# Patient Record
Sex: FEMALE | Race: WHITE | NOT HISPANIC OR LATINO | Employment: UNEMPLOYED | URBAN - METROPOLITAN AREA
[De-identification: names, ages, dates, MRNs, and addresses within clinical notes are randomized per-mention and may not be internally consistent; named-entity substitution may affect disease eponyms.]

---

## 2023-06-03 PROBLEM — F19.10 POLYSUBSTANCE ABUSE: Status: ACTIVE | Noted: 2023-06-03

## 2023-06-03 PROBLEM — A41.9 SEPSIS WITHOUT ACUTE ORGAN DYSFUNCTION, DUE TO UNSPECIFIED ORGANISM: Status: ACTIVE | Noted: 2023-06-03

## 2023-06-03 PROBLEM — R11.2 NAUSEA VOMITING AND DIARRHEA: Status: ACTIVE | Noted: 2023-06-03

## 2023-06-03 PROBLEM — I07.9 ENDOCARDITIS OF TRICUSPID VALVE: Status: ACTIVE | Noted: 2023-06-03

## 2023-06-03 PROBLEM — R78.81 MRSA BACTEREMIA: Status: ACTIVE | Noted: 2023-06-03

## 2023-06-03 PROBLEM — N17.9 AKI (ACUTE KIDNEY INJURY): Status: ACTIVE | Noted: 2023-06-03

## 2023-06-03 PROBLEM — D63.8 ANEMIA, CHRONIC DISEASE: Status: ACTIVE | Noted: 2023-06-03

## 2023-06-03 PROBLEM — R19.7 NAUSEA VOMITING AND DIARRHEA: Status: ACTIVE | Noted: 2023-06-03

## 2023-06-03 PROBLEM — B95.62 MRSA BACTEREMIA: Status: ACTIVE | Noted: 2023-06-03

## 2023-06-04 ENCOUNTER — TELEPHONE (OUTPATIENT)
Dept: PSYCHIATRY | Facility: HOSPITAL | Age: 33
End: 2023-06-04

## 2023-06-07 PROBLEM — Z18.9 RETAINED FOREIGN BODY: Status: ACTIVE | Noted: 2023-06-07

## 2023-06-19 ENCOUNTER — READMISSION MANAGEMENT (OUTPATIENT)
Dept: CALL CENTER | Facility: HOSPITAL | Age: 33
End: 2023-06-19
Payer: COMMERCIAL

## 2023-06-19 NOTE — OUTREACH NOTE
Prep Survey      Flowsheet Row Responses   [facility] facility patient discharged from? [place]   Is LACE score < 7 ? No   Eligibility Not Eligible   What are the reasons patient is not eligible? Other  [continued IV heroin]   Does the patient have one of the following disease processes/diagnoses(primary or secondary)? Sepsis   Prep survey completed? Yes            Miguelina LIU - Registered Nurse

## 2023-08-08 ENCOUNTER — TRANSCRIBE ORDERS (OUTPATIENT)
Dept: ADMINISTRATIVE | Facility: HOSPITAL | Age: 33
End: 2023-08-08
Payer: COMMERCIAL

## 2023-08-08 ENCOUNTER — HOSPITAL ENCOUNTER (OUTPATIENT)
Dept: GENERAL RADIOLOGY | Facility: HOSPITAL | Age: 33
Discharge: HOME | End: 2023-08-08
Admitting: NURSE PRACTITIONER
Payer: COMMERCIAL

## 2023-08-08 DIAGNOSIS — M25.572 LEFT ANKLE PAIN, UNSPECIFIED CHRONICITY: ICD-10-CM

## 2023-08-08 DIAGNOSIS — M25.572 LEFT ANKLE PAIN, UNSPECIFIED CHRONICITY: Primary | ICD-10-CM

## 2023-08-08 PROCEDURE — 73610 X-RAY EXAM OF ANKLE: CPT

## 2023-12-02 ENCOUNTER — APPOINTMENT (OUTPATIENT)
Dept: GENERAL RADIOLOGY | Facility: HOSPITAL | Age: 33
DRG: 871 | End: 2023-12-02
Payer: COMMERCIAL

## 2023-12-02 ENCOUNTER — HOSPITAL ENCOUNTER (INPATIENT)
Facility: HOSPITAL | Age: 33
LOS: 9 days | Discharge: LEFT BEFORE DISCHARGE | DRG: 871 | End: 2023-12-11
Attending: EMERGENCY MEDICINE | Admitting: INTERNAL MEDICINE
Payer: COMMERCIAL

## 2023-12-02 DIAGNOSIS — R65.20 SEPSIS WITH ENCEPHALOPATHY WITHOUT SEPTIC SHOCK, DUE TO UNSPECIFIED ORGANISM: Primary | ICD-10-CM

## 2023-12-02 DIAGNOSIS — G93.41 SEPSIS WITH ENCEPHALOPATHY WITHOUT SEPTIC SHOCK, DUE TO UNSPECIFIED ORGANISM: Primary | ICD-10-CM

## 2023-12-02 DIAGNOSIS — A41.9 SEPSIS WITH ENCEPHALOPATHY WITHOUT SEPTIC SHOCK, DUE TO UNSPECIFIED ORGANISM: Primary | ICD-10-CM

## 2023-12-02 LAB
ALBUMIN SERPL-MCNC: 2.8 G/DL (ref 3.5–5.2)
ALBUMIN/GLOB SERPL: 0.6 G/DL
ALP SERPL-CCNC: 212 U/L (ref 39–117)
ALT SERPL W P-5'-P-CCNC: 61 U/L (ref 1–33)
ANION GAP SERPL CALCULATED.3IONS-SCNC: 23.8 MMOL/L (ref 5–15)
ARTERIAL PATENCY WRIST A: POSITIVE
AST SERPL-CCNC: 111 U/L (ref 1–32)
ATMOSPHERIC PRESS: 749.6 MMHG
B PARAPERT DNA SPEC QL NAA+PROBE: NOT DETECTED
B PERT DNA SPEC QL NAA+PROBE: NOT DETECTED
BASE EXCESS BLDA CALC-SCNC: -15.4 MMOL/L (ref 0–2)
BDY SITE: ABNORMAL
BILIRUB SERPL-MCNC: 2.8 MG/DL (ref 0–1.2)
BUN BLDA-MCNC: 19 MG/DL
BUN SERPL-MCNC: 21 MG/DL (ref 6–20)
BUN/CREAT SERPL: 7.7 (ref 7–25)
C PNEUM DNA NPH QL NAA+NON-PROBE: NOT DETECTED
CA-I BLDA-SCNC: 1.05 MMOL/L (ref 2.15–2.5)
CALCIUM SPEC-SCNC: 8.4 MG/DL (ref 8.6–10.5)
CHLORIDE BLDA-SCNC: 106 MMOL/L (ref 98–107)
CHLORIDE SERPL-SCNC: 95 MMOL/L (ref 98–107)
CO2 BLDA-SCNC: 12.8 MMOL/L (ref 23–27)
CO2 SERPL-SCNC: 15.2 MMOL/L (ref 22–29)
CREAT BLDA-MCNC: 2.83 MG/DL (ref 0.6–130)
CREAT SERPL-MCNC: 2.72 MG/DL (ref 0.57–1)
CRP SERPL-MCNC: 12.82 MG/DL (ref 0–0.5)
D-LACTATE SERPL-SCNC: 11.2 MMOL/L (ref 0.5–2)
D-LACTATE SERPL-SCNC: 11.9 MMOL/L (ref 0.5–2)
D-LACTATE SERPL-SCNC: 5.9 MMOL/L (ref 0.5–2)
D-LACTATE SERPL-SCNC: 7.6 MMOL/L (ref 0.5–2)
DEPRECATED RDW RBC AUTO: 41.3 FL (ref 37–54)
EGFRCR SERPLBLD CKD-EPI 2021: 22.7 ML/MIN/1.73
EOSINOPHIL # BLD MANUAL: 0.17 10*3/MM3 (ref 0–0.4)
EOSINOPHIL NFR BLD MANUAL: 1 % (ref 0.3–6.2)
ERYTHROCYTE [DISTWIDTH] IN BLOOD BY AUTOMATED COUNT: 15.6 % (ref 12.3–15.4)
ERYTHROCYTE [SEDIMENTATION RATE] IN BLOOD: 46 MM/HR (ref 0–20)
ETHANOL BLD-MCNC: <10 MG/DL (ref 0–10)
ETHANOL UR QL: <0.01 %
FLUAV SUBTYP SPEC NAA+PROBE: NOT DETECTED
FLUBV RNA ISLT QL NAA+PROBE: NOT DETECTED
GLOBULIN UR ELPH-MCNC: 4.6 GM/DL
GLUCOSE BLDC GLUCOMTR-MCNC: 28 MG/DL (ref 70–130)
GLUCOSE BLDC GLUCOMTR-MCNC: 31 MG/DL (ref 70–130)
GLUCOSE BLDC GLUCOMTR-MCNC: 62 MG/DL (ref 70–130)
GLUCOSE BLDC GLUCOMTR-MCNC: 64 MG/DL (ref 70–130)
GLUCOSE BLDC GLUCOMTR-MCNC: 69 MG/DL (ref 70–130)
GLUCOSE BLDC GLUCOMTR-MCNC: 69 MG/DL (ref 70–130)
GLUCOSE BLDC GLUCOMTR-MCNC: 88 MG/DL (ref 70–130)
GLUCOSE BLDC GLUCOMTR-MCNC: 94 MG/DL (ref 70–130)
GLUCOSE BLDC GLUCOMTR-MCNC: 95 MG/DL (ref 70–130)
GLUCOSE SERPL-MCNC: 64 MG/DL (ref 65–99)
HADV DNA SPEC NAA+PROBE: NOT DETECTED
HCG SERPL QL: NEGATIVE
HCO3 BLDA-SCNC: 12.5 MMOL/L (ref 22–28)
HCOV 229E RNA SPEC QL NAA+PROBE: NOT DETECTED
HCOV HKU1 RNA SPEC QL NAA+PROBE: NOT DETECTED
HCOV NL63 RNA SPEC QL NAA+PROBE: NOT DETECTED
HCOV OC43 RNA SPEC QL NAA+PROBE: NOT DETECTED
HCT VFR BLD AUTO: 32.4 % (ref 34–46.6)
HCT VFR BLDA CALC: 28 % (ref 38–51)
HEMODILUTION: NO
HGB BLD-MCNC: 9.7 G/DL (ref 12–15.9)
HGB BLDA-MCNC: 9.5 G/DL (ref 12–17)
HMPV RNA NPH QL NAA+NON-PROBE: NOT DETECTED
HPIV1 RNA ISLT QL NAA+PROBE: NOT DETECTED
HPIV2 RNA SPEC QL NAA+PROBE: NOT DETECTED
HPIV3 RNA NPH QL NAA+PROBE: NOT DETECTED
HPIV4 P GENE NPH QL NAA+PROBE: NOT DETECTED
LIPASE SERPL-CCNC: 14 U/L (ref 13–60)
LYMPHOCYTES # BLD MANUAL: 0 10*3/MM3 (ref 0.7–3.1)
LYMPHOCYTES NFR BLD MANUAL: 2 % (ref 5–12)
Lab: ABNORMAL
Lab: ABNORMAL
M PNEUMO IGG SER IA-ACNC: NOT DETECTED
MCH RBC QN AUTO: 22 PG (ref 26.6–33)
MCHC RBC AUTO-ENTMCNC: 29.9 G/DL (ref 31.5–35.7)
MCV RBC AUTO: 73.6 FL (ref 79–97)
METAMYELOCYTES NFR BLD MANUAL: 2 % (ref 0–0)
MODALITY: ABNORMAL
MONOCYTES # BLD: 0.35 10*3/MM3 (ref 0.1–0.9)
NEUTROPHILS # BLD AUTO: 16.39 10*3/MM3 (ref 1.7–7)
NEUTROPHILS NFR BLD MANUAL: 95 % (ref 42.7–76)
NOTIFIED WHO: ABNORMAL
NRBC BLD AUTO-RTO: 0 /100 WBC (ref 0–0.2)
PCO2 BLDA: 36.2 MM HG (ref 35–45)
PH BLDA: 7.14 PH UNITS (ref 7.35–7.45)
PLAT MORPH BLD: NORMAL
PLATELET # BLD AUTO: 144 10*3/MM3 (ref 140–450)
PMV BLD AUTO: 10.4 FL (ref 6–12)
PO2 BLDA: 79.8 MM HG (ref 80–100)
POTASSIUM BLDA-SCNC: 2.9 MMOL/L (ref 3.5–5.2)
POTASSIUM SERPL-SCNC: 3.6 MMOL/L (ref 3.5–5.2)
PROT SERPL-MCNC: 7.4 G/DL (ref 6–8.5)
RBC # BLD AUTO: 4.4 10*6/MM3 (ref 3.77–5.28)
RBC MORPH BLD: NORMAL
READ BACK: YES
RHINOVIRUS RNA SPEC NAA+PROBE: NOT DETECTED
RSV RNA NPH QL NAA+NON-PROBE: NOT DETECTED
SAO2 % BLDCOA: 91.5 % (ref 92–98.5)
SARS-COV-2 RNA NPH QL NAA+NON-PROBE: NOT DETECTED
SODIUM BLD-SCNC: 138 MMOL/L (ref 136–145)
SODIUM SERPL-SCNC: 134 MMOL/L (ref 136–145)
TOTAL RATE: 16 BREATHS/MINUTE
VARIANT LYMPHS NFR BLD MANUAL: 0 % (ref 19.6–45.3)
WBC MORPH BLD: NORMAL
WBC NRBC COR # BLD AUTO: 17.25 10*3/MM3 (ref 3.4–10.8)
WHOLE BLOOD HOLD COAG: NORMAL

## 2023-12-02 PROCEDURE — 83605 ASSAY OF LACTIC ACID: CPT | Performed by: INTERNAL MEDICINE

## 2023-12-02 PROCEDURE — 82948 REAGENT STRIP/BLOOD GLUCOSE: CPT

## 2023-12-02 PROCEDURE — 83605 ASSAY OF LACTIC ACID: CPT | Performed by: EMERGENCY MEDICINE

## 2023-12-02 PROCEDURE — 85025 COMPLETE CBC W/AUTO DIFF WBC: CPT | Performed by: EMERGENCY MEDICINE

## 2023-12-02 PROCEDURE — 85652 RBC SED RATE AUTOMATED: CPT | Performed by: EMERGENCY MEDICINE

## 2023-12-02 PROCEDURE — 71045 X-RAY EXAM CHEST 1 VIEW: CPT

## 2023-12-02 PROCEDURE — 86140 C-REACTIVE PROTEIN: CPT | Performed by: EMERGENCY MEDICINE

## 2023-12-02 PROCEDURE — 25810000003 SODIUM CHLORIDE 0.9 % SOLUTION: Performed by: INTERNAL MEDICINE

## 2023-12-02 PROCEDURE — 25010000002 FENTANYL CITRATE (PF) 50 MCG/ML SOLUTION: Performed by: INTERNAL MEDICINE

## 2023-12-02 PROCEDURE — 83690 ASSAY OF LIPASE: CPT | Performed by: EMERGENCY MEDICINE

## 2023-12-02 PROCEDURE — 80047 BASIC METABLC PNL IONIZED CA: CPT

## 2023-12-02 PROCEDURE — 85007 BL SMEAR W/DIFF WBC COUNT: CPT | Performed by: EMERGENCY MEDICINE

## 2023-12-02 PROCEDURE — 25010000002 HEPARIN (PORCINE) PER 1000 UNITS: Performed by: INTERNAL MEDICINE

## 2023-12-02 PROCEDURE — 25010000002 VASOPRESSIN 20 UNIT/ML SOLUTION

## 2023-12-02 PROCEDURE — 99285 EMERGENCY DEPT VISIT HI MDM: CPT

## 2023-12-02 PROCEDURE — 85018 HEMOGLOBIN: CPT

## 2023-12-02 PROCEDURE — 25010000002 MIDAZOLAM PER 1 MG

## 2023-12-02 PROCEDURE — 0202U NFCT DS 22 TRGT SARS-COV-2: CPT | Performed by: EMERGENCY MEDICINE

## 2023-12-02 PROCEDURE — 25010000002 POTASSIUM CHLORIDE 10 MEQ/100ML SOLUTION

## 2023-12-02 PROCEDURE — 36415 COLL VENOUS BLD VENIPUNCTURE: CPT | Performed by: INTERNAL MEDICINE

## 2023-12-02 PROCEDURE — 87147 CULTURE TYPE IMMUNOLOGIC: CPT | Performed by: EMERGENCY MEDICINE

## 2023-12-02 PROCEDURE — 82803 BLOOD GASES ANY COMBINATION: CPT

## 2023-12-02 PROCEDURE — 25010000002 VANCOMYCIN 10 G RECONSTITUTED SOLUTION: Performed by: EMERGENCY MEDICINE

## 2023-12-02 PROCEDURE — 82077 ASSAY SPEC XCP UR&BREATH IA: CPT | Performed by: EMERGENCY MEDICINE

## 2023-12-02 PROCEDURE — 36600 WITHDRAWAL OF ARTERIAL BLOOD: CPT

## 2023-12-02 PROCEDURE — 87186 SC STD MICRODIL/AGAR DIL: CPT | Performed by: EMERGENCY MEDICINE

## 2023-12-02 PROCEDURE — 25810000003 SODIUM CHLORIDE 0.9 % SOLUTION: Performed by: EMERGENCY MEDICINE

## 2023-12-02 PROCEDURE — 25010000002 ONDANSETRON PER 1 MG: Performed by: INTERNAL MEDICINE

## 2023-12-02 PROCEDURE — 25810000003 DEXTROSE-NACL PER 500 ML: Performed by: INTERNAL MEDICINE

## 2023-12-02 PROCEDURE — 25010000002 PIPERACILLIN SOD-TAZOBACTAM PER 1 G: Performed by: INTERNAL MEDICINE

## 2023-12-02 PROCEDURE — 02HV33Z INSERTION OF INFUSION DEVICE INTO SUPERIOR VENA CAVA, PERCUTANEOUS APPROACH: ICD-10-PCS

## 2023-12-02 PROCEDURE — 80053 COMPREHEN METABOLIC PANEL: CPT | Performed by: EMERGENCY MEDICINE

## 2023-12-02 PROCEDURE — 25010000002 PIPERACILLIN SOD-TAZOBACTAM PER 1 G: Performed by: EMERGENCY MEDICINE

## 2023-12-02 PROCEDURE — 87040 BLOOD CULTURE FOR BACTERIA: CPT | Performed by: EMERGENCY MEDICINE

## 2023-12-02 PROCEDURE — 84703 CHORIONIC GONADOTROPIN ASSAY: CPT | Performed by: EMERGENCY MEDICINE

## 2023-12-02 PROCEDURE — 83605 ASSAY OF LACTIC ACID: CPT

## 2023-12-02 PROCEDURE — 87150 DNA/RNA AMPLIFIED PROBE: CPT | Performed by: EMERGENCY MEDICINE

## 2023-12-02 RX ORDER — ACETAMINOPHEN 500 MG
1000 TABLET ORAL ONCE
Status: COMPLETED | OUTPATIENT
Start: 2023-12-02 | End: 2023-12-02

## 2023-12-02 RX ORDER — DEXTROSE AND SODIUM CHLORIDE 5; .9 G/100ML; G/100ML
75 INJECTION, SOLUTION INTRAVENOUS CONTINUOUS
Status: DISCONTINUED | OUTPATIENT
Start: 2023-12-02 | End: 2023-12-05

## 2023-12-02 RX ORDER — HEPARIN SODIUM 5000 [USP'U]/ML
5000 INJECTION, SOLUTION INTRAVENOUS; SUBCUTANEOUS EVERY 8 HOURS SCHEDULED
Status: DISCONTINUED | OUTPATIENT
Start: 2023-12-02 | End: 2023-12-11 | Stop reason: HOSPADM

## 2023-12-02 RX ORDER — AMOXICILLIN 250 MG
2 CAPSULE ORAL 2 TIMES DAILY
Status: DISCONTINUED | OUTPATIENT
Start: 2023-12-02 | End: 2023-12-06

## 2023-12-02 RX ORDER — SODIUM CHLORIDE 9 MG/ML
125 INJECTION, SOLUTION INTRAVENOUS CONTINUOUS
Status: DISCONTINUED | OUTPATIENT
Start: 2023-12-02 | End: 2023-12-05

## 2023-12-02 RX ORDER — DEXTROSE MONOHYDRATE 25 G/50ML
25 INJECTION, SOLUTION INTRAVENOUS
Status: DISCONTINUED | OUTPATIENT
Start: 2023-12-02 | End: 2023-12-11 | Stop reason: HOSPADM

## 2023-12-02 RX ORDER — DEXTROSE MONOHYDRATE 25 G/50ML
25 INJECTION, SOLUTION INTRAVENOUS ONCE
Status: COMPLETED | OUTPATIENT
Start: 2023-12-02 | End: 2023-12-02

## 2023-12-02 RX ORDER — NITROGLYCERIN 0.4 MG/1
0.4 TABLET SUBLINGUAL
Status: DISCONTINUED | OUTPATIENT
Start: 2023-12-02 | End: 2023-12-11 | Stop reason: HOSPADM

## 2023-12-02 RX ORDER — POTASSIUM CHLORIDE 750 MG/1
40 TABLET, FILM COATED, EXTENDED RELEASE ORAL EVERY 4 HOURS
Status: DISCONTINUED | OUTPATIENT
Start: 2023-12-02 | End: 2023-12-02

## 2023-12-02 RX ORDER — VANCOMYCIN HYDROCHLORIDE 1 G/200ML
1000 INJECTION, SOLUTION INTRAVENOUS EVERY 24 HOURS
Status: DISCONTINUED | OUTPATIENT
Start: 2023-12-03 | End: 2023-12-03

## 2023-12-02 RX ORDER — PHENYLEPHRINE HCL IN 0.9% NACL 0.5 MG/5ML
.5-3 SYRINGE (ML) INTRAVENOUS
Status: DISCONTINUED | OUTPATIENT
Start: 2023-12-02 | End: 2023-12-05

## 2023-12-02 RX ORDER — DEXTROSE MONOHYDRATE 25 G/50ML
INJECTION, SOLUTION INTRAVENOUS
Status: COMPLETED
Start: 2023-12-02 | End: 2023-12-02

## 2023-12-02 RX ORDER — SODIUM CHLORIDE 0.9 % (FLUSH) 0.9 %
10 SYRINGE (ML) INJECTION EVERY 12 HOURS SCHEDULED
Status: DISCONTINUED | OUTPATIENT
Start: 2023-12-02 | End: 2023-12-11 | Stop reason: HOSPADM

## 2023-12-02 RX ORDER — POLYETHYLENE GLYCOL 3350 17 G/17G
17 POWDER, FOR SOLUTION ORAL DAILY PRN
Status: DISCONTINUED | OUTPATIENT
Start: 2023-12-02 | End: 2023-12-06

## 2023-12-02 RX ORDER — SODIUM CHLORIDE 0.9 % (FLUSH) 0.9 %
10 SYRINGE (ML) INJECTION AS NEEDED
Status: DISCONTINUED | OUTPATIENT
Start: 2023-12-02 | End: 2023-12-11 | Stop reason: HOSPADM

## 2023-12-02 RX ORDER — VANCOMYCIN 2 GRAM/500 ML IN 0.9 % SODIUM CHLORIDE INTRAVENOUS
20 ONCE
Status: COMPLETED | OUTPATIENT
Start: 2023-12-02 | End: 2023-12-02

## 2023-12-02 RX ORDER — NOREPINEPHRINE BITARTRATE 0.03 MG/ML
.02-.3 INJECTION, SOLUTION INTRAVENOUS
Status: DISCONTINUED | OUTPATIENT
Start: 2023-12-02 | End: 2023-12-05

## 2023-12-02 RX ORDER — MIDAZOLAM HYDROCHLORIDE 1 MG/ML
2 INJECTION INTRAMUSCULAR; INTRAVENOUS ONCE
Status: COMPLETED | OUTPATIENT
Start: 2023-12-02 | End: 2023-12-02

## 2023-12-02 RX ORDER — FENTANYL CITRATE 50 UG/ML
100 INJECTION, SOLUTION INTRAMUSCULAR; INTRAVENOUS ONCE
Status: COMPLETED | OUTPATIENT
Start: 2023-12-02 | End: 2023-12-02

## 2023-12-02 RX ORDER — NICOTINE POLACRILEX 4 MG
15 LOZENGE BUCCAL
Status: DISCONTINUED | OUTPATIENT
Start: 2023-12-02 | End: 2023-12-11 | Stop reason: HOSPADM

## 2023-12-02 RX ORDER — SODIUM CHLORIDE 9 MG/ML
40 INJECTION, SOLUTION INTRAVENOUS AS NEEDED
Status: DISCONTINUED | OUTPATIENT
Start: 2023-12-02 | End: 2023-12-11 | Stop reason: HOSPADM

## 2023-12-02 RX ORDER — ACETAMINOPHEN 325 MG/1
650 TABLET ORAL EVERY 6 HOURS PRN
Status: DISCONTINUED | OUTPATIENT
Start: 2023-12-02 | End: 2023-12-11 | Stop reason: HOSPADM

## 2023-12-02 RX ORDER — POTASSIUM CHLORIDE 7.45 MG/ML
10 INJECTION INTRAVENOUS
Status: COMPLETED | OUTPATIENT
Start: 2023-12-02 | End: 2023-12-03

## 2023-12-02 RX ORDER — MIDAZOLAM HYDROCHLORIDE 1 MG/ML
INJECTION INTRAMUSCULAR; INTRAVENOUS
Status: COMPLETED
Start: 2023-12-02 | End: 2023-12-02

## 2023-12-02 RX ORDER — BISACODYL 10 MG
10 SUPPOSITORY, RECTAL RECTAL DAILY PRN
Status: DISCONTINUED | OUTPATIENT
Start: 2023-12-02 | End: 2023-12-06

## 2023-12-02 RX ORDER — IBUPROFEN 600 MG/1
1 TABLET ORAL
Status: DISCONTINUED | OUTPATIENT
Start: 2023-12-02 | End: 2023-12-11 | Stop reason: HOSPADM

## 2023-12-02 RX ORDER — BISACODYL 5 MG/1
5 TABLET, DELAYED RELEASE ORAL DAILY PRN
Status: DISCONTINUED | OUTPATIENT
Start: 2023-12-02 | End: 2023-12-06

## 2023-12-02 RX ORDER — ONDANSETRON 2 MG/ML
4 INJECTION INTRAMUSCULAR; INTRAVENOUS EVERY 6 HOURS PRN
Status: DISCONTINUED | OUTPATIENT
Start: 2023-12-02 | End: 2023-12-11 | Stop reason: HOSPADM

## 2023-12-02 RX ORDER — NOREPINEPHRINE BITARTRATE 0.03 MG/ML
INJECTION, SOLUTION INTRAVENOUS
Status: COMPLETED
Start: 2023-12-02 | End: 2023-12-02

## 2023-12-02 RX ORDER — LORAZEPAM 2 MG/ML
1 CONCENTRATE ORAL ONCE
Status: DISCONTINUED | OUTPATIENT
Start: 2023-12-02 | End: 2023-12-02

## 2023-12-02 RX ORDER — OXYCODONE HYDROCHLORIDE AND ACETAMINOPHEN 5; 325 MG/1; MG/1
1 TABLET ORAL EVERY 6 HOURS PRN
Status: DISCONTINUED | OUTPATIENT
Start: 2023-12-02 | End: 2023-12-06

## 2023-12-02 RX ADMIN — POTASSIUM CHLORIDE 10 MEQ: 7.46 INJECTION, SOLUTION INTRAVENOUS at 21:39

## 2023-12-02 RX ADMIN — POTASSIUM CHLORIDE 10 MEQ: 7.46 INJECTION, SOLUTION INTRAVENOUS at 23:55

## 2023-12-02 RX ADMIN — DEXTROSE MONOHYDRATE 25 G: 25 INJECTION, SOLUTION INTRAVENOUS at 17:56

## 2023-12-02 RX ADMIN — SODIUM CHLORIDE 3030 ML: 9 INJECTION, SOLUTION INTRAVENOUS at 11:16

## 2023-12-02 RX ADMIN — POTASSIUM CHLORIDE 40 MEQ: 750 TABLET, EXTENDED RELEASE ORAL at 16:06

## 2023-12-02 RX ADMIN — Medication 10 ML: at 11:17

## 2023-12-02 RX ADMIN — ACETAMINOPHEN 1000 MG: 500 TABLET ORAL at 12:00

## 2023-12-02 RX ADMIN — Medication 0.02 MCG/KG/MIN: at 14:01

## 2023-12-02 RX ADMIN — MIDAZOLAM HYDROCHLORIDE 2 MG: 2 INJECTION, SOLUTION INTRAMUSCULAR; INTRAVENOUS at 22:35

## 2023-12-02 RX ADMIN — Medication 10 ML: at 20:08

## 2023-12-02 RX ADMIN — DEXTROSE MONOHYDRATE 25 G: 25 INJECTION, SOLUTION INTRAVENOUS at 11:07

## 2023-12-02 RX ADMIN — FENTANYL CITRATE 100 MCG: 50 INJECTION, SOLUTION INTRAMUSCULAR; INTRAVENOUS at 22:35

## 2023-12-02 RX ADMIN — Medication 0.3 MCG/KG/MIN: at 18:51

## 2023-12-02 RX ADMIN — ACETAMINOPHEN 650 MG: 325 TABLET, FILM COATED ORAL at 20:29

## 2023-12-02 RX ADMIN — POTASSIUM CHLORIDE 10 MEQ: 7.46 INJECTION, SOLUTION INTRAVENOUS at 23:03

## 2023-12-02 RX ADMIN — DEXTROSE MONOHYDRATE 25 G: 25 INJECTION, SOLUTION INTRAVENOUS at 19:51

## 2023-12-02 RX ADMIN — Medication 0.3 MCG/KG/MIN: at 23:34

## 2023-12-02 RX ADMIN — HEPARIN SODIUM 5000 UNITS: 5000 INJECTION INTRAVENOUS; SUBCUTANEOUS at 21:39

## 2023-12-02 RX ADMIN — VASOPRESSIN 0.03 UNITS/MIN: 20 INJECTION, SOLUTION INTRAVENOUS at 19:31

## 2023-12-02 RX ADMIN — SODIUM CHLORIDE 1000 ML: 9 INJECTION, SOLUTION INTRAVENOUS at 14:44

## 2023-12-02 RX ADMIN — MIDAZOLAM HYDROCHLORIDE 2 MG: 1 INJECTION INTRAMUSCULAR; INTRAVENOUS at 22:56

## 2023-12-02 RX ADMIN — DEXTROSE AND SODIUM CHLORIDE 125 ML/HR: 5; 900 INJECTION, SOLUTION INTRAVENOUS at 17:59

## 2023-12-02 RX ADMIN — MIDAZOLAM HYDROCHLORIDE 2 MG: 2 INJECTION, SOLUTION INTRAMUSCULAR; INTRAVENOUS at 22:56

## 2023-12-02 RX ADMIN — PIPERACILLIN SODIUM AND TAZOBACTAM SODIUM 3.38 G: 3; .375 INJECTION, SOLUTION INTRAVENOUS at 20:52

## 2023-12-02 RX ADMIN — PIPERACILLIN SODIUM AND TAZOBACTAM SODIUM 4.5 G: 4; .5 INJECTION, SOLUTION INTRAVENOUS at 11:23

## 2023-12-02 RX ADMIN — HEPARIN SODIUM 5000 UNITS: 5000 INJECTION INTRAVENOUS; SUBCUTANEOUS at 16:06

## 2023-12-02 RX ADMIN — VANCOMYCIN HYDROCHLORIDE 2000 MG: 10 INJECTION, POWDER, LYOPHILIZED, FOR SOLUTION INTRAVENOUS at 11:31

## 2023-12-02 RX ADMIN — ONDANSETRON 4 MG: 2 INJECTION INTRAMUSCULAR; INTRAVENOUS at 18:02

## 2023-12-02 RX ADMIN — SODIUM CHLORIDE 1000 ML: 9 INJECTION, SOLUTION INTRAVENOUS at 17:30

## 2023-12-02 RX ADMIN — SODIUM CHLORIDE 125 ML/HR: 9 INJECTION, SOLUTION INTRAVENOUS at 14:43

## 2023-12-02 RX ADMIN — POTASSIUM CHLORIDE 10 MEQ: 7.46 INJECTION, SOLUTION INTRAVENOUS at 20:30

## 2023-12-02 NOTE — ED NOTES
Pt to ED via EMS from home, boyfriend called informed them she had done heroin this am, has had n/v/d for days. Pt a/o x 4 at this time, states mild sore throat. Pt refused nia and md solange aware

## 2023-12-02 NOTE — ED TRIAGE NOTES
Patient to ED via EMS from home. Patient's boyfriend called EMS for diarrhea and weakness. EMS reports that patient's last heroine use was this morning.

## 2023-12-02 NOTE — ED PROVIDER NOTES
EMERGENCY DEPARTMENT ENCOUNTER    Room Number:  33/33  PCP: Provider, No Known  Patient Care Team:  Provider, No Known as PCP - General   Independent Historians: Patient EMS    HPI:  Chief Complaint: Hypotension, altered mental status    A complete HPI/ROS/PMH/PSH/SH/FH are unobtainable due to: Altered mental status    Chronic or social conditions impacting patient care (Social Determinants of Health): Chronic IV drug use, history of noncompliant behavior  (Financial Resource Strain / Food Insecurity / Transportation Needs / Physical Activity / Stress / Social Connections / Intimate Partner Violence / Housing Stability)    Context: Sammie Landeros is a 33 y.o. female who presents to the ED c/o acute altered status.  EMS reports that the patient has had diarrhea for the last several days.  She has been crawling around on the floor at home because she has been too weak to stand.  EMS reports she has a history of IV drug use and has been using as recently as yesterday and today.  They found her hypotensive.  They found her hypoglycemic.  They were unable to obtain IV access.  She is not able to provide much history.    Review of prior external notes (non-ED) -and- Review of prior external test results outside of this encounter: Discharge summary dated 6/19/2023 with sepsis, MRSA and polysubstance abuse with nausea vomiting and diarrhea    Prescription drug monitoring program review:         PAST MEDICAL HISTORY  Active Ambulatory Problems     Diagnosis Date Noted    Cellulitis of left ankle 07/17/2016    Sepsis without acute organ dysfunction, due to unspecified organism 06/03/2023    Anemia, chronic disease 06/03/2023    ANTONELLA (acute kidney injury) 06/03/2023    MRSA bacteremia 06/03/2023    s 06/03/2023    Polysubstance abuse 06/03/2023    Nausea vomiting and diarrhea 06/03/2023    Endocarditis of tricuspid valve 06/03/2023    Retained foreign body 06/07/2023     Resolved Ambulatory Problems     Diagnosis Date Noted     "No Resolved Ambulatory Problems     Past Medical History:   Diagnosis Date    Hepatitis C     Hyperlipidemia          PAST SURGICAL HISTORY  Past Surgical History:   Procedure Laterality Date    ADENOIDECTOMY      BACK SURGERY      INCISION AND DRAINAGE LEG Left 7/20/2016    Procedure: Incision and Drainage left ankle;  Surgeon: Zechariah Kunz MD;  Location: [facility] OR;  Service:     TONSILLECTOMY           FAMILY HISTORY  Family History   Problem Relation Age of Onset    Other Mother         ADOPTED         SOCIAL HISTORY  Social History     Socioeconomic History    Marital status: Single   Tobacco Use    Smoking status: Former     Packs/day: 1     Types: Cigarettes   Vaping Use    Vaping Use: Some days   Substance and Sexual Activity    Alcohol use: No    Drug use: Yes     Frequency: 21.0 times per week     Types: Heroin     Comment: \"USE $20-&50 UP TO 3 TIMES PER DAY\"    Sexual activity: Defer         ALLERGIES  Patient has no known allergies.        REVIEW OF SYSTEMS  Review of Systems  Included in HPI  All systems reviewed and negative except for those discussed in HPI.      PHYSICAL EXAM    I have reviewed the triage vital signs and nursing notes.    ED Triage Vitals   Temp Heart Rate Resp BP SpO2   12/02/23 1107 12/02/23 1104 12/02/23 1104 12/02/23 1106 --   (!) 100.6 °F (38.1 °C) 94 12 90/45       Temp src Heart Rate Source Patient Position BP Location FiO2 (%)   12/02/23 1107 -- -- -- --   Tympanic           Physical Exam  GENERAL: Awake, alert, acutely and chronically ill-appearing  SKIN: Warm, dry  HENT: Normocephalic, atraumatic  EYES: no scleral icterus  CV: regular rhythm, regular rate  RESPIRATORY: normal effort, lungs clear  ABDOMEN: soft, nontender, nondistended  MUSCULOSKELETAL: no deformity, scars to the bilateral lower extremities with persistent open wounds without drainage or erythema.  NEURO: alert, moves all extremities, follows commands                                                  "              LAB RESULTS  Recent Results (from the past 24 hour(s))   POC Glucose Once    Collection Time: 12/02/23 11:04 AM    Specimen: Blood   Result Value Ref Range    Glucose 28 (C) 70 - 130 mg/dL   Comprehensive Metabolic Panel    Collection Time: 12/02/23 11:10 AM    Specimen: Blood   Result Value Ref Range    Glucose 64 (L) 65 - 99 mg/dL    BUN 21 (H) 6 - 20 mg/dL    Creatinine 2.72 (H) 0.57 - 1.00 mg/dL    Sodium 134 (L) 136 - 145 mmol/L    Potassium 3.6 3.5 - 5.2 mmol/L    Chloride 95 (L) 98 - 107 mmol/L    CO2 15.2 (L) 22.0 - 29.0 mmol/L    Calcium 8.4 (L) 8.6 - 10.5 mg/dL    Total Protein 7.4 6.0 - 8.5 g/dL    Albumin 2.8 (L) 3.5 - 5.2 g/dL    ALT (SGPT) 61 (H) 1 - 33 U/L    AST (SGOT) 111 (H) 1 - 32 U/L    Alkaline Phosphatase 212 (H) 39 - 117 U/L    Total Bilirubin 2.8 (H) 0.0 - 1.2 mg/dL    Globulin 4.6 gm/dL    A/G Ratio 0.6 g/dL    BUN/Creatinine Ratio 7.7 7.0 - 25.0    Anion Gap 23.8 (H) 5.0 - 15.0 mmol/L    eGFR 22.7 (L) >60.0 mL/min/1.73   hCG, Serum, Qualitative    Collection Time: 12/02/23 11:10 AM    Specimen: Blood   Result Value Ref Range    HCG Qualitative Negative Negative   Lipase    Collection Time: 12/02/23 11:10 AM    Specimen: Blood   Result Value Ref Range    Lipase 14 13 - 60 U/L   Lactic Acid, Plasma    Collection Time: 12/02/23 11:10 AM    Specimen: Blood   Result Value Ref Range    Lactate 11.2 (C) 0.5 - 2.0 mmol/L   Ethanol    Collection Time: 12/02/23 11:10 AM    Specimen: Blood   Result Value Ref Range    Ethanol <10 0 - 10 mg/dL    Ethanol % <0.010 %   CBC Auto Differential    Collection Time: 12/02/23 11:10 AM    Specimen: Blood   Result Value Ref Range    WBC 17.25 (H) 3.40 - 10.80 10*3/mm3    RBC 4.40 3.77 - 5.28 10*6/mm3    Hemoglobin 9.7 (L) 12.0 - 15.9 g/dL    Hematocrit 32.4 (L) 34.0 - 46.6 %    MCV 73.6 (L) 79.0 - 97.0 fL    MCH 22.0 (L) 26.6 - 33.0 pg    MCHC 29.9 (L) 31.5 - 35.7 g/dL    RDW 15.6 (H) 12.3 - 15.4 %    RDW-SD 41.3 37.0 - 54.0 fl    MPV 10.4 6.0 - 12.0  fL    Platelets 144 140 - 450 10*3/mm3    nRBC 0.0 0.0 - 0.2 /100 WBC   Sedimentation Rate    Collection Time: 12/02/23 11:10 AM    Specimen: Blood   Result Value Ref Range    Sed Rate 46 (H) 0 - 20 mm/hr   C-reactive Protein    Collection Time: 12/02/23 11:10 AM    Specimen: Blood   Result Value Ref Range    C-Reactive Protein 12.82 (H) 0.00 - 0.50 mg/dL   Manual Differential    Collection Time: 12/02/23 11:10 AM    Specimen: Blood   Result Value Ref Range    Neutrophil % 95.0 (H) 42.7 - 76.0 %    Lymphocyte % 0.0 (L) 19.6 - 45.3 %    Monocyte % 2.0 (L) 5.0 - 12.0 %    Eosinophil % 1.0 0.3 - 6.2 %    Metamyelocyte % 2.0 (H) 0.0 - 0.0 %    Neutrophils Absolute 16.39 (H) 1.70 - 7.00 10*3/mm3    Lymphocytes Absolute 0.00 (L) 0.70 - 3.10 10*3/mm3    Monocytes Absolute 0.35 0.10 - 0.90 10*3/mm3    Eosinophils Absolute 0.17 0.00 - 0.40 10*3/mm3    RBC Morphology Normal Normal    WBC Morphology Normal Normal    Platelet Morphology Normal Normal   Respiratory Panel PCR w/COVID-19(SARS-CoV-2) ALEAH/JOHNNIE/VENU/PAD/COR/DELORIS In-House, NP Swab in UTM/VTM, 2 HR TAT - Swab, Nasopharynx    Collection Time: 12/02/23 11:11 AM    Specimen: Nasopharynx; Swab   Result Value Ref Range    ADENOVIRUS, PCR Not Detected Not Detected    Coronavirus 229E Not Detected Not Detected    Coronavirus HKU1 Not Detected Not Detected    Coronavirus NL63 Not Detected Not Detected    Coronavirus OC43 Not Detected Not Detected    COVID19 Not Detected Not Detected - Ref. Range    Human Metapneumovirus Not Detected Not Detected    Human Rhinovirus/Enterovirus Not Detected Not Detected    Influenza A PCR Not Detected Not Detected    Influenza B PCR Not Detected Not Detected    Parainfluenza Virus 1 Not Detected Not Detected    Parainfluenza Virus 2 Not Detected Not Detected    Parainfluenza Virus 3 Not Detected Not Detected    Parainfluenza Virus 4 Not Detected Not Detected    RSV, PCR Not Detected Not Detected    Bordetella pertussis pcr Not Detected Not  Detected    Bordetella parapertussis PCR Not Detected Not Detected    Chlamydophila pneumoniae PCR Not Detected Not Detected    Mycoplasma pneumo by PCR Not Detected Not Detected   Light Blue Top    Collection Time: 12/02/23 11:20 AM   Result Value Ref Range    Extra Tube Hold for add-ons.    POC Glucose Once    Collection Time: 12/02/23 11:27 AM    Specimen: Blood   Result Value Ref Range    Glucose 88 70 - 130 mg/dL   POC Lactate    Collection Time: 12/02/23 11:54 AM    Specimen: Arterial Blood   Result Value Ref Range    Lactate 11.9 (C) 0.5 - 2.0 mmol/L    Notified Time      Notified Who ramy shields     Read Back Yes    POC Chem Panel    Collection Time: 12/02/23 11:54 AM    Specimen: Arterial Blood   Result Value Ref Range    Glucose 94 70 - 130 mg/dL    Sodium 138 136 - 145 mmol/L    POC Potassium 2.9 (L) 3.5 - 5.2 mmol/L    Ionized Calcium 1.05 (L) 2.15 - 2.50 mmol/L    Chloride 106 98 - 107 mmol/L    Creatinine 2.83 0.60 - 130.00 mg/dL    BUN 19 mg/dL    CO2 Content 12.8 (L) 23 - 27 mmol/L    Notified Who ramy shields     Read Back Yes    POC H&H    Collection Time: 12/02/23 11:54 AM    Specimen: Arterial Blood   Result Value Ref Range    Hemoglobin 9.5 (L) 12.0 - 17.0 g/dL    Hematocrit 28 (L) 38 - 51 %   Blood Gas, Arterial -    Collection Time: 12/02/23 11:54 AM    Specimen: Arterial Blood   Result Value Ref Range    Site Right Radial     Vicente's Test Positive     pH, Arterial 7.144 (C) 7.350 - 7.450 pH units    pCO2, Arterial 36.2 35.0 - 45.0 mm Hg    pO2, Arterial 79.8 (L) 80.0 - 100.0 mm Hg    HCO3, Arterial 12.5 (L) 22.0 - 28.0 mmol/L    Base Excess, Arterial -15.4 (L) 0.0 - 2.0 mmol/L    O2 Saturation, Arterial 91.5 (L) 92.0 - 98.5 %    Barometric Pressure for Blood Gas 749.6000 mmHg    Modality Room Air     Rate 16 Breaths/minute    Notified Sean shields     Read Back Yes     Notified Time      Hemodilution No    POC Glucose Once    Collection Time: 12/02/23 12:32 PM    Specimen: Blood    Result Value Ref Range    Glucose 69 (L) 70 - 130 mg/dL       I ordered the above labs and independently reviewed the results.        RADIOLOGY  XR Chest 1 View    Result Date: 12/2/2023  PORTABLE CHEST X-RAY  HISTORY: Drug overdose, hypoglycemia and fever.  Portable chest x-ray is provided. Correlation: Chest x-ray [place] 3, 2023 and abdomen pelvis CT June 12, 2023.  FINDINGS: The cardiomediastinal silhouette is normal. The lungs are clear. The costophrenic sulci are dry and the bones appear normal. There is no pneumothorax.      Negative.  This report was finalized on 12/2/2023 12:24 PM by Dr. Joel Hussein M.D on Workstation: [id number]       I ordered the above noted radiological studies. Reviewed by me and discussed with radiologist.  See dictation for official radiology interpretation.      PROCEDURES    Procedures      MEDICATIONS GIVEN IN ER    Medications   sodium chloride 0.9 % flush 10 mL (10 mL Intravenous Given 12/2/23 1117)   vancomycin IVPB 2000 mg in 0.9% Sodium Chloride 500 mL (2,000 mg Intravenous New Bag 12/2/23 1131)   sodium chloride 0.9 % bolus 3,030 mL (0 mL Intravenous Stopped 12/2/23 1240)   dextrose (D50W) (25 g/50 mL) IV injection 25 g (25 g Intravenous Given 12/2/23 1107)   piperacillin-tazobactam (ZOSYN) 4.5 g in iso-osmotic dextrose 100 mL IVPB (premix) (0 g Intravenous Stopped 12/2/23 1154)   acetaminophen (TYLENOL) tablet 1,000 mg (1,000 mg Oral Given 12/2/23 1200)         ORDERS PLACED DURING THIS VISIT:  Orders Placed This Encounter   Procedures    Respiratory Panel PCR w/COVID-19(SARS-CoV-2) ALEAH/JOHNNIE/VENU/PAD/COR/DELORIS In-House, NP Swab in UTM/VTM, 2 HR TAT - Swab, Nasopharynx    Blood Culture - Blood,    Blood Culture - Blood,    Clostridioides difficile Toxin - Stool, Per Rectum    Gastrointestinal Panel, PCR - Stool, Per Rectum    Clostridioides difficile Toxin, PCR - Stool, Per Rectum    XR Chest 1 View    Comprehensive Metabolic Panel    hCG, Serum, Qualitative    Lipase     Urinalysis With Culture If Indicated - Urine, Catheter    Lactic Acid, Plasma    Ethanol    Urine Drug Screen - Urine, Clean Catch    CBC Auto Differential    Sedimentation Rate    C-reactive Protein    [place] Draw    Manual Differential    Blood Gas, Arterial -    STAT Lactic Acid, Reflex    Blood Gas, Arterial -    Monitor Blood Pressure    Pulse Oximetry, Continuous    IP Palliative Care Nurse Consult    Pulmonology (on-call MD unless specified)    Patient Isolation Contact Spore    POC Glucose Once    POC Glucose Once    POC Lactate    POC Chem Panel    POC H&H    POC Glucose Once    Insert Peripheral IV    Insert 2nd peripheral IV    Inpatient Admission    CBC & Differential    Light Blue Top         PROGRESS, DATA ANALYSIS, CONSULTS, AND MEDICAL DECISION MAKING    All labs have been independently interpreted by me.  All radiology studies have been reviewed by me and discussed with radiologist dictating the report.   EKG's independently viewed and interpreted by me.  Discussion below represents my analysis of pertinent findings related to patient's condition, differential diagnosis, treatment plan and final disposition.    Differential diagnosis includes but is not limited to sepsis, UTI, endocarditis, pneumonia, hypoglycemia.    Clinical Scores:              ED Course as of 12/02/23 1241   Sat Dec 02, 2023   1141 The patient's mental status is significantly improved with correction of her glucose [TR]   1141 Creatinine(!): 2.72 [TR]   1141 CO2(!): 15.2 [TR]   1142 Total Bilirubin(!): 2.8 [TR]   1154 XR Chest 1 View  My independent interpretation of the chest x-rayIs cardiomegaly [TR]   1204 WBC(!): 17.25 [TR]   1210 BP: 97/43 [TR]   1223 I updated the mother at the bedside. [TR]   1225 pH, Arterial(!!): 7.144 [TR]   1240 Discussing with Dr. Fowler with pulmonary ICU.  He agrees to admit. [TR]      ED Course User Index  [TR] Joseph Haile MD               Critical care provider statement:     Critical care  time (minutes): 30-74.    Critical care time was exclusive of:  Separately billable procedures and treating other patients    Critical care was necessary to treat or prevent imminent or life-threatening deterioration of the following conditions: Sepsis    Critical care was time spent personally by me on the following activities:  Development of treatment plan with patient or surrogate, discussions with consultants, evaluation of patient's response to treatment, examination of patient, obtaining history from patient or surrogate, ordering and performing treatments and interventions, ordering and review of laboratory studies, ordering and review of radiographic studies, pulse oximetry, re-evaluation of patient's condition and review of old charts    PPE: The patient wore a mask and I wore an N95 mask throughout the entire patient encounter.       AS OF 12:41 EST VITALS:    BP - 97/43  HR - 98  TEMP - (!) 100.6 °F (38.1 °C) (Tympanic)  O2 SATS - 100%        DIAGNOSIS  Final diagnoses:   Sepsis with encephalopathy without septic shock, due to unspecified organism         DISPOSITION  ED Disposition       ED Disposition   Decision to Admit    Condition   --    Comment   Level of Care: Critical Care [6]   Diagnosis: Sepsis [[id number]]   Admitting Physician: ALEKS VERDE [[id number]]   Attending Physician: ALEKS VERDE [[id number]]   Certification: I Certify That Inpatient Hospital Services Are Medically Necessary For Greater Than 2 Midnights                    Note Disclaimer: At [facility], we believe that sharing information builds trust and better relationships. You are receiving this note because you recently visited [facility]. It is possible you will see health information before a provider has talked with you about it. This kind of information can be easy to misunderstand. To help you fully understand what it means for your health, we urge you to discuss this note with your provider.         Joseph Haile,  MD  12/02/23 1244

## 2023-12-02 NOTE — PROGRESS NOTES
"[facility] Clinical Pharmacy Services: Vancomycin Pharmacokinetic Initial Consult Note    Sammie Landeros is a 33 y.o. female who is on day 1 of pharmacy to dose vancomycin.    Indication: Sepsis  Consulting Provider: Malcolm  Planned Duration of Therapy: 7 days  Loading Dose Ordered or Given: LD in ED given   MRSA PCR performed: ; Result:   Culture/Source: Hx of endocarditis tricuspid valve,   Target: Dose by Levels  Pertinent Vanc Dosing History:   Other Antimicrobials: Zosyn EI     Vitals/Labs  Ht: 175.3 cm (69\"); Wt: 101 kg (221 lb 12.5 oz)  Temp Readings from Last 1 Encounters:   12/02/23 96.1 °F (35.6 °C) (Oral)    Estimated Creatinine Clearance: 35.1 mL/min (by C-G formula based on SCr of 2.83 mg/dL).  ANTONELLA- HoTN  - baseline based on Hx to be around 1.5 - 1.7     Results from last 7 days   Lab Units 12/02/23  1154 12/02/23  1110   CREATININE mg/dL 2.83 2.72*   WBC 10*3/mm3  --  17.25*     Assessment/Plan:    Vancomycin Dose:   1000 mg IV every  24  hours - given septic shock and fluid resuscitation expect Vd to be slightly higher, will go ahead and schedule a dose for tomorrow and reassess kidney function and the need for intermittent dosing in the morning on 12/3 prior to dose tomorrow. Estimated AUC is 473 at current kidney function  Will assess kidney function with SCr on 12/3, no levels ordered at this time.       Pharmacy will follow patient's kidney function and will adjust doses and obtain levels as necessary. Thank you for involving pharmacy in this patient's care. Please contact pharmacy with any questions or concerns.                           Emani Montiel, PharmD  Clinical Pharmacist    " slight TTP of dorsum of Lt foot

## 2023-12-02 NOTE — PLAN OF CARE
Goal Outcome Evaluation:  Plan of Care Reviewed With: patient        Progress: no change  Admitted from ED Blood pressure low.  Fluid bolus given times 2.  On Levophed at 0.28 at present.  Pt non-complaint with multiple things.  Glucose dropped to 31.  1 amp of D50 given and iv fluids changed to d5ns.  Glucose now running 69.  Will watch closely
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
## 2023-12-02 NOTE — ATTESTATION SEPSIS FOCUSED EXAM
SEPTIC SHOCK FOCUSED EXAM ATTESTATION    I attest that I have reassessed tissue perfusion after the fluid bolus given.    Palmira Fowler MD  12/02/23  14:25 EST

## 2023-12-03 ENCOUNTER — APPOINTMENT (OUTPATIENT)
Dept: CARDIOLOGY | Facility: HOSPITAL | Age: 33
DRG: 871 | End: 2023-12-03
Payer: COMMERCIAL

## 2023-12-03 LAB
ALBUMIN SERPL-MCNC: 2.4 G/DL (ref 3.5–5.2)
ALBUMIN/GLOB SERPL: 0.6 G/DL
ALP SERPL-CCNC: 127 U/L (ref 39–117)
ALT SERPL W P-5'-P-CCNC: 48 U/L (ref 1–33)
ANION GAP SERPL CALCULATED.3IONS-SCNC: 15 MMOL/L (ref 5–15)
AORTIC DIMENSIONLESS INDEX: 0.7 (DI)
ASCENDING AORTA: 2.9 CM
AST SERPL-CCNC: 81 U/L (ref 1–32)
BACTERIA BLD CULT: ABNORMAL
BACTERIA ID TEST ISLT QL CULT: ABNORMAL
BH CV ECHO MEAS - ACS: 2.27 CM
BH CV ECHO MEAS - AO MAX PG: 16.1 MMHG
BH CV ECHO MEAS - AO MEAN PG: 9.2 MMHG
BH CV ECHO MEAS - AO ROOT DIAM: 2.9 CM
BH CV ECHO MEAS - AO V2 MAX: 200.9 CM/SEC
BH CV ECHO MEAS - AO V2 VTI: 29.3 CM
BH CV ECHO MEAS - AVA(I,D): 2.7 CM2
BH CV ECHO MEAS - EDV(CUBED): 103.9 ML
BH CV ECHO MEAS - EDV(MOD-SP2): 41 ML
BH CV ECHO MEAS - EDV(MOD-SP4): 62 ML
BH CV ECHO MEAS - EF(MOD-BP): 65.2 %
BH CV ECHO MEAS - EF(MOD-SP2): 61 %
BH CV ECHO MEAS - EF(MOD-SP4): 66.1 %
BH CV ECHO MEAS - ESV(CUBED): 25.7 ML
BH CV ECHO MEAS - ESV(MOD-SP2): 16 ML
BH CV ECHO MEAS - ESV(MOD-SP4): 21 ML
BH CV ECHO MEAS - FS: 37.3 %
BH CV ECHO MEAS - IVS/LVPW: 1.39 CM
BH CV ECHO MEAS - IVSD: 1.8 CM
BH CV ECHO MEAS - LA DIMENSION: 4 CM
BH CV ECHO MEAS - LAT PEAK E' VEL: 17.1 CM/SEC
BH CV ECHO MEAS - LV DIASTOLIC VOL/BSA (35-75): 28.4 CM2
BH CV ECHO MEAS - LV MASS(C)D: 307.4 GRAMS
BH CV ECHO MEAS - LV MAX PG: 7.4 MMHG
BH CV ECHO MEAS - LV MEAN PG: 3.4 MMHG
BH CV ECHO MEAS - LV SYSTOLIC VOL/BSA (12-30): 9.6 CM2
BH CV ECHO MEAS - LV V1 MAX: 136 CM/SEC
BH CV ECHO MEAS - LV V1 VTI: 21.3 CM
BH CV ECHO MEAS - LVIDD: 4.7 CM
BH CV ECHO MEAS - LVIDS: 2.9 CM
BH CV ECHO MEAS - LVOT AREA: 3.7 CM2
BH CV ECHO MEAS - LVOT DIAM: 2.16 CM
BH CV ECHO MEAS - LVPWD: 1.29 CM
BH CV ECHO MEAS - MED PEAK E' VEL: 11.2 CM/SEC
BH CV ECHO MEAS - MV A DUR: 0.12 SEC
BH CV ECHO MEAS - MV A MAX VEL: 102.8 CM/SEC
BH CV ECHO MEAS - MV DEC SLOPE: 673.5 CM/SEC2
BH CV ECHO MEAS - MV DEC TIME: 206 SEC
BH CV ECHO MEAS - MV E MAX VEL: 139 CM/SEC
BH CV ECHO MEAS - MV E/A: 1.35
BH CV ECHO MEAS - MV MAX PG: 9.6 MMHG
BH CV ECHO MEAS - MV MEAN PG: 4.2 MMHG
BH CV ECHO MEAS - MV P1/2T: 67.3 MSEC
BH CV ECHO MEAS - MV V2 VTI: 33.9 CM
BH CV ECHO MEAS - MVA(P1/2T): 3.3 CM2
BH CV ECHO MEAS - MVA(VTI): 2.31 CM2
BH CV ECHO MEAS - PA ACC TIME: 0.14 SEC
BH CV ECHO MEAS - PA V2 MAX: 143.9 CM/SEC
BH CV ECHO MEAS - PULM A REVS DUR: 0.13 SEC
BH CV ECHO MEAS - PULM A REVS VEL: 41.9 CM/SEC
BH CV ECHO MEAS - PULM DIAS VEL: 34.3 CM/SEC
BH CV ECHO MEAS - PULM S/D: 1.57
BH CV ECHO MEAS - PULM SYS VEL: 53.8 CM/SEC
BH CV ECHO MEAS - QP/QS: 1.34
BH CV ECHO MEAS - RAP SYSTOLE: 3 MMHG
BH CV ECHO MEAS - RV MAX PG: 3.9 MMHG
BH CV ECHO MEAS - RV V1 MAX: 98.5 CM/SEC
BH CV ECHO MEAS - RV V1 VTI: 18.8 CM
BH CV ECHO MEAS - RVDD: 1.57 CM
BH CV ECHO MEAS - RVOT DIAM: 2.7 CM
BH CV ECHO MEAS - RVSP: 44.4 MMHG
BH CV ECHO MEAS - SI(MOD-SP2): 11.5 ML/M2
BH CV ECHO MEAS - SI(MOD-SP4): 18.8 ML/M2
BH CV ECHO MEAS - SV(LVOT): 78.3 ML
BH CV ECHO MEAS - SV(MOD-SP2): 25 ML
BH CV ECHO MEAS - SV(MOD-SP4): 41 ML
BH CV ECHO MEAS - SV(RVOT): 105.1 ML
BH CV ECHO MEAS - TAPSE (>1.6): 3.2 CM
BH CV ECHO MEAS - TR MAX PG: 41.4 MMHG
BH CV ECHO MEAS - TR MAX VEL: 321.7 CM/SEC
BH CV ECHO MEASUREMENTS AVERAGE E/E' RATIO: 9.82
BH CV ECHO SHUNT ASSESSMENT PERFORMED (HIDDEN SCRIPTING): 1
BH CV XLRA - RV BASE: 3.5 CM
BH CV XLRA - RV LENGTH: 6.5 CM
BH CV XLRA - RV MID: 3.3 CM
BH CV XLRA - TDI S': 23.5 CM/SEC
BILIRUB SERPL-MCNC: 2.6 MG/DL (ref 0–1.2)
BOTTLE TYPE: ABNORMAL
BUN SERPL-MCNC: 30 MG/DL (ref 6–20)
BUN/CREAT SERPL: 9.4 (ref 7–25)
CALCIUM SPEC-SCNC: 6.7 MG/DL (ref 8.6–10.5)
CHLORIDE SERPL-SCNC: 105 MMOL/L (ref 98–107)
CLUE CELLS SPEC QL WET PREP: ABNORMAL
CO2 SERPL-SCNC: 15 MMOL/L (ref 22–29)
CREAT SERPL-MCNC: 3.18 MG/DL (ref 0.57–1)
D-LACTATE SERPL-SCNC: 2.7 MMOL/L (ref 0.5–2)
D-LACTATE SERPL-SCNC: 3.3 MMOL/L (ref 0.5–2)
D-LACTATE SERPL-SCNC: 4.7 MMOL/L (ref 0.5–2)
D-LACTATE SERPL-SCNC: 5.1 MMOL/L (ref 0.5–2)
D-LACTATE SERPL-SCNC: 5.1 MMOL/L (ref 0.5–2)
DEPRECATED RDW RBC AUTO: 40.3 FL (ref 37–54)
EGFRCR SERPLBLD CKD-EPI 2021: 18.8 ML/MIN/1.73
EOSINOPHIL # BLD MANUAL: 0.17 10*3/MM3 (ref 0–0.4)
EOSINOPHIL NFR BLD MANUAL: 1 % (ref 0.3–6.2)
ERYTHROCYTE [DISTWIDTH] IN BLOOD BY AUTOMATED COUNT: 15.9 % (ref 12.3–15.4)
GLOBULIN UR ELPH-MCNC: 4 GM/DL
GLUCOSE BLDC GLUCOMTR-MCNC: 120 MG/DL (ref 70–130)
GLUCOSE BLDC GLUCOMTR-MCNC: 79 MG/DL (ref 70–130)
GLUCOSE BLDC GLUCOMTR-MCNC: 95 MG/DL (ref 70–130)
GLUCOSE BLDC GLUCOMTR-MCNC: 95 MG/DL (ref 70–130)
GLUCOSE SERPL-MCNC: 109 MG/DL (ref 65–99)
HCT VFR BLD AUTO: 28 % (ref 34–46.6)
HCV AB SER DONR QL: REACTIVE
HGB BLD-MCNC: 8.7 G/DL (ref 12–15.9)
HIV 1+2 AB+HIV1 P24 AG SERPL QL IA: NORMAL
HYDATID CYST SPEC WET PREP: ABNORMAL
LEFT ATRIUM VOLUME INDEX: 32.3 ML/M2
LYMPHOCYTES # BLD MANUAL: 0.17 10*3/MM3 (ref 0.7–3.1)
LYMPHOCYTES NFR BLD MANUAL: 2 % (ref 5–12)
MCH RBC QN AUTO: 22.4 PG (ref 26.6–33)
MCHC RBC AUTO-ENTMCNC: 31.1 G/DL (ref 31.5–35.7)
MCV RBC AUTO: 72 FL (ref 79–97)
MONOCYTES # BLD: 0.34 10*3/MM3 (ref 0.1–0.9)
NEUTROPHILS # BLD AUTO: 16.19 10*3/MM3 (ref 1.7–7)
NEUTROPHILS NFR BLD MANUAL: 96 % (ref 42.7–76)
PLAT MORPH BLD: NORMAL
PLATELET # BLD AUTO: 124 10*3/MM3 (ref 140–450)
PMV BLD AUTO: 10 FL (ref 6–12)
POIKILOCYTOSIS BLD QL SMEAR: ABNORMAL
POTASSIUM SERPL-SCNC: 4.8 MMOL/L (ref 3.5–5.2)
POTASSIUM SERPL-SCNC: 4.8 MMOL/L (ref 3.5–5.2)
PROT SERPL-MCNC: 6.4 G/DL (ref 6–8.5)
RBC # BLD AUTO: 3.89 10*6/MM3 (ref 3.77–5.28)
SINUS: 2.42 CM
SODIUM SERPL-SCNC: 135 MMOL/L (ref 136–145)
STJ: 2.45 CM
T VAGINALIS SPEC QL WET PREP: ABNORMAL
VANCOMYCIN SERPL-MCNC: 20.3 MCG/ML (ref 5–40)
VARIANT LYMPHS NFR BLD MANUAL: 1 % (ref 19.6–45.3)
WBC MORPH BLD: NORMAL
WBC NRBC COR # BLD AUTO: 16.86 10*3/MM3 (ref 3.4–10.8)
WBC SPEC QL WET PREP: ABNORMAL
YEAST GENITAL QL WET PREP: ABNORMAL

## 2023-12-03 PROCEDURE — G0432 EIA HIV-1/HIV-2 SCREEN: HCPCS | Performed by: INTERNAL MEDICINE

## 2023-12-03 PROCEDURE — 85025 COMPLETE CBC W/AUTO DIFF WBC: CPT | Performed by: INTERNAL MEDICINE

## 2023-12-03 PROCEDURE — 93306 TTE W/DOPPLER COMPLETE: CPT

## 2023-12-03 PROCEDURE — 25010000002 POTASSIUM CHLORIDE 10 MEQ/100ML SOLUTION

## 2023-12-03 PROCEDURE — 85007 BL SMEAR W/DIFF WBC COUNT: CPT | Performed by: INTERNAL MEDICINE

## 2023-12-03 PROCEDURE — 83605 ASSAY OF LACTIC ACID: CPT | Performed by: EMERGENCY MEDICINE

## 2023-12-03 PROCEDURE — 86803 HEPATITIS C AB TEST: CPT | Performed by: INTERNAL MEDICINE

## 2023-12-03 PROCEDURE — 87210 SMEAR WET MOUNT SALINE/INK: CPT | Performed by: INTERNAL MEDICINE

## 2023-12-03 PROCEDURE — 83605 ASSAY OF LACTIC ACID: CPT | Performed by: INTERNAL MEDICINE

## 2023-12-03 PROCEDURE — 93306 TTE W/DOPPLER COMPLETE: CPT | Performed by: INTERNAL MEDICINE

## 2023-12-03 PROCEDURE — 25010000002 VANCOMYCIN 750 MG RECONSTITUTED SOLUTION 1 EACH VIAL: Performed by: INTERNAL MEDICINE

## 2023-12-03 PROCEDURE — 80053 COMPREHEN METABOLIC PANEL: CPT | Performed by: INTERNAL MEDICINE

## 2023-12-03 PROCEDURE — 82948 REAGENT STRIP/BLOOD GLUCOSE: CPT

## 2023-12-03 PROCEDURE — 87491 CHLMYD TRACH DNA AMP PROBE: CPT | Performed by: INTERNAL MEDICINE

## 2023-12-03 PROCEDURE — 25010000002 HEPARIN (PORCINE) PER 1000 UNITS: Performed by: INTERNAL MEDICINE

## 2023-12-03 PROCEDURE — 25810000003 DEXTROSE-NACL PER 500 ML: Performed by: INTERNAL MEDICINE

## 2023-12-03 PROCEDURE — 25810000003 SODIUM CHLORIDE 0.9 % SOLUTION 250 ML FLEX CONT: Performed by: INTERNAL MEDICINE

## 2023-12-03 PROCEDURE — 25010000002 FLUCONAZOLE PER 200 MG: Performed by: INTERNAL MEDICINE

## 2023-12-03 PROCEDURE — 80202 ASSAY OF VANCOMYCIN: CPT | Performed by: INTERNAL MEDICINE

## 2023-12-03 PROCEDURE — 84132 ASSAY OF SERUM POTASSIUM: CPT | Performed by: INTERNAL MEDICINE

## 2023-12-03 PROCEDURE — 25010000002 VASOPRESSIN 20 UNIT/ML SOLUTION

## 2023-12-03 RX ORDER — SODIUM CHLORIDE 0.9 % (FLUSH) 0.9 %
10 SYRINGE (ML) INJECTION AS NEEDED
Status: DISCONTINUED | OUTPATIENT
Start: 2023-12-03 | End: 2023-12-11 | Stop reason: HOSPADM

## 2023-12-03 RX ORDER — SODIUM CHLORIDE 0.9 % (FLUSH) 0.9 %
10 SYRINGE (ML) INJECTION EVERY 12 HOURS SCHEDULED
Status: DISCONTINUED | OUTPATIENT
Start: 2023-12-03 | End: 2023-12-11 | Stop reason: HOSPADM

## 2023-12-03 RX ORDER — SODIUM CHLORIDE 9 MG/ML
40 INJECTION, SOLUTION INTRAVENOUS AS NEEDED
Status: DISCONTINUED | OUTPATIENT
Start: 2023-12-03 | End: 2023-12-11 | Stop reason: HOSPADM

## 2023-12-03 RX ORDER — FLUCONAZOLE 2 MG/ML
200 INJECTION, SOLUTION INTRAVENOUS ONCE
Status: COMPLETED | OUTPATIENT
Start: 2023-12-03 | End: 2023-12-03

## 2023-12-03 RX ORDER — SODIUM CHLORIDE 0.9 % (FLUSH) 0.9 %
20 SYRINGE (ML) INJECTION AS NEEDED
Status: DISCONTINUED | OUTPATIENT
Start: 2023-12-03 | End: 2023-12-11 | Stop reason: HOSPADM

## 2023-12-03 RX ADMIN — Medication 0.14 MCG/KG/MIN: at 23:21

## 2023-12-03 RX ADMIN — Medication 10 ML: at 12:05

## 2023-12-03 RX ADMIN — DEXTROSE AND SODIUM CHLORIDE 125 ML/HR: 5; 900 INJECTION, SOLUTION INTRAVENOUS at 18:05

## 2023-12-03 RX ADMIN — HEPARIN SODIUM 5000 UNITS: 5000 INJECTION INTRAVENOUS; SUBCUTANEOUS at 14:34

## 2023-12-03 RX ADMIN — Medication 0.26 MCG/KG/MIN: at 04:07

## 2023-12-03 RX ADMIN — DOCUSATE SODIUM 50MG AND SENNOSIDES 8.6MG 2 TABLET: 8.6; 5 TABLET, FILM COATED ORAL at 08:55

## 2023-12-03 RX ADMIN — DEXTROSE AND SODIUM CHLORIDE 125 ML/HR: 5; 900 INJECTION, SOLUTION INTRAVENOUS at 10:03

## 2023-12-03 RX ADMIN — Medication 10 ML: at 12:04

## 2023-12-03 RX ADMIN — HEPARIN SODIUM 5000 UNITS: 5000 INJECTION INTRAVENOUS; SUBCUTANEOUS at 05:12

## 2023-12-03 RX ADMIN — POTASSIUM CHLORIDE 10 MEQ: 7.46 INJECTION, SOLUTION INTRAVENOUS at 02:21

## 2023-12-03 RX ADMIN — Medication 10 ML: at 20:23

## 2023-12-03 RX ADMIN — FLUCONAZOLE 200 MG: 200 INJECTION, SOLUTION INTRAVENOUS at 16:49

## 2023-12-03 RX ADMIN — VASOPRESSIN 0.03 UNITS/MIN: 20 INJECTION, SOLUTION INTRAVENOUS at 04:07

## 2023-12-03 RX ADMIN — Medication 0.2 MCG/KG/MIN: at 10:00

## 2023-12-03 RX ADMIN — Medication 10 ML: at 20:24

## 2023-12-03 RX ADMIN — Medication 10 ML: at 11:48

## 2023-12-03 RX ADMIN — VANCOMYCIN HYDROCHLORIDE 750 MG: 750 INJECTION, POWDER, LYOPHILIZED, FOR SOLUTION INTRAVENOUS at 18:34

## 2023-12-03 RX ADMIN — OXYCODONE HYDROCHLORIDE AND ACETAMINOPHEN 1 TABLET: 5; 325 TABLET ORAL at 21:23

## 2023-12-03 RX ADMIN — Medication 0.18 MCG/KG/MIN: at 16:24

## 2023-12-03 RX ADMIN — HEPARIN SODIUM 5000 UNITS: 5000 INJECTION INTRAVENOUS; SUBCUTANEOUS at 20:23

## 2023-12-03 RX ADMIN — POTASSIUM CHLORIDE 10 MEQ: 7.46 INJECTION, SOLUTION INTRAVENOUS at 01:07

## 2023-12-03 RX ADMIN — DEXTROSE AND SODIUM CHLORIDE 125 ML/HR: 5; 900 INJECTION, SOLUTION INTRAVENOUS at 01:07

## 2023-12-03 NOTE — PLAN OF CARE
Goal Outcome Evaluation:  Plan of Care Reviewed With: patient, mother        Progress: no change     Pt remains in CICU, drowsy but follows command . still on pressor support levo and vaso maintaining MAP above 65, CVL placed. Gave D50 x1 for hypoglycemia effective in bringing BG back up. Plan for ECHO today, MOM updated over the phone .

## 2023-12-03 NOTE — PROGRESS NOTES
"                                              LOS: 1 day   Patient Care Team:  Provider, No Known as PCP - General    Chief Complaint:  F/up septic shock, endocarditis and medical problems listed below.    Subjective   Interval History       She is anuric.  Had a low-grade fever yesterday 100.6.  She is tachycardic.  She remains hypotensive and currently requiring high-dose Levophed.  CVL was placed overnight.    She is really not much cooperative.  She had the blanket wrapped around her head and did not really want to be engaged much in discussion.    REVIEW OF SYSTEMS:   CARDIOVASCULAR: No chest pain, chest pressure or chest discomfort. No palpitations or edema.   RESPIRATORY: No shortness of breath, cough or sputum.   GASTROINTESTINAL: She is asking for something to drink and eat.  She has been having ice chips only.  CONSTITUTIONAL: No fever or chills.     Ventilator/Non-Invasive Ventilation Settings (From admission, onward)      None                  Physical Exam:     Vital Signs  Temp:  [96.1 °F (35.6 °C)-100.6 °F (38.1 °C)] 97.9 °F (36.6 °C)  Heart Rate:  [] 99  Resp:  [12-16] 16  BP: ()/(34-72) 109/59    Intake/Output Summary (Last 24 hours) at 12/3/2023 0907  Last data filed at 12/2/2023 1154  Gross per 24 hour   Intake 100 ml   Output --   Net 100 ml     Flowsheet Rows      Flowsheet Row First Filed Value   Admission Height 175.3 cm (69\") Documented at 12/02/2023 1103   Admission Weight 101 kg (221 lb 12.5 oz) Documented at 12/02/2023 1103            PPE used per hospital policy    General Appearance:   Alert, cooperative, in no acute distress   ENMT:  Mallampati score 3, dry mucous membrane   Eyes:  Pupils equal and reactive to light. EOMI   Neck:   Trachea midline. No thyromegaly.   Lungs:   Minimal bibasilar crackles.  No wheezing.  No use of accessory muscles.    Heart:  Tachycardia with regular rhythm.  No audible murmur   Skin:  Superficial skin scabs on the anterior aspect of her legs. "   Abdomen:    Obese. Soft. No tenderness. No HSM.   Neuro/psych:  Conscious, alert, oriented x3. Strength 5/5 in upper and lower  ext.  Anxious mood.  Flat affect.   Extremities:  No cyanosis, clubbing or edema.  Warm extremities and well-perfused          Results Review:        Results from last 7 days   Lab Units 12/03/23  0512 12/02/23  1154 12/02/23  1110   SODIUM mmol/L 135*  --  134*   POTASSIUM mmol/L 4.8  4.8  --  3.6   CHLORIDE mmol/L 105  --  95*   CO2 mmol/L 15.0*  --  15.2*   BUN mg/dL 30*  --  21*   CREATININE mg/dL 3.18* 2.83 2.72*   GLUCOSE mg/dL 109*  --  64*   CALCIUM mg/dL 6.7*  --  8.4*         Results from last 7 days   Lab Units 12/03/23  0512 12/02/23  1154 12/02/23  1110   WBC 10*3/mm3 16.86*  --  17.25*   HEMOGLOBIN g/dL 8.7*  --  9.7*   HEMOGLOBIN, POC g/dL  --  9.5*  --    HEMATOCRIT % 28.0*  --  32.4*   HEMATOCRIT POC %  --  28*  --    PLATELETS 10*3/mm3 124*  --  144                     Results from last 7 days   Lab Units 12/02/23  1110   CRP mg/dL 12.82*       Results from last 7 days   Lab Units 12/02/23  1154   PH, ARTERIAL pH units 7.144*   PCO2, ARTERIAL mm Hg 36.2   PO2 ART mm Hg 79.8*   O2 SATURATION ART % 91.5*   MODALITY  Room Air         I reviewed the patient's new clinical results.        Medication Review:   heparin (porcine), 5,000 Units, Subcutaneous, Q8H  senna-docusate sodium, 2 tablet, Oral, BID  sodium chloride, 10 mL, Intravenous, Q12H        dextrose 5 % and sodium chloride 0.9 %, 125 mL/hr, Last Rate: 125 mL/hr (12/03/23 0107)  norepinephrine, 0.02-0.3 mcg/kg/min, Last Rate: 0.22 mcg/kg/min (12/03/23 0843)  Pharmacy to dose vancomycin,   phenylephrine, 0.5-3 mcg/kg/min  sodium chloride, 125 mL/hr, Last Rate: 125 mL/hr (12/02/23 1443)  vasopressin, 0.03 Units/min, Last Rate: 0.03 Units/min (12/03/23 8289)        Diagnostic imaging:  I personally and independently reviewed the following images:  CXR 12/2/2023: Central line in good position.    Assessment       Septic  shock  Tricuspid valve endocarditis, 2.2 x 0.5 cm.  Possible small vegetation on the aortic leaflet as well (5 mm)  MRSA bacteremia, secondary to the above  Anuric ANTONELLA on CKD 3  Severe lactic acidosis  Mild pulmonary hypertension, RVSP 44 on echo 12/3.  Secondary to TR which is secondary to endocarditis  Transaminitis  Electrolytes disturbance: Hypokalemia- replaced  Vaginal discharge     Chronic anemia and thrombocytopenia  Noncompliance with medical therapy  IV drug abuse: Heroin  History of osteomyelitis of the legs      Plan     IV fluid with  mL/H.  Consulted nephrology for management of anuric ANTONELLA and discussed with Dr. Newman.  Urine studies ordered.  Pressors with Levophed to keep MAP >65.  Vasopressin added as well but will try to wean that off first.  Antibiotics with vancomycin  Continue vancomycin for MRSA bacteremia/endocarditis.  Consult ID.  Echo resulted today and Discussed with Dr. Holman following.  Consult cardiothoracic surgery  Check HIV and hepatitis panel.  Check vaginal culture and its positive for yeast.  Also collected chlamydia PCR.  Patient declined exam but staff reported excessive vaginal discharge.  Will administer 1 dose of Diflucan      Unfortunately she has a poor prognosis with her continuous drug abuse and currently septic shock with multiorgan failure.    Palmira Fowler MD  12/03/23  09:07 EST      Time: Critical care 43 min      This note was dictated utilizing [employer] dictation

## 2023-12-03 NOTE — PROGRESS NOTES
"[facility] Clinical Pharmacy Services: Vancomycin Pharmacokinetic Initial Consult Note    Sammie Landeros is a 33 y.o. female who is on day 2 of pharmacy to dose vancomycin.    Indication: Sepsis  Consulting Provider: Malcolm  Planned Duration of Therapy: 7 days  Loading Dose Ordered or Given: LD in ED given   MRSA PCR performed: ; Result:   Culture/Source: Hx of endocarditis tricuspid valve,   Target: Dose by Levels  Pertinent Vanc Dosing History:   Other Antimicrobials: Zosyn EI     Vitals/Labs  Ht: 175.3 cm (69\"); Wt: 103 kg (227 lb)  Temp Readings from Last 1 Encounters:   12/03/23 97.9 °F (36.6 °C) (Oral)    Estimated Creatinine Clearance: 31.5 mL/min (A) (by C-G formula based on SCr of 3.18 mg/dL (H)).  ANTONELLA- HoTN  - baseline based on Hx to be around 1.5 - 1.7     Results from last 7 days   Lab Units 12/03/23  0512 12/02/23  1154 12/02/23  1110   CREATININE mg/dL 3.18* 2.83 2.72*   WBC 10*3/mm3 16.86*  --  17.25*     Assessment/Plan:    Vancomycin Dose:  Intermittent dosing, Scr increased today. Please call [phone] after 4pm if starting HD or CRRT. Currently plan to redose with 750mg once today at 1800   Vanc random 12/4 @ 0600        Pharmacy will follow patient's kidney function and will adjust doses and obtain levels as necessary. Thank you for involving pharmacy in this patient's care. Please contact pharmacy with any questions or concerns.                           Emani Montiel, PharmD  Clinical Pharmacist      "

## 2023-12-03 NOTE — PLAN OF CARE
Goal Outcome Evaluation:  Plan of Care Reviewed With: patient        Progress: no change  Remains in CICU.  On Levophed off Vaso.  No urine output.  When bladder scanned this am there was 0 noted.  Dr Fowler and DR Menard.  They wanted a Charles.  Attempt was made but elena was so much vaginal discharge that was making it hard pt finally made us stopped and refused to let us try again.  Dr Fowler and Trent both notified.

## 2023-12-03 NOTE — PROCEDURES
Ultrasound Guided Central Venous Catheter Insertion Procedure Note      Indications:  vascular access    Procedure Details   Informed consent was obtained for the procedure, including sedation.  Risks of lung perforation, hemorrhage, arrhythmia, and adverse drug reaction were discussed.     Maximum sterile technique was used including usual patient drapes, antiseptics and physician sterile garments.    Under sterile conditions the skin above the on the left internal jugular vein was prepped with chlorhexidine and covered with a sterile drape. A sterile ultrasound probe was used to localize the target vein. It was clearly visualized. Local anesthesia was applied to the skin and subcutaneous tissues with lidocaine 1%. An 18-gauge needle was then inserted into the vein under ultrasound guidance. A guide wire was then threaded into the vein. A central venous catheter was then inserted into the vessel over the guide wire. The catheter was sutured into place and dressed following sterile protocol with Biopatch placed. All 4 ports were flushed and deemed patent.    Findings:  There were no changes to vital signs. All catheter ports were flushed with saline. Patient did tolerate procedure well.    Recommendations:  CXR ordered to verify placement.       Siena Peguero, APRN  12/2/2023

## 2023-12-03 NOTE — CONSULTS
[facility] note  32 yo female with septic shock due to tricuspid valve endocarditis and IV drug abuse that has been using as recently as before admission. I reviewed the TTE and she has tricuspid valve regurgitation and a vegetation of 0.5 x 2.2cm. I thing that the vegetation is small and I do not see compromise to the aortic valve. I would recommend medical management of his septic shock for now. Will follow. Full consult to follow.
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
## 2023-12-04 LAB
ABO GROUP BLD: NORMAL
ALBUMIN SERPL-MCNC: 2 G/DL (ref 3.5–5.2)
ALBUMIN SERPL-MCNC: 2.1 G/DL (ref 3.5–5.2)
ALBUMIN/GLOB SERPL: 0.6 G/DL
ALP SERPL-CCNC: 114 U/L (ref 39–117)
ALT SERPL W P-5'-P-CCNC: 36 U/L (ref 1–33)
ANION GAP SERPL CALCULATED.3IONS-SCNC: 8 MMOL/L (ref 5–15)
ANION GAP SERPL CALCULATED.3IONS-SCNC: 9 MMOL/L (ref 5–15)
ARTERIAL PATENCY WRIST A: POSITIVE
AST SERPL-CCNC: 56 U/L (ref 1–32)
ATMOSPHERIC PRESS: 745.9 MMHG
BASE EXCESS BLDA CALC-SCNC: -9.5 MMOL/L (ref 0–2)
BASOPHILS # BLD AUTO: 0.02 10*3/MM3 (ref 0–0.2)
BASOPHILS NFR BLD AUTO: 0.3 % (ref 0–1.5)
BDY SITE: ABNORMAL
BILIRUB SERPL-MCNC: 2.3 MG/DL (ref 0–1.2)
BLD GP AB SCN SERPL QL: NEGATIVE
BUN SERPL-MCNC: 40 MG/DL (ref 6–20)
BUN SERPL-MCNC: 42 MG/DL (ref 6–20)
BUN/CREAT SERPL: 10.2 (ref 7–25)
BUN/CREAT SERPL: 10.7 (ref 7–25)
CALCIUM SPEC-SCNC: 6.8 MG/DL (ref 8.6–10.5)
CALCIUM SPEC-SCNC: 7 MG/DL (ref 8.6–10.5)
CHLORIDE SERPL-SCNC: 106 MMOL/L (ref 98–107)
CHLORIDE SERPL-SCNC: 109 MMOL/L (ref 98–107)
CO2 BLDA-SCNC: 16.2 MMOL/L (ref 23–27)
CO2 SERPL-SCNC: 16 MMOL/L (ref 22–29)
CO2 SERPL-SCNC: 18 MMOL/L (ref 22–29)
CREAT SERPL-MCNC: 3.75 MG/DL (ref 0.57–1)
CREAT SERPL-MCNC: 4.1 MG/DL (ref 0.57–1)
D-LACTATE SERPL-SCNC: 1.5 MMOL/L (ref 0.5–2)
D-LACTATE SERPL-SCNC: 1.6 MMOL/L (ref 0.5–2)
D-LACTATE SERPL-SCNC: 1.7 MMOL/L (ref 0.5–2)
D-LACTATE SERPL-SCNC: 1.9 MMOL/L (ref 0.5–2)
DEPRECATED RDW RBC AUTO: 41 FL (ref 37–54)
EGFRCR SERPLBLD CKD-EPI 2021: 13.9 ML/MIN/1.73
EGFRCR SERPLBLD CKD-EPI 2021: 15.5 ML/MIN/1.73
EOSINOPHIL # BLD AUTO: 0.18 10*3/MM3 (ref 0–0.4)
EOSINOPHIL NFR BLD AUTO: 2.6 % (ref 0.3–6.2)
ERYTHROCYTE [DISTWIDTH] IN BLOOD BY AUTOMATED COUNT: 15.8 % (ref 12.3–15.4)
GLOBULIN UR ELPH-MCNC: 3.3 GM/DL
GLUCOSE BLDC GLUCOMTR-MCNC: 169 MG/DL (ref 70–130)
GLUCOSE BLDC GLUCOMTR-MCNC: 64 MG/DL (ref 70–130)
GLUCOSE BLDC GLUCOMTR-MCNC: 70 MG/DL (ref 70–130)
GLUCOSE BLDC GLUCOMTR-MCNC: 76 MG/DL (ref 70–130)
GLUCOSE BLDC GLUCOMTR-MCNC: 87 MG/DL (ref 70–130)
GLUCOSE SERPL-MCNC: 77 MG/DL (ref 65–99)
GLUCOSE SERPL-MCNC: 89 MG/DL (ref 65–99)
HCO3 BLDA-SCNC: 15.3 MMOL/L (ref 22–28)
HCT VFR BLD AUTO: 23.1 % (ref 34–46.6)
HEMODILUTION: NO
HGB BLD-MCNC: 7.2 G/DL (ref 12–15.9)
INHALED O2 CONCENTRATION: 21 %
LYMPHOCYTES # BLD AUTO: 0.8 10*3/MM3 (ref 0.7–3.1)
LYMPHOCYTES NFR BLD AUTO: 11.5 % (ref 19.6–45.3)
MAGNESIUM SERPL-MCNC: 1.6 MG/DL (ref 1.6–2.6)
MCH RBC QN AUTO: 22.3 PG (ref 26.6–33)
MCHC RBC AUTO-ENTMCNC: 31.2 G/DL (ref 31.5–35.7)
MCV RBC AUTO: 71.5 FL (ref 79–97)
MODALITY: ABNORMAL
MONOCYTES # BLD AUTO: 0.28 10*3/MM3 (ref 0.1–0.9)
MONOCYTES NFR BLD AUTO: 4 % (ref 5–12)
NEUTROPHILS NFR BLD AUTO: 5.67 10*3/MM3 (ref 1.7–7)
NEUTROPHILS NFR BLD AUTO: 81.3 % (ref 42.7–76)
O2 A-A PPRESDIFF RESPIRATORY: 0.8 MMHG
PCO2 BLDA: 28.5 MM HG (ref 35–45)
PH BLDA: 7.34 PH UNITS (ref 7.35–7.45)
PHOSPHATE SERPL-MCNC: 2.8 MG/DL (ref 2.5–4.5)
PHOSPHATE SERPL-MCNC: 3.1 MG/DL (ref 2.5–4.5)
PLATELET # BLD AUTO: 102 10*3/MM3 (ref 140–450)
PMV BLD AUTO: 10.1 FL (ref 6–12)
PO2 BLDA: 96.3 MM HG (ref 80–100)
POTASSIUM SERPL-SCNC: 3.9 MMOL/L (ref 3.5–5.2)
POTASSIUM SERPL-SCNC: 4.2 MMOL/L (ref 3.5–5.2)
PROT SERPL-MCNC: 5.4 G/DL (ref 6–8.5)
RBC # BLD AUTO: 3.23 10*6/MM3 (ref 3.77–5.28)
RH BLD: POSITIVE
SAO2 % BLDCOA: 97.2 % (ref 92–98.5)
SODIUM SERPL-SCNC: 133 MMOL/L (ref 136–145)
SODIUM SERPL-SCNC: 133 MMOL/L (ref 136–145)
T&S EXPIRATION DATE: NORMAL
TOTAL RATE: 20 BREATHS/MINUTE
URATE SERPL-MCNC: 7.2 MG/DL (ref 2.4–5.7)
VANCOMYCIN SERPL-MCNC: 19.5 MCG/ML (ref 5–40)
VANCOMYCIN SERPL-MCNC: 21.7 MCG/ML (ref 5–40)
WBC NRBC COR # BLD AUTO: 6.97 10*3/MM3 (ref 3.4–10.8)

## 2023-12-04 PROCEDURE — 86923 COMPATIBILITY TEST ELECTRIC: CPT

## 2023-12-04 PROCEDURE — 85025 COMPLETE CBC W/AUTO DIFF WBC: CPT | Performed by: INTERNAL MEDICINE

## 2023-12-04 PROCEDURE — 25010000002 VANCOMYCIN 750 MG RECONSTITUTED SOLUTION 1 EACH VIAL: Performed by: INTERNAL MEDICINE

## 2023-12-04 PROCEDURE — 87660 TRICHOMONAS VAGIN DIR PROBE: CPT | Performed by: INTERNAL MEDICINE

## 2023-12-04 PROCEDURE — 80202 ASSAY OF VANCOMYCIN: CPT | Performed by: INTERNAL MEDICINE

## 2023-12-04 PROCEDURE — 82948 REAGENT STRIP/BLOOD GLUCOSE: CPT

## 2023-12-04 PROCEDURE — 84100 ASSAY OF PHOSPHORUS: CPT | Performed by: INTERNAL MEDICINE

## 2023-12-04 PROCEDURE — 80053 COMPREHEN METABOLIC PANEL: CPT | Performed by: INTERNAL MEDICINE

## 2023-12-04 PROCEDURE — 25010000002 HEPARIN (PORCINE) PER 1000 UNITS: Performed by: INTERNAL MEDICINE

## 2023-12-04 PROCEDURE — 87480 CANDIDA DNA DIR PROBE: CPT | Performed by: INTERNAL MEDICINE

## 2023-12-04 PROCEDURE — 86901 BLOOD TYPING SEROLOGIC RH(D): CPT

## 2023-12-04 PROCEDURE — 83605 ASSAY OF LACTIC ACID: CPT | Performed by: INTERNAL MEDICINE

## 2023-12-04 PROCEDURE — 25810000003 DEXTROSE-NACL PER 500 ML: Performed by: INTERNAL MEDICINE

## 2023-12-04 PROCEDURE — 84550 ASSAY OF BLOOD/URIC ACID: CPT | Performed by: INTERNAL MEDICINE

## 2023-12-04 PROCEDURE — 83735 ASSAY OF MAGNESIUM: CPT | Performed by: INTERNAL MEDICINE

## 2023-12-04 PROCEDURE — 25810000003 DEXTROSE-NACL PER 500 ML: Performed by: HOSPITALIST

## 2023-12-04 PROCEDURE — 86900 BLOOD TYPING SEROLOGIC ABO: CPT

## 2023-12-04 PROCEDURE — 86850 RBC ANTIBODY SCREEN: CPT

## 2023-12-04 PROCEDURE — 82803 BLOOD GASES ANY COMBINATION: CPT

## 2023-12-04 PROCEDURE — 36600 WITHDRAWAL OF ARTERIAL BLOOD: CPT

## 2023-12-04 PROCEDURE — 25810000003 SODIUM CHLORIDE 0.9 % SOLUTION 250 ML FLEX CONT: Performed by: INTERNAL MEDICINE

## 2023-12-04 PROCEDURE — 87040 BLOOD CULTURE FOR BACTERIA: CPT | Performed by: INTERNAL MEDICINE

## 2023-12-04 PROCEDURE — 87510 GARDNER VAG DNA DIR PROBE: CPT | Performed by: INTERNAL MEDICINE

## 2023-12-04 RX ORDER — MAGNESIUM SULFATE HEPTAHYDRATE 40 MG/ML
4 INJECTION, SOLUTION INTRAVENOUS ONCE
Status: CANCELLED | OUTPATIENT
Start: 2023-12-04 | End: 2023-12-04

## 2023-12-04 RX ADMIN — Medication 10 ML: at 21:32

## 2023-12-04 RX ADMIN — VANCOMYCIN HYDROCHLORIDE 750 MG: 750 INJECTION, POWDER, LYOPHILIZED, FOR SOLUTION INTRAVENOUS at 21:30

## 2023-12-04 RX ADMIN — Medication 10 ML: at 21:33

## 2023-12-04 RX ADMIN — HEPARIN SODIUM 5000 UNITS: 5000 INJECTION INTRAVENOUS; SUBCUTANEOUS at 05:41

## 2023-12-04 RX ADMIN — OXYCODONE HYDROCHLORIDE AND ACETAMINOPHEN 1 TABLET: 5; 325 TABLET ORAL at 21:30

## 2023-12-04 RX ADMIN — Medication 10 ML: at 09:50

## 2023-12-04 RX ADMIN — DEXTROSE AND SODIUM CHLORIDE 125 ML/HR: 5; 900 INJECTION, SOLUTION INTRAVENOUS at 02:00

## 2023-12-04 RX ADMIN — DEXTROSE AND SODIUM CHLORIDE 75 ML/HR: 5; 900 INJECTION, SOLUTION INTRAVENOUS at 10:23

## 2023-12-04 NOTE — CASE MANAGEMENT/SOCIAL WORK
Discharge Planning Assessment  [facility]     Patient Name: Sammie Landeros  MRN: [id number]  Today's Date: 12/4/2023    Admit Date: 12/2/2023    Plan: Pending Access consult and determination on IV antbx   Discharge Needs Assessment       Row Name 12/04/23 1559       Living Environment    People in Home significant other    Primary Care Provided by self    Provides Primary Care For no one    Family Caregiver if Needed parent(s)    Family Caregiver Names Melvi Landeros Mom [phone]    Quality of Family Relationships supportive       Resource/Environmental Concerns    Transportation Concerns no car       Transition Planning    Patient/Family Anticipates Transition to home with family;inpatient rehabilitation facility    Patient/Family Anticipated Services at Transition     Transportation Anticipated family or friend will provide       Discharge Needs Assessment    Concerns to be Addressed discharge planning    Anticipated Changes Related to Illness inability to care for self    Provided Post Acute Provider List? N/A    N/A Provider List Comment Pending ACCESS consult.    Provided Post Acute Provider Quality & Resource List? N/A    Current Discharge Risk non-alliance;physical impairment;substance use/abuse                   Discharge Plan       Row Name 12/04/23 0560       Plan    Plan Pending Access consult and determination on IV antbx    Patient/Family in Agreement with Plan yes    Plan Comments Met with patient at bedside. Patient granted me permission to speak to Melvi Landeros [facility] [place] [phone].  Melvi Brown [phone] answered all discharge planning questions. Has an updated address, mom to get. Lives with boyfriend. Has a cane. Uses the [employer] on [address], mom states that patient is non-compliant with medications and recommended treatment. Patient does not have a living will. Patient will need access consult and will follow for potential IV antbx. Mom should be able to transport. Will continue to monitor for new  or changing discharge needs. Jeannette CHACON RN CCP                  Continued Care and Services - Admitted Since 12/2/2023    Coordination has not been started for this encounter.       Expected Discharge Date and Time       Expected Discharge Date Expected Discharge Time    Dec 7, 2023            Demographic Summary       Row Name 12/04/23 1559       General Information    Admission Type inpatient    Arrived From emergency department    Referral Source admission list    Reason for Consult discharge planning    Preferred Language English       Contact Information    Permission Granted to Share Info With family/designee  Melvi Landeros [facility] [place] [phone]                   Functional Status       Row Name 12/04/23 1559       Functional Status    Usual Activity Tolerance fair    Current Activity Tolerance poor       Functional Status, IADL    Medications independent       Mental Status Summary    Recent Changes in Mental Status/Cognitive Functioning thought patterns;behavior patterns;decision-making/judgment       Employment/    Employment Status unemployed                   Psychosocial    No documentation.                  Abuse/Neglect    No documentation.                  Legal    No documentation.                  Substance Abuse    No documentation.                  Patient Forms    No documentation.                     Jeannette Tamez RN

## 2023-12-04 NOTE — PLAN OF CARE
Goal Outcome Evaluation: Pt off all pressors. VSS. Cultures redrawn. Pt continues to selectively refuse care.

## 2023-12-04 NOTE — PAYOR COMM NOTE
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 EMS for diarrhea and weakness. EMS reports that patient's last heroine use was this morning.    Electronically signed by Andrea Pires RN at 12/02/23 1059       Medication Administration Report for Sammie Landeros as of 12/04/23 1105     Legend:    Given Hold Not Given Due Canceled Entry Other Actions    Time Time (Time) Time Time-Action         Discontinued     Completed     Future     MAR Hold     Linked             Medications 12/03/23 12/04/23      acetaminophen (TYLENOL) tablet 650 mg  Dose: 650 mg  Freq: Every 6 Hours PRN Route: PO  PRN Reason: Mild Pain  Start: 12/02/23 1422   Admin Instructions:   If given for fever, use fever parameter: fever greater than 100.4 °F  Based on patient request - if ordered for moderate or severe pain, provider allows for administration of a medication prescribed for a lower pain scale.    Do not exceed 4 grams of acetaminophen in a 24 hr period. Max dose of 2gm for AST/ALT greater than 120 units/L.    If given for pain, use the following pain scale:   Mild Pain = Pain Score of 1-3, CPOT 1-2  Moderate Pain = Pain Score of 4-6, CPOT 3-4  Severe Pain = Pain Score of 7-10, CPOT 5-8         sennosides-docusate (PERICOLACE) 8.6-50 MG per tablet 2 tablet  Dose: 2 tablet  Freq: 2 Times  "Daily Route: PO  Start: 12/02/23 2100   Admin Instructions:   HOLD MEDICATION IF PATIENT HAS HAD BOWEL MOVEMENT. Start bowel management regimen if patient has not had a bowel movement after 12 hours.    0855-Given     (2022)-Not Given           (0950)-Not Given     2100             And  polyethylene glycol (MIRALAX) packet 17 g  Dose: 17 g  Freq: Daily PRN Route: PO  PRN Reason: Constipation  PRN Comment: Use if senna-docusate is ineffective  Start: 12/02/23 1424   Admin Instructions:   Use if no bowel movement after 12 hours. Mix in 6-8 ounces of water.  Use 4-8 ounces of water, tea, or juice for each 17 gram dose.        And  bisacodyl (DULCOLAX) EC tablet 5 mg  Dose: 5 mg  Freq: Daily PRN Route: PO  PRN Reason: Constipation  PRN Comment: Use if polyethylene glycol is ineffective  Start: 12/02/23 1424   Admin Instructions:   Use if no bowel movement after 12 hours.  Swallow whole. Do not crush, split, or chew tablet.        And  bisacodyl (DULCOLAX) suppository 10 mg  Dose: 10 mg  Freq: Daily PRN Route: RE  PRN Reason: Constipation  PRN Comment: Use if bisacodyl oral is ineffective  Start: 12/02/23 1424   Admin Instructions:   Use if no bowel movement after 12 hours.  Hold for diarrhea         Calcium Replacement - Follow Nurse / BPA Driven Protocol  Freq: As Needed Route: XX  PRN Reason: Other  Start: 12/02/23 1422   Admin Instructions:   Open Order & Select \"BHS Electrolyte Replacement Protocol Algorithm\" to View Details         dextrose (D50W) (25 g/50 mL) IV injection 25 g  Dose: 25 g  Freq: Every 15 Minutes PRN Route: IV  PRN Reason: Low Blood Sugar  PRN Comment: Blood Sugar Less Than 70, Patient Has IV Access - Unresponsive, NPO or Unable To Safely Swallow  Start: 12/02/23 1932         dextrose (GLUTOSE) oral gel 15 g  Dose: 15 g  Freq: Every 15 Minutes PRN Route: PO  PRN Reason: Low Blood Sugar  PRN Comment: Blood Sugar Less Than 70, Patient Alert, Is Not NPO & Can Safely Swallow  Start: 12/02/23 1932      " "   dextrose 5 % and sodium chloride 0.9 % infusion  Rate: 75 mL/hr Dose: 75 mL/hr  Freq: Continuous Route: IV  Start: 12/02/23 1845    0107-New Bag     1003-New Bag     1805-New Bag          0200-New Bag     0907-Rate/Dose Change     1023-New Bag             glucagon (GLUCAGEN) injection 1 mg  Dose: 1 mg  Freq: Every 15 Minutes PRN Route: SC  PRN Reason: Low Blood Sugar  PRN Comment: Blood Glucose Less Than 70 - Patient Without IV Access - Unresponsive, NPO or Unable To Safely Swallow  Start: 12/02/23 1932   Admin Instructions:   Reconstitute powder for injection by adding 1 mL of -supplied sterile diluent or sterile water for injection to a vial containing 1 mg of the drug, to provide solutions containing 1 mg/mL. Shake vial gently to dissolve.         heparin (porcine) 5000 UNIT/ML injection 5,000 Units  Dose: 5,000 Units  Freq: Every 8 Hours Scheduled Route: SC  Indications of Use: PROPHYLAXIS OF VENOUS THROMBOEMBOLISM  Start: 12/02/23 1515    0512-Given     1434-Given     2023-Given     (2219)-Not Given [C]         0541-Given     1400     2200             Magnesium Cardiology Dose Replacement - Follow Nurse / BPA Driven Protocol  Freq: As Needed Route: XX  PRN Reason: Other  Start: 12/02/23 1422   Admin Instructions:   Open Order & Select \"BHS Electrolyte Replacement Protocol Algorithm\" to View Details         nitroglycerin (NITROSTAT) SL tablet 0.4 mg  Dose: 0.4 mg  Freq: Every 5 Minutes PRN Route: SL  PRN Reason: Chest Pain  PRN Comment: Only if SBP Greater Than 100  Start: 12/02/23 1424   Admin Instructions:   If Pain Unrelieved After 3 Doses Notify MD  May administer up to 3 doses per episode.         norepinephrine (LEVOPHED) 8 mg in 250 mL NS infusion (premix)  Rate: 3.79-56.81 mL/hr Dose: 0.02-0.3 mcg/kg/min  Weight Dosing Info: 101 kg  Freq: Titrated Route: IV  Start: 12/02/23 1445   Admin Instructions:   Initiate infusion at 0.02 mcg/kg/min and titrate by 0.02 - 0.06 mcg/kg/min every 5 - 10 " "minutes to use the lowest dose possible to maintain a MAP greater than or equal To 65 mmHg. Maximum Dose = 0.3 mcg/kg/min. Contact provider if unable to maintain MAP target at the maximum dose or if MAP is greater than 95 mmHg. Once MAP target achieved, obtain vitals a minimum of every 30 minutes.  With provider order may titrate to maximum dose of 0.5 mcg/kg/min.  Concentration 8 mg/250 mL          Central line preferred, if unavailable use large bore IV access with frequent nurse monitoring of IV site.    0210-Rate/Dose Change     0407-New Bag     0803-Currently Infusing     0843-Rate/Dose Change     1000-New Bag       1624-New Bag     2021-Rate/Dose Change     2054-Rate/Dose Change     2230-Rate/Dose Change     2321-New Bag        0020-Rate/Dose Change     0100-Rate/Dose Change     0122-Rate/Dose Change     0303-Rate/Dose Change     0658-Rate/Dose Change       0803-Rate/Dose Change     0907-Rate/Dose Change     1024-Stopped             ondansetron (ZOFRAN) injection 4 mg  Dose: 4 mg  Freq: Every 6 Hours PRN Route: IV  PRN Reasons: Nausea,Vomiting  Start: 12/02/23 1736   Admin Instructions:   \"If multiple N/V medications ordered, use in the following order: Ondansetron, Prochlorperazine, Promethazine. Use PO unless patient refuses or patient unable to swallow.\"         oxyCODONE-acetaminophen (PERCOCET) 5-325 MG per tablet 1 tablet  Dose: 1 tablet  Freq: Every 6 Hours PRN Route: PO  PRN Reason: Moderate Pain  Start: 12/02/23 1727   End: 12/09/23 1726   Admin Instructions:   Based on patient request - if ordered for moderate or severe pain, provider allows for administration of a medication prescribed for a lower pain scale.  [DU]    Do not exceed 4 grams of acetaminophen in a 24 hr period. Max dose of 2gm for AST/ALT greater than 120 units/L        If given for pain, use the following pain scale:   Mild Pain = Pain Score of 1-3, CPOT 1-2  Moderate Pain = Pain Score of 4-6, CPOT 3-4  Severe Pain = Pain Score of " "7-10, CPOT 5-8    2123-Given                Pharmacy to dose vancomycin  Freq: Continuous PRN Route: XX  PRN Reason: Consult  Indications of Use: SEPSIS  Start: 12/02/23 1421   End: 12/09/23 1420         phenylephrine (KYE-SYNEPHRINE) 50 mg in 250 mL NS infusion  Rate: 15.45-92.7 mL/hr Dose: 0.5-3 mcg/kg/min  Weight Dosing Info: 103 kg  Freq: Titrated Route: IV  Start: 12/02/23 2300   Admin Instructions:   Initiate infusion at 0.5 mcg/kg/min and titrate up or down by 0.3 - 0.6 mcg/kg/min every 5 minutes to use the lowest dose possible to maintain MAP greater than or equal to 65 mm Hg. Maximum dose = 3 mcg/kg/min. Contact provider if unable to maintain MAP greater than or equal to 65 mm Hg on maximum dose or if MAP is greater than 95 mm Hg. Once MAP target achieved obtain vitals a minimum of every 30 minutes. Central line preferred.  Protect from light. Central line preferred, if unavailable use large bore IV access with frequent nurse monitoring of IV site.         Phosphorus Replacement - Follow Nurse / BPA Driven Protocol  Freq: As Needed Route: XX  PRN Reason: Other  Start: 12/02/23 1422   Admin Instructions:   Open Order & Select \"BHS Electrolyte Replacement Protocol Algorithm\" to View Details         Potassium Replacement - Follow Nurse / BPA Driven Protocol  Freq: As Needed Route: XX  PRN Reason: Other  Start: 12/02/23 1422   Admin Instructions:   Open Order & Select \"BHS Electrolyte Replacement Protocol Algorithm\" to View Details         sodium chloride 0.9 % flush 10 mL  Dose: 10 mL  Freq: As Needed Route: IV  PRN Reason: Line Care  PRN Comment: After Medication Administration  Start: 12/03/23 1151         sodium chloride 0.9 % flush 10 mL  Dose: 10 mL  Freq: Every 12 Hours Scheduled Route: IV  Start: 12/03/23 1245   Admin Instructions:   Lumen #4    1204-Given     2023-Given           0950-Given     2100              sodium chloride 0.9 % flush 10 mL  Dose: 10 mL  Freq: Every 12 Hours Scheduled Route: " IV  Start: 12/03/23 1245   Admin Instructions:   Lumen #3    1204-Given     2023-Given           0950-Given     2100              sodium chloride 0.9 % flush 10 mL  Dose: 10 mL  Freq: Every 12 Hours Scheduled Route: IV  Start: 12/03/23 1245   Admin Instructions:   Lumen #2    1205-Given     2023-Given           0950-Given     2100              sodium chloride 0.9 % flush 10 mL  Dose: 10 mL  Freq: Every 12 Hours Scheduled Route: IV  Start: 12/03/23 1245   Admin Instructions:   Lumen #1    1205-Given     2024-Given           0950-Given     2100              sodium chloride 0.9 % flush 10 mL  Dose: 10 mL  Freq: As Needed Route: IV  PRN Reason: Line Care  Start: 12/02/23 1424         sodium chloride 0.9 % flush 10 mL  Dose: 10 mL  Freq: Every 12 Hours Scheduled Route: IV  Start: 12/02/23 2100    1148-Given     2024-Given           0950-Given     2100              sodium chloride 0.9 % flush 10 mL  Dose: 10 mL  Freq: As Needed Route: IV  PRN Reason: Line Care  Start: 12/02/23 1104         sodium chloride 0.9 % flush 20 mL  Dose: 20 mL  Freq: As Needed Route: IV  PRN Reason: Line Care  PRN Comment: After Blood Draws or Blood Product Administration  Start: 12/03/23 1151         sodium chloride 0.9 % infusion  Rate: 125 mL/hr Dose: 125 mL/hr  Freq: Continuous Route: IV  Start: 12/02/23 1515         sodium chloride 0.9 % infusion 40 mL  Dose: 40 mL  Freq: As Needed Route: IV  PRN Reason: Line Care  Start: 12/03/23 1151   Admin Instructions:   Following administration of an IV intermittent medication, flush line with 40mL NS at 100mL/hr.         sodium chloride 0.9 % infusion 40 mL  Dose: 40 mL  Freq: As Needed Route: IV  PRN Reason: Line Care  Start: 12/02/23 1424   Admin Instructions:   Following administration of an IV intermittent medication, flush line with 40mL NS at 100mL/hr.         Vancomycin Pharmacy Intermittent/Pulse Dosing  Freq: Daily Route: XX  Indications of Use: BACTEREMIA  Start: 12/02/23 1430   End:  "12/09/23 0859   Admin Instructions:   Patient is on intermittent or pulse Vancomycin dosing. This is an informational \"Pharmacy Dosing\" note only.     (2267)-Not Given               vasopressin (PITRESSIN) 20 units in 100 mL NS infusion  Rate: 9 mL/hr Dose: 0.03 Units/min  Freq: Continuous Route: IV  Start: 12/02/23 1945    0407-New Bag     1034-Hold [C]              Completed Medications  Medications 12/03/23 12/04/23       acetaminophen (TYLENOL) tablet 1,000 mg  Dose: 1,000 mg  Freq: Once Route: PO  Start: 12/02/23 1157   End: 12/02/23 1200   Admin Instructions:   If given for fever, use fever parameter: fever greater than 100.4 °F  Based on patient request - if ordered for moderate or severe pain, provider allows for administration of a medication prescribed for a lower pain scale.    Do not exceed 4 grams of acetaminophen in a 24 hr period. Max dose of 2gm for AST/ALT greater than 120 units/L.    If given for pain, use the following pain scale:   Mild Pain = Pain Score of 1-3, CPOT 1-2  Moderate Pain = Pain Score of 4-6, CPOT 3-4  Severe Pain = Pain Score of 7-10, CPOT 5-8         dextrose (D50W) (25 g/50 mL) IV injection 25 g  Dose: 25 g  Freq: Once Route: IV  Start: 12/02/23 1122   End: 12/02/23 1756         fentaNYL citrate (PF) (SUBLIMAZE) injection 100 mcg  Dose: 100 mcg  Freq: Once Route: IV  Start: 12/02/23 2300   End: 12/02/23 2235   Admin Instructions:   Use filter needle to withdraw dose from ampule. Based on patient request - if ordered for moderate or severe pain, provider allows for administration of a medication prescribed for a lower pain scale.  If given for pain, use the following pain scale:  Mild Pain = Pain Score of 1-3, CPOT 1-2  Moderate Pain = Pain Score of 4-6, CPOT 3-4  Severe Pain = Pain Score of 7-10, CPOT 5-8         fluconazole (DIFLUCAN) IVPB 200 mg  Dose: 200 mg  Freq: Once Route: IV  Indications of Use: VULVOVAGINAL CANDIDIASIS  Start: 12/03/23 1600   End: 12/03/23 1749   Admin " Instructions:   Group 2 (Pink) Hazardous Drug - Reproductive Risk Only - See Handling Guide    1649-New Bag                midazolam (VERSED) injection 2 mg  Dose: 2 mg  Freq: Once Route: IV  Start: 12/02/23 2345   End: 12/02/23 2256   Admin Instructions:            midazolam (VERSED) injection 2 mg  Dose: 2 mg  Freq: Once Route: IV  Start: 12/02/23 2330   End: 12/02/23 2235   Admin Instructions:            piperacillin-tazobactam (ZOSYN) 4.5 g in iso-osmotic dextrose 100 mL IVPB (premix)  Dose: 4.5 g  Freq: Once Route: IV  Indications of Use: SEPSIS  Start: 12/02/23 1122   End: 12/02/23 1154   Admin Instructions:   Refrigerate         potassium chloride 10 mEq in 100 mL IVPB  Dose: 10 mEq  Freq: Every 1 Hour Route: IV  Start: 12/02/23 2100   End: 12/03/23 0321   Admin Instructions:   OUTPATIENT/NON-MONITORED UNITS: Potassium Chloride standard bolus infusion rate is a maximum of 10 mEq/hr on unmonitored patients    MONITORED UNITS: Potassium Chloride standard bolus infusion rate is a maximum of 20 mEq/hr on ECG monitored patients ONLY    0107-New Bag     0221-New Bag               sodium chloride 0.9 % bolus 1,000 mL  Dose: 1,000 mL  Freq: Once Route: IV  Start: 12/02/23 1815   End: 12/02/23 1830         sodium chloride 0.9 % bolus 1,000 mL  Dose: 1,000 mL  Freq: Once Route: IV  Start: 12/02/23 1515   End: 12/02/23 1514         sodium chloride 0.9 % bolus 3,030 mL  Dose: 30 mL/kg  Weight Dosing Info: 101 kg  Freq: Once Route: IV  Start: 12/02/23 1121   End: 12/02/23 1240   Admin Instructions:   You may need to adjust the volume of this order to account for fluids already given.  The total of the bolus(es) given should be as close to 30 mL/kg without going under.         vancomycin 750 mg in sodium chloride 0.9 % 250 mL IVPB-VTB  Dose: 750 mg  Freq: Once Route: IV  Indications of Use: SEPSIS  Start: 12/03/23 1800   End: 12/03/23 1919    1834-New Bag                vancomycin IVPB 2000 mg in 0.9% Sodium Chloride 500  mL  Dose: 20 mg/kg  Weight Dosing Info: 101 kg  Freq: Once Route: IV  Indications of Use: SEPSIS  Start: 23 1122   End: 23 1331        Discontinued Medications  Medications 23       LORazepam (ATIVAN) 2 MG/ML concentrated solution 1 mg  Dose: 1 mg  Freq: Once Route: PO  Start: 23 2300   End: 23   Admin Instructions:    Caution: Look alike/sound alike drug alert         piperacillin-tazobactam (ZOSYN) 3.375 g in iso-osmotic dextrose 50 ml (premix)  Dose: 3.375 g  Freq: Every 8 Hours Route: IV  Indications of Use: SEPSIS  Start: 23   End: 23 004   Admin Instructions:   Refrigerate         potassium chloride (K-DUR,KLOR-CON) ER tablet 40 mEq  Dose: 40 mEq  Freq: Every 4 Hours Route: PO  Start: 23 1600   End: 23 1604   Admin Instructions:   Do not crush or chew the capsules or tablets. The drug may not work as designed if the capsule or tablet is crushed or chewed. Swallow whole.  Swallow whole; do not crush, split, or chew.         potassium chloride (K-DUR,KLOR-CON) ER tablet 40 mEq  Dose: 40 mEq  Freq: Every 4 Hours Route: PO  Start: 23 1600   End: 23   Admin Instructions:   Do not crush or chew the capsules or tablets. The drug may not work as designed if the capsule or tablet is crushed or chewed. Swallow whole.  Swallow whole; do not crush, split, or chew.         vancomycin (VANCOCIN) 1000 mg/200 mL dextrose 5% IVPB  Dose: 1,000 mg  Freq: Every 24 Hours Route: IV  Indications of Use: SEPSIS  Start: 23 1000   End: 23 0855                       Physician Progress Notes (last 72 hours)        Elvira Coleman MD at 23 0855              [facility] Progress Note      Patient Name: Sammie Landeros  : 1990  MRN: [id number]  Primary Care Physician:  Provider, No Known  Date of admission: 2023    Subjective     Interval History:   The patient was seen and examined today for  follow-up on  ANTONELLA   Fatigued and sleepy.  Denies any nausea or vomiting.  No fever no chills.  Continues to be on pressors.   Not very cooperative with review of system.  Feels that she wants to urinate.    Review of Systems:   As noted above    Objective     Vitals:   Temp:  [97.5 °F (36.4 °C)-98.3 °F (36.8 °C)] 97.5 °F (36.4 °C)  Heart Rate:  [] 90  BP: ()/(48-77) 115/60    Intake/Output Summary (Last 24 hours) at 12/4/2023 0856  Last data filed at 12/4/2023 0400  Gross per 24 hour   Intake 6561.69 ml   Output 0 ml   Net 6561.69 ml       Physical Exam:    General Appearance: Frail and fatigued.  Sleepy but arousable  Skin: warm and dry  HEENT: oral mucosa normal, nonicteric sclera  Neck: supple, no JVD  Lungs: CTA  Heart: RRR, normal S1 and S2  Abdomen: soft, nontender, nondistended  : no palpable bladder  Extremities: Bilateral extremity edema  Neuro: Not very cooperative    Scheduled Meds:     heparin (porcine), 5,000 Units, Subcutaneous, Q8H  senna-docusate sodium, 2 tablet, Oral, BID  sodium chloride, 10 mL, Intravenous, Q12H  sodium chloride, 10 mL, Intravenous, Q12H  sodium chloride, 10 mL, Intravenous, Q12H  sodium chloride, 10 mL, Intravenous, Q12H  sodium chloride, 10 mL, Intravenous, Q12H  Vancomycin Pharmacy Intermittent/Pulse Dosing, , Does not apply, Daily      IV Meds:   dextrose 5 % and sodium chloride 0.9 %, 125 mL/hr, Last Rate: 125 mL/hr (12/04/23 0200)  norepinephrine, 0.02-0.3 mcg/kg/min, Last Rate: 0.04 mcg/kg/min (12/04/23 0803)  Pharmacy to dose vancomycin,   phenylephrine, 0.5-3 mcg/kg/min  sodium chloride, 125 mL/hr, Last Rate: 125 mL/hr (12/02/23 1443)  vasopressin, 0.03 Units/min, Last Rate: Stopped (12/03/23 1034)        Results Reviewed:   I have personally reviewed the results from the time of this admission to 12/4/2023 08:56 EST     Results from last 7 days   Lab Units 12/04/23  0429 12/03/23  0512 12/02/23  1154 12/02/23  1110   SODIUM mmol/L 133* 135*  --  134*    POTASSIUM mmol/L 3.9 4.8  4.8  --  3.6   CHLORIDE mmol/L 106 105  --  95*   CO2 mmol/L 18.0* 15.0*  --  15.2*   BUN mg/dL 40* 30*  --  21*   CREATININE mg/dL 3.75* 3.18* 2.83 2.72*   CALCIUM mg/dL 6.8* 6.7*  --  8.4*   BILIRUBIN mg/dL 2.3* 2.6*  --  2.8*   ALK PHOS U/L 114 127*  --  212*   ALT (SGPT) U/L 36* 48*  --  61*   AST (SGOT) U/L 56* 81*  --  111*   GLUCOSE mg/dL 77 109*  --  64*       Estimated Creatinine Clearance: 27.8 mL/min (A) (by C-G formula based on SCr of 3.75 mg/dL (H)).    Results from last 7 days   Lab Units 12/04/23  0250   MAGNESIUM mg/dL 1.6   PHOSPHORUS mg/dL 3.1       Results from last 7 days   Lab Units 12/04/23  0250   URIC ACID mg/dL 7.2*       Results from last 7 days   Lab Units 12/04/23  0250 12/03/23  0512 12/02/23  1154 12/02/23  1110   WBC 10*3/mm3 6.97 16.86*  --  17.25*   HEMOGLOBIN g/dL 7.2* 8.7*  --  9.7*   HEMOGLOBIN, POC g/dL  --   --  9.5*  --    PLATELETS 10*3/mm3 102* 124*  --  144             Assessment / Plan     ASSESSMENT:  Acute Kidney Injury secondary to sepsis and ATN from severe hypotension and GI loss associated with diarrhea superimposed on chronic kidney disease baseline SCr unclear. Scr was at 3.0 on similar hospital admission in May 2023. MAP goal >65, No urine for review. Refused palacios catheter.  Postinfectious GN is on differential but unlikely given normal C3 and C4 levels   NAG Metabolic Acidosis, severe sepsis, HCO3 is 15.0.   Shock most likely septic on vasopressors  Possible chronic kidney disease she had another episode of acute kidney injury in June 2023, prior to that creatinine was normal in 2016 and there is no labs checked between June 2023 and present time.  And she has not followed up in our office as instructed  Severe sepsis, positive blood culture (Staph aureus MRSA) some suspicion for recurrent endocarditis, echo pending, Lactate to be 5.1 and is down to 4.7 WBC 16.86, with concern about endocarditis  Hypoglycemia, improved, treated with  D50.  Elevated liver enzymes, total bili 2.6, history of chronic Hep B/C  Hypocalcemia, calcium 6.7 and albumin is 2.4, calcium is within acceptable range around 8.1 when corrected to albumin  Chronic anemia, hemoglobin today 8.7 on 6/19/2023 was seven-point  Thrombocytopenia, platelet 124,000  Polysubstance abuse  Anemia with worsening hemoglobin  Bilateral extremity edema likely contributed by hypoalbuminemia and capillary leak due to sepsis        PLAN:  Kidney function progressively worse but electrolytes are relatively acceptable.  Currently anuric but feels that she wants to urinate bladder scan from yesterday was negative  Patient seems to be heading toward dialysis but fortunately no indication for dialysis today.  Will check ABGs given acidosis  Decrease IV fluid to 75 cc an hour  Recheck renal panel at noon  Labs in a.m.      Thank you for involving us in the care of Sammie Landeros.  Please feel free to call with any questions.    Elvira Coleman MD  12/04/23  08:56 [facility]    [facility]  [phone]    Parts of this note may be an electronic transcription/translation of spoken language to printed text using the Dragon dictation system.    Electronically signed by Elvira Coleman MD at 12/04/23 0905       Palmira Fowler MD at 12/03/23 0906                                                        LOS: 1 day   Patient Care Team:  Provider, No Known as PCP - General    Chief Complaint:  F/up septic shock, endocarditis and medical problems listed below.    Subjective   Interval History       She is anuric.  Had a low-grade fever yesterday 100.6.  She is tachycardic.  She remains hypotensive and currently requiring high-dose Levophed.  CVL was placed overnight.    She is really not much cooperative.  She had the blanket wrapped around her head and did not really want to be engaged much in discussion.    REVIEW OF SYSTEMS:   CARDIOVASCULAR: No chest pain, chest pressure or chest  "discomfort. No palpitations or edema.   RESPIRATORY: No shortness of breath, cough or sputum.   GASTROINTESTINAL: She is asking for something to drink and eat.  She has been having ice chips only.  CONSTITUTIONAL: No fever or chills.     Ventilator/Non-Invasive Ventilation Settings (From admission, onward)      None                  Physical Exam:     Vital Signs  Temp:  [96.1 °F (35.6 °C)-100.6 °F (38.1 °C)] 97.9 °F (36.6 °C)  Heart Rate:  [] 99  Resp:  [12-16] 16  BP: ()/(34-72) 109/59    Intake/Output Summary (Last 24 hours) at 12/3/2023 0907  Last data filed at 12/2/2023 1154  Gross per 24 hour   Intake 100 ml   Output --   Net 100 ml     Flowsheet Rows      Flowsheet Row First Filed Value   Admission Height 175.3 cm (69\") Documented at 12/02/2023 1103   Admission Weight 101 kg (221 lb 12.5 oz) Documented at 12/02/2023 1103            PPE used per hospital policy    General Appearance:   Alert, cooperative, in no acute distress   ENMT:  Mallampati score 3, dry mucous membrane   Eyes:  Pupils equal and reactive to light. EOMI   Neck:   Trachea midline. No thyromegaly.   Lungs:   Minimal bibasilar crackles.  No wheezing.  No use of accessory muscles.    Heart:  Tachycardia with regular rhythm.  No audible murmur   Skin:  Superficial skin scabs on the anterior aspect of her legs.   Abdomen:    Obese. Soft. No tenderness. No HSM.   Neuro/psych:  Conscious, alert, oriented x3. Strength 5/5 in upper and lower  ext.  Anxious mood.  Flat affect.   Extremities:  No cyanosis, clubbing or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  Gastrointestinal: Positive bowel sounds, abdomen is soft, nontender and nondistended  : Copious amounts of visible pyuria form vaginal area  Musculoskeletal: Trace lower extremity bilateral edema, no clubbing or cyanosis evidence of cellulitis on the left lower extremity below the knee  Psychiatric: Uncooperative  Neurologic: Moving all extremities  Skin: Warm and dry       Scheduled Meds:     heparin (porcine), 5,000 Units, Subcutaneous, Q8H  senna-docusate sodium, 2 tablet, Oral, BID  sodium chloride, 10 mL, Intravenous, Q12H  vancomycin, 750 mg, Intravenous, Once      IV Meds:   dextrose 5 % and sodium chloride 0.9 %, 125 mL/hr, Last Rate: 125 mL/hr (12/03/23 1003)  norepinephrine, 0.02-0.3 mcg/kg/min, Last Rate: 0.2 mcg/kg/min (12/03/23 1000)  Pharmacy to dose vancomycin,   phenylephrine, 0.5-3 mcg/kg/min  sodium chloride, 125 mL/hr, Last Rate: 125 mL/hr (12/02/23 1443)  vasopressin, 0.03 Units/min, Last Rate: Stopped (12/03/23 1034)      Results Reviewed:   I have personally reviewed the results from the time of this admission to 12/3/2023 11:15 EST     Lab Results   Component Value Date    GLUCOSE 109 (H) 12/03/2023    CALCIUM 6.7 (L) 12/03/2023     (L) 12/03/2023    K 4.8 12/03/2023    K 4.8 12/03/2023    CO2 15.0 (L) 12/03/2023     12/03/2023    BUN 30 (H) 12/03/2023    CREATININE 3.18 (H) 12/03/2023    EGFRIFNONA 96 07/23/2016    BCR 9.4 12/03/2023    ANIONGAP 15.0 12/03/2023      Lab Results   Component Value Date    MG 1.7 06/19/2023    PHOS 4.0 06/14/2023    ALBUMIN 2.4 (L)  12/03/2023           Assessment / Plan       Sepsis      ASSESSMENT:  Acute Kidney Injury secondary to sepsis and ATN from severe hypotension and GI loss associated with diarrhea superimposed on chronic kidney disease baseline SCr unclear. Scr was at 3.0 on similar hospital admission in May 2023. MAP goal >65, No urine for review. Refused palacios catheter. Most electrolytes acceptable, Ca low at 6.7, with the heroin abuse always concern about glomerulonephritis also concern about possible glomerulonephritis in light of possible endocarditis  NAG Metabolic Acidosis, severe sepsis, HCO3 is 15.0.   Shock most likely septic on vasopressors  Possible chronic kidney disease she had another episode of acute kidney injury in June 2023, prior to that creatinine was normal in 2016 and there is no labs checked between June 2023 and present time.  And she has not followed up in our office as instructed  Severe sepsis, positive blood culture (Staph aureus MRSA) some suspicion for recurrent endocarditis, echo pending, Lactate to be 5.1 and is down to 4.7 WBC 16.86, with concern about endocarditis  Hypoglycemia, improved, treated with D50.  Elevated liver enzymes, total bili 2.6, history of chronic Hep B/C  Hypocalcemia, calcium 6.7 and albumin is 2.4, calcium is within acceptable range around 8.1 when corrected to albumin  Chronic anemia, hemoglobin today 8.7 on 6/19/2023 was seven-point  Thrombocytopenia, platelet 124,000  Polysubstance abuse    PLAN:  Check random urine for sodium, chloride and protein to creatinine  Continue current IV fluid  I agree with the present treatment  Surveillance Labs    I reviewed the chart and other providers notes, reviewed labs and imaging  I discussed the case with the patient's nurse and with     Thank you for involving us in the care of Sammie Landeros.  Please feel free to call with any questions.    Mateusz Newman MD  12/03/23  11:15 [facility]    [facility]  [phone]      Please note that portions of this note were completed with a voice recognition program.    Electronically signed by Mateusz Newman MD at 12/03/23 2277

## 2023-12-04 NOTE — NURSING NOTE
Pt remains in CICU on levo. No BM or UOP, bladder scan had 243 ml. Treated hypoglycemia with orange juice. 1 unit prbcs ordered for hgb of 7.2.

## 2023-12-04 NOTE — PROGRESS NOTES
"[facility] Clinical Pharmacy Services: Vancomycin Monitoring Note    Sammie Landeros is a 33 y.o. female who is on day 3/7 of pharmacy to dose vancomycin for Sepsis.    Previous Vancomycin Dose:   intermittent dosing   Updated Cultures and Sensitivities: Hx of tricuspid valve endocarditis   12/2 BCx x2 sets MRSA; Strep spp not A, B, or pneumo     Results from last 7 days   Lab Units 12/04/23  0429 12/03/23  0512   VANCOMYCIN RM mcg/mL 21.70 20.30     Vitals/Labs  Ht: 175.3 cm (69\"); Wt: 110 kg (243 lb 9.7 oz)   Temp Readings from Last 1 Encounters:   12/04/23 97.5 °F (36.4 °C) (Oral)     Estimated Creatinine Clearance: 27.8 mL/min (A) (by C-G formula based on SCr of 3.75 mg/dL (H)).   Results from last 7 days   Lab Units 12/04/23  0429 12/04/23  0250 12/03/23  0512 12/02/23  1154 12/02/23  1110   CREATININE mg/dL 3.75*  --  3.18* 2.83 2.72*   WBC 10*3/mm3  --  6.97 16.86*  --  17.25*     Assessment/Plan    Current Vancomycin Dose: Continue vancomycin intermittent dosing. Hold vancomycin today based on slightly supratherapeutic level with significantly upward-trending SCr.   Next Level Date and Time: Vanc Random ordered for later today at 1800. Checking 12h level rather than 24h level to evaluate more frequently for redosing due to severity of illness.   SCr ordered daily with AM labs.     Thank you for involving pharmacy in this patient's care. Please contact pharmacy with any questions or concerns.       Charlie Zhang, PharmD, ANAIS, BCPS, BCCCP  12/04/23 08:00 EST      "

## 2023-12-04 NOTE — PROGRESS NOTES
[facility] Progress Note      Patient Name: Sammie Landeros  : 1990  MRN: [id number]  Primary Care Physician:  Provider, No Known  Date of admission: 2023    Subjective     Interval History:   The patient was seen and examined today for follow-up on  ANTONELLA   Fatigued and sleepy.  Denies any nausea or vomiting.  No fever no chills.  Continues to be on pressors.   Not very cooperative with review of system.  Feels that she wants to urinate.    Review of Systems:   As noted above    Objective     Vitals:   Temp:  [97.5 °F (36.4 °C)-98.3 °F (36.8 °C)] 97.5 °F (36.4 °C)  Heart Rate:  [] 90  BP: ()/(48-77) 115/60    Intake/Output Summary (Last 24 hours) at 2023 0856  Last data filed at 2023 0400  Gross per 24 hour   Intake 6561.69 ml   Output 0 ml   Net 6561.69 ml       Physical Exam:    General Appearance: Frail and fatigued.  Sleepy but arousable  Skin: warm and dry  HEENT: oral mucosa normal, nonicteric sclera  Neck: supple, no JVD  Lungs: CTA  Heart: RRR, normal S1 and S2  Abdomen: soft, nontender, nondistended  : no palpable bladder  Extremities: Bilateral extremity edema  Neuro: Not very cooperative    Scheduled Meds:     heparin (porcine), 5,000 Units, Subcutaneous, Q8H  senna-docusate sodium, 2 tablet, Oral, BID  sodium chloride, 10 mL, Intravenous, Q12H  sodium chloride, 10 mL, Intravenous, Q12H  sodium chloride, 10 mL, Intravenous, Q12H  sodium chloride, 10 mL, Intravenous, Q12H  sodium chloride, 10 mL, Intravenous, Q12H  Vancomycin Pharmacy Intermittent/Pulse Dosing, , Does not apply, Daily      IV Meds:   dextrose 5 % and sodium chloride 0.9 %, 125 mL/hr, Last Rate: 125 mL/hr (23 0200)  norepinephrine, 0.02-0.3 mcg/kg/min, Last Rate: 0.04 mcg/kg/min (23 0803)  Pharmacy to dose vancomycin,   phenylephrine, 0.5-3 mcg/kg/min  sodium chloride, 125 mL/hr, Last Rate: 125 mL/hr (23 1443)  vasopressin, 0.03 Units/min, Last Rate: Stopped  (12/03/23 1034)        Results Reviewed:   I have personally reviewed the results from the time of this admission to 12/4/2023 08:56 EST     Results from last 7 days   Lab Units 12/04/23  0429 12/03/23  0512 12/02/23  1154 12/02/23  1110   SODIUM mmol/L 133* 135*  --  134*   POTASSIUM mmol/L 3.9 4.8  4.8  --  3.6   CHLORIDE mmol/L 106 105  --  95*   CO2 mmol/L 18.0* 15.0*  --  15.2*   BUN mg/dL 40* 30*  --  21*   CREATININE mg/dL 3.75* 3.18* 2.83 2.72*   CALCIUM mg/dL 6.8* 6.7*  --  8.4*   BILIRUBIN mg/dL 2.3* 2.6*  --  2.8*   ALK PHOS U/L 114 127*  --  212*   ALT (SGPT) U/L 36* 48*  --  61*   AST (SGOT) U/L 56* 81*  --  111*   GLUCOSE mg/dL 77 109*  --  64*       Estimated Creatinine Clearance: 27.8 mL/min (A) (by C-G formula based on SCr of 3.75 mg/dL (H)).    Results from last 7 days   Lab Units 12/04/23  0250   MAGNESIUM mg/dL 1.6   PHOSPHORUS mg/dL 3.1       Results from last 7 days   Lab Units 12/04/23  0250   URIC ACID mg/dL 7.2*       Results from last 7 days   Lab Units 12/04/23  0250 12/03/23  0512 12/02/23  1154 12/02/23  1110   WBC 10*3/mm3 6.97 16.86*  --  17.25*   HEMOGLOBIN g/dL 7.2* 8.7*  --  9.7*   HEMOGLOBIN, POC g/dL  --   --  9.5*  --    PLATELETS 10*3/mm3 102* 124*  --  144             Assessment / Plan     ASSESSMENT:  Acute Kidney Injury secondary to sepsis and ATN from severe hypotension and GI loss associated with diarrhea superimposed on chronic kidney disease baseline SCr unclear. Scr was at 3.0 on similar hospital admission in May 2023. MAP goal >65, No urine for review. Refused palacios catheter.  Postinfectious GN is on differential but unlikely given normal C3 and C4 levels   NAG Metabolic Acidosis, severe sepsis, HCO3 is 15.0.   Shock most likely septic on vasopressors  Possible chronic kidney disease she had another episode of acute kidney injury in June 2023, prior to that creatinine was normal in 2016 and there is no labs checked between June 2023 and present time.  And she has not  followed up in our office as instructed  Severe sepsis, positive blood culture (Staph aureus MRSA) some suspicion for recurrent endocarditis, echo pending, Lactate to be 5.1 and is down to 4.7 WBC 16.86, with concern about endocarditis  Hypoglycemia, improved, treated with D50.  Elevated liver enzymes, total bili 2.6, history of chronic Hep B/C  Hypocalcemia, calcium 6.7 and albumin is 2.4, calcium is within acceptable range around 8.1 when corrected to albumin  Chronic anemia, hemoglobin today 8.7 on 6/19/2023 was seven-point  Thrombocytopenia, platelet 124,000  Polysubstance abuse  Anemia with worsening hemoglobin  Bilateral extremity edema likely contributed by hypoalbuminemia and capillary leak due to sepsis        PLAN:  Kidney function progressively worse but electrolytes are relatively acceptable.  Currently anuric but feels that she wants to urinate bladder scan from yesterday was negative  Patient seems to be heading toward dialysis but fortunately no indication for dialysis today.  Will check ABGs given acidosis  Decrease IV fluid to 75 cc an hour  Recheck renal panel at noon  Labs in a.m.      Thank you for involving us in the care of Sammie Landeros.  Please feel free to call with any questions.    Elvira Coleman MD  12/04/23  08:56 [facility]    [facility]  [phone]    Parts of this note may be an electronic transcription/translation of spoken language to printed text using the Dragon dictation system.

## 2023-12-04 NOTE — PROGRESS NOTES
"  PROGRESS NOTE  Patient Name: Sammie Landeros  Age/Sex: 33 y.o. female  : 1990  MRN: [id number]    Date of Admission: 2023  Date of Encounter Visit: 23   LOS: 2 days   Patient Care Team:  Provider, No Known as PCP - General    Chief Complaint: Septic shock, acute kidney injury, recurrent endocarditis    Hospital course: Patient has been having recurrent endocarditis, she has positive IV drug abuse, she does report that she finished a course of antibiotic after the last admission.  She came in this time with septic shock, so far she had more than 6 L of IV fluid and her hemoglobin dropped from 9.3 down to 7.2 but no active bleeding is suspected and this might be dilutional.  She is afebrile, she is feeling better, complaining of dry mouth and complaining of the low urine output.  She is refusing a Charles catheter but she is in ATN and it is expected that she is going to be oliguric to start with.  She has no active bleeding  She is off the pressors at this point but still on the IV fluid at normal saline 125/h.      REVIEW OF SYSTEMS:   CONSTITUTIONAL: no fever or chills  CARDIOVASCULAR: No chest pain, chest pressure or chest discomfort. No palpitations or edema.   RESPIRATORY: No shortness of breath, cough or sputum.   GASTROINTESTINAL: No anorexia, nausea, vomiting or diarrhea. No abdominal pain or blood.   HEMATOLOGIC: No bleeding or bruising.     Ventilator/Non-Invasive Ventilation Settings (From admission, onward)      On room air              Vital Signs  Temp:  [97.5 °F (36.4 °C)-98.3 °F (36.8 °C)] 97.5 °F (36.4 °C)  Heart Rate:  [] 97  BP: ()/(48-77) 103/49  SpO2:  [85 %-100 %] 99 %  on    Device (Oxygen Therapy): room air    Intake/Output Summary (Last 24 hours) at 2023 1343  Last data filed at 2023 0400  Gross per 24 hour   Intake 6561.69 ml   Output 0 ml   Net 6561.69 ml     Flowsheet Rows      Flowsheet Row First Filed Value   Admission Height 175.3 cm (69\") " Documented at 12/02/2023 1103   Admission Weight 101 kg (221 lb 12.5 oz) Documented at 12/02/2023 1103          Body mass index is 35.97 kg/m².      12/03/23  0513 12/03/23  0753 12/04/23  0400   Weight: 103 kg (227 lb 1.2 oz) 103 kg (227 lb) 110 kg (243 lb 9.7 oz)       Physical Exam:  GEN:  No acute distress, alert, cooperative, well developed, sickly looking  EYES:   Sclerae clear. No icterus. PERRL. Normal EOM  ENT:   External ears/nose normal, no oral lesions, no thrush, slightly dry mucous membranes  NECK:  Supple, midline trachea, no JVD  LUNGS: Normal chest on inspection, very faint crackles posteriorly otherwise no wheezes or labored breathing. Respirations regular, even and unlabored.   CV:  Regular rhythm and rapid heart rate. Normal S1/S2. No murmurs, gallops, or rubs noted.  ABD:  Soft, nontender and nondistended. Normal bowel sounds. No guarding  EXT:  Moves all extremities well. No cyanosis. No redness.  Positive edema lower extremities  Skin: Dry, intact, no, skin graft over the lower extremities on the right with chronic edema and venous stasis hyperpigmentation and color changes    Results Review:    Results From Last 14 Days   Lab Units 12/04/23  0429 12/03/23  2316 12/03/23  1747 12/02/23  1154 12/02/23  1110   CRP mg/dL  --   --   --   --  12.82*   LACTATE mmol/L 1.6 1.9 2.7*   < > 11.2*   SED RATE mm/hr  --   --   --   --  46*    < > = values in this interval not displayed.     Results from last 7 days   Lab Units 12/04/23  0429 12/03/23  0512 12/02/23  1154 12/02/23  1110   SODIUM mmol/L 133* 135*  --  134*   POTASSIUM mmol/L 3.9 4.8  4.8  --  3.6   CHLORIDE mmol/L 106 105  --  95*   CO2 mmol/L 18.0* 15.0*  --  15.2*   BUN mg/dL 40* 30*  --  21*   CREATININE mg/dL 3.75* 3.18* 2.83 2.72*   CALCIUM mg/dL 6.8* 6.7*  --  8.4*   AST (SGOT) U/L 56* 81*  --  111*   ALT (SGPT) U/L 36* 48*  --  61*   ANION GAP mmol/L 9.0 15.0  --  23.8*   ALBUMIN g/dL 2.1* 2.4*  --  2.8*                 Results from  "last 7 days   Lab Units 12/04/23  0250 12/03/23  0512 12/02/23  1154 12/02/23  1110   WBC 10*3/mm3 6.97 16.86*  --  17.25*   HEMOGLOBIN g/dL 7.2* 8.7*  --  9.7*   HEMOGLOBIN, POC g/dL  --   --  9.5*  --    HEMATOCRIT % 23.1* 28.0*  --  32.4*   HEMATOCRIT POC %  --   --  28*  --    PLATELETS 10*3/mm3 102* 124*  --  144   MCV fL 71.5* 72.0*  --  73.6*   NEUTROPHIL % % 81.3*  --   --   --    LYMPHOCYTE % % 11.5*  --   --   --    MONOCYTES % % 4.0*  --   --   --    EOSINOPHIL % % 2.6  --   --   --    BASOPHIL % % 0.3  --   --   --          Results from last 7 days   Lab Units 12/04/23  0250   MAGNESIUM mg/dL 1.6           Invalid input(s): \"LDLCALC\"  Results from last 7 days   Lab Units 12/04/23  0929 12/02/23  1154   PH, ARTERIAL pH units 7.338* 7.144*   PCO2, ARTERIAL mm Hg 28.5* 36.2   PO2 ART mm Hg 96.3 79.8*   HCO3 ART mmol/L 15.3* 12.5*         Glucose   Date/Time Value Ref Range Status   12/04/2023 0526 87 70 - 130 mg/dL Final   12/04/2023 0455 64 (L) 70 - 130 mg/dL Final   12/04/2023 0416 70 70 - 130 mg/dL Final   12/04/2023 0048 169 (H) 70 - 130 mg/dL Final   12/03/2023 1950 120 70 - 130 mg/dL Final   12/03/2023 1524 95 70 - 130 mg/dL Final   12/03/2023 1118 79 70 - 130 mg/dL Final   12/03/2023 0718 95 70 - 130 mg/dL Final     Results from last 7 days   Lab Units 12/04/23  0429 12/03/23  2316 12/03/23  1747 12/03/23  1149 12/03/23  0512 12/02/23  2357 12/02/23  2051   LACTATE mmol/L 1.6 1.9 2.7* 3.3* 4.7* 5.1*  5.1* 5.9*     Results from last 7 days   Lab Units 12/02/23  1115 12/02/23  1110   BLOODCX  Staphylococcus aureus, MRSA* Staphylococcus aureus, MRSA*   BCIDPCR  Staph aureus. mecA/C and MREJ (methicillin resistance gene) detected. Identification by BCID2 PCR.*  Streptococcus spp, not A, B, or pneumoniae. Identification by BCID2 PCR.*  --          Results from last 7 days   Lab Units 12/02/23  1111   COVID19  Not Detected   ADENOVIRUS DETECTION BY PCR  Not Detected   CORONAVIRUS 229E  Not Detected "   CORONAVIRUS HKU1  Not Detected   CORONAVIRUS NL63  Not Detected   CORONAVIRUS OC43  Not Detected   HUMAN METAPNEUMOVIRUS  Not Detected   HUMAN RHINOVIRUS/ENTEROVIRUS  Not Detected   INFLUENZA B PCR  Not Detected   PARAINFLUENZA 1  Not Detected   PARAINFLUENZA VIRUS 2  Not Detected   PARAINFLUENZA VIRUS 3  Not Detected   PARAINFLUENZA VIRUS 4  Not Detected   BORDETELLA PERTUSSIS PCR  Not Detected   BORDETELLA PARAPERTUSSIS PCR  Not Detected   CHLAMYDOPHILA PNEUMONIAE PCR  Not Detected   MYCOPLAMA PNEUMO PCR  Not Detected   RSV, PCR  Not Detected     Results from last 7 days   Lab Units 12/04/23  0250   URIC ACID mg/dL 7.2*           Imaging:   Imaging Results (All)       Procedure Component Value Units Date/Time    XR Chest Post CVA Port [[id number]] Collected: 12/02/23 3396            I reviewed the patient's new clinical results.  I personally viewed and interpreted the patient's imaging results:        Medication Review:   heparin (porcine), 5,000 Units, Subcutaneous, Q8H  senna-docusate sodium, 2 tablet, Oral, BID  sodium chloride, 10 mL, Intravenous, Q12H  sodium chloride, 10 mL, Intravenous, Q12H  sodium chloride, 10 mL, Intravenous, Q12H  sodium chloride, 10 mL, Intravenous, Q12H  sodium chloride, 10 mL, Intravenous, Q12H  Vancomycin Pharmacy Intermittent/Pulse Dosing, , Does not apply, Daily        dextrose 5 % and sodium chloride 0.9 %, 75 mL/hr, Last Rate: 75 mL/hr (12/04/23 1023)  norepinephrine, 0.02-0.3 mcg/kg/min, Last Rate: Stopped (12/04/23 1024)  Pharmacy to dose vancomycin,   phenylephrine, 0.5-3 mcg/kg/min  sodium chloride, 125 mL/hr, Last Rate: 125 mL/hr (12/02/23 1443)  vasopressin, 0.03 Units/min, Last Rate: Stopped (12/03/23 1034)        ASSESSMENT:   Septic shock  Tricuspid valve endocarditis with recurrent infection  MRSA bacteremia, recurrent  Anuric ANTONELLA on chronic kidney disease stage III, worsening  Severe lactic acidosis, resolved  Mild pulmonary hypertension RVSP of 44  Electrolyte  disturbances  Vaginal discharge  Heroin IV drug abuse  History of osteomyelitis  Anemia, likely dilutional    PLAN:  Patient had a positive blood culture with MRSA, Accu-Chek was showing some transient hypoglycemia.  Lactic acid normalized with supportive measures, hemoglobin was 9.748 hours ago down to 7.2 today, patient has 6.5 L total fluid intake.  The creatinine has worsened since presentation up to 3.75 today, liver enzymes are trending down and albumin is low at 2.1.  Calcium is low but it normalizes when corrected for the albumin level  The drop in the hemoglobin is likely dilutional, patient is no longer any pressors.  Baseline hemoglobin is around 7.5 looking over the number from prior admission but she was 9.7 on initial presentation, the drop is likely dilutional given the amount of fluid bolus and the fluid intake  We will hold on the transfusion orders that was already provided by the night shift team and we will continue with the serial H&H, if the patient becomes hemodynamically unstable or if the hemoglobin drops below 7.0 then we will go ahead and administer the blood transfusion  Continue with the current IV fluid at 125 cc/h  If transfusion is ordered, will order some Lasix along with that.  Nephrology note reviewed, Patient refused a Charles catheter  Discussed with patient,  discussed with the CICU rounding team  Lab no symptoms reviewed  Labs/Notes/films were independently reviewed and pertinent results are summarized above  Discussed with infectious disease at the bedside  She is a high risk of recurrent infection if she does not do something about her polysubstance abuse and IV drug abuse.  The copied texts in this note were reviewed and they are accurate as of 12/04/23    Disposition: Depending on hospital course    Jovanni Ibarra MD  12/04/23  13:43 EST      Time: Critical care 36 min      Dictated utilizing Dragon dictation

## 2023-12-04 NOTE — CONSULTS
CONSULT NOTE    Infectious Diseases - Jazmine Gillis MD  [facility]       Patient Identification:  Name: Sammie Landeros  Age: 33 y.o.  Sex: female  :  1990  MRN: [id number]             Date of Consultation: 2023      Primary Care Physician: Provider, No Known                               Requesting Physician: Dr. Fowler  Reason for Consultation: MRSA endocarditis    Impression: 33-year-old female with past medical history remarkable for MRSA bacteremic sepsis with endocarditis in the setting of IV drug use who was hospitalized in 2023 and was discharged on oral Zyvox because of the issues with IV access in the setting of ongoing active IV drug use and inability to arrange proper set up for IV antibiotic therapy has been actively using IV drugs based on information we have and currently living with her boyfriend was recently hospitalized at [facility] for MRSA sepsis and was discharged on IV daptomycin.  Patient apparently is not compliant with IV daptomycin and currently living with her boyfriend who brought her to the emergency room on 2023 for weakness diarrhea following active heroin injection earlier in the day.  Evaluation in the emergency room revealed parameters consistent with sepsis with altered mental status.  Patient was started on broad-spectrum antibiotics blood cultures were drawn which come back positive for MRSA.  Patient is complaining of pain all over and does not want to engage in review system at this time.  Echocardiogram performed shows evidence of tricuspid valve vegetation.  Infectious disease service is consulted.  1-MRSA bacteremic sepsis likely secondary to  2-recurrence of tricuspid valve endocarditis with recurrence of bacteremia either due to new IV drug use of perpetuation and continuation of previous MRSA bacteremic sepsis endocarditis with noncompliance with treatment  3-at risk of disseminated MRSA infection such as  discitis osteomyelitis perinephric abscess and septic arthritis and these will be difficult to identify given lack of proper review system and patient's inability to engage in information exchange  4-IV drug use including recent substance abuse  5-hepatitis C  6-acute renal failure on chronic kidney disease      Recommendations/Discussions:  At this juncture I agree with the care plan consisting of IV vancomycin.  Follow-up on repeat blood cultures.  Overall care will be difficult and prognosis is poor because of patient's lack of participation in her own wellbeing including documented noncompliance and active self-mutilation behavior with ongoing IV drug use.  If vancomycin is considered toxic to already declining renal function then if MRSA in the blood cultures documented to be sensitive to daptomycin then she could be changed to daptomycin to avoid further decline in renal function.  Management of other issues including this complex psychosocial issues per primary team.  Duration of antibiotic therapy at present is open ended.  Thank you Dr. Angela for letting me be the part of your patient care please see above impression and recommendations    History of Present Illness:   33-year-old female with past medical history remarkable for MRSA bacteremic sepsis with endocarditis in the setting of IV drug use who was hospitalized in June 2023 and was discharged on oral Zyvox because of the issues with IV access in the setting of ongoing active IV drug use and inability to arrange proper set up for IV antibiotic therapy has been actively using IV drugs based on information we have and currently living with her boyfriend was recently hospitalized at [facility] for MRSA sepsis and was discharged on IV daptomycin.  Patient apparently is not compliant with IV daptomycin and currently living with her boyfriend who brought her to the emergency room on 12/2/2023 for weakness diarrhea following active  heroin injection earlier in the day.  Evaluation in the emergency room revealed parameters consistent with sepsis with altered mental status.  Patient was started on broad-spectrum antibiotics blood cultures were drawn which come back positive for MRSA.  Patient is complaining of pain all over and does not want to engage in review system at this time.  Echocardiogram performed shows evidence of tricuspid valve vegetation.  Infectious disease service is consulted.      Past Medical History:  Past Medical History:   Diagnosis Date    Drug abuse     Hepatitis C     Hyperlipidemia     Nausea vomiting and diarrhea 06/03/2023     Past Surgical History:  Past Surgical History:   Procedure Laterality Date    ADENOIDECTOMY      BACK SURGERY      INCISION AND DRAINAGE LEG Left 7/20/2016    Procedure: Incision and Drainage left ankle;  Surgeon: Zechariah Kunz MD;  Location: [facility];  Service:     TONSILLECTOMY        Home Meds:  Medications Prior to Admission   Medication Sig Dispense Refill Last Dose    busPIRone (BUSPAR) 5 MG tablet Take 1 tablet by mouth 3 (Three) Times a Day. 90 tablet 0     furosemide (LASIX) 40 MG tablet Take 1 tablet by mouth 2 (Two) Times a Day. 60 tablet 0     metoprolol tartrate (LOPRESSOR) 25 MG tablet Take 1 tablet by mouth Every 12 (Twelve) Hours. 60 tablet 0     pantoprazole (PROTONIX) 40 MG EC tablet Take 1 tablet by mouth Every Morning. 30 tablet 0     sennosides-docusate (PERICOLACE) 8.6-50 MG per tablet Take 2 tablets by mouth 2 (Two) Times a Day. 60 tablet 0     vitamin D (ERGOCALCIFEROL) 1.25 MG (59083 UT) capsule capsule Take 1 capsule by mouth Every 7 (Seven) Days. 5 capsule 0      Current Meds:     Current Facility-Administered Medications:     acetaminophen (TYLENOL) tablet 650 mg, 650 mg, Oral, Q6H PRN, Palmira Fowler MD, 650 mg at 12/02/23 2029    sennosides-docusate (PERICOLACE) 8.6-50 MG per tablet 2 tablet, 2 tablet, Oral, BID, 2 tablet at 12/03/23 0855 **AND**  polyethylene glycol (MIRALAX) packet 17 g, 17 g, Oral, Daily PRN **AND** bisacodyl (DULCOLAX) EC tablet 5 mg, 5 mg, Oral, Daily PRN **AND** bisacodyl (DULCOLAX) suppository 10 mg, 10 mg, Rectal, Daily PRN, Palmira Fowler MD    Calcium Replacement - Follow Nurse / BPA Driven Protocol, , Does not apply, PRN, Palmira Fowler MD    dextrose (D50W) (25 g/50 mL) IV injection 25 g, 25 g, Intravenous, Q15 Min PRN, Siena Peguero APRN, 25 g at 12/02/23 1951    dextrose (GLUTOSE) oral gel 15 g, 15 g, Oral, Q15 Min PRN, Siena Peguero APRN    dextrose 5 % and sodium chloride 0.9 % infusion, 125 mL/hr, Intravenous, Continuous, Palmira Fowler MD, Last Rate: 125 mL/hr at 12/04/23 0200, 125 mL/hr at 12/04/23 0200    glucagon (GLUCAGEN) injection 1 mg, 1 mg, Subcutaneous, Q15 Min PRN, Siena Peguero APRN    heparin (porcine) 5000 UNIT/ML injection 5,000 Units, 5,000 Units, Subcutaneous, Q8H, Palmira Fowler MD, 5,000 Units at 12/04/23 0541    Magnesium Cardiology Dose Replacement - Follow Nurse / BPA Driven Protocol, , Does not apply, PRN, Palmira Fowler MD    nitroglycerin (NITROSTAT) SL tablet 0.4 mg, 0.4 mg, Sublingual, Q5 Min PRN, Palmira Fowler MD    norepinephrine (LEVOPHED) 8 mg in 250 mL NS infusion (premix), 0.02-0.3 mcg/kg/min, Intravenous, Titrated, Palmira Fowler MD, Last Rate: 7.58 mL/hr at 12/04/23 0803, 0.04 mcg/kg/min at 12/04/23 0803    ondansetron (ZOFRAN) injection 4 mg, 4 mg, Intravenous, Q6H PRN, Palmira Fowler MD, 4 mg at 12/02/23 1802    oxyCODONE-acetaminophen (PERCOCET) 5-325 MG per tablet 1 tablet, 1 tablet, Oral, Q6H PRN, Palmira Fowler MD, 1 tablet at 12/03/23 2123    Pharmacy to dose vancomycin, , Does not apply, Continuous PRN, Palmira Fowler MD    phenylephrine (KYE-SYNEPHRINE) 50 mg in 250 mL NS infusion, 0.5-3 mcg/kg/min, Intravenous, Titrated, Jonathon Samayoa MD    Phosphorus Replacement - Follow Nurse / BPA Driven Protocol, , Does not apply, PRMalcolm VICEKRS Rami, MD    Potassium Replacement -  Follow Nurse / BPA Driven Protocol, , Does not apply, Malcolm MCCLOUD Rami, MD    [COMPLETED] Insert Peripheral IV, , , Once **AND** sodium chloride 0.9 % flush 10 mL, 10 mL, Intravenous, PRN, Joseph Haile MD, 10 mL at 12/02/23 1117    sodium chloride 0.9 % flush 10 mL, 10 mL, Intravenous, Q12H, Palmira Fowler MD, 10 mL at 12/03/23 2024    sodium chloride 0.9 % flush 10 mL, 10 mL, Intravenous, PRN, Palmira Fowler MD    sodium chloride 0.9 % flush 10 mL, 10 mL, Intravenous, Q12H, Palmira Fowler MD, 10 mL at 12/03/23 2024    sodium chloride 0.9 % flush 10 mL, 10 mL, Intravenous, Q12H, Palmira Fowler MD, 10 mL at 12/03/23 2023    sodium chloride 0.9 % flush 10 mL, 10 mL, Intravenous, Q12H, Palmira Fowler MD, 10 mL at 12/03/23 2023    sodium chloride 0.9 % flush 10 mL, 10 mL, Intravenous, Q12H, Palmira Fowler MD, 10 mL at 12/03/23 2023    sodium chloride 0.9 % flush 10 mL, 10 mL, Intravenous, PRN, Palmira Fowler MD    sodium chloride 0.9 % flush 20 mL, 20 mL, Intravenous, PRN, Palmira Fowler MD    sodium chloride 0.9 % infusion 40 mL, 40 mL, Intravenous, PRN, Palmira Fowler MD    sodium chloride 0.9 % infusion 40 mL, 40 mL, Intravenous, PRN, Palmira Fowler MD    sodium chloride 0.9 % infusion, 125 mL/hr, Intravenous, Continuous, Palmira Fowler MD, Last Rate: 125 mL/hr at 12/02/23 1443, 125 mL/hr at 12/02/23 1443    Vancomycin Pharmacy Intermittent/Pulse Dosing, , Does not apply, Daily, Palmira Fowler MD    vasopressin (PITRESSIN) 20 units in 100 mL NS infusion, 0.03 Units/min, Intravenous, Continuous, Siena Peguero, APRN, Held at 12/03/23 1034  Allergies:  No Known Allergies  Social History:   Social History     Tobacco Use    Smoking status: Former     Packs/day: 1     Types: Cigarettes    Smokeless tobacco: Not on file   Substance Use Topics    Alcohol use: No      Family History:  Family History   Problem Relation Age of Onset    Other Mother         ADOPTED          Review of Systems  See history of present  "illness and past medical history.  Could not be obtained.      Vitals:   /60   Pulse 90   Temp 97.5 °F (36.4 °C) (Oral)   Resp 16   Ht 175.3 cm (69\")   Wt 110 kg (243 lb 9.7 oz)   LMP 11/22/2023 (Approximate)   SpO2 98%   BMI 35.97 kg/m²   I/O:   Intake/Output Summary (Last 24 hours) at 12/4/2023 0843  Last data filed at 12/4/2023 0400  Gross per 24 hour   Intake 6561.69 ml   Output 0 ml   Net 6561.69 ml     Exam:  Patient is examined using the personal protective equipment as per guidelines from infection control for this particular patient as enacted.  Hand washing was performed before and after patient interaction.  General Appearance:  Ill-appearing female does not engage and wants to be left alone examination was limited.   Head:    Normocephalic, without obvious abnormality, atraumatic   Eyes:    PERRL, conjunctivae/corneas clear, EOM's intact, both eyes   Ears:    Normal external ear canals, both ears   Nose:   Nares normal, septum midline, mucosa normal, no drainage    or sinus tenderness   Throat:   Lips, tongue, gums normal; oral mucosa pink and moist   Neck: Supple   Back:     Symmetric, no curvature, ROM normal, no CVA tenderness   Lungs:   Bilateral equal chest movements   Chest Wall:    No tenderness or deformity    Heart:  Regular rhythm tachycardia   Abdomen:   Limited examination as patient is not cooperative no guarding noted   Extremities: Lesions due to IV drug injections.   Pulses:   Pulses palpable in all extremities; symmetric all extremities   Skin: Needle track marks but no rash   Neurologic: Lethargic wants to be left alone grossly nonfocal       Data Review:    I reviewed the patient's new clinical results.  Results from last 7 days   Lab Units 12/04/23  0250 12/03/23  0512 12/02/23  1154 12/02/23  1110   WBC 10*3/mm3 6.97 16.86*  --  17.25*   HEMOGLOBIN g/dL 7.2* 8.7*  --  9.7*   HEMOGLOBIN, POC g/dL  --   --  9.5*  --    PLATELETS 10*3/mm3 102* 124*  --  144     Results " from last 7 days   Lab Units 12/04/23  0429 12/03/23  0512 12/02/23  1154 12/02/23  1110   SODIUM mmol/L 133* 135*  --  134*   POTASSIUM mmol/L 3.9 4.8  4.8  --  3.6   CHLORIDE mmol/L 106 105  --  95*   CO2 mmol/L 18.0* 15.0*  --  15.2*   BUN mg/dL 40* 30*  --  21*   CREATININE mg/dL 3.75* 3.18* 2.83 2.72*   CALCIUM mg/dL 6.8* 6.7*  --  8.4*   GLUCOSE mg/dL 77 109*  --  64*     Microbiology Results (last 10 days)       Procedure Component Value - Date/Time    Wet Prep, Genital - Swab, Vagina [[id number]]  (Abnormal) Collected: 12/03/23 1200    Lab Status: Final result Specimen: Swab from Vagina Updated: 12/03/23 1251     YEAST 4+ Yeast seen     HYPHAL ELEMENTS No Hyphal elements seen     WBC'S 3+ WBC's seen     Clue Cells, Wet Prep No Clue cells seen     Trichomonas, Wet Prep No Trichomonas seen    Blood Culture - Blood, Arm, Right [[id number]]  (Abnormal) Collected: 12/02/23 1115    Lab Status: Preliminary result Specimen: Blood from Arm, Right Updated: 12/04/23 0709     Blood Culture Staphylococcus aureus, MRSA     Comment:   Infectious disease consultation is highly recommended to rule out distant foci of infection.  Methicillin resistant Staphylococcus aureus, Patient may be an isolation risk.        Isolated from Aerobic Bottle     Gram Stain Aerobic Bottle Gram positive cocci in clusters    Blood Culture ID, PCR - Blood, Arm, Right [[id number]]  (Abnormal) Collected: 12/02/23 1115    Lab Status: Final result Specimen: Blood from Arm, Right Updated: 12/03/23 0040     BCID, PCR Staph aureus. mecA/C and MREJ (methicillin resistance gene) detected. Identification by BCID2 PCR.     BCID, PCR 2 Streptococcus spp, not A, B, or pneumoniae. Identification by BCID2 PCR.     BOTTLE TYPE Aerobic Bottle    Narrative:      Infectious disease consultation is highly recommended to rule out distant foci of infection.    Respiratory Panel PCR w/COVID-19(SARS-CoV-2) ALEAH/JOHNNIE/VENU/PAD/COR/DELORIS In-House, NP Swab in UTM/VTM, 2 HR TAT -  Swab, Nasopharynx [[id number]]  (Normal) Collected: 12/02/23 1111    Lab Status: Final result Specimen: Swab from Nasopharynx Updated: 12/02/23 1224     ADENOVIRUS, PCR Not Detected     Coronavirus 229E Not Detected     Coronavirus HKU1 Not Detected     Coronavirus NL63 Not Detected     Coronavirus OC43 Not Detected     COVID19 Not Detected     Human Metapneumovirus Not Detected     Human Rhinovirus/Enterovirus Not Detected     Influenza A PCR Not Detected     Influenza B PCR Not Detected     Parainfluenza Virus 1 Not Detected     Parainfluenza Virus 2 Not Detected     Parainfluenza Virus 3 Not Detected     Parainfluenza Virus 4 Not Detected     RSV, PCR Not Detected     Bordetella pertussis pcr Not Detected     Bordetella parapertussis PCR Not Detected     Chlamydophila pneumoniae PCR Not Detected     Mycoplasma pneumo by PCR Not Detected    Narrative:      In the setting of a positive respiratory panel with a viral infection PLUS a negative procalcitonin without other underlying concern for bacterial infection, consider observing off antibiotics or discontinuation of antibiotics and continue supportive care. If the respiratory panel is positive for atypical bacterial infection (Bordetella pertussis, Chlamydophila pneumoniae, or Mycoplasma pneumoniae), consider antibiotic de-escalation to target atypical bacterial infection.    Blood Culture - Blood, Arm, Left [[id number]]  (Abnormal) Collected: 12/02/23 1110    Lab Status: Preliminary result Specimen: Blood from Arm, Left Updated: 12/04/23 0709     Blood Culture Staphylococcus aureus, MRSA     Comment:   Infectious disease consultation is highly recommended to rule out distant foci of infection.  Methicillin resistant Staphylococcus aureus, Patient may be an isolation risk.        Isolated from Aerobic Bottle     Gram Stain Aerobic Bottle Gram positive cocci in clusters              Assessment:  Active Hospital Problems    Diagnosis  POA    **Sepsis [A41.9]  Yes       Resolved Hospital Problems   No resolved problems to display.         Plan:  See above  Jazmine Higginbotham MD   12/4/2023  08:43 EST    Parts of this note may be an electronic transcription/translation of spoken language to printed text using the Dragon dictation system.

## 2023-12-05 PROBLEM — R65.21 SHOCK, SEPTIC: Status: ACTIVE | Noted: 2023-12-02

## 2023-12-05 PROBLEM — K21.9 GERD WITHOUT ESOPHAGITIS: Status: ACTIVE | Noted: 2023-12-05

## 2023-12-05 PROBLEM — R79.89 ELEVATED LFTS: Status: ACTIVE | Noted: 2023-12-05

## 2023-12-05 PROBLEM — D69.6 THROMBOCYTOPENIA: Status: ACTIVE | Noted: 2023-12-05

## 2023-12-05 LAB
ALBUMIN SERPL-MCNC: 2 G/DL (ref 3.5–5.2)
ALBUMIN/GLOB SERPL: 0.6 G/DL
ALP SERPL-CCNC: 187 U/L (ref 39–117)
ALT SERPL W P-5'-P-CCNC: 36 U/L (ref 1–33)
ANION GAP SERPL CALCULATED.3IONS-SCNC: 8.2 MMOL/L (ref 5–15)
ANISOCYTOSIS BLD QL: ABNORMAL
AST SERPL-CCNC: 61 U/L (ref 1–32)
BACTERIA SPEC AEROBE CULT: ABNORMAL
BILIRUB SERPL-MCNC: 2.8 MG/DL (ref 0–1.2)
BUN SERPL-MCNC: 48 MG/DL (ref 6–20)
BUN/CREAT SERPL: 11.4 (ref 7–25)
CALCIUM SPEC-SCNC: 7.5 MG/DL (ref 8.6–10.5)
CANDIDA RRNA VAG QL PROBE: POSITIVE
CHLORIDE SERPL-SCNC: 109 MMOL/L (ref 98–107)
CO2 SERPL-SCNC: 16.8 MMOL/L (ref 22–29)
CREAT SERPL-MCNC: 4.22 MG/DL (ref 0.57–1)
D-LACTATE SERPL-SCNC: 1.2 MMOL/L (ref 0.5–2)
D-LACTATE SERPL-SCNC: 1.2 MMOL/L (ref 0.5–2)
DEPRECATED RDW RBC AUTO: 39.9 FL (ref 37–54)
EGFRCR SERPLBLD CKD-EPI 2021: 13.4 ML/MIN/1.73
EOSINOPHIL # BLD MANUAL: 0.15 10*3/MM3 (ref 0–0.4)
EOSINOPHIL NFR BLD MANUAL: 4 % (ref 0.3–6.2)
ERYTHROCYTE [DISTWIDTH] IN BLOOD BY AUTOMATED COUNT: 15.8 % (ref 12.3–15.4)
FERRITIN SERPL-MCNC: 102 NG/ML (ref 13–150)
G VAGINALIS RRNA GENITAL QL PROBE: NEGATIVE
GLOBULIN UR ELPH-MCNC: 3.6 GM/DL
GLUCOSE BLDC GLUCOMTR-MCNC: 86 MG/DL (ref 70–130)
GLUCOSE SERPL-MCNC: 81 MG/DL (ref 65–99)
GRAM STN SPEC: ABNORMAL
HCT VFR BLD AUTO: 23.2 % (ref 34–46.6)
HCT VFR BLD AUTO: 24.9 % (ref 34–46.6)
HGB BLD-MCNC: 7 G/DL (ref 12–15.9)
HGB BLD-MCNC: 7.9 G/DL (ref 12–15.9)
IRON 24H UR-MRATE: 53 MCG/DL (ref 37–145)
IRON SATN MFR SERPL: 23 % (ref 20–50)
ISOLATED FROM: ABNORMAL
LYMPHOCYTES # BLD MANUAL: 0.66 10*3/MM3 (ref 0.7–3.1)
LYMPHOCYTES NFR BLD MANUAL: 8 % (ref 5–12)
MCH RBC QN AUTO: 21.3 PG (ref 26.6–33)
MCHC RBC AUTO-ENTMCNC: 30.2 G/DL (ref 31.5–35.7)
MCV RBC AUTO: 70.7 FL (ref 79–97)
MONOCYTES # BLD: 0.31 10*3/MM3 (ref 0.1–0.9)
NEUTROPHILS # BLD AUTO: 2.75 10*3/MM3 (ref 1.7–7)
NEUTROPHILS NFR BLD MANUAL: 71 % (ref 42.7–76)
NRBC BLD AUTO-RTO: 0 /100 WBC (ref 0–0.2)
PHOSPHATE SERPL-MCNC: 2.7 MG/DL (ref 2.5–4.5)
PLAT MORPH BLD: NORMAL
PLATELET # BLD AUTO: 87 10*3/MM3 (ref 140–450)
PMV BLD AUTO: 10.3 FL (ref 6–12)
POIKILOCYTOSIS BLD QL SMEAR: ABNORMAL
POTASSIUM SERPL-SCNC: 4.1 MMOL/L (ref 3.5–5.2)
PROT SERPL-MCNC: 5.6 G/DL (ref 6–8.5)
RBC # BLD AUTO: 3.28 10*6/MM3 (ref 3.77–5.28)
SODIUM SERPL-SCNC: 134 MMOL/L (ref 136–145)
T VAGINALIS RRNA GENITAL QL PROBE: NEGATIVE
TIBC SERPL-MCNC: 235 MCG/DL (ref 298–536)
TRANSFERRIN SERPL-MCNC: 158 MG/DL (ref 200–360)
VARIANT LYMPHS NFR BLD MANUAL: 17 % (ref 19.6–45.3)
WBC MORPH BLD: NORMAL
WBC NRBC COR # BLD AUTO: 3.87 10*3/MM3 (ref 3.4–10.8)

## 2023-12-05 PROCEDURE — 83540 ASSAY OF IRON: CPT | Performed by: STUDENT IN AN ORGANIZED HEALTH CARE EDUCATION/TRAINING PROGRAM

## 2023-12-05 PROCEDURE — 80053 COMPREHEN METABOLIC PANEL: CPT | Performed by: INTERNAL MEDICINE

## 2023-12-05 PROCEDURE — 85025 COMPLETE CBC W/AUTO DIFF WBC: CPT | Performed by: INTERNAL MEDICINE

## 2023-12-05 PROCEDURE — 82948 REAGENT STRIP/BLOOD GLUCOSE: CPT

## 2023-12-05 PROCEDURE — 85014 HEMATOCRIT: CPT | Performed by: INTERNAL MEDICINE

## 2023-12-05 PROCEDURE — 84466 ASSAY OF TRANSFERRIN: CPT | Performed by: STUDENT IN AN ORGANIZED HEALTH CARE EDUCATION/TRAINING PROGRAM

## 2023-12-05 PROCEDURE — 85007 BL SMEAR W/DIFF WBC COUNT: CPT | Performed by: INTERNAL MEDICINE

## 2023-12-05 PROCEDURE — 82728 ASSAY OF FERRITIN: CPT | Performed by: STUDENT IN AN ORGANIZED HEALTH CARE EDUCATION/TRAINING PROGRAM

## 2023-12-05 PROCEDURE — P9016 RBC LEUKOCYTES REDUCED: HCPCS

## 2023-12-05 PROCEDURE — 25010000002 HEPARIN (PORCINE) PER 1000 UNITS: Performed by: INTERNAL MEDICINE

## 2023-12-05 PROCEDURE — 85018 HEMOGLOBIN: CPT | Performed by: INTERNAL MEDICINE

## 2023-12-05 PROCEDURE — 86900 BLOOD TYPING SEROLOGIC ABO: CPT

## 2023-12-05 PROCEDURE — 83605 ASSAY OF LACTIC ACID: CPT | Performed by: INTERNAL MEDICINE

## 2023-12-05 PROCEDURE — 84100 ASSAY OF PHOSPHORUS: CPT | Performed by: HOSPITALIST

## 2023-12-05 PROCEDURE — 36430 TRANSFUSION BLD/BLD COMPNT: CPT

## 2023-12-05 RX ORDER — PANTOPRAZOLE SODIUM 40 MG/1
40 TABLET, DELAYED RELEASE ORAL
Status: DISCONTINUED | OUTPATIENT
Start: 2023-12-06 | End: 2023-12-11 | Stop reason: HOSPADM

## 2023-12-05 RX ORDER — SODIUM BICARBONATE 650 MG/1
650 TABLET ORAL 3 TIMES DAILY
Status: DISCONTINUED | OUTPATIENT
Start: 2023-12-05 | End: 2023-12-08

## 2023-12-05 RX ORDER — HYDROXYZINE HYDROCHLORIDE 25 MG/1
25 TABLET, FILM COATED ORAL 3 TIMES DAILY PRN
Status: DISCONTINUED | OUTPATIENT
Start: 2023-12-05 | End: 2023-12-11 | Stop reason: HOSPADM

## 2023-12-05 RX ORDER — BUSPIRONE HYDROCHLORIDE 5 MG/1
5 TABLET ORAL 3 TIMES DAILY
Status: DISCONTINUED | OUTPATIENT
Start: 2023-12-05 | End: 2023-12-11 | Stop reason: HOSPADM

## 2023-12-05 RX ADMIN — Medication 10 ML: at 21:36

## 2023-12-05 RX ADMIN — DOCUSATE SODIUM 50MG AND SENNOSIDES 8.6MG 2 TABLET: 8.6; 5 TABLET, FILM COATED ORAL at 21:54

## 2023-12-05 RX ADMIN — Medication 10 ML: at 12:28

## 2023-12-05 RX ADMIN — Medication 10 ML: at 12:27

## 2023-12-05 RX ADMIN — SODIUM BICARBONATE 650 MG: 650 TABLET ORAL at 17:38

## 2023-12-05 RX ADMIN — BUSPIRONE HYDROCHLORIDE 5 MG: 5 TABLET ORAL at 21:34

## 2023-12-05 RX ADMIN — OXYCODONE HYDROCHLORIDE AND ACETAMINOPHEN 1 TABLET: 5; 325 TABLET ORAL at 03:45

## 2023-12-05 RX ADMIN — Medication 10 ML: at 21:35

## 2023-12-05 RX ADMIN — SODIUM BICARBONATE 650 MG: 650 TABLET ORAL at 21:35

## 2023-12-05 RX ADMIN — HEPARIN SODIUM 5000 UNITS: 5000 INJECTION INTRAVENOUS; SUBCUTANEOUS at 15:23

## 2023-12-05 RX ADMIN — SODIUM BICARBONATE 650 MG: 650 TABLET ORAL at 12:26

## 2023-12-05 RX ADMIN — Medication 10 ML: at 12:29

## 2023-12-05 NOTE — PROGRESS NOTES
"[facility] Clinical Pharmacy Services: Vancomycin Monitoring Note    Sammie Landeros is a 33 y.o. female who is on day 3/7 of pharmacy to dose vancomycin for Sepsis.    Previous Vancomycin Dose:   intermittent dosing   Updated Cultures and Sensitivities: Hx of tricuspid valve endocarditis   12/2 BCx x2 sets MRSA; Strep spp not A, B, or pneumo     Results from last 7 days   Lab Units 12/04/23  1820 12/04/23  0429 12/03/23  0512   VANCOMYCIN RM mcg/mL 19.50 21.70 20.30     Vitals/Labs  Ht: 175.3 cm (69\"); Wt: 110 kg (243 lb 9.7 oz)   Temp Readings from Last 1 Encounters:   12/04/23 97.8 °F (36.6 °C) (Oral)     Estimated Creatinine Clearance: 25.4 mL/min (A) (by C-G formula based on SCr of 4.1 mg/dL (H)).   Results from last 7 days   Lab Units 12/04/23  1343 12/04/23  0429 12/04/23  0250 12/03/23  0512 12/02/23  1154 12/02/23  1110   CREATININE mg/dL 4.10* 3.75*  --  3.18*   < > 2.72*   WBC 10*3/mm3  --   --  6.97 16.86*  --  17.25*    < > = values in this interval not displayed.     Assessment/Plan  Trough came back below 20 so will redose x once  Current Vancomycin Dose: 750 mg IVPB x once tonight  Next Level Date and Time: Vanc Random ordered for 12/5 at 1000 (~12hrs)  SCr ordered daily with AM labs.     Thank you for involving pharmacy in this patient's care. Please contact pharmacy with any questions or concerns.       Lia Quintero, [facility]  Clinical Pharmacist            "

## 2023-12-05 NOTE — PLAN OF CARE
Goal Outcome Evaluation:                      Pt admitted from Home with general weakness, and diarrhea for 4 days. Admitted to using Heroin on day of admittion , previous hx of MRSA sepsis in 6/2023,DX with endocarditis , with vegation on aortic leaflet and tricuspid regurge, has been refusing most care, VSS , and could move out of ICU

## 2023-12-05 NOTE — NURSING NOTE
"This RN had a conversation with pts mother and I asked who her prescribing Dr was for pts Suboxone.  Access had asked for the information, and pt told us to ask her mom). Her mother stated that she is the prescriber. I then clarified that she is the pts mother and she stated I am and \"Desperate times\".  Risk notified.  "

## 2023-12-05 NOTE — PROGRESS NOTES
PROGRESS NOTE  Patient Name: Sammie Landeros  Age/Sex: 33 y.o. female  : 1990  MRN: [id number]    Date of Admission: 2023  Date of Encounter Visit: 23   LOS: 3 days   Patient Care Team:  Melvi Landeros APRN as PCP - General (Nurse Practitioner)    Chief Complaint: Septic shock, acute kidney injury, recurrent endocarditis    Hospital course: Patient has been having recurrent endocarditis, she has positive IV drug abuse, she does report that she finished a course of antibiotic after the last admission,  infectious disease team is suspecting poor compliance.  The patient did admit however that she did use contaminated needle and that may be the source of the current infection.  She is doing better clinically, she is on room air, she came off all the pressors  She has been followed by cardiothoracic surgery regarding her valvular disease.  She is also followed by nephrology  , She did develop acute renal failure because of the hypotension from the sepsis and the GI blood loss with post infectious clinical nephritis ruled out because of the normal C3 and C4 level.  Her creatinine seems to be plateauing around 4.2.  She is currently on the vancomycin and infectious disease have poor expectation as far as long-term prognosis given her history so far.  Cardiothoracic surgery evaluated and the plan is to treat medically for start and they will follow-up for further later recommendations.    REVIEW OF SYSTEMS:   CONSTITUTIONAL: no fever or chills  CARDIOVASCULAR: No chest pain, chest pressure or chest discomfort. No palpitations or edema.   RESPIRATORY: No shortness of breath, cough or sputum.   GASTROINTESTINAL: No anorexia, nausea, vomiting or diarrhea. No abdominal pain or blood.   HEMATOLOGIC: No bleeding or bruising.     Ventilator/Non-Invasive Ventilation Settings (From admission, onward)      On room air              Vital Signs  Temp:  [97.5 °F (36.4 °C)-98 °F (36.7 °C)] 97.5 °F (36.4 °C)  Heart  "Rate:  [] 99  BP: ()/(49-70) 106/61  SpO2:  [99 %-100 %] 99 %  on    Device (Oxygen Therapy): room air    Intake/Output Summary (Last 24 hours) at 12/5/2023 1325  Last data filed at 12/5/2023 0100  Gross per 24 hour   Intake --   Output 450 ml   Net -450 ml     Flowsheet Rows      Flowsheet Row First Filed Value   Admission Height 175.3 cm (69\") Documented at 12/02/2023 1103   Admission Weight 101 kg (221 lb 12.5 oz) Documented at 12/02/2023 1103          Body mass index is 37.7 kg/m².      12/04/23  0400 12/05/23  0300 12/05/23  0500   Weight: 110 kg (243 lb 9.7 oz) 116 kg (255 lb 4.7 oz) 116 kg (255 lb 4.7 oz)       Physical Exam:  GEN:  No acute distress, alert, cooperative, well developed   EYES:   Sclerae clear. No icterus. PERRL. Normal EOM  ENT:   External ears/nose normal, no oral lesions, no thrush, moist mucous membranes  NECK:  Supple, midline trachea, no JVD  LUNGS: Normal chest on inspection, very faint crackles posteriorly otherwise no wheezes or labored breathing. Respirations regular, even and unlabored.   CV:  Regular rhythm and borderline rapid heart rate. Normal S1/S2. No murmurs, gallops, or rubs noted.  ABD:  Soft, nontender and nondistended. Normal bowel sounds. No guarding  EXT:  Moves all extremities well. No cyanosis. No redness.  Positive edema lower extremities  Skin: Dry, intact, no, skin graft over the lower extremities on the right with chronic edema and venous stasis hyperpigmentation and color changes    Results Review:    Results From Last 14 Days   Lab Units 12/05/23  0340 12/05/23  0008 12/04/23  1820 12/02/23  1154 12/02/23  1110   CRP mg/dL  --   --   --   --  12.82*   LACTATE mmol/L 1.2 1.2 1.7   < > 11.2*   SED RATE mm/hr  --   --   --   --  46*    < > = values in this interval not displayed.     Results from last 7 days   Lab Units 12/05/23  0340 12/04/23  1343 12/04/23  0429 12/03/23  0512 12/02/23  1154 12/02/23  1110   SODIUM mmol/L 134* 133* 133* 135*  --  134* " "  POTASSIUM mmol/L 4.1 4.2 3.9 4.8  4.8  --  3.6   CHLORIDE mmol/L 109* 109* 106 105  --  95*   CO2 mmol/L 16.8* 16.0* 18.0* 15.0*  --  15.2*   BUN mg/dL 48* 42* 40* 30*  --  21*   CREATININE mg/dL 4.22* 4.10* 3.75* 3.18* 2.83 2.72*   CALCIUM mg/dL 7.5* 7.0* 6.8* 6.7*  --  8.4*   AST (SGOT) U/L 61*  --  56* 81*  --  111*   ALT (SGPT) U/L 36*  --  36* 48*  --  61*   ANION GAP mmol/L 8.2 8.0 9.0 15.0  --  23.8*   ALBUMIN g/dL 2.0* 2.0* 2.1* 2.4*  --  2.8*                 Results from last 7 days   Lab Units 12/05/23  0340 12/04/23  0250 12/03/23  0512 12/02/23  1154 12/02/23  1110   WBC 10*3/mm3 3.87 6.97 16.86*  --  17.25*   HEMOGLOBIN g/dL 7.0* 7.2* 8.7*  --  9.7*   HEMOGLOBIN, POC g/dL  --   --   --  9.5*  --    HEMATOCRIT % 23.2* 23.1* 28.0*  --  32.4*   HEMATOCRIT POC %  --   --   --  28*  --    PLATELETS 10*3/mm3 87* 102* 124*  --  144   MCV fL 70.7* 71.5* 72.0*  --  73.6*   NEUTROPHIL % %  --  81.3*  --   --   --    LYMPHOCYTE % %  --  11.5*  --   --   --    MONOCYTES % %  --  4.0*  --   --   --    EOSINOPHIL % %  --  2.6  --   --   --    BASOPHIL % %  --  0.3  --   --   --          Results from last 7 days   Lab Units 12/04/23  0250   MAGNESIUM mg/dL 1.6           Invalid input(s): \"LDLCALC\"  Results from last 7 days   Lab Units 12/04/23  0929 12/02/23  1154   PH, ARTERIAL pH units 7.338* 7.144*   PCO2, ARTERIAL mm Hg 28.5* 36.2   PO2 ART mm Hg 96.3 79.8*   HCO3 ART mmol/L 15.3* 12.5*         Glucose   Date/Time Value Ref Range Status   12/05/2023 1122 86 70 - 130 mg/dL Final   12/04/2023 2306 76 70 - 130 mg/dL Final   12/04/2023 0526 87 70 - 130 mg/dL Final   12/04/2023 0455 64 (L) 70 - 130 mg/dL Final   12/04/2023 0416 70 70 - 130 mg/dL Final   12/04/2023 0048 169 (H) 70 - 130 mg/dL Final   12/03/2023 1950 120 70 - 130 mg/dL Final   12/03/2023 1524 95 70 - 130 mg/dL Final     Results from last 7 days   Lab Units 12/05/23  0340 12/05/23  0008 12/04/23  1820 12/04/23  1343 12/04/23  0429 12/03/23  2316 " 12/03/23  1747   LACTATE mmol/L 1.2 1.2 1.7 1.5 1.6 1.9 2.7*     Results from last 7 days   Lab Units 12/04/23  0940 12/02/23  1115 12/02/23  1110   BLOODCX  No growth at 24 hours Staphylococcus aureus, MRSA* Staphylococcus aureus, MRSA*   BCIDPCR   --  Staph aureus. mecA/C and MREJ (methicillin resistance gene) detected. Identification by BCID2 PCR.*  Streptococcus spp, not A, B, or pneumoniae. Identification by BCID2 PCR.*  --          Results from last 7 days   Lab Units 12/02/23  1111   COVID19  Not Detected   ADENOVIRUS DETECTION BY PCR  Not Detected   CORONAVIRUS 229E  Not Detected   CORONAVIRUS HKU1  Not Detected   CORONAVIRUS NL63  Not Detected   CORONAVIRUS OC43  Not Detected   HUMAN METAPNEUMOVIRUS  Not Detected   HUMAN RHINOVIRUS/ENTEROVIRUS  Not Detected   INFLUENZA B PCR  Not Detected   PARAINFLUENZA 1  Not Detected   PARAINFLUENZA VIRUS 2  Not Detected   PARAINFLUENZA VIRUS 3  Not Detected   PARAINFLUENZA VIRUS 4  Not Detected   BORDETELLA PERTUSSIS PCR  Not Detected   BORDETELLA PARAPERTUSSIS PCR  Not Detected   CHLAMYDOPHILA PNEUMONIAE PCR  Not Detected   MYCOPLAMA PNEUMO PCR  Not Detected   RSV, PCR  Not Detected     Results from last 7 days   Lab Units 12/04/23  0250   URIC ACID mg/dL 7.2*           Imaging:   Imaging Results (All)       Procedure Component Value Units Date/Time    XR Chest Post CVA Port [[id number]] Collected: 12/02/23 4412            I reviewed the patient's new clinical results.  I personally viewed and interpreted the patient's imaging results:        Medication Review:   heparin (porcine), 5,000 Units, Subcutaneous, Q8H  senna-docusate sodium, 2 tablet, Oral, BID  sodium bicarbonate, 650 mg, Oral, TID  sodium chloride, 10 mL, Intravenous, Q12H  sodium chloride, 10 mL, Intravenous, Q12H  sodium chloride, 10 mL, Intravenous, Q12H  sodium chloride, 10 mL, Intravenous, Q12H  sodium chloride, 10 mL, Intravenous, Q12H  Vancomycin Pharmacy Intermittent/Pulse Dosing, , Does not apply,  Daily        Pharmacy to dose vancomycin,         ASSESSMENT:   Septic shock  Tricuspid valve endocarditis with recurrent infection  MRSA bacteremia, recurrent  Anuric ANTONELLA on chronic kidney disease stage III, worsening  Severe lactic acidosis, resolved  Mild pulmonary hypertension RVSP of 44  Electrolyte disturbances  Vaginal discharge  Heroin IV drug abuse  History of osteomyelitis  Anemia, likely dilutional    PLAN:  Patient had a positive blood culture with MRSA, and she is on vancomycin with infectious disease following for the recommendations  Thoracic surgery did evaluate and they will be following as well and probably need to be notified once the patient is out of the ICU so that they can finalize whether medical or surgical treatment is needed  Respiratory wise she is on room air with no more distress  Kidney function continues to decline but she seems to be plateauing around 4.2 and that will be followed and managed by nephrology, she is on no pressors, and she will be taken off the IV fluid now that her p.o. intake is at normal level.   Glycemic control is satisfactory  Repeat blood culture so far are no growth  She is cleared to transfer out of the intensive care unit  She has multiple comorbidities with multiple medical issues and we will ask the internal medicine team to step and to help manage her medical problems.  Discussed with the CICU rounding team and with the patient  The copied texts in this note were reviewed and they are accurate as of 12/05/23    Disposition: Depending on hospital course    Jovanni Ibarra MD  12/05/23  13:25 EST          Dictated utilizing Dragon dictation

## 2023-12-05 NOTE — PROGRESS NOTES
[facility] Progress Note      Patient Name: Sammie Landeros  : 1990  MRN: [id number]  Primary Care Physician:  Melvi Landeros APRN  Date of admission: 2023    Subjective     Interval History:   The patient was seen and examined today for follow-up on  ANTONELLA.   Doing well overall.  Denies any nausea or vomiting.  No fever no chills.  Sleeping this a.m.  Appetite seems to be good.  Urine output not totally recorded.      Review of Systems:   As noted above    Objective     Vitals:   Temp:  [97.5 °F (36.4 °C)-98 °F (36.7 °C)] 97.5 °F (36.4 °C)  Heart Rate:  [] 98  BP: ()/(46-68) 98/63    Intake/Output Summary (Last 24 hours) at 2023 0926  Last data filed at 2023 0100  Gross per 24 hour   Intake --   Output 450 ml   Net -450 ml       Physical Exam:    General Appearance: Frail and fatigued.  Sleepy but arousable  Skin: warm and dry  HEENT: oral mucosa normal, nonicteric sclera  Neck: supple, no JVD  Lungs: CTA  Heart: RRR, normal S1 and S2  Abdomen: soft, nontender, nondistended  : no palpable bladder  Extremities: Bilateral lower and upper extremity edema  Neuro: Not very cooperative with examination    Scheduled Meds:     heparin (porcine), 5,000 Units, Subcutaneous, Q8H  senna-docusate sodium, 2 tablet, Oral, BID  sodium chloride, 10 mL, Intravenous, Q12H  sodium chloride, 10 mL, Intravenous, Q12H  sodium chloride, 10 mL, Intravenous, Q12H  sodium chloride, 10 mL, Intravenous, Q12H  sodium chloride, 10 mL, Intravenous, Q12H  Vancomycin Pharmacy Intermittent/Pulse Dosing, , Does not apply, Daily      IV Meds:   dextrose 5 % and sodium chloride 0.9 %, 75 mL/hr, Last Rate: 75 mL/hr (23 1023)  norepinephrine, 0.02-0.3 mcg/kg/min, Last Rate: Stopped (23 1024)  Pharmacy to dose vancomycin,   phenylephrine, 0.5-3 mcg/kg/min  sodium chloride, 125 mL/hr, Last Rate: 125 mL/hr (23 1443)  vasopressin, 0.03 Units/min, Last Rate: Stopped (23  1034)        Results Reviewed:   I have personally reviewed the results from the time of this admission to 12/5/2023 09:26 EST     Results from last 7 days   Lab Units 12/05/23  0340 12/04/23  1343 12/04/23  0429 12/03/23  0512   SODIUM mmol/L 134* 133* 133* 135*   POTASSIUM mmol/L 4.1 4.2 3.9 4.8  4.8   CHLORIDE mmol/L 109* 109* 106 105   CO2 mmol/L 16.8* 16.0* 18.0* 15.0*   BUN mg/dL 48* 42* 40* 30*   CREATININE mg/dL 4.22* 4.10* 3.75* 3.18*   CALCIUM mg/dL 7.5* 7.0* 6.8* 6.7*   BILIRUBIN mg/dL 2.8*  --  2.3* 2.6*   ALK PHOS U/L 187*  --  114 127*   ALT (SGPT) U/L 36*  --  36* 48*   AST (SGOT) U/L 61*  --  56* 81*   GLUCOSE mg/dL 81 89 77 109*       Estimated Creatinine Clearance: 25.3 mL/min (A) (by C-G formula based on SCr of 4.22 mg/dL (H)).    Results from last 7 days   Lab Units 12/05/23  0340 12/04/23  1343 12/04/23  0250   MAGNESIUM mg/dL  --   --  1.6   PHOSPHORUS mg/dL 2.7 2.8 3.1       Results from last 7 days   Lab Units 12/04/23  0250   URIC ACID mg/dL 7.2*       Results from last 7 days   Lab Units 12/05/23  0340 12/04/23  0250 12/03/23  0512 12/02/23  1154 12/02/23  1110   WBC 10*3/mm3 3.87 6.97 16.86*  --  17.25*   HEMOGLOBIN g/dL 7.0* 7.2* 8.7*  --  9.7*   HEMOGLOBIN, POC g/dL  --   --   --  9.5*  --    PLATELETS 10*3/mm3 87* 102* 124*  --  144             Assessment / Plan     ASSESSMENT:  Acute Kidney Injury secondary to sepsis and ATN from severe hypotension and GI loss associated with diarrhea superimposed on chronic kidney disease baseline SCr unclear. Scr was at 3.0 on similar hospital admission in May 2023. MAP goal >65.  Postinfectious GN is on differential but unlikely given normal C3 and C4 levels   NAG Metabolic Acidosis, severe sepsis, HCO3 is 15.0.   Shock most likely septic on vasopressors  Possible chronic kidney disease she had another episode of acute kidney injury in June 2023, prior to that creatinine was normal in 2016 and there is no labs checked between June 2023 and present  time.  And she has not followed up in our office as instructed  Severe sepsis, positive blood culture (Staph aureus MRSA) on antibiotic with suspicion of endocarditis  Hypoglycemia, improved, treated with D50.  Elevated liver enzymes, total bili 2.6, history of chronic Hep B/C  Hypocalcemia, calcium 6.7 and albumin is 2.4, calcium is within acceptable range around 8.1 when corrected to albumin  Chronic anemia, hemoglobin today 8.7 on 6/19/2023 was seven-point  Thrombocytopenia,   Polysubstance abuse  Anemia with worsening hemoglobin  Bilateral extremity edema likely contributed by hypoalbuminemia and capillary leak due to sepsis        PLAN:  Kidney functions is plateauing with patient showing signs of hypervolemia.  Will DC IV fluids for now.  Recheck urinalysis  Start oral bicarbonate  Poor prognosis      Thank you for involving us in the care of Sammie Landeros.  Please feel free to call with any questions.    Elvira Coleman MD  12/05/23  09:26 [facility]    [facility]  [phone]    Parts of this note may be an electronic transcription/translation of spoken language to printed text using the Dragon dictation system.
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
## 2023-12-05 NOTE — PROGRESS NOTES
Name: Sammie Landeros ADMIT: 2023   : 1990  PCP: Melvi LanderosTYLOR    MRN: [id number] LOS: 3 days   AGE/SEX: 33 y.o. female  ROOM: [facility]     Subjective   Subjective     No events overnight. She complains of generalized pain and anxiety       Objective   Objective   Vital Signs  Temp:  [97.2 °F (36.2 °C)-98.1 °F (36.7 °C)] 97.2 °F (36.2 °C)  Heart Rate:  [] 94  Resp:  [16] 16  BP: ()/(50-75) 123/72  SpO2:  [98 %-100 %] 98 %  on   ;   Device (Oxygen Therapy): room air  Body mass index is 38.1 kg/m².  Physical Exam  Constitutional:       General: She is not in acute distress.     Appearance: She is ill-appearing. She is not toxic-appearing.   Cardiovascular:      Rate and Rhythm: Normal rate and regular rhythm.      Heart sounds: Normal heart sounds.   Pulmonary:      Effort: Pulmonary effort is normal. No respiratory distress.      Breath sounds: Normal breath sounds. No wheezing, rhonchi or rales.   Abdominal:      General: Bowel sounds are normal. There is no distension.      Palpations: Abdomen is soft.      Tenderness: There is abdominal tenderness. There is no guarding or rebound.   Musculoskeletal:      Right lower leg: Edema present.      Left lower leg: Edema present.   Neurological:      Mental Status: She is alert.   Psychiatric:         Mood and Affect: Mood normal.         Behavior: Behavior normal.         Results Review     I reviewed the patient's new clinical results.  Results from last 7 days   Lab Units 23  0340 23  0250 23  0512 23  1154 23  1110   WBC 10*3/mm3 3.87 6.97 16.86*  --  17.25*   HEMOGLOBIN g/dL 7.0* 7.2* 8.7*  --  9.7*   HEMOGLOBIN, POC g/dL  --   --   --  9.5*  --    PLATELETS 10*3/mm3 87* 102* 124*  --  144     Results from last 7 days   Lab Units 23  0340 23  1343 23  0429 12/03/23  0512   SODIUM mmol/L 134* 133* 133* 135*   POTASSIUM mmol/L 4.1 4.2 3.9 4.8  4.8   CHLORIDE mmol/L 109* 109* 106 105   CO2  mmol/L 16.8* 16.0* 18.0* 15.0*   BUN mg/dL 48* 42* 40* 30*   CREATININE mg/dL 4.22* 4.10* 3.75* 3.18*   GLUCOSE mg/dL 81 89 77 109*   Estimated Creatinine Clearance: 25.4 mL/min (A) (by C-G formula based on SCr of 4.22 mg/dL (H)).  Results from last 7 days   Lab Units 12/05/23  0340 12/04/23  1343 12/04/23  0429 12/03/23  0512 12/02/23  1110   ALBUMIN g/dL 2.0* 2.0* 2.1* 2.4* 2.8*   BILIRUBIN mg/dL 2.8*  --  2.3* 2.6* 2.8*   ALK PHOS U/L 187*  --  114 127* 212*   AST (SGOT) U/L 61*  --  56* 81* 111*   ALT (SGPT) U/L 36*  --  36* 48* 61*     Results from last 7 days   Lab Units 12/05/23  0340 12/04/23  1343 12/04/23  0429 12/04/23  0250 12/03/23  0512   CALCIUM mg/dL 7.5* 7.0* 6.8*  --  6.7*   ALBUMIN g/dL 2.0* 2.0* 2.1*  --  2.4*   MAGNESIUM mg/dL  --   --   --  1.6  --    PHOSPHORUS mg/dL 2.7 2.8  --  3.1  --      Results from last 7 days   Lab Units 12/05/23  0340 12/05/23  0008 12/04/23  1820 12/04/23  1343   LACTATE mmol/L 1.2 1.2 1.7 1.5     COVID19   Date Value Ref Range Status   12/02/2023 Not Detected Not Detected - Ref. Range Final     SARS-CoV-2, [place]   Date Value Ref Range Status   05/10/2023 NEGATIVE Negative Final     Comment:     The 2019-CoV rRT-PCR Assay is only for use under a Food and Drug Administration Emergency Use Authorization. The performance characteristics of the assay were verified by the Clinical Microbiology Laboratory at the [facility].   Results should be used in conjunction with the patient's clinical symptoms, medical history, and other clinical/laboratory findings to determine an overall clinical diagnosis. Negative results do not preclude infection with SARS-CoV-2 (COVID-19).    Test parameters have not been validated for screening asymptomatic patients.     Glucose   Date/Time Value Ref Range Status   12/05/2023 1122 86 70 - 130 mg/dL Final   12/04/2023 2306 76 70 - 130 mg/dL Final   12/04/2023 0526 87 70 - 130 mg/dL Final   12/04/2023 0455 64 (L) 70 - 130  mg/dL Final   12/04/2023 0416 70 70 - 130 mg/dL Final   12/04/2023 0048 169 (H) 70 - 130 mg/dL Final   12/03/2023 1950 120 70 - 130 mg/dL Final       Adult Transthoracic Echo Complete W/ Cont if Necessary Per Protocol    Left ventricular systolic function is normal. Calculated left   ventricular EF = 65.2%    Left ventricular diastolic function was normal.    Estimated right ventricular systolic pressure from tricuspid   regurgitation is mildly elevated (35-45 mmHg). Calculated right   ventricular systolic pressure from tricuspid regurgitation is 44 mmHg.    Mild pulmonary hypertension is present.    The aortic valve is structurally normal with no regurgitation or   stenosis present. The aortic valve appears trileaflet. There may be a   vegetation in the LVOT seen best on image 68 and 69    The tricuspid valve is structurally normal with no significant stenosis   present. Moderate to severe tricuspid valve regurgitation is present.   Estimated right ventricular systolic pressure from tricuspid regurgitation   is mildly elevated (35-45 mmHg). Calculated right ventricular systolic   pressure from tricuspid regurgitation is 44.4 mmHg. Mild pulmonary   hypertension is present. Vegetation noted on the tricuspid valve measuring   0.5 x 2.2cm    Left atrial volume is mildly increased. Saline test results are   negative.    Scheduled Medications  heparin (porcine), 5,000 Units, Subcutaneous, Q8H  senna-docusate sodium, 2 tablet, Oral, BID  sodium bicarbonate, 650 mg, Oral, TID  sodium chloride, 10 mL, Intravenous, Q12H  sodium chloride, 10 mL, Intravenous, Q12H  sodium chloride, 10 mL, Intravenous, Q12H  sodium chloride, 10 mL, Intravenous, Q12H  sodium chloride, 10 mL, Intravenous, Q12H  Vancomycin Pharmacy Intermittent/Pulse Dosing, , Does not apply, Daily    Infusions  Pharmacy to dose vancomycin,     Diet  Diet: Regular/House Diet; Texture: Regular Texture (IDDSI 7); Fluid Consistency: Thin (IDDSI 0)        Assessment/Plan     Active Hospital Problems    Diagnosis  POA    **Endocarditis of tricuspid valve [I07.9]  Yes    Elevated LFTs [R79.89]  Yes    Thrombocytopenia [D69.6]  Yes    GERD without esophagitis [K21.9]  Yes    Shock, septic [A41.9, R65.21]  Yes    MRSA bacteremia [R78.81, B95.62]  Yes    ANTONELLA (acute kidney injury) [N17.9]  Yes    Anemia, chronic disease [D63.8]  Yes    Polysubstance abuse [F19.10]  Yes      Resolved Hospital Problems   No resolved problems to display.       33 y.o. female admitted with Endocarditis of tricuspid valve.    Septic shock due to recurrent tricuspid valve endocarditis and possible aortic valve endocarditis from MRSA with bacteremia-on iv vancomycin per ID recommendations. She is has been evaluated by CT surgery and is not felt to be a candidate for intervention. Luckily, the vegetations appear small and may clear with antibiotics alone.  ANTONELLA on CKD-due to sepsis/ATN. Creatinine up to 4.2 today. Nephrology is managing. IVF stopped as she appears volume overloaded.  Elevated liver enzymes-mild and improving  Anemia of chronic disease-update iron studies, b12, folate. Receiving a transfusion today for hgb of 7  Thrombocytopenia-down to 87 today. Normal on admission, though has been low in the past. She does have a history of hepatitis c as well as cirrhosis and hypersplenism on her last CT abdomen/pelvis done in June. Check B12 and folate as above.    Bilateral lower extremity swelling-possibly secondary to hypoalbuminemia. Check bilateral duplexes to rule out DVT  Candidal vaginitis-s/p a dose of diflucan on 12/3/23  GERD-restart ppi  Anxiety-prn hydroxyzine  Hepatitis b and c  Cirrhosis with splenomegaly  Polysubstance and IV drug abuse  History of MRSA endocarditis of the tricuspid valve s/p 1 month of linazolid   History of osteomyelitis   Heparin SC for DVT prophylaxis.  Full code.  Discussed with patient.  Anticipate discharge  TBD  timing yet to be determined.      Placido  MD Amarilis  [facility]  12/05/23  17:15 EST    I wore protective equipment throughout this patient encounter including a face mask, gloves and protective eyewear.  Hand hygiene was performed before donning protective equipment and after removal when leaving the room.

## 2023-12-05 NOTE — NURSING NOTE
Call placed to DR Samayoa to inform of critical lab value of HBG 7.0, no new orders, hold off on transfusion at this time.

## 2023-12-05 NOTE — PROGRESS NOTES
"  Infectious Diseases Progress Note    Jazmine Higginbotham MD     [facility]  [place]: 3 days  Patient Identification:  Name: Sammie Landeros  Age: 33 y.o.  Sex: female  :  1990  MRN: [id number]         Primary Care Physician: Melvi Landeros APRN        Subjective: Comfortable and more pleasant and interactive.  Still withdrawn and does not going details of review system but claims that her pain is better.  Interval History: See consultation note.    Objective:    Scheduled Meds:heparin (porcine), 5,000 Units, Subcutaneous, Q8H  senna-docusate sodium, 2 tablet, Oral, BID  sodium chloride, 10 mL, Intravenous, Q12H  sodium chloride, 10 mL, Intravenous, Q12H  sodium chloride, 10 mL, Intravenous, Q12H  sodium chloride, 10 mL, Intravenous, Q12H  sodium chloride, 10 mL, Intravenous, Q12H  Vancomycin Pharmacy Intermittent/Pulse Dosing, , Does not apply, Daily      Continuous Infusions:dextrose 5 % and sodium chloride 0.9 %, 75 mL/hr, Last Rate: 75 mL/hr (23 1023)  norepinephrine, 0.02-0.3 mcg/kg/min, Last Rate: Stopped (23 1024)  Pharmacy to dose vancomycin,   phenylephrine, 0.5-3 mcg/kg/min  sodium chloride, 125 mL/hr, Last Rate: 125 mL/hr (23 1443)  vasopressin, 0.03 Units/min, Last Rate: Stopped (23 1034)        Vital signs in last 24 hours:  Temp:  [97.5 °F (36.4 °C)-98 °F (36.7 °C)] 97.5 °F (36.4 °C)  Heart Rate:  [] 98  BP: ()/(46-68) 98/63    Intake/Output:    Intake/Output Summary (Last 24 hours) at 2023 0911  Last data filed at 2023 0100  Gross per 24 hour   Intake --   Output 450 ml   Net -450 ml       Exam:  BP 98/63   Pulse 98   Temp 97.5 °F (36.4 °C) (Oral)   Resp 16   Ht 175.3 cm (69\")   Wt 116 kg (255 lb 4.7 oz)   LMP 2023 (Approximate)   SpO2 99%   BMI 37.70 kg/m²   Patient is examined using the personal protective equipment as per guidelines from infection control for this particular patient as enacted.  Hand washing was performed before " and after patient interaction.  General Appearance:  Limited examination due to patient wants to be left alone.    Chronically ill nontoxic-appearing female who is withdrawn.       Data Review:    I reviewed the patient's new clinical results.  Results from last 7 days   Lab Units 12/05/23  0340 12/04/23  0250 12/03/23  0512 12/02/23  1154 12/02/23  1110   WBC 10*3/mm3 3.87 6.97 16.86*  --  17.25*   HEMOGLOBIN g/dL 7.0* 7.2* 8.7*  --  9.7*   HEMOGLOBIN, POC g/dL  --   --   --  9.5*  --    PLATELETS 10*3/mm3 87* 102* 124*  --  144     Results from last 7 days   Lab Units 12/05/23  0340 12/04/23  1343 12/04/23  0429 12/03/23  0512 12/02/23  1154 12/02/23  1110   SODIUM mmol/L 134* 133* 133* 135*  --  134*   POTASSIUM mmol/L 4.1 4.2 3.9 4.8  4.8  --  3.6   CHLORIDE mmol/L 109* 109* 106 105  --  95*   CO2 mmol/L 16.8* 16.0* 18.0* 15.0*  --  15.2*   BUN mg/dL 48* 42* 40* 30*  --  21*   CREATININE mg/dL 4.22* 4.10* 3.75* 3.18* 2.83 2.72*   CALCIUM mg/dL 7.5* 7.0* 6.8* 6.7*  --  8.4*   GLUCOSE mg/dL 81 89 77 109*  --  64*     Microbiology Results (last 10 days)       Procedure Component Value - Date/Time    Wet Prep, Genital - Swab, Vagina [[id number]]  (Abnormal) Collected: 12/03/23 1200    Lab Status: Final result Specimen: Swab from Vagina Updated: 12/03/23 1251     YEAST 4+ Yeast seen     HYPHAL ELEMENTS No Hyphal elements seen     WBC'S 3+ WBC's seen     Clue Cells, Wet Prep No Clue cells seen     Trichomonas, Wet Prep No Trichomonas seen    Blood Culture - Blood, Arm, Right [[id number]]  (Abnormal)  (Susceptibility) Collected: 12/02/23 1115    Lab Status: Preliminary result Specimen: Blood from Arm, Right Updated: 12/05/23 0623     Blood Culture Staphylococcus aureus, MRSA     Comment:   Infectious disease consultation is highly recommended to rule out distant foci of infection.  Methicillin resistant Staphylococcus aureus, Patient may be an isolation risk.        Isolated from Aerobic Bottle     Gram Stain Aerobic  Bottle Gram positive cocci in clusters    Narrative:      No strep isolated from culture 12/5. Sub culturing in progress.    Susceptibility        Staphylococcus aureus, MRSA      SARAH      Gentamicin Susceptible      Oxacillin Resistant      Rifampin Susceptible      Vancomycin Susceptible                       Susceptibility Comments       Staphylococcus aureus, MRSA    This isolate does not demonstrate inducible clindamycin resistance in vitro.                 Blood Culture ID, PCR - Blood, Arm, Right [[id number]]  (Abnormal) Collected: 12/02/23 1115    Lab Status: Final result Specimen: Blood from Arm, Right Updated: 12/03/23 0040     BCID, PCR Staph aureus. mecA/C and MREJ (methicillin resistance gene) detected. Identification by BCID2 PCR.     BCID, PCR 2 Streptococcus spp, not A, B, or pneumoniae. Identification by BCID2 PCR.     BOTTLE TYPE Aerobic Bottle    Narrative:      Infectious disease consultation is highly recommended to rule out distant foci of infection.    Respiratory Panel PCR w/COVID-19(SARS-CoV-2) ALEAH/JOHNNIE/VENU/PAD/COR/DELORIS In-House, NP Swab in UTM/VTM, 2 HR TAT - Swab, Nasopharynx [[id number]]  (Normal) Collected: 12/02/23 1111    Lab Status: Final result Specimen: Swab from Nasopharynx Updated: 12/02/23 1224     ADENOVIRUS, PCR Not Detected     Coronavirus 229E Not Detected     Coronavirus HKU1 Not Detected     Coronavirus NL63 Not Detected     Coronavirus OC43 Not Detected     COVID19 Not Detected     Human Metapneumovirus Not Detected     Human Rhinovirus/Enterovirus Not Detected     Influenza A PCR Not Detected     Influenza B PCR Not Detected     Parainfluenza Virus 1 Not Detected     Parainfluenza Virus 2 Not Detected     Parainfluenza Virus 3 Not Detected     Parainfluenza Virus 4 Not Detected     RSV, PCR Not Detected     Bordetella pertussis pcr Not Detected     Bordetella parapertussis PCR Not Detected     Chlamydophila pneumoniae PCR Not Detected     Mycoplasma pneumo by PCR Not Detected     Narrative:      In the setting of a positive respiratory panel with a viral infection PLUS a negative procalcitonin without other underlying concern for bacterial infection, consider observing off antibiotics or discontinuation of antibiotics and continue supportive care. If the respiratory panel is positive for atypical bacterial infection (Bordetella pertussis, Chlamydophila pneumoniae, or Mycoplasma pneumoniae), consider antibiotic de-escalation to target atypical bacterial infection.    Blood Culture - Blood, Arm, Left [[id number]]  (Abnormal) Collected: 12/02/23 1110    Lab Status: Final result Specimen: Blood from Arm, Left Updated: 12/05/23 0618     Blood Culture Staphylococcus aureus, MRSA     Comment:   Infectious disease consultation is highly recommended to rule out distant foci of infection.  Methicillin resistant Staphylococcus aureus, Patient may be an isolation risk.        Isolated from Aerobic Bottle     Gram Stain Aerobic Bottle Gram positive cocci in clusters    Narrative:      Refer to previous blood culture collected on 12/02/2023 1115 for MICs.              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  Follow-up on repeat blood cultures.    Continue with IV vancomycin.  Overall care will be difficult and prognosis is poor  because of patient's lack of participation in her own wellbeing including documented noncompliance and active self-mutilation behavior with ongoing IV drug use.  If vancomycin is considered toxic to already declining renal function then if MRSA in the blood cultures documented to be sensitive to daptomycin then she could be changed to daptomycin to avoid further decline in renal function.  Management of other issues including this complex psychosocial issues per primary team.  Duration of antibiotic therapy at present is open ended.  Jazmine Higginbotham MD  12/5/2023  09:11 EST    Parts of this note may be an electronic transcription/translation of spoken language to printed text using the Dragon dictation system.

## 2023-12-05 NOTE — PLAN OF CARE
Problem: Adult Inpatient Plan of Care  Goal: Plan of Care Review  Outcome: Ongoing, Not Progressing  Flowsheets (Taken 12/5/2023 1642)  Outcome Evaluation: Patient was transferred from [facility] at 1515. Hemogloin was noted at 7 so clarified with Dr Ibarra if unit of prbc was to be given and he said yes. So blood is now infusing and patient is resting, vs wnl, lungs clear  Goal: Optimal Comfort and Wellbeing  Outcome: Ongoing, Not Progressing  Intervention: Provide Person-Centered Care  Recent Flowsheet Documentation  Taken 12/5/2023 1522 by Fe Jones, RN  Trust Relationship/Rapport:   care explained   choices provided   reassurance provided   thoughts/feelings acknowledged   questions encouraged  Goal: Readiness for Transition of Care  Outcome: Ongoing, Not Progressing   Goal Outcome Evaluation:              Outcome Evaluation: Patient was transferred from [facility] at 1515. Hemogloin was noted at 7 so clarified with Dr Ibarra if unit of prbc was to be given and he said yes. So blood is now infusing and patient is resting, vs wnl, lungs clear

## 2023-12-05 NOTE — PROGRESS NOTES
Access tried to evaluate pt but she refused and asked that we come back tomorrow. She told Access this yesterday as well. Access will attempt again tomorrow.

## 2023-12-06 LAB
ALBUMIN SERPL-MCNC: 2.1 G/DL (ref 3.5–5.2)
ALBUMIN/GLOB SERPL: 0.6 G/DL
ALP SERPL-CCNC: 361 U/L (ref 39–117)
ALT SERPL W P-5'-P-CCNC: 35 U/L (ref 1–33)
ANION GAP SERPL CALCULATED.3IONS-SCNC: 10 MMOL/L (ref 5–15)
ANISOCYTOSIS BLD QL: ABNORMAL
AST SERPL-CCNC: 56 U/L (ref 1–32)
BACTERIA SPEC AEROBE CULT: ABNORMAL
BASOPHILS # BLD MANUAL: 0.1 10*3/MM3 (ref 0–0.2)
BASOPHILS NFR BLD MANUAL: 2.1 % (ref 0–1.5)
BH BB BLOOD EXPIRATION DATE: NORMAL
BH BB BLOOD TYPE BARCODE: 6200
BH BB DISPENSE STATUS: NORMAL
BH BB PRODUCT CODE: NORMAL
BH BB UNIT NUMBER: NORMAL
BILIRUB SERPL-MCNC: 2.4 MG/DL (ref 0–1.2)
BUN SERPL-MCNC: 55 MG/DL (ref 6–20)
BUN/CREAT SERPL: 10.7 (ref 7–25)
C TRACH RRNA SPEC QL NAA+PROBE: NEGATIVE
CALCIUM SPEC-SCNC: 8.2 MG/DL (ref 8.6–10.5)
CHLORIDE SERPL-SCNC: 110 MMOL/L (ref 98–107)
CO2 SERPL-SCNC: 16 MMOL/L (ref 22–29)
CREAT SERPL-MCNC: 5.13 MG/DL (ref 0.57–1)
CROSSMATCH INTERPRETATION: NORMAL
DEPRECATED RDW RBC AUTO: 42.5 FL (ref 37–54)
EGFRCR SERPLBLD CKD-EPI 2021: 10.6 ML/MIN/1.73
EOSINOPHIL # BLD MANUAL: 0.05 10*3/MM3 (ref 0–0.4)
EOSINOPHIL NFR BLD MANUAL: 1 % (ref 0.3–6.2)
ERYTHROCYTE [DISTWIDTH] IN BLOOD BY AUTOMATED COUNT: 17 % (ref 12.3–15.4)
FOLATE SERPL-MCNC: 9.69 NG/ML (ref 4.78–24.2)
GLOBULIN UR ELPH-MCNC: 3.6 GM/DL
GLUCOSE SERPL-MCNC: 87 MG/DL (ref 65–99)
GRAM STN SPEC: ABNORMAL
HAPTOGLOB SERPL-MCNC: 67 MG/DL (ref 30–200)
HCT VFR BLD AUTO: 23.8 % (ref 34–46.6)
HGB BLD-MCNC: 7.5 G/DL (ref 12–15.9)
ISOLATED FROM: ABNORMAL
LDH SERPL-CCNC: 218 U/L (ref 135–214)
LYMPHOCYTES # BLD MANUAL: 0.43 10*3/MM3 (ref 0.7–3.1)
LYMPHOCYTES NFR BLD MANUAL: 4.1 % (ref 5–12)
MCH RBC QN AUTO: 22.5 PG (ref 26.6–33)
MCHC RBC AUTO-ENTMCNC: 31.5 G/DL (ref 31.5–35.7)
MCV RBC AUTO: 71.3 FL (ref 79–97)
MICROCYTES BLD QL: ABNORMAL
MONOCYTES # BLD: 0.19 10*3/MM3 (ref 0.1–0.9)
NEUTROPHILS # BLD AUTO: 3.82 10*3/MM3 (ref 1.7–7)
NEUTROPHILS NFR BLD MANUAL: 83.5 % (ref 42.7–76)
NRBC BLD AUTO-RTO: 0 /100 WBC (ref 0–0.2)
PHOSPHATE SERPL-MCNC: 3.5 MG/DL (ref 2.5–4.5)
PLAT MORPH BLD: NORMAL
PLATELET # BLD AUTO: 70 10*3/MM3 (ref 140–450)
PMV BLD AUTO: 10.1 FL (ref 6–12)
POIKILOCYTOSIS BLD QL SMEAR: ABNORMAL
POLYCHROMASIA BLD QL SMEAR: ABNORMAL
POTASSIUM SERPL-SCNC: 4.4 MMOL/L (ref 3.5–5.2)
PROT SERPL-MCNC: 5.7 G/DL (ref 6–8.5)
RBC # BLD AUTO: 3.34 10*6/MM3 (ref 3.77–5.28)
SODIUM SERPL-SCNC: 136 MMOL/L (ref 136–145)
UNIT  ABO: NORMAL
UNIT  RH: NORMAL
VANCOMYCIN SERPL-MCNC: 20.9 MCG/ML (ref 5–40)
VARIANT LYMPHS NFR BLD MANUAL: 9.3 % (ref 19.6–45.3)
VIT B12 BLD-MCNC: >2000 PG/ML (ref 211–946)
WBC MORPH BLD: NORMAL
WBC NRBC COR # BLD AUTO: 4.57 10*3/MM3 (ref 3.4–10.8)

## 2023-12-06 PROCEDURE — 80307 DRUG TEST PRSMV CHEM ANLYZR: CPT | Performed by: INTERNAL MEDICINE

## 2023-12-06 PROCEDURE — 83615 LACTATE (LD) (LDH) ENZYME: CPT | Performed by: HOSPITALIST

## 2023-12-06 PROCEDURE — 86037 ANCA TITER EACH ANTIBODY: CPT | Performed by: HOSPITALIST

## 2023-12-06 PROCEDURE — 82570 ASSAY OF URINE CREATININE: CPT | Performed by: HOSPITALIST

## 2023-12-06 PROCEDURE — 83010 ASSAY OF HAPTOGLOBIN QUANT: CPT | Performed by: HOSPITALIST

## 2023-12-06 PROCEDURE — 83516 IMMUNOASSAY NONANTIBODY: CPT | Performed by: HOSPITALIST

## 2023-12-06 PROCEDURE — 85007 BL SMEAR W/DIFF WBC COUNT: CPT | Performed by: INTERNAL MEDICINE

## 2023-12-06 PROCEDURE — 84100 ASSAY OF PHOSPHORUS: CPT | Performed by: INTERNAL MEDICINE

## 2023-12-06 PROCEDURE — 84300 ASSAY OF URINE SODIUM: CPT | Performed by: HOSPITALIST

## 2023-12-06 PROCEDURE — 80053 COMPREHEN METABOLIC PANEL: CPT | Performed by: INTERNAL MEDICINE

## 2023-12-06 PROCEDURE — 80202 ASSAY OF VANCOMYCIN: CPT | Performed by: INTERNAL MEDICINE

## 2023-12-06 PROCEDURE — 25810000003 SODIUM CHLORIDE 0.9 % SOLUTION: Performed by: HOSPITALIST

## 2023-12-06 PROCEDURE — 82746 ASSAY OF FOLIC ACID SERUM: CPT | Performed by: STUDENT IN AN ORGANIZED HEALTH CARE EDUCATION/TRAINING PROGRAM

## 2023-12-06 PROCEDURE — 85025 COMPLETE CBC W/AUTO DIFF WBC: CPT | Performed by: INTERNAL MEDICINE

## 2023-12-06 PROCEDURE — 86160 COMPLEMENT ANTIGEN: CPT | Performed by: HOSPITALIST

## 2023-12-06 PROCEDURE — 81001 URINALYSIS AUTO W/SCOPE: CPT | Performed by: HOSPITALIST

## 2023-12-06 PROCEDURE — 82043 UR ALBUMIN QUANTITATIVE: CPT | Performed by: HOSPITALIST

## 2023-12-06 PROCEDURE — 86038 ANTINUCLEAR ANTIBODIES: CPT | Performed by: HOSPITALIST

## 2023-12-06 PROCEDURE — 82607 VITAMIN B-12: CPT | Performed by: STUDENT IN AN ORGANIZED HEALTH CARE EDUCATION/TRAINING PROGRAM

## 2023-12-06 PROCEDURE — 86431 RHEUMATOID FACTOR QUANT: CPT | Performed by: HOSPITALIST

## 2023-12-06 RX ORDER — SODIUM CHLORIDE 9 MG/ML
75 INJECTION, SOLUTION INTRAVENOUS CONTINUOUS
Status: ACTIVE | OUTPATIENT
Start: 2023-12-06 | End: 2023-12-07

## 2023-12-06 RX ORDER — BUPRENORPHINE AND NALOXONE 8; 2 MG/1; MG/1
2 FILM, SOLUBLE BUCCAL; SUBLINGUAL DAILY
Status: DISCONTINUED | OUTPATIENT
Start: 2023-12-07 | End: 2023-12-11 | Stop reason: HOSPADM

## 2023-12-06 RX ADMIN — BUSPIRONE HYDROCHLORIDE 5 MG: 5 TABLET ORAL at 16:25

## 2023-12-06 RX ADMIN — Medication 10 ML: at 08:21

## 2023-12-06 RX ADMIN — SODIUM BICARBONATE 650 MG: 650 TABLET ORAL at 08:22

## 2023-12-06 RX ADMIN — Medication 10 ML: at 20:54

## 2023-12-06 RX ADMIN — HYDROXYZINE HYDROCHLORIDE 25 MG: 25 TABLET ORAL at 12:43

## 2023-12-06 RX ADMIN — Medication 10 ML: at 20:53

## 2023-12-06 RX ADMIN — Medication 10 ML: at 20:52

## 2023-12-06 RX ADMIN — BUSPIRONE HYDROCHLORIDE 5 MG: 5 TABLET ORAL at 08:22

## 2023-12-06 RX ADMIN — Medication 10 ML: at 08:22

## 2023-12-06 RX ADMIN — SODIUM CHLORIDE 75 ML/HR: 9 INJECTION, SOLUTION INTRAVENOUS at 12:43

## 2023-12-06 RX ADMIN — PANTOPRAZOLE SODIUM 40 MG: 40 TABLET, DELAYED RELEASE ORAL at 06:19

## 2023-12-06 RX ADMIN — SODIUM BICARBONATE 650 MG: 650 TABLET ORAL at 16:25

## 2023-12-06 NOTE — PROGRESS NOTES
[facility] Clinical Pharmacy Services: Vancomycin Level Monitoring Note    Sammie Landeros is a 33 y.o. female who is on day 4/7 of pharmacy to dose vancomycin for Sepsis.    Estimated Creatinine Clearance: 21.1 mL/min (A) (by C-G formula based on SCr of 5.13 mg/dL (H)).    Last Vanc Dose: 750 mg IV 12/4 @ 21:30  Results from last 7 days   Lab Units 12/06/23  0614 12/04/23  1820 12/04/23  0429   VANCOMYCIN RM mcg/mL 20.90 19.50 21.70     Next Level Date and Time: Vanc Random on 12/7 with am labs    No changes at this time. Pharmacy is continuing to monitor and will adjust as needed.    Harsha López PharmD  Clinical Pharmacist

## 2023-12-06 NOTE — PLAN OF CARE
Goal Outcome Evaluation:              Outcome Evaluation: pt is A&O , pt refused duplex of lower extremities, pt refused every medication except for anxiety medication, pt moaned very loud throughout shift, pt refused to keep gown on, IVF given per MAR, VSS, Will continue to monitor rest of shift.

## 2023-12-06 NOTE — PROGRESS NOTES
Name: Sammie Landeros ADMIT: 2023   : 1990  PCP: Leti Melvi ENGLANDTYLOR    MRN: [id number] LOS: 4 days   AGE/SEX: 33 y.o. female  ROOM: [facility]     Subjective   Subjective   Patient appears obese, generally weak, relatively comfortable, no apparent distress.  Requesting IV administration medication.  Denies nausea / vomiting; however, refusing meals.  Discussed with RN--patient also refusing duplex bilateral lower extremity rule out DVT.       Objective   Objective   Vital Signs  Temp:  [97.2 °F (36.2 °C)-98 °F (36.7 °C)] 98 °F (36.7 °C)  Heart Rate:  [87-96] 87  Resp:  [16] 16  BP: (122-133)/(64-87) 122/64  SpO2:  [97 %-99 %] 98 %  on   ;   Device (Oxygen Therapy): room air  Body mass index is 38.64 kg/m².    Physical Exam  Constitutional:       General: She is not in acute distress.     Appearance: She is obese. She is not toxic-appearing.      Comments: Lethargic   Cardiovascular:      Rate and Rhythm: Normal rate.      Heart sounds: Normal heart sounds.   Pulmonary:      Effort: Pulmonary effort is normal.      Comments: Diminished on expiration posteriorly  Abdominal:      General: Bowel sounds are normal.      Palpations: Abdomen is soft.   Musculoskeletal:      Right lower leg: Edema present.      Left lower leg: Edema present.   Skin:     General: Skin is warm and dry.       Results Review     I reviewed the patient's new clinical results.  Results from last 7 days   Lab Units 23  0614 23  2204 23  0340 23  0250 23  0512   WBC 10*3/mm3 4.57  --  3.87 6.97 16.86*   HEMOGLOBIN g/dL 7.5* 7.9* 7.0* 7.2* 8.7*   PLATELETS 10*3/mm3 70*  --  87* 102* 124*     Results from last 7 days   Lab Units 23  0614 23  0340 23  1343 23  0429   SODIUM mmol/L 136 134* 133* 133*   POTASSIUM mmol/L 4.4 4.1 4.2 3.9   CHLORIDE mmol/L 110* 109* 109* 106   CO2 mmol/L 16.0* 16.8* 16.0* 18.0*   BUN mg/dL 55* 48* 42* 40*   CREATININE mg/dL 5.13* 4.22* 4.10* 3.75*   GLUCOSE  mg/dL 87 81 89 77   EGFR mL/min/1.73 10.6* 13.4* 13.9* 15.5*     Results from last 7 days   Lab Units 12/06/23  0614 12/05/23  0340 12/04/23  1343 12/04/23  0429 12/03/23  0512   ALBUMIN g/dL 2.1* 2.0* 2.0* 2.1* 2.4*   BILIRUBIN mg/dL 2.4* 2.8*  --  2.3* 2.6*   ALK PHOS U/L 361* 187*  --  114 127*   AST (SGOT) U/L 56* 61*  --  56* 81*   ALT (SGPT) U/L 35* 36*  --  36* 48*     Results from last 7 days   Lab Units 12/06/23  0614 12/05/23 0340 12/04/23  1343 12/04/23  0429 12/04/23  0250   CALCIUM mg/dL 8.2* 7.5* 7.0* 6.8*  --    ALBUMIN g/dL 2.1* 2.0* 2.0* 2.1*  --    MAGNESIUM mg/dL  --   --   --   --  1.6   PHOSPHORUS mg/dL 3.5 2.7 2.8  --  3.1     Results from last 7 days   Lab Units 12/05/23  0340 12/05/23  0008 12/04/23  1820 12/04/23  1343   LACTATE mmol/L 1.2 1.2 1.7 1.5     Glucose   Date/Time Value Ref Range Status   12/05/2023 1122 86 70 - 130 mg/dL Final   12/04/2023 2306 76 70 - 130 mg/dL Final   12/04/2023 0526 87 70 - 130 mg/dL Final   12/04/2023 0455 64 (L) 70 - 130 mg/dL Final   12/04/2023 0416 70 70 - 130 mg/dL Final   12/04/2023 0048 169 (H) 70 - 130 mg/dL Final   12/03/2023 1950 120 70 - 130 mg/dL Final       No radiology results for the last day    I have personally reviewed all medications:  Scheduled Medications  busPIRone, 5 mg, Oral, TID  heparin (porcine), 5,000 Units, Subcutaneous, Q8H  pantoprazole, 40 mg, Oral, Q AM  sodium bicarbonate, 650 mg, Oral, TID  sodium chloride, 10 mL, Intravenous, Q12H  sodium chloride, 10 mL, Intravenous, Q12H  sodium chloride, 10 mL, Intravenous, Q12H  sodium chloride, 10 mL, Intravenous, Q12H  sodium chloride, 10 mL, Intravenous, Q12H  Vancomycin Pharmacy Intermittent/Pulse Dosing, , Does not apply, Daily    Infusions  Pharmacy to dose vancomycin,   sodium chloride, 75 mL/hr, Last Rate: 75 mL/hr (12/06/23 1243)    Diet  Diet: Regular/House Diet; Texture: Regular Texture (IDDSI 7); Fluid Consistency: Thin (IDDSI 0)    I have personally reviewed:  [x]   Laboratory   [x]  Microbiology   [x]  Radiology   [x]  EKG/Telemetry  [x]  Cardiology/Vascular   []  Pathology    []  Records       Assessment/Plan     Active Hospital Problems    Diagnosis  POA    **Endocarditis of tricuspid valve [I07.9]  Yes    Elevated LFTs [R79.89]  Yes    Thrombocytopenia [D69.6]  Yes    GERD without esophagitis [K21.9]  Yes    Shock, septic [A41.9, R65.21]  Yes    MRSA bacteremia [R78.81, B95.62]  Yes    ANTONELLA (acute kidney injury) [N17.9]  Yes    Anemia, chronic disease [D63.8]  Yes    Polysubstance abuse [F19.10]  Yes      Resolved Hospital Problems   No resolved problems to display.       33 y.o. female admitted with Endocarditis of tricuspid valve.    Septic shock due to recurrent tricuspid valve endocarditis and possible aortic valve endocarditis from MRSA with bacteremia-on iv vancomycin per ID recommendations. CT surgery evaluated and felt not to be a candidate for intervention. Vegetations appear small and may clear with antibiotics alone.  ANTONELLA on CKD-due to sepsis/ATN. Creatinine up to 4.2-->5.1 today. Nephrology managing. IVF stopped recently due to volume overload.  Elevated liver enzymes-mild & unchanged  Anemia of chronic disease-improved iron studies, B12 >2000, folate 9.69. Hgb 7.5 s/p transfusion   Thrombocytopenia--87-->70 today. Normal on admission, yet low in the past. History of hepatitis c as well as cirrhosis and hypersplenism on last CT abdomen/pelvis done in June.   Bilateral lower extremity swelling-possibly secondary to hypoalbuminemia. Check bilateral duplexes to rule out DVT (refused by patient)  Candidal vaginitis-s/p a dose of diflucan on 12/3/23  GERD-restart ppi  Anxiety-prn hydroxyzine available  Hepatitis b and c  Cirrhosis with splenomegaly  Polysubstance and IV drug abuse with plan to monitor off narcotics due to increased lethargy   History of MRSA endocarditis of the tricuspid valve s/p 1 month of linazolid   History of osteomyelitis   Heparin SC for DVT  prophylaxis.  Full code.  Discussed with patient, RN, & Dr. Jarrell as confirmed on KIRNA patient's mother prescribed Suboxone twice since 8/2023  Anticipate discharge TBD timing yet to be determined.      TYLOR Mcarthur  [facility]  12/06/23  16:17 EST

## 2023-12-06 NOTE — PROGRESS NOTES
"  Infectious Diseases Progress Note    Jazmine Higginbotham MD     [facility]  [place]: 4 days  Patient Identification:  Name: Sammie Landeros  Age: 33 y.o.  Sex: female  :  1990  MRN: [id number]         Primary Care Physician: Meliv Landeros APRN        Subjective: Feels about the same and concerned about her withdrawal.  Does not want to talk.  Interval History: See consultation note.    Objective:    Scheduled Meds:busPIRone, 5 mg, Oral, TID  heparin (porcine), 5,000 Units, Subcutaneous, Q8H  pantoprazole, 40 mg, Oral, Q AM  sodium bicarbonate, 650 mg, Oral, TID  sodium chloride, 10 mL, Intravenous, Q12H  sodium chloride, 10 mL, Intravenous, Q12H  sodium chloride, 10 mL, Intravenous, Q12H  sodium chloride, 10 mL, Intravenous, Q12H  sodium chloride, 10 mL, Intravenous, Q12H  Vancomycin Pharmacy Intermittent/Pulse Dosing, , Does not apply, Daily      Continuous Infusions:Pharmacy to dose vancomycin,         Vital signs in last 24 hours:  Temp:  [97.2 °F (36.2 °C)-98.1 °F (36.7 °C)] 97.7 °F (36.5 °C)  Heart Rate:  [87-96] 87  Resp:  [16] 16  BP: ()/(50-87) 130/80    Intake/Output:    Intake/Output Summary (Last 24 hours) at 2023 1012  Last data filed at 2023 1917  Gross per 24 hour   Intake 443.42 ml   Output 1525 ml   Net -1081.58 ml       Exam:  /80 (BP Location: Right arm, Patient Position: Lying)   Pulse 87   Temp 97.7 °F (36.5 °C) (Oral)   Resp 16   Ht 175.3 cm (69\")   Wt 119 kg (261 lb 11 oz)   LMP 2023 (Approximate)   SpO2 99%   BMI 38.64 kg/m²   Patient is examined using the personal protective equipment as per guidelines from infection control for this particular patient as enacted.  Hand washing was performed before and after patient interaction.  General Appearance:  Limited examination due to patient wants to be left alone.    Chronically ill nontoxic-appearing female who is withdrawn.       Data Review:    I reviewed the patient's new clinical " results.  Results from last 7 days   Lab Units 12/06/23  0614 12/05/23  2204 12/05/23  0340 12/04/23  0250 12/03/23  0512 12/02/23  1154 12/02/23  1110   WBC 10*3/mm3 4.57  --  3.87 6.97 16.86*  --  17.25*   HEMOGLOBIN g/dL 7.5* 7.9* 7.0* 7.2* 8.7*  --  9.7*   HEMOGLOBIN, POC g/dL  --   --   --   --   --  9.5*  --    PLATELETS 10*3/mm3 70*  --  87* 102* 124*  --  144     Results from last 7 days   Lab Units 12/06/23  0614 12/05/23  0340 12/04/23  1343 12/04/23  0429 12/03/23  0512 12/02/23  1154 12/02/23  1110   SODIUM mmol/L 136 134* 133* 133* 135*  --  134*   POTASSIUM mmol/L 4.4 4.1 4.2 3.9 4.8  4.8  --  3.6   CHLORIDE mmol/L 110* 109* 109* 106 105  --  95*   CO2 mmol/L 16.0* 16.8* 16.0* 18.0* 15.0*  --  15.2*   BUN mg/dL 55* 48* 42* 40* 30*  --  21*   CREATININE mg/dL 5.13* 4.22* 4.10* 3.75* 3.18* 2.83 2.72*   CALCIUM mg/dL 8.2* 7.5* 7.0* 6.8* 6.7*  --  8.4*   GLUCOSE mg/dL 87 81 89 77 109*  --  64*     Microbiology Results (last 10 days)       Procedure Component Value - Date/Time    Gardnerella vaginalis, Trichomonas vaginalis, Candida albicans, DNA - Swab, Vagina [[id number]]  (Abnormal) Collected: 12/04/23 1652    Lab Status: Final result Specimen: Swab from Vagina Updated: 12/05/23 1412     Candida Species Positive     Gardnerella vaginalis, DNA Probe Negative     Trichomonas vaginosis Negative    Narrative:      Performed at:   - [address]  [id number]  : Cayden Prieto PhD, Phone:  [phone]    Blood Culture - Blood, Cannula [[id number]]  (Normal) Collected: 12/04/23 0940    Lab Status: Preliminary result Specimen: Blood from Cannula Updated: 12/06/23 1000     Blood Culture No growth at 2 days    Wet Prep, Genital - Swab, Vagina [[id number]]  (Abnormal) Collected: 12/03/23 1200    Lab Status: Final result Specimen: Swab from Vagina Updated: 12/03/23 1251     YEAST 4+ Yeast seen     HYPHAL ELEMENTS No Hyphal elements seen     WBC'S 3+ WBC's seen     Clue  Cells, Wet Prep No Clue cells seen     Trichomonas, Wet Prep No Trichomonas seen    Chlamydia trachomatis, PCR - Swab, Vagina [[id number]] Collected: 12/03/23 1159    Lab Status: Final result Specimen: Swab from Vagina Updated: 12/06/23 0711     Chlamydia trachomatis, [place] Negative    Narrative:      Performed at:  [address] [place]  [id number]  : Karuna Kamara MD, Phone:  [phone]    Blood Culture - Blood, Arm, Right [[id number]]  (Abnormal)  (Susceptibility) Collected: 12/02/23 1115    Lab Status: Edited Result - FINAL Specimen: Blood from Arm, Right Updated: 12/06/23 0625     Blood Culture Staphylococcus aureus, MRSA     Comment:   Infectious disease consultation is highly recommended to rule out distant foci of infection.  Methicillin resistant Staphylococcus aureus, Patient may be an isolation risk.        Isolated from Aerobic Bottle     Gram Stain Aerobic Bottle Gram positive cocci in clusters    Narrative:      Streptococcus spp not viable for growth.    Susceptibility        Staphylococcus aureus, MRSA      SARAH      Gentamicin Susceptible      Oxacillin Resistant      Rifampin Susceptible      Vancomycin Susceptible                       Susceptibility Comments       Staphylococcus aureus, MRSA    This isolate does not demonstrate inducible clindamycin resistance in vitro.                 Blood Culture ID, PCR - Blood, Arm, Right [[id number]]  (Abnormal) Collected: 12/02/23 1115    Lab Status: Final result Specimen: Blood from Arm, Right Updated: 12/03/23 0040     BCID, PCR Staph aureus. mecA/C and MREJ (methicillin resistance gene) detected. Identification by BCID2 PCR.     BCID, PCR 2 Streptococcus spp, not A, B, or pneumoniae. Identification by BCID2 PCR.     BOTTLE TYPE Aerobic Bottle    Narrative:      Infectious disease consultation is highly recommended to rule out distant foci of infection.    Respiratory Panel PCR w/COVID-19(SARS-CoV-2)  ALEAH/JOHNNIE/VENU/PAD/COR/DELORIS In-House, NP Swab in UTM/VTM, 2 HR TAT - Swab, Nasopharynx [[id number]]  (Normal) Collected: 12/02/23 1111    Lab Status: Final result Specimen: Swab from Nasopharynx Updated: 12/02/23 1224     ADENOVIRUS, PCR Not Detected     Coronavirus 229E Not Detected     Coronavirus HKU1 Not Detected     Coronavirus NL63 Not Detected     Coronavirus OC43 Not Detected     COVID19 Not Detected     Human Metapneumovirus Not Detected     Human Rhinovirus/Enterovirus Not Detected     Influenza A PCR Not Detected     Influenza B PCR Not Detected     Parainfluenza Virus 1 Not Detected     Parainfluenza Virus 2 Not Detected     Parainfluenza Virus 3 Not Detected     Parainfluenza Virus 4 Not Detected     RSV, PCR Not Detected     Bordetella pertussis pcr Not Detected     Bordetella parapertussis PCR Not Detected     Chlamydophila pneumoniae PCR Not Detected     Mycoplasma pneumo by PCR Not Detected    Narrative:      In the setting of a positive respiratory panel with a viral infection PLUS a negative procalcitonin without other underlying concern for bacterial infection, consider observing off antibiotics or discontinuation of antibiotics and continue supportive care. If the respiratory panel is positive for atypical bacterial infection (Bordetella pertussis, Chlamydophila pneumoniae, or Mycoplasma pneumoniae), consider antibiotic de-escalation to target atypical bacterial infection.    Blood Culture - Blood, Arm, Left [[id number]]  (Abnormal) Collected: 12/02/23 1110    Lab Status: Final result Specimen: Blood from Arm, Left Updated: 12/05/23 0618     Blood Culture Staphylococcus aureus, MRSA     Comment:   Infectious disease consultation is highly recommended to rule out distant foci of infection.  Methicillin resistant Staphylococcus aureus, Patient may be an isolation risk.        Isolated from Aerobic Bottle     Gram Stain Aerobic Bottle Gram positive cocci in clusters    Narrative:      Refer to previous  blood culture collected on 12/02/2023 1115 for MICs.              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  Follow-up on repeat blood cultures.    Continue with IV vancomycin.  Overall care will be difficult and prognosis is poor because of patient's lack of participation in her own wellbeing including documented noncompliance and active self-mutilation behavior with ongoing IV drug use.  If vancomycin is considered toxic to already declining renal function then if MRSA in the blood cultures documented to be sensitive to daptomycin then she could be changed to daptomycin to avoid further decline in renal function.  Management of other issues including this complex psychosocial issues per primary team.  Duration of antibiotic therapy at present is open ended.  Jazmine Higginbotham MD  12/6/2023  10:12 EST    Parts of this note may be an electronic transcription/translation of spoken language to printed text using the Dragon dictation system.

## 2023-12-06 NOTE — CONSULTS
Pt has declined assessment two days in a row and has refused offer of resources. Access will remove consult. Please reach out if pt has questions or changes her mind.

## 2023-12-06 NOTE — PROGRESS NOTES
"  PROGRESS NOTE  Patient Name: Sammie Landeros  Age/Sex: 33 y.o. female  : 1990  MRN: [id number]    Date of Admission: 2023  Date of Encounter Visit: 23   LOS: 4 days   Patient Care Team:  Melvi Landeros APRN as PCP - General (Nurse Practitioner)    Chief Complaint: Septic shock, acute kidney injury, recurrent endocarditis    Hospital course: Patient continues to improve, on room air, on IV antibiotic and followed by ID and by cardiothoracic surgery  Appreciate the help from the internal medicine team to address her multiple complex other medical issues  She is on room air,, she has no respiratory or hemodynamic issues at this point    REVIEW OF SYSTEMS:   CONSTITUTIONAL: no fever or chills      Ventilator/Non-Invasive Ventilation Settings (From admission, onward)      On room air              Vital Signs  Temp:  [97.2 °F (36.2 °C)-98 °F (36.7 °C)] 98 °F (36.7 °C)  Heart Rate:  [87-96] 87  Resp:  [16] 16  BP: (122-133)/(64-87) 122/64  SpO2:  [97 %-99 %] 98 %  on    Device (Oxygen Therapy): room air    Intake/Output Summary (Last 24 hours) at 2023 1634  Last data filed at 2023 1409  Gross per 24 hour   Intake 443.42 ml   Output 1575 ml   Net -1131.58 ml     Flowsheet Rows      Flowsheet Row First Filed Value   Admission Height 175.3 cm (69\") Documented at 2023 1103   Admission Weight 101 kg (221 lb 12.5 oz) Documented at 2023 1103          Body mass index is 38.64 kg/m².      23  0500 23  1521 23  0602   Weight: 116 kg (255 lb 4.7 oz) 117 kg (258 lb) 119 kg (261 lb 11 oz)       Physical Exam:  GEN:  No acute distress, alert, cooperative, well developed   LUNGS: Normal chest on inspection,  Respirations regular, even and unlabored.   CV:  Regular rhythm and borderline rapid heart rate. Normal S1/S2. No murmurs, gallops, or rubs noted.    Results Review:    Results From Last 14 Days   Lab Units 23  2204 23  0340 23  0008 23  1820 " "12/02/23  1154 12/02/23  1110   CRP mg/dL  --   --   --   --   --  12.82*   FERRITIN ng/mL 102.00  --   --   --   --   --    LACTATE mmol/L  --  1.2 1.2 1.7   < > 11.2*   SED RATE mm/hr  --   --   --   --   --  46*    < > = values in this interval not displayed.     Results from last 7 days   Lab Units 12/06/23  0614 12/05/23  0340 12/04/23  1343 12/04/23  0429 12/03/23  0512 12/02/23  1154 12/02/23  1110   SODIUM mmol/L 136 134* 133* 133* 135*  --  134*   POTASSIUM mmol/L 4.4 4.1 4.2 3.9 4.8  4.8  --  3.6   CHLORIDE mmol/L 110* 109* 109* 106 105  --  95*   CO2 mmol/L 16.0* 16.8* 16.0* 18.0* 15.0*  --  15.2*   BUN mg/dL 55* 48* 42* 40* 30*  --  21*   CREATININE mg/dL 5.13* 4.22* 4.10* 3.75* 3.18* 2.83 2.72*   CALCIUM mg/dL 8.2* 7.5* 7.0* 6.8* 6.7*  --  8.4*   AST (SGOT) U/L 56* 61*  --  56* 81*  --  111*   ALT (SGPT) U/L 35* 36*  --  36* 48*  --  61*   ANION GAP mmol/L 10.0 8.2 8.0 9.0 15.0  --  23.8*   ALBUMIN g/dL 2.1* 2.0* 2.0* 2.1* 2.4*  --  2.8*                 Results from last 7 days   Lab Units 12/06/23  0614 12/05/23  2204 12/05/23  0340 12/04/23  0250 12/03/23  0512 12/02/23  1154 12/02/23  1110   WBC 10*3/mm3 4.57  --  3.87 6.97 16.86*  --  17.25*   HEMOGLOBIN g/dL 7.5* 7.9* 7.0* 7.2* 8.7*  --  9.7*   HEMOGLOBIN, POC g/dL  --   --   --   --   --  9.5*  --    HEMATOCRIT % 23.8* 24.9* 23.2* 23.1* 28.0*  --  32.4*   HEMATOCRIT POC %  --   --   --   --   --  28*  --    PLATELETS 10*3/mm3 70*  --  87* 102* 124*  --  144   MCV fL 71.3*  --  70.7* 71.5* 72.0*  --  73.6*   NEUTROPHIL % %  --   --   --  81.3*  --   --   --    LYMPHOCYTE % %  --   --   --  11.5*  --   --   --    MONOCYTES % %  --   --   --  4.0*  --   --   --    EOSINOPHIL % %  --   --   --  2.6  --   --   --    BASOPHIL % %  --   --   --  0.3  --   --   --          Results from last 7 days   Lab Units 12/04/23  0250   MAGNESIUM mg/dL 1.6           Invalid input(s): \"LDLCALC\"  Results from last 7 days   Lab Units 12/04/23  0929 12/02/23  1154 "   PH, ARTERIAL pH units 7.338* 7.144*   PCO2, ARTERIAL mm Hg 28.5* 36.2   PO2 ART mm Hg 96.3 79.8*   HCO3 ART mmol/L 15.3* 12.5*         Glucose   Date/Time Value Ref Range Status   12/05/2023 1122 86 70 - 130 mg/dL Final   12/04/2023 2306 76 70 - 130 mg/dL Final   12/04/2023 0526 87 70 - 130 mg/dL Final   12/04/2023 0455 64 (L) 70 - 130 mg/dL Final   12/04/2023 0416 70 70 - 130 mg/dL Final   12/04/2023 0048 169 (H) 70 - 130 mg/dL Final   12/03/2023 1950 120 70 - 130 mg/dL Final     Results from last 7 days   Lab Units 12/05/23  0340 12/05/23  0008 12/04/23  1820 12/04/23  1343 12/04/23  0429 12/03/23  2316 12/03/23  1747   LACTATE mmol/L 1.2 1.2 1.7 1.5 1.6 1.9 2.7*     Results from last 7 days   Lab Units 12/04/23  0940 12/02/23  1115 12/02/23  1110   BLOODCX  No growth at 2 days Staphylococcus aureus, MRSA* Staphylococcus aureus, MRSA*   BCIDPCR   --  Staph aureus. mecA/C and MREJ (methicillin resistance gene) detected. Identification by BCID2 PCR.*  Streptococcus spp, not A, B, or pneumoniae. Identification by BCID2 PCR.*  --          Results from last 7 days   Lab Units 12/02/23  1111   COVID19  Not Detected   ADENOVIRUS DETECTION BY PCR  Not Detected   CORONAVIRUS 229E  Not Detected   CORONAVIRUS HKU1  Not Detected   CORONAVIRUS NL63  Not Detected   CORONAVIRUS OC43  Not Detected   HUMAN METAPNEUMOVIRUS  Not Detected   HUMAN RHINOVIRUS/ENTEROVIRUS  Not Detected   INFLUENZA B PCR  Not Detected   PARAINFLUENZA 1  Not Detected   PARAINFLUENZA VIRUS 2  Not Detected   PARAINFLUENZA VIRUS 3  Not Detected   PARAINFLUENZA VIRUS 4  Not Detected   BORDETELLA PERTUSSIS PCR  Not Detected   BORDETELLA PARAPERTUSSIS PCR  Not Detected   CHLAMYDOPHILA PNEUMONIAE PCR  Not Detected   MYCOPLAMA PNEUMO PCR  Not Detected   RSV, PCR  Not Detected     Results from last 7 days   Lab Units 12/04/23  0250   URIC ACID mg/dL 7.2*           Imaging:   Imaging Results (All)       Procedure Component Value Units Date/Time    XR Chest Post  CVA Port [[id number]] Collected: 12/02/23 8961            I reviewed the patient's new clinical results.  I personally viewed and interpreted the patient's imaging results:        Medication Review:   busPIRone, 5 mg, Oral, TID  heparin (porcine), 5,000 Units, Subcutaneous, Q8H  pantoprazole, 40 mg, Oral, Q AM  sodium bicarbonate, 650 mg, Oral, TID  sodium chloride, 10 mL, Intravenous, Q12H  sodium chloride, 10 mL, Intravenous, Q12H  sodium chloride, 10 mL, Intravenous, Q12H  sodium chloride, 10 mL, Intravenous, Q12H  sodium chloride, 10 mL, Intravenous, Q12H  Vancomycin Pharmacy Intermittent/Pulse Dosing, , Does not apply, Daily        Pharmacy to dose vancomycin,   sodium chloride, 75 mL/hr, Last Rate: 75 mL/hr (12/06/23 1243)        ASSESSMENT:   Septic shock  Tricuspid valve endocarditis with recurrent infection  MRSA bacteremia, recurrent  Anuric ANTONELLA on chronic kidney disease stage III, worsening  Severe lactic acidosis, resolved  Mild pulmonary hypertension RVSP of 44  Electrolyte disturbances  Vaginal discharge  Heroin IV drug abuse  History of osteomyelitis  Anemia, likely dilutional    PLAN:  Patient is no longer having any respiratory or hemodynamic issues, renal failure is progressive however and she is still followed by infectious disease, renal and internal medicine in addition to the thoracic surgery  We will sign off, please do not hesitate to call with any question or concern  The copied texts in this note were reviewed and they are accurate as of 12/06/23    Disposition: Depending on hospital course    Jovanni Ibarra MD  12/06/23  16:34 EST          Dictated utilizing Dragon dictation

## 2023-12-06 NOTE — PROGRESS NOTES
[facility] Progress Note      Patient Name: Sammie Landeros  : 1990  MRN: [id number]  Primary Care Physician:  Melvi Landeros APRN  Date of admission: 2023    Subjective     Interval History:   The patient was seen and examined today for follow-up on  ANTONELLA.   Doing well overall.  Denies any nausea or vomiting.  No fever no chills.  Not very cooperative with exam.  Nursing staff reported poor oral intake    Review of Systems:   As noted above    Objective     Vitals:   Temp:  [97.2 °F (36.2 °C)-98.1 °F (36.7 °C)] 97.7 °F (36.5 °C)  Heart Rate:  [87-96] 87  Resp:  [16] 16  BP: ()/(50-87) 130/80    Intake/Output Summary (Last 24 hours) at 2023 1150  Last data filed at 2023 1917  Gross per 24 hour   Intake 443.42 ml   Output 1525 ml   Net -1081.58 ml       Physical Exam:    General Appearance: Frail and fatigued.  Sleepy but arousable  Skin: warm and dry  HEENT: oral mucosa normal, nonicteric sclera  Neck: supple, no JVD  Lungs: CTA  Heart: RRR, normal S1 and S2  Abdomen: soft, nontender, nondistended  : no palpable bladder  Extremities: Bilateral lower and upper extremity edema  Neuro: Not very cooperative with examination    Scheduled Meds:     busPIRone, 5 mg, Oral, TID  heparin (porcine), 5,000 Units, Subcutaneous, Q8H  pantoprazole, 40 mg, Oral, Q AM  sodium bicarbonate, 650 mg, Oral, TID  sodium chloride, 10 mL, Intravenous, Q12H  sodium chloride, 10 mL, Intravenous, Q12H  sodium chloride, 10 mL, Intravenous, Q12H  sodium chloride, 10 mL, Intravenous, Q12H  sodium chloride, 10 mL, Intravenous, Q12H  Vancomycin Pharmacy Intermittent/Pulse Dosing, , Does not apply, Daily      IV Meds:   Pharmacy to dose vancomycin,   sodium chloride, 75 mL/hr        Results Reviewed:   I have personally reviewed the results from the time of this admission to 2023 11:50 EST     Results from last 7 days   Lab Units 23  0614 23  0340 23  1343  12/04/23  0429   SODIUM mmol/L 136 134* 133* 133*   POTASSIUM mmol/L 4.4 4.1 4.2 3.9   CHLORIDE mmol/L 110* 109* 109* 106   CO2 mmol/L 16.0* 16.8* 16.0* 18.0*   BUN mg/dL 55* 48* 42* 40*   CREATININE mg/dL 5.13* 4.22* 4.10* 3.75*   CALCIUM mg/dL 8.2* 7.5* 7.0* 6.8*   BILIRUBIN mg/dL 2.4* 2.8*  --  2.3*   ALK PHOS U/L 361* 187*  --  114   ALT (SGPT) U/L 35* 36*  --  36*   AST (SGOT) U/L 56* 61*  --  56*   GLUCOSE mg/dL 87 81 89 77       Estimated Creatinine Clearance: 21.1 mL/min (A) (by C-G formula based on SCr of 5.13 mg/dL (H)).    Results from last 7 days   Lab Units 12/06/23  0614 12/05/23  0340 12/04/23  1343 12/04/23  0250   MAGNESIUM mg/dL  --   --   --  1.6   PHOSPHORUS mg/dL 3.5 2.7 2.8 3.1       Results from last 7 days   Lab Units 12/04/23  0250   URIC ACID mg/dL 7.2*       Results from last 7 days   Lab Units 12/06/23  0614 12/05/23  2204 12/05/23  0340 12/04/23  0250 12/03/23  0512 12/02/23  1154 12/02/23  1110   WBC 10*3/mm3 4.57  --  3.87 6.97 16.86*  --  17.25*   HEMOGLOBIN g/dL 7.5* 7.9* 7.0* 7.2* 8.7*  --  9.7*   HEMOGLOBIN, POC   --   --   --   --   --    < >  --    PLATELETS 10*3/mm3 70*  --  87* 102* 124*  --  144    < > = values in this interval not displayed.             Assessment / Plan     ASSESSMENT:  Acute Kidney Injury secondary to sepsis and ATN from severe hypotension and GI loss associated with diarrhea superimposed on chronic kidney disease baseline SCr unclear. Scr was at 3.0 on similar hospital admission in May 2023. MAP goal >65.  Postinfectious GN is on differential but unlikely given normal C3 and C4 levels   NAG Metabolic Acidosis, severe sepsis, HCO3 is 15.0.   Shock most likely septic on vasopressors  Possible chronic kidney disease she had another episode of acute kidney injury in June 2023, prior to that creatinine was normal in 2016 and there is no labs checked between June 2023 and present time.  And she has not followed up in our office as instructed  Severe sepsis,  positive blood culture (Staph aureus MRSA) on antibiotic with suspicion of endocarditis  Hypoglycemia, improved, treated with D50.  Elevated liver enzymes, total bili 2.6, history of chronic Hep B/C  Hypocalcemia, calcium 6.7 and albumin is 2.4, calcium is within acceptable range around 8.1 when corrected to albumin  Chronic anemia, hemoglobin today 8.7 on 6/19/2023 was seven-point  Thrombocytopenia,   Polysubstance abuse  Anemia with worsening hemoglobin  Bilateral extremity edema likely contributed by hypoalbuminemia and capillary leak due to sepsis        PLAN:   Renal function is worse and staff noted decrease oral intake  We will restart IV fluid   We will check serology   We will Check [id number] given  worsening thrombocytopenia   We will check LDH and haptoglobin   It seems the patient is heading toward hemodialysis.  Fortunately there is no indication for dialysis today.  Recommend alternative to vancomycin for possible  Labs in AM       Thank you for involving us in the care of Sammie Landeros.  Please feel free to call with any questions.    Elvira Coleman MD  12/06/23  11:50 [facility]    [facility]  [phone]    Parts of this note may be an electronic transcription/translation of spoken language to printed text using the Dragon dictation system.

## 2023-12-07 LAB
ALBUMIN SERPL-MCNC: 2.1 G/DL (ref 3.5–5.2)
ALBUMIN UR-MCNC: 2.3 MG/DL
ALBUMIN/GLOB SERPL: 0.5 G/DL
ALP SERPL-CCNC: 431 U/L (ref 39–117)
ALT SERPL W P-5'-P-CCNC: 35 U/L (ref 1–33)
AMPHET+METHAMPHET UR QL: POSITIVE
ANA SER QL: POSITIVE
ANION GAP SERPL CALCULATED.3IONS-SCNC: 11.5 MMOL/L (ref 5–15)
AST SERPL-CCNC: 53 U/L (ref 1–32)
BACTERIA UR QL AUTO: ABNORMAL /HPF
BARBITURATES UR QL SCN: NEGATIVE
BASOPHILS # BLD AUTO: 0.01 10*3/MM3 (ref 0–0.2)
BASOPHILS NFR BLD AUTO: 0.2 % (ref 0–1.5)
BENZODIAZ UR QL SCN: NEGATIVE
BILIRUB SERPL-MCNC: 1.5 MG/DL (ref 0–1.2)
BILIRUB UR QL STRIP: NEGATIVE
BUN SERPL-MCNC: 58 MG/DL (ref 6–20)
BUN/CREAT SERPL: 11.5 (ref 7–25)
C-ANCA TITR SER IF: ABNORMAL TITER
C3 SERPL-MCNC: 71 MG/DL (ref 82–167)
C4 SERPL-MCNC: 16 MG/DL (ref 12–38)
CALCIUM SPEC-SCNC: 8.2 MG/DL (ref 8.6–10.5)
CANNABINOIDS SERPL QL: NEGATIVE
CHLORIDE SERPL-SCNC: 110 MMOL/L (ref 98–107)
CLARITY UR: CLEAR
CO2 SERPL-SCNC: 14.5 MMOL/L (ref 22–29)
COCAINE UR QL: NEGATIVE
COLOR UR: YELLOW
CREAT SERPL-MCNC: 5.06 MG/DL (ref 0.57–1)
CREAT UR-MCNC: 27.2 MG/DL
DEPRECATED RDW RBC AUTO: 43 FL (ref 37–54)
EGFRCR SERPLBLD CKD-EPI 2021: 10.8 ML/MIN/1.73
EOSINOPHIL # BLD AUTO: 0.12 10*3/MM3 (ref 0–0.4)
EOSINOPHIL NFR BLD AUTO: 2.5 % (ref 0.3–6.2)
ERYTHROCYTE [DISTWIDTH] IN BLOOD BY AUTOMATED COUNT: 17.2 % (ref 12.3–15.4)
FENTANYL UR-MCNC: POSITIVE NG/ML
GBM AB SER IA-ACNC: <0.2 UNITS (ref 0–0.9)
GLOBULIN UR ELPH-MCNC: 4 GM/DL
GLUCOSE SERPL-MCNC: 96 MG/DL (ref 65–99)
GLUCOSE UR STRIP-MCNC: NEGATIVE MG/DL
HCT VFR BLD AUTO: 25.3 % (ref 34–46.6)
HGB BLD-MCNC: 8.1 G/DL (ref 12–15.9)
HGB UR QL STRIP.AUTO: ABNORMAL
HYALINE CASTS UR QL AUTO: ABNORMAL /LPF
KETONES UR QL STRIP: NEGATIVE
LEUKOCYTE ESTERASE UR QL STRIP.AUTO: ABNORMAL
LYMPHOCYTES # BLD AUTO: 1.05 10*3/MM3 (ref 0.7–3.1)
LYMPHOCYTES NFR BLD AUTO: 22.2 % (ref 19.6–45.3)
MCH RBC QN AUTO: 22.8 PG (ref 26.6–33)
MCHC RBC AUTO-ENTMCNC: 32 G/DL (ref 31.5–35.7)
MCV RBC AUTO: 71.1 FL (ref 79–97)
METHADONE UR QL SCN: NEGATIVE
MICROALBUMIN/CREAT UR: 84.6 MG/G (ref 0–29)
MONOCYTES # BLD AUTO: 0.44 10*3/MM3 (ref 0.1–0.9)
MONOCYTES NFR BLD AUTO: 9.3 % (ref 5–12)
MYELOPEROXIDASE AB SER IA-ACNC: <0.2 UNITS (ref 0–0.9)
NEUTROPHILS NFR BLD AUTO: 3.06 10*3/MM3 (ref 1.7–7)
NEUTROPHILS NFR BLD AUTO: 64.7 % (ref 42.7–76)
NITRITE UR QL STRIP: NEGATIVE
OPIATES UR QL: NEGATIVE
OXYCODONE UR QL SCN: POSITIVE
P-ANCA ATYPICAL TITR SER IF: ABNORMAL TITER
P-ANCA TITR SER IF: ABNORMAL TITER
PH UR STRIP.AUTO: 6 [PH] (ref 5–8)
PLATELET # BLD AUTO: 83 10*3/MM3 (ref 140–450)
PMV BLD AUTO: 9.2 FL (ref 6–12)
POTASSIUM SERPL-SCNC: 4.5 MMOL/L (ref 3.5–5.2)
PROT SERPL-MCNC: 6.1 G/DL (ref 6–8.5)
PROT UR QL STRIP: ABNORMAL
PROTEINASE3 AB SER IA-ACNC: <0.2 UNITS (ref 0–0.9)
RBC # BLD AUTO: 3.56 10*6/MM3 (ref 3.77–5.28)
RBC # UR STRIP: ABNORMAL /HPF
REF LAB TEST METHOD: ABNORMAL
RHEUMATOID FACT SERPL-ACNC: 43.3 IU/ML
SODIUM SERPL-SCNC: 136 MMOL/L (ref 136–145)
SODIUM UR-SCNC: 65 MMOL/L
SP GR UR STRIP: 1.01 (ref 1–1.03)
SQUAMOUS #/AREA URNS HPF: ABNORMAL /HPF
TRANS CELLS #/AREA URNS HPF: ABNORMAL /HPF
UROBILINOGEN UR QL STRIP: ABNORMAL
VANCOMYCIN SERPL-MCNC: 14.2 MCG/ML (ref 5–40)
WBC # UR STRIP: ABNORMAL /HPF
WBC NRBC COR # BLD AUTO: 4.73 10*3/MM3 (ref 3.4–10.8)

## 2023-12-07 PROCEDURE — 25010000002 HEPARIN (PORCINE) PER 1000 UNITS: Performed by: INTERNAL MEDICINE

## 2023-12-07 PROCEDURE — 80202 ASSAY OF VANCOMYCIN: CPT | Performed by: HOSPITALIST

## 2023-12-07 PROCEDURE — 80053 COMPREHEN METABOLIC PANEL: CPT | Performed by: INTERNAL MEDICINE

## 2023-12-07 PROCEDURE — 25810000003 SODIUM CHLORIDE 0.9 % SOLUTION: Performed by: HOSPITALIST

## 2023-12-07 PROCEDURE — 25010000002 VANCOMYCIN PER 500 MG: Performed by: NURSE PRACTITIONER

## 2023-12-07 PROCEDURE — 85025 COMPLETE CBC W/AUTO DIFF WBC: CPT | Performed by: INTERNAL MEDICINE

## 2023-12-07 RX ADMIN — HEPARIN SODIUM 5000 UNITS: 5000 INJECTION INTRAVENOUS; SUBCUTANEOUS at 14:37

## 2023-12-07 RX ADMIN — Medication 10 ML: at 10:05

## 2023-12-07 RX ADMIN — Medication 10 ML: at 20:20

## 2023-12-07 RX ADMIN — VANCOMYCIN HYDROCHLORIDE 500 MG: 500 INJECTION, POWDER, LYOPHILIZED, FOR SOLUTION INTRAVENOUS at 10:49

## 2023-12-07 RX ADMIN — BUSPIRONE HYDROCHLORIDE 5 MG: 5 TABLET ORAL at 20:18

## 2023-12-07 RX ADMIN — HEPARIN SODIUM 5000 UNITS: 5000 INJECTION INTRAVENOUS; SUBCUTANEOUS at 20:19

## 2023-12-07 RX ADMIN — HYDROXYZINE HYDROCHLORIDE 25 MG: 25 TABLET ORAL at 20:19

## 2023-12-07 RX ADMIN — SODIUM BICARBONATE: 84 INJECTION, SOLUTION INTRAVENOUS at 14:32

## 2023-12-07 RX ADMIN — BUSPIRONE HYDROCHLORIDE 5 MG: 5 TABLET ORAL at 15:24

## 2023-12-07 RX ADMIN — Medication 10 ML: at 10:04

## 2023-12-07 RX ADMIN — SODIUM BICARBONATE 650 MG: 650 TABLET ORAL at 20:19

## 2023-12-07 RX ADMIN — SODIUM BICARBONATE 650 MG: 650 TABLET ORAL at 09:44

## 2023-12-07 RX ADMIN — SODIUM BICARBONATE 650 MG: 650 TABLET ORAL at 15:24

## 2023-12-07 RX ADMIN — SODIUM CHLORIDE 75 ML/HR: 9 INJECTION, SOLUTION INTRAVENOUS at 00:10

## 2023-12-07 RX ADMIN — BUSPIRONE HYDROCHLORIDE 5 MG: 5 TABLET ORAL at 09:44

## 2023-12-07 RX ADMIN — BUPRENORPHINE AND NALOXONE 2 FILM: 8; 2 FILM BUCCAL; SUBLINGUAL at 09:44

## 2023-12-07 NOTE — NURSING NOTE
Patient refused her medications throughout this shift.   Patient is AOX4 with no new issues. Will continue to monitor patient this shift

## 2023-12-07 NOTE — PAYOR COMM NOTE
"Sammie Abraham (33 y.o. Female)   PLEASE SEE ATTACHED FOR CONTINUED INPT STAY DAYS    REF # [id number]    PLEASE CALL FREDI MAHAJAN RN/ DEPT @ [phone]   OR [phone]    THANK YOU  FREDI MAHAJAN RN  [facility]         Date of Birth   1990    Social Security Number       Address   [address] [place] [address]    Home Phone   [phone]    MRN   [id number]       Orthodox   Mormon    Marital Status   Single                            Admission Date   12/2/23    Admission Type   Emergency    Admitting Provider   Jovanni Ibarra MD    Attending Provider   Bang Jarrell MD    Department, Room/Bed   [address], [address]/1       Discharge Date       Discharge Disposition       Discharge Destination                                 Attending Provider: Bang Jarrell MD    Allergies: No Known Allergies    Isolation: Contact   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)   Code Status: CPR    Ht: 175.3 cm (69\")   Wt: 114 kg (251 lb 8.7 oz)    Admission Cmt: None   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]                   Active Insurance as of 12/2/2023       Primary Coverage       Payor Plan Insurance Group Employer/Plan Group    [employer] BY [employer]ONA [facility] BY CORONA [id number]       Payor Plan Address Payor Plan Phone Number Payor Plan Fax Number Effective Dates    [address] [address]   11/1/2022 - None Entered    [facility] [id number]         Subscriber Name Subscriber Birth Date Member ID       SAMMIE ABRAHAM 1990 [id number]                     Emergency Contacts        (Rel.) Home Phone Work Phone Mobile Phone    Melvi Abraham (Mother) [phone] -- --              [place]: NPI [id number]  Tax ID [id number]  Medication Administration Report for Sammie Abraham as of 12/07/23 1047     Legend:    Given Hold Not Given Due Canceled Entry Other Actions    Time Time (Time) Time Time-Action         Discontinued     " Completed     Future     MAR Hold     Linked             Medications 12/06/23 12/07/23      acetaminophen (TYLENOL) tablet 650 mg  Dose: 650 mg  Freq: Every 6 Hours PRN Route: PO  PRN Reason: Mild Pain  Start: 12/02/23 1422   Admin Instructions:   If given for fever, use fever parameter: fever greater than 100.4 °F  Based on patient request - if ordered for moderate or severe pain, provider allows for administration of a medication prescribed for a lower pain scale.    Do not exceed 4 grams of acetaminophen in a 24 hr period. Max dose of 2gm for AST/ALT greater than 120 units/L.    If given for pain, use the following pain scale:   Mild Pain = Pain Score of 1-3, CPOT 1-2  Moderate Pain = Pain Score of 4-6, CPOT 3-4  Severe Pain = Pain Score of 7-10, CPOT 5-8         buprenorphine-naloxone (SUBOXONE) 8-2 MG film 2 film  Dose: 2 film  Freq: Daily Route: SL  Indications of Use: Opioid Use Disorder (Continuation of Therapy)  Start: 12/07/23 0900   End: 12/14/23 0859   Admin Instructions:   Do not crush or chew the capsules or tablets. The drug may not work as designed if the capsule or tablet is crushed or chewed. Swallow whole.   Do not cut, chew or swallow the film. Sublingual Use Only.   Order specific questions:   Is this being prescribed for induction or maintenance? Maintenance       0944-Given               busPIRone (BUSPAR) tablet 5 mg  Dose: 5 mg  Freq: 3 Times Daily Route: PO  Start: 12/05/23 2100   Admin Instructions:   Caution: Look alike/sound alike drug alert. Take with food.  Avoid grapefruit juice.    0822-Given     1625-Given     (2056)-Not Given          0944-Given     1600 2100             dextrose (D50W) (25 g/50 mL) IV injection 25 g  Dose: 25 g  Freq: Every 15 Minutes PRN Route: IV  PRN Reason: Low Blood Sugar  PRN Comment: Blood Sugar Less Than 70, Patient Has IV Access - Unresponsive, NPO or Unable To Safely Swallow  Start: 12/02/23 1932         dextrose (GLUTOSE) oral gel 15 g  Dose: 15  "g  Freq: Every 15 Minutes PRN Route: PO  PRN Reason: Low Blood Sugar  PRN Comment: Blood Sugar Less Than 70, Patient Alert, Is Not NPO & Can Safely Swallow  Start: 12/02/23 1932         glucagon (GLUCAGEN) injection 1 mg  Dose: 1 mg  Freq: Every 15 Minutes PRN Route: SC  PRN Reason: Low Blood Sugar  PRN Comment: Blood Glucose Less Than 70 - Patient Without IV Access - Unresponsive, NPO or Unable To Safely Swallow  Start: 12/02/23 1932   Admin Instructions:   Reconstitute powder for injection by adding 1 mL of -supplied sterile diluent or sterile water for injection to a vial containing 1 mg of the drug, to provide solutions containing 1 mg/mL. Shake vial gently to dissolve.         heparin (porcine) 5000 UNIT/ML injection 5,000 Units  Dose: 5,000 Units  Freq: Every 8 Hours Scheduled Route: SC  Indications of Use: PROPHYLAXIS OF VENOUS THROMBOEMBOLISM  Start: 12/02/23 1515    (0558)-Not Given     (1628)-Not Given     (2102)-Not Given          (0542)-Not Given     1400     2200             hydrOXYzine (ATARAX) tablet 25 mg  Dose: 25 mg  Freq: 3 Times Daily PRN Route: PO  PRN Reason: Anxiety  Start: 12/05/23 1927   Admin Instructions:   Caution: Look alike/sound alike drug alert    1243-Given                nitroglycerin (NITROSTAT) SL tablet 0.4 mg  Dose: 0.4 mg  Freq: Every 5 Minutes PRN Route: SL  PRN Reason: Chest Pain  PRN Comment: Only if SBP Greater Than 100  Start: 12/02/23 1424   Admin Instructions:   If Pain Unrelieved After 3 Doses Notify MD  May administer up to 3 doses per episode.         ondansetron (ZOFRAN) injection 4 mg  Dose: 4 mg  Freq: Every 6 Hours PRN Route: IV  PRN Reasons: Nausea,Vomiting  Start: 12/02/23 1736   Admin Instructions:   \"If multiple N/V medications ordered, use in the following order: Ondansetron, Prochlorperazine, Promethazine. Use PO unless patient refuses or patient unable to swallow.\"         pantoprazole (PROTONIX) EC tablet 40 mg  Dose: 40 mg  Freq: Every Early " "Morning Route: PO  Start: 12/06/23 0600   Admin Instructions:   Do not crush or chew the capsules or tablets. The drug may not work as designed if the capsule or tablet is crushed or chewed. Swallow whole.  Swallow whole; do not crush, split, or chew.    0619-Given            (0542)-Not Given               Pharmacy to dose vancomycin  Freq: Continuous PRN Route: XX  PRN Reason: Consult  Indications of Use: SEPSIS  Start: 12/02/23 1421   End: 12/09/23 1420         Potassium Replacement - Follow Nurse / BPA Driven Protocol  Freq: As Needed Route: XX  PRN Reason: Other  Start: 12/02/23 1422   Admin Instructions:   Open Order & Select \"BHS Electrolyte Replacement Protocol Algorithm\" to View Details         sodium bicarbonate tablet 650 mg  Dose: 650 mg  Freq: 3 Times Daily Route: PO  Start: 12/05/23 1030    0822-Given     1625-Given     (2051)-Not Given          0944-Given     1600     2100             sodium chloride 0.9 % flush 10 mL  Dose: 10 mL  Freq: As Needed Route: IV  PRN Reason: Line Care  PRN Comment: After Medication Administration  Start: 12/03/23 1151         sodium chloride 0.9 % flush 10 mL  Dose: 10 mL  Freq: Every 12 Hours Scheduled Route: IV  Start: 12/03/23 1245   Admin Instructions:   Lumen #4    0821-Given     2052-Given           1005-Given     2100              sodium chloride 0.9 % flush 10 mL  Dose: 10 mL  Freq: Every 12 Hours Scheduled Route: IV  Start: 12/03/23 1245   Admin Instructions:   Lumen #3    0822-Given     2053-Given           1004-Given     2100              sodium chloride 0.9 % flush 10 mL  Dose: 10 mL  Freq: Every 12 Hours Scheduled Route: IV  Start: 12/03/23 1245   Admin Instructions:   Lumen #2    0822-Given     2053-Given           1004-Given     2100              sodium chloride 0.9 % flush 10 mL  Dose: 10 mL  Freq: Every 12 Hours Scheduled Route: IV  Start: 12/03/23 1245   Admin Instructions:   Lumen #1    0822-Given     2054-Given           1004-Given     2100              " "sodium chloride 0.9 % flush 10 mL  Dose: 10 mL  Freq: As Needed Route: IV  PRN Reason: Line Care  Start: 12/02/23 1424         sodium chloride 0.9 % flush 10 mL  Dose: 10 mL  Freq: Every 12 Hours Scheduled Route: IV  Start: 12/02/23 2100    0822-Given     2054-Given           1004-Given     2100              sodium chloride 0.9 % flush 10 mL  Dose: 10 mL  Freq: As Needed Route: IV  PRN Reason: Line Care  Start: 12/02/23 1104         sodium chloride 0.9 % flush 20 mL  Dose: 20 mL  Freq: As Needed Route: IV  PRN Reason: Line Care  PRN Comment: After Blood Draws or Blood Product Administration  Start: 12/03/23 1151         sodium chloride 0.9 % infusion  Rate: 75 mL/hr Dose: 75 mL/hr  Freq: Continuous Route: IV  Start: 12/06/23 1245    1243-New Bag            0000-Stopped     0010-New Bag              sodium chloride 0.9 % infusion 40 mL  Dose: 40 mL  Freq: As Needed Route: IV  PRN Reason: Line Care  Start: 12/03/23 1151   Admin Instructions:   Following administration of an IV intermittent medication, flush line with 40mL NS at 100mL/hr.         sodium chloride 0.9 % infusion 40 mL  Dose: 40 mL  Freq: As Needed Route: IV  PRN Reason: Line Care  Start: 12/02/23 1424   Admin Instructions:   Following administration of an IV intermittent medication, flush line with 40mL NS at 100mL/hr.         vancomycin (VANCOCIN) 500 mg in sodium chloride 0.9 % 100 mL IVPB-VTB  Dose: 500 mg  Freq: Once Route: IV  Indications of Use: SEPSIS  Start: 12/07/23 0900     0900               Vancomycin Pharmacy Intermittent/Pulse Dosing  Freq: Daily Route: XX  Indications of Use: BACTEREMIA  Start: 12/02/23 1430   End: 12/09/23 0859   Admin Instructions:   Patient is on intermittent or pulse Vancomycin dosing. This is an informational \"Pharmacy Dosing\" note only.    0900            0900              Completed Medications  Medications 12/06/23 12/07/23       acetaminophen (TYLENOL) tablet 1,000 mg  Dose: 1,000 mg  Freq: Once Route: PO  Start: " 12/02/23 1157   End: 12/02/23 1200   Admin Instructions:   If given for fever, use fever parameter: fever greater than 100.4 °F  Based on patient request - if ordered for moderate or severe pain, provider allows for administration of a medication prescribed for a lower pain scale.    Do not exceed 4 grams of acetaminophen in a 24 hr period. Max dose of 2gm for AST/ALT greater than 120 units/L.    If given for pain, use the following pain scale:   Mild Pain = Pain Score of 1-3, CPOT 1-2  Moderate Pain = Pain Score of 4-6, CPOT 3-4  Severe Pain = Pain Score of 7-10, CPOT 5-8         dextrose (D50W) (25 g/50 mL) IV injection 25 g  Dose: 25 g  Freq: Once Route: IV  Start: 12/02/23 1122   End: 12/02/23 1756         fentaNYL citrate (PF) (SUBLIMAZE) injection 100 mcg  Dose: 100 mcg  Freq: Once Route: IV  Start: 12/02/23 2300   End: 12/02/23 2235   Admin Instructions:   Use filter needle to withdraw dose from ampule. Based on patient request - if ordered for moderate or severe pain, provider allows for administration of a medication prescribed for a lower pain scale.  If given for pain, use the following pain scale:  Mild Pain = Pain Score of 1-3, CPOT 1-2  Moderate Pain = Pain Score of 4-6, CPOT 3-4  Severe Pain = Pain Score of 7-10, CPOT 5-8         fluconazole (DIFLUCAN) IVPB 200 mg  Dose: 200 mg  Freq: Once Route: IV  Indications of Use: VULVOVAGINAL CANDIDIASIS  Start: 12/03/23 1600   End: 12/03/23 1749   Admin Instructions:   Group 2 (Pink) Hazardous Drug - Reproductive Risk Only - See Handling Guide         midazolam (VERSED) injection 2 mg  Dose: 2 mg  Freq: Once Route: IV  Start: 12/02/23 2345   End: 12/02/23 2256   Admin Instructions:            midazolam (VERSED) injection 2 mg  Dose: 2 mg  Freq: Once Route: IV  Start: 12/02/23 2330   End: 12/02/23 2235   Admin Instructions:            piperacillin-tazobactam (ZOSYN) 4.5 g in iso-osmotic dextrose 100 mL IVPB (premix)  Dose: 4.5 g  Freq: Once Route:  IV  Indications of Use: SEPSIS  Start: 12/02/23 1122   End: 12/02/23 1154   Admin Instructions:   Refrigerate         potassium chloride 10 mEq in 100 mL IVPB  Dose: 10 mEq  Freq: Every 1 Hour Route: IV  Start: 12/02/23 2100   End: 12/03/23 0321   Admin Instructions:   OUTPATIENT/NON-MONITORED UNITS: Potassium Chloride standard bolus infusion rate is a maximum of 10 mEq/hr on unmonitored patients    MONITORED UNITS: Potassium Chloride standard bolus infusion rate is a maximum of 20 mEq/hr on ECG monitored patients ONLY         sodium chloride 0.9 % bolus 1,000 mL  Dose: 1,000 mL  Freq: Once Route: IV  Start: 12/02/23 1815   End: 12/02/23 1830         sodium chloride 0.9 % bolus 1,000 mL  Dose: 1,000 mL  Freq: Once Route: IV  Start: 12/02/23 1515   End: 12/02/23 1514         sodium chloride 0.9 % bolus 3,030 mL  Dose: 30 mL/kg  Weight Dosing Info: 101 kg  Freq: Once Route: IV  Start: 12/02/23 1121   End: 12/02/23 1240   Admin Instructions:   You may need to adjust the volume of this order to account for fluids already given.  The total of the bolus(es) given should be as close to 30 mL/kg without going under.         vancomycin 750 mg in sodium chloride 0.9 % 250 mL IVPB-VTB  Dose: 750 mg  Freq: Once Route: IV  Indications of Use: SEPSIS  Start: 12/04/23 2200   End: 12/04/23 2215         vancomycin 750 mg in sodium chloride 0.9 % 250 mL IVPB-VTB  Dose: 750 mg  Freq: Once Route: IV  Indications of Use: SEPSIS  Start: 12/03/23 1800   End: 12/03/23 1919         vancomycin IVPB 2000 mg in 0.9% Sodium Chloride 500 mL  Dose: 20 mg/kg  Weight Dosing Info: 101 kg  Freq: Once Route: IV  Indications of Use: SEPSIS  Start: 12/02/23 1122   End: 12/02/23 1331        Discontinued Medications  Medications 12/06/23 12/07/23       sennosides-docusate (PERICOLACE) 8.6-50 MG per tablet 2 tablet  Dose: 2 tablet  Freq: 2 Times Daily Route: PO  Start: 12/02/23 2100   End: 12/06/23 0923   Admin Instructions:   HOLD MEDICATION IF PATIENT  "HAS HAD BOWEL MOVEMENT. Start bowel management regimen if patient has not had a bowel movement after 12 hours.    (0926)-Not Given               And  polyethylene glycol (MIRALAX) packet 17 g  Dose: 17 g  Freq: Daily PRN Route: PO  PRN Reason: Constipation  PRN Comment: Use if senna-docusate is ineffective  Start: 12/02/23 1424   End: 12/06/23 0923   Admin Instructions:   Use if no bowel movement after 12 hours. Mix in 6-8 ounces of water.  Use 4-8 ounces of water, tea, or juice for each 17 gram dose.        And  bisacodyl (DULCOLAX) EC tablet 5 mg  Dose: 5 mg  Freq: Daily PRN Route: PO  PRN Reason: Constipation  PRN Comment: Use if polyethylene glycol is ineffective  Start: 12/02/23 1424   End: 12/06/23 0923   Admin Instructions:   Use if no bowel movement after 12 hours.  Swallow whole. Do not crush, split, or chew tablet.        And  bisacodyl (DULCOLAX) suppository 10 mg  Dose: 10 mg  Freq: Daily PRN Route: RE  PRN Reason: Constipation  PRN Comment: Use if bisacodyl oral is ineffective  Start: 12/02/23 1424   End: 12/06/23 0923   Admin Instructions:   Use if no bowel movement after 12 hours.  Hold for diarrhea         Calcium Replacement - Follow Nurse / BPA Driven Protocol  Freq: As Needed Route: XX  PRN Reason: Other  Start: 12/02/23 1422   End: 12/06/23 0923   Admin Instructions:   Open Order & Select \"BHS Electrolyte Replacement Protocol Algorithm\" to View Details         dextrose 5 % and sodium chloride 0.9 % infusion  Rate: 75 mL/hr Dose: 75 mL/hr  Freq: Continuous Route: IV  Start: 12/02/23 1845   End: 12/05/23 0933         LORazepam (ATIVAN) 2 MG/ML concentrated solution 1 mg  Dose: 1 mg  Freq: Once Route: PO  Start: 12/02/23 2300   End: 12/02/23 2231   Admin Instructions:    Caution: Look alike/sound alike drug alert         Magnesium Cardiology Dose Replacement - Follow Nurse / BPA Driven Protocol  Freq: As Needed Route: XX  PRN Reason: Other  Start: 12/02/23 1422   End: 12/06/23 0923   Admin " "Instructions:   Open Order & Select \"BHS Electrolyte Replacement Protocol Algorithm\" to View Details         norepinephrine (LEVOPHED) 8 mg in 250 mL NS infusion (premix)  Rate: 3.79-56.81 mL/hr Dose: 0.02-0.3 mcg/kg/min  Weight Dosing Info: 101 kg  Freq: Titrated Route: IV  Start: 12/02/23 1445   End: 12/05/23 1322   Admin Instructions:   Initiate infusion at 0.02 mcg/kg/min and titrate by 0.02 - 0.06 mcg/kg/min every 5 - 10 minutes to use the lowest dose possible to maintain a MAP greater than or equal To 65 mmHg. Maximum Dose = 0.3 mcg/kg/min. Contact provider if unable to maintain MAP target at the maximum dose or if MAP is greater than 95 mmHg. Once MAP target achieved, obtain vitals a minimum of every 30 minutes.  With provider order may titrate to maximum dose of 0.5 mcg/kg/min.  Concentration 8 mg/250 mL          Central line preferred, if unavailable use large bore IV access with frequent nurse monitoring of IV site.         oxyCODONE-acetaminophen (PERCOCET) 5-325 MG per tablet 1 tablet  Dose: 1 tablet  Freq: Every 6 Hours PRN Route: PO  PRN Reason: Moderate Pain  Start: 12/02/23 1727   End: 12/06/23 0923   Admin Instructions:   Based on patient request - if ordered for moderate or severe pain, provider allows for administration of a medication prescribed for a lower pain scale.  [DU]    Do not exceed 4 grams of acetaminophen in a 24 hr period. Max dose of 2gm for AST/ALT greater than 120 units/L        If given for pain, use the following pain scale:   Mild Pain = Pain Score of 1-3, CPOT 1-2  Moderate Pain = Pain Score of 4-6, CPOT 3-4  Severe Pain = Pain Score of 7-10, CPOT 5-8         Pharmacy Consult - Pharmacy to dose  Freq: Continuous PRN Route: XX  PRN Reason: Consult  Start: 12/06/23 1737   End: 12/07/23 0811   Order specific questions:   Pharmacy to Dose: Continue Suboxone  Indication of Use Continue Suboxone           phenylephrine (KYE-SYNEPHRINE) 50 mg in 250 mL NS infusion  Rate: 15.45-92.7 " "mL/hr Dose: 0.5-3 mcg/kg/min  Weight Dosing Info: 103 kg  Freq: Titrated Route: IV  Start: 12/02/23 2300   End: 12/05/23 1322   Admin Instructions:   Initiate infusion at 0.5 mcg/kg/min and titrate up or down by 0.3 - 0.6 mcg/kg/min every 5 minutes to use the lowest dose possible to maintain MAP greater than or equal to 65 mm Hg. Maximum dose = 3 mcg/kg/min. Contact provider if unable to maintain MAP greater than or equal to 65 mm Hg on maximum dose or if MAP is greater than 95 mm Hg. Once MAP target achieved obtain vitals a minimum of every 30 minutes. Central line preferred.  Protect from light. Central line preferred, if unavailable use large bore IV access with frequent nurse monitoring of IV site.         Phosphorus Replacement - Follow Nurse / BPA Driven Protocol  Freq: As Needed Route: XX  PRN Reason: Other  Start: 12/05/23 0159   End: 12/05/23 0201   Admin Instructions:   Open Order & Select \"BHS Electrolyte Replacement Protocol Algorithm\" to View Details         Phosphorus Replacement - Follow Nurse / BPA Driven Protocol  Freq: As Needed Route: XX  PRN Reason: Other  Start: 12/02/23 1422   End: 12/06/23 0923   Admin Instructions:   Open Order & Select \"BHS Electrolyte Replacement Protocol Algorithm\" to View Details         piperacillin-tazobactam (ZOSYN) 3.375 g in iso-osmotic dextrose 50 ml (premix)  Dose: 3.375 g  Freq: Every 8 Hours Route: IV  Indications of Use: SEPSIS  Start: 12/02/23 2115   End: 12/03/23 0049   Admin Instructions:   Refrigerate         potassium chloride (K-DUR,KLOR-CON) ER tablet 40 mEq  Dose: 40 mEq  Freq: Every 4 Hours Route: PO  Start: 12/02/23 1600   End: 12/02/23 1604   Admin Instructions:   Do not crush or chew the capsules or tablets. The drug may not work as designed if the capsule or tablet is crushed or chewed. Swallow whole.  Swallow whole; do not crush, split, or chew.         potassium chloride (K-DUR,KLOR-CON) ER tablet 40 mEq  Dose: 40 mEq  Freq: Every 4 Hours Route: " PO  Start: 23 1600   End: 23   Admin Instructions:   Do not crush or chew the capsules or tablets. The drug may not work as designed if the capsule or tablet is crushed or chewed. Swallow whole.  Swallow whole; do not crush, split, or chew.         sodium chloride 0.9 % infusion  Rate: 125 mL/hr Dose: 125 mL/hr  Freq: Continuous Route: IV  Start: 23 1515   End: 23 132         vancomycin (VANCOCIN) 1000 mg/200 mL dextrose 5% IVPB  Dose: 1,000 mg  Freq: Every 24 Hours Route: IV  Indications of Use: SEPSIS  Start: 23 1000   End: 23 0855         vasopressin (PITRESSIN) 20 units in 100 mL NS infusion  Rate: 9 mL/hr Dose: 0.03 Units/min  Freq: Continuous Route: IV  Start: 23   End: 23                       Physician Progress Notes (last 48 hours)        Jazmine Higginbotham MD at 23 0804            Infectious Diseases Progress Note    Jazmine Higginbotham MD     [facility]  Los: 5 days  Patient Identification:  Name: Sammie Landeros  Age: 33 y.o.  Sex: female  :  1990  MRN: [id number]         Primary Care Physician: Melvi Landeros APRN        Subjective: Covered up in the right lateral decubitus position and does not want to engage.  Claims that she is going through withdrawal.  Interval History: See consultation note.    Objective:    Scheduled Meds:buprenorphine-naloxone, 2 film, Sublingual, Daily  busPIRone, 5 mg, Oral, TID  heparin (porcine), 5,000 Units, Subcutaneous, Q8H  pantoprazole, 40 mg, Oral, Q AM  sodium bicarbonate, 650 mg, Oral, TID  sodium chloride, 10 mL, Intravenous, Q12H  sodium chloride, 10 mL, Intravenous, Q12H  sodium chloride, 10 mL, Intravenous, Q12H  sodium chloride, 10 mL, Intravenous, Q12H  sodium chloride, 10 mL, Intravenous, Q12H  Vancomycin Pharmacy Intermittent/Pulse Dosing, , Does not apply, Daily      Continuous Infusions:Pharmacy Consult - Pharmacy to dose,   Pharmacy to dose vancomycin,   sodium chloride, 75  "mL/hr, Last Rate: 75 mL/hr (12/07/23 0010)        Vital signs in last 24 hours:  Temp:  [97.3 °F (36.3 °C)-98.6 °F (37 °C)] 97.7 °F (36.5 °C)  Heart Rate:  [78-94] 78  Resp:  [16-18] 18  BP: (122-139)/(64-88) 139/85    Intake/Output:    Intake/Output Summary (Last 24 hours) at 12/7/2023 0804  Last data filed at 12/7/2023 0000  Gross per 24 hour   Intake 1000 ml   Output 1975 ml   Net -975 ml       Exam:  /85 (BP Location: Right arm, Patient Position: Lying)   Pulse 78   Temp 97.7 °F (36.5 °C) (Oral)   Resp 18   Ht 175.3 cm (69\")   Wt 114 kg (251 lb 8.7 oz)   LMP 11/22/2023 (Approximate)   SpO2 98%   BMI 37.15 kg/m²   Patient is examined using the personal protective equipment as per guidelines from infection control for this particular patient as enacted.  Hand washing was performed before and after patient interaction.  General Appearance:  Limited examination due to patient wants to be left alone.    Chronically ill nontoxic-appearing female who is withdrawn.       Data Review:    I reviewed the patient's new clinical results.  Results from last 7 days   Lab Units 12/07/23  0641 12/06/23  0614 12/05/23  2204 12/05/23  0340 12/04/23  0250 12/03/23  0512 12/02/23  1154 12/02/23  1110   WBC 10*3/mm3 4.73 4.57  --  3.87 6.97 16.86*  --  17.25*   HEMOGLOBIN g/dL 8.1* 7.5* 7.9* 7.0* 7.2* 8.7*  --  9.7*   HEMOGLOBIN, POC g/dL  --   --   --   --   --   --  9.5*  --    PLATELETS 10*3/mm3 83* 70*  --  87* 102* 124*  --  144     Results from last 7 days   Lab Units 12/07/23  0641 12/06/23  0614 12/05/23  0340 12/04/23  1343 12/04/23  0429 12/03/23  0512 12/02/23  1154 12/02/23  1110   SODIUM mmol/L 136 136 134* 133* 133* 135*  --  134*   POTASSIUM mmol/L 4.5 4.4 4.1 4.2 3.9 4.8  4.8  --  3.6   CHLORIDE mmol/L 110* 110* 109* 109* 106 105  --  95*   CO2 mmol/L 14.5* 16.0* 16.8* 16.0* 18.0* 15.0*  --  15.2*   BUN mg/dL 58* 55* 48* 42* 40* 30*  --  21*   CREATININE mg/dL 5.06* 5.13* 4.22* 4.10* 3.75* 3.18* 2.83 " 2.72*   CALCIUM mg/dL 8.2* 8.2* 7.5* 7.0* 6.8* 6.7*  --  8.4*   GLUCOSE mg/dL 96 87 81 89 77 109*  --  64*     Microbiology Results (last 10 days)       Procedure Component Value - Date/Time    Gardnerella vaginalis, Trichomonas vaginalis, Candida albicans, DNA - Swab, Vagina [[id number]]  (Abnormal) Collected: 12/04/23 1652    Lab Status: Final result Specimen: Swab from Vagina Updated: 12/05/23 1412     Candida Species Positive     Gardnerella vaginalis, DNA Probe Negative     Trichomonas vaginosis Negative    Narrative:      Performed at:  01 - [facility]  [id number]  : Cayden Prieto PhD, Phone:  [phone]    Blood Culture - Blood, Cannula [[id number]]  (Normal) Collected: 12/04/23 0940    Lab Status: Preliminary result Specimen: Blood from Cannula Updated: 12/06/23 1000     Blood Culture No growth at 2 days    Wet Prep, Genital - Swab, Vagina [[id number]]  (Abnormal) Collected: 12/03/23 1200    Lab Status: Final result Specimen: Swab from Vagina Updated: 12/03/23 1251     YEAST 4+ Yeast seen     HYPHAL ELEMENTS No Hyphal elements seen     WBC'S 3+ WBC's seen     Clue Cells, Wet Prep No Clue cells seen     Trichomonas, Wet Prep No Trichomonas seen    Chlamydia trachomatis, PCR - Swab, Vagina [[id number]] Collected: 12/03/23 1159    Lab Status: Final result Specimen: Swab from Vagina Updated: 12/06/23 0711     Chlamydia trachomatis, [place] Negative    Narrative:      Performed at:  [address] - [facility]  [id number]  : Karuna Kamara MD, Phone:  [phone]    Blood Culture - Blood, Arm, Right [[id number]]  (Abnormal)  (Susceptibility) Collected: 12/02/23 1115    Lab Status: Edited Result - FINAL Specimen: Blood from Arm, Right Updated: 12/06/23 0625     Blood Culture Staphylococcus aureus, MRSA     Comment:   Infectious disease consultation is highly recommended to rule out distant foci of infection.  Methicillin resistant  Staphylococcus aureus, Patient may be an isolation risk.        Isolated from Aerobic Bottle     Gram Stain Aerobic Bottle Gram positive cocci in clusters    Narrative:      Streptococcus spp not viable for growth.    Susceptibility        Staphylococcus aureus, MRSA      SARAH      Gentamicin Susceptible      Oxacillin Resistant      Rifampin Susceptible      Vancomycin Susceptible                       Susceptibility Comments       Staphylococcus aureus, MRSA    This isolate does not demonstrate inducible clindamycin resistance in vitro.                 Blood Culture ID, PCR - Blood, Arm, Right [[id number]]  (Abnormal) Collected: 12/02/23 1115    Lab Status: Final result Specimen: Blood from Arm, Right Updated: 12/03/23 0040     BCID, PCR Staph aureus. mecA/C and MREJ (methicillin resistance gene) detected. Identification by BCID2 PCR.     BCID, PCR 2 Streptococcus spp, not A, B, or pneumoniae. Identification by BCID2 PCR.     BOTTLE TYPE Aerobic Bottle    Narrative:      Infectious disease consultation is highly recommended to rule out distant foci of infection.    Respiratory Panel PCR w/COVID-19(SARS-CoV-2) ALEAH/JOHNNIE/VENU/PAD/COR/DELORIS In-House, NP Swab in UTM/VTM, 2 HR TAT - Swab, Nasopharynx [[id number]]  (Normal) Collected: 12/02/23 1111    Lab Status: Final result Specimen: Swab from Nasopharynx Updated: 12/02/23 1224     ADENOVIRUS, PCR Not Detected     Coronavirus 229E Not Detected     Coronavirus HKU1 Not Detected     Coronavirus NL63 Not Detected     Coronavirus OC43 Not Detected     COVID19 Not Detected     Human Metapneumovirus Not Detected     Human Rhinovirus/Enterovirus Not Detected     Influenza A PCR Not Detected     Influenza B PCR Not Detected     Parainfluenza Virus 1 Not Detected     Parainfluenza Virus 2 Not Detected     Parainfluenza Virus 3 Not Detected     Parainfluenza Virus 4 Not Detected     RSV, PCR Not Detected     Bordetella pertussis pcr Not Detected     Bordetella parapertussis PCR Not  Detected     Chlamydophila pneumoniae PCR Not Detected     Mycoplasma pneumo by PCR Not Detected    Narrative:      In the setting of a positive respiratory panel with a viral infection PLUS a negative procalcitonin without other underlying concern for bacterial infection, consider observing off antibiotics or discontinuation of antibiotics and continue supportive care. If the respiratory panel is positive for atypical bacterial infection (Bordetella pertussis, Chlamydophila pneumoniae, or Mycoplasma pneumoniae), consider antibiotic de-escalation to target atypical bacterial infection.    Blood Culture - Blood, Arm, Left [[id number]]  (Abnormal) Collected: 12/02/23 1110    Lab Status: Final result Specimen: Blood from Arm, Left Updated: 12/05/23 0618     Blood Culture Staphylococcus aureus, MRSA     Comment:   Infectious disease consultation is highly recommended to rule out distant foci of infection.  Methicillin resistant Staphylococcus aureus, Patient may be an isolation risk.        Isolated from Aerobic Bottle     Gram Stain Aerobic Bottle Gram positive cocci in clusters    Narrative:      Refer to previous blood culture collected on 12/02/2023 1115 for MICs.              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  Follow-up on repeat blood  cultures.    Continue with IV vancomycin  Overall care will be difficult and prognosis is poor because of patient's lack of participation in her own wellbeing including documented noncompliance and active self-mutilation behavior with ongoing IV drug use.  If vancomycin is considered toxic to already declining renal function then if MRSA in the blood cultures documented to be sensitive to daptomycin then she could be changed to daptomycin to avoid further decline in renal function.  Management of other issues including this complex psychosocial issues per primary team.  Duration of antibiotic therapy will be 6 weeks to 8 weeks from last negative blood culture  Jazmine Higginbotham MD  2023  08:04 EST    Parts of this note may be an electronic transcription/translation of spoken language to printed text using the Dragon dictation system.      Electronically signed by Jazmine Higginbotham MD at 23 0806       Jovanni Ibarra MD at 23 1634            PROGRESS NOTE  Patient Name: Sammie Landeros  Age/Sex: 33 y.o. female  : 1990  MRN: [id number]    Date of Admission: 2023  Date of Encounter Visit: 23   LOS: 4 days   Patient Care Team:  Melvi Landeros APRN as PCP - General (Nurse Practitioner)    Chief Complaint: Septic shock, acute kidney injury, recurrent endocarditis    Hospital course: Patient continues to improve, on room air, on IV antibiotic and followed by ID and by cardiothoracic surgery  Appreciate the help from the internal medicine team to address her multiple complex other medical issues  She is on room air,, she has no respiratory or hemodynamic issues at this point    REVIEW OF SYSTEMS:   CONSTITUTIONAL: no fever or chills      Ventilator/Non-Invasive Ventilation Settings (From admission, onward)      On room air              Vital Signs  Temp:  [97.2 °F (36.2 °C)-98 °F (36.7 °C)] 98 °F (36.7 °C)  Heart Rate:  [87-96] 87  Resp:  [16] 16  BP: (122-133)/(64-87) 122/64  SpO2:  [97 %-99 %] 98  "%  on    Device (Oxygen Therapy): room air    Intake/Output Summary (Last 24 hours) at 12/6/2023 1634  Last data filed at 12/6/2023 1409  Gross per 24 hour   Intake 443.42 ml   Output 1575 ml   Net -1131.58 ml     Flowsheet Rows      Flowsheet Row First Filed Value   Admission Height 175.3 cm (69\") Documented at 12/02/2023 1103   Admission Weight 101 kg (221 lb 12.5 oz) Documented at 12/02/2023 1103          Body mass index is 38.64 kg/m².      12/05/23  0500 12/05/23  1521 12/06/23  0602   Weight: 116 kg (255 lb 4.7 oz) 117 kg (258 lb) 119 kg (261 lb 11 oz)       Physical Exam:  GEN:  No acute distress, alert, cooperative, well developed   LUNGS: Normal chest on inspection,  Respirations regular, even and unlabored.   CV:  Regular rhythm and borderline rapid heart rate. Normal S1/S2. No murmurs, gallops, or rubs noted.    Results Review:    Results From Last 14 Days   Lab Units 12/05/23  2204 12/05/23  0340 12/05/23  0008 12/04/23  1820 12/02/23  1154 12/02/23  1110   CRP mg/dL  --   --   --   --   --  12.82*   FERRITIN ng/mL 102.00  --   --   --   --   --    LACTATE mmol/L  --  1.2 1.2 1.7   < > 11.2*   SED RATE mm/hr  --   --   --   --   --  46*    < > = values in this interval not displayed.     Results from last 7 days   Lab Units 12/06/23  0614 12/05/23  0340 12/04/23  1343 12/04/23  0429 12/03/23  0512 12/02/23  1154 12/02/23  1110   SODIUM mmol/L 136 134* 133* 133* 135*  --  134*   POTASSIUM mmol/L 4.4 4.1 4.2 3.9 4.8  4.8  --  3.6   CHLORIDE mmol/L 110* 109* 109* 106 105  --  95*   CO2 mmol/L 16.0* 16.8* 16.0* 18.0* 15.0*  --  15.2*   BUN mg/dL 55* 48* 42* 40* 30*  --  21*   CREATININE mg/dL 5.13* 4.22* 4.10* 3.75* 3.18* 2.83 2.72*   CALCIUM mg/dL 8.2* 7.5* 7.0* 6.8* 6.7*  --  8.4*   AST (SGOT) U/L 56* 61*  --  56* 81*  --  111*   ALT (SGPT) U/L 35* 36*  --  36* 48*  --  61*   ANION GAP mmol/L 10.0 8.2 8.0 9.0 15.0  --  23.8*   ALBUMIN g/dL 2.1* 2.0* 2.0* 2.1* 2.4*  --  2.8*                 Results from " "last 7 days   Lab Units 12/06/23  0614 12/05/23  2204 12/05/23  0340 12/04/23  0250 12/03/23  0512 12/02/23  1154 12/02/23  1110   WBC 10*3/mm3 4.57  --  3.87 6.97 16.86*  --  17.25*   HEMOGLOBIN g/dL 7.5* 7.9* 7.0* 7.2* 8.7*  --  9.7*   HEMOGLOBIN, POC g/dL  --   --   --   --   --  9.5*  --    HEMATOCRIT % 23.8* 24.9* 23.2* 23.1* 28.0*  --  32.4*   HEMATOCRIT POC %  --   --   --   --   --  28*  --    PLATELETS 10*3/mm3 70*  --  87* 102* 124*  --  144   MCV fL 71.3*  --  70.7* 71.5* 72.0*  --  73.6*   NEUTROPHIL % %  --   --   --  81.3*  --   --   --    LYMPHOCYTE % %  --   --   --  11.5*  --   --   --    MONOCYTES % %  --   --   --  4.0*  --   --   --    EOSINOPHIL % %  --   --   --  2.6  --   --   --    BASOPHIL % %  --   --   --  0.3  --   --   --          Results from last 7 days   Lab Units 12/04/23  0250   MAGNESIUM mg/dL 1.6           Invalid input(s): \"LDLCALC\"  Results from last 7 days   Lab Units 12/04/23  0929 12/02/23  1154   PH, ARTERIAL pH units 7.338* 7.144*   PCO2, ARTERIAL mm Hg 28.5* 36.2   PO2 ART mm Hg 96.3 79.8*   HCO3 ART mmol/L 15.3* 12.5*         Glucose   Date/Time Value Ref Range Status   12/05/2023 1122 86 70 - 130 mg/dL Final   12/04/2023 2306 76 70 - 130 mg/dL Final   12/04/2023 0526 87 70 - 130 mg/dL Final   12/04/2023 0455 64 (L) 70 - 130 mg/dL Final   12/04/2023 0416 70 70 - 130 mg/dL Final   12/04/2023 0048 169 (H) 70 - 130 mg/dL Final   12/03/2023 1950 120 70 - 130 mg/dL Final     Results from last 7 days   Lab Units 12/05/23  0340 12/05/23  0008 12/04/23  1820 12/04/23  1343 12/04/23  0429 12/03/23  2316 12/03/23  1747   LACTATE mmol/L 1.2 1.2 1.7 1.5 1.6 1.9 2.7*     Results from last 7 days   Lab Units 12/04/23  0940 12/02/23  1115 12/02/23  1110   BLOODCX  No growth at 2 days Staphylococcus aureus, MRSA* Staphylococcus aureus, MRSA*   BCIDPCR   --  Staph aureus. mecA/C and MREJ (methicillin resistance gene) detected. Identification by BCID2 PCR.*  Streptococcus spp, not A, B, " or pneumoniae. Identification by BCID2 PCR.*  --          Results from last 7 days   Lab Units 12/02/23  1111   COVID19  Not Detected   ADENOVIRUS DETECTION BY PCR  Not Detected   CORONAVIRUS 229E  Not Detected   CORONAVIRUS HKU1  Not Detected   CORONAVIRUS NL63  Not Detected   CORONAVIRUS OC43  Not Detected   HUMAN METAPNEUMOVIRUS  Not Detected   HUMAN RHINOVIRUS/ENTEROVIRUS  Not Detected   INFLUENZA B PCR  Not Detected   PARAINFLUENZA 1  Not Detected   PARAINFLUENZA VIRUS 2  Not Detected   PARAINFLUENZA VIRUS 3  Not Detected   PARAINFLUENZA VIRUS 4  Not Detected   BORDETELLA PERTUSSIS PCR  Not Detected   BORDETELLA PARAPERTUSSIS PCR  Not Detected   CHLAMYDOPHILA PNEUMONIAE PCR  Not Detected   MYCOPLAMA PNEUMO PCR  Not Detected   RSV, PCR  Not Detected     Results from last 7 days   Lab Units 12/04/23  0250   URIC ACID mg/dL 7.2*           Imaging:   Imaging Results (All)       Procedure Component Value Units Date/Time    XR Chest Post CVA Port [[id number]] Collected: 12/02/23 0044            I reviewed the patient's new clinical results.  I personally viewed and interpreted the patient's imaging results:        Medication Review:   busPIRone, 5 mg, Oral, TID  heparin (porcine), 5,000 Units, Subcutaneous, Q8H  pantoprazole, 40 mg, Oral, Q AM  sodium bicarbonate, 650 mg, Oral, TID  sodium chloride, 10 mL, Intravenous, Q12H  sodium chloride, 10 mL, Intravenous, Q12H  sodium chloride, 10 mL, Intravenous, Q12H  sodium chloride, 10 mL, Intravenous, Q12H  sodium chloride, 10 mL, Intravenous, Q12H  Vancomycin Pharmacy Intermittent/Pulse Dosing, , Does not apply, Daily        Pharmacy to dose vancomycin,   sodium chloride, 75 mL/hr, Last Rate: 75 mL/hr (12/06/23 1243)        ASSESSMENT:   Septic shock  Tricuspid valve endocarditis with recurrent infection  MRSA bacteremia, recurrent  Anuric ANTONELLA on chronic kidney disease stage III, worsening  Severe lactic acidosis, resolved  Mild pulmonary hypertension RVSP of  44  Electrolyte disturbances  Vaginal discharge  Heroin IV drug abuse  History of osteomyelitis  Anemia, likely dilutional    PLAN:  Patient is no longer having any respiratory or hemodynamic issues, renal failure is progressive however and she is still followed by infectious disease, renal and internal medicine in addition to the thoracic surgery  We will sign off, please do not hesitate to call with any question or concern  The copied texts in this note were reviewed and they are accurate as of 23    Disposition: Depending on hospital course    Jovanni Ibarra MD  23  16:34 EST          Dictated utilizing Dragon dictation    Electronically signed by Jovanni Ibarra MD at 23 1636       Juan Reardon APRN at 23 1215              Name: Sammie Landeros ADMIT: 2023   : 1990  PCP: Melvi Landeros APRN    MRN: [id number] LOS: 4 days   AGE/SEX: 33 y.o. female  ROOM: [facility]     Subjective   Subjective   Patient appears obese, generally weak, relatively comfortable, no apparent distress.  Requesting IV administration medication.  Denies nausea / vomiting; however, refusing meals.  Discussed with RN--patient also refusing duplex bilateral lower extremity rule out DVT.      Objective   Objective   Vital Signs  Temp:  [97.2 °F (36.2 °C)-98 °F (36.7 °C)] 98 °F (36.7 °C)  Heart Rate:  [87-96] 87  Resp:  [16] 16  BP: (122-133)/(64-87) 122/64  SpO2:  [97 %-99 %] 98 %  on   ;   Device (Oxygen Therapy): room air  Body mass index is 38.64 kg/m².    Physical Exam  Constitutional:       General: She is not in acute distress.     Appearance: She is obese. She is not toxic-appearing.      Comments: Lethargic   Cardiovascular:      Rate and Rhythm: Normal rate.      Heart sounds: Normal heart sounds.   Pulmonary:      Effort: Pulmonary effort is normal.      Comments: Diminished on expiration posteriorly  Abdominal:      General: Bowel sounds are normal.      Palpations: Abdomen is soft.    Musculoskeletal:      Right lower leg: Edema present.      Left lower leg: Edema present.   Skin:     General: Skin is warm and dry.       Results Review     I reviewed the patient's new clinical results.  Results from last 7 days   Lab Units 12/06/23  0614 12/05/23  2204 12/05/23  0340 12/04/23  0250 12/03/23  0512   WBC 10*3/mm3 4.57  --  3.87 6.97 16.86*   HEMOGLOBIN g/dL 7.5* 7.9* 7.0* 7.2* 8.7*   PLATELETS 10*3/mm3 70*  --  87* 102* 124*     Results from last 7 days   Lab Units 12/06/23  0614 12/05/23 0340 12/04/23  1343 12/04/23  0429   SODIUM mmol/L 136 134* 133* 133*   POTASSIUM mmol/L 4.4 4.1 4.2 3.9   CHLORIDE mmol/L 110* 109* 109* 106   CO2 mmol/L 16.0* 16.8* 16.0* 18.0*   BUN mg/dL 55* 48* 42* 40*   CREATININE mg/dL 5.13* 4.22* 4.10* 3.75*   GLUCOSE mg/dL 87 81 89 77   EGFR mL/min/1.73 10.6* 13.4* 13.9* 15.5*     Results from last 7 days   Lab Units 12/06/23  0614 12/05/23 0340 12/04/23  1343 12/04/23  0429 12/03/23  0512   ALBUMIN g/dL 2.1* 2.0* 2.0* 2.1* 2.4*   BILIRUBIN mg/dL 2.4* 2.8*  --  2.3* 2.6*   ALK PHOS U/L 361* 187*  --  114 127*   AST (SGOT) U/L 56* 61*  --  56* 81*   ALT (SGPT) U/L 35* 36*  --  36* 48*     Results from last 7 days   Lab Units 12/06/23  0614 12/05/23  0340 12/04/23  1343 12/04/23  0429 12/04/23  0250   CALCIUM mg/dL 8.2* 7.5* 7.0* 6.8*  --    ALBUMIN g/dL 2.1* 2.0* 2.0* 2.1*  --    MAGNESIUM mg/dL  --   --   --   --  1.6   PHOSPHORUS mg/dL 3.5 2.7 2.8  --  3.1     Results from last 7 days   Lab Units 12/05/23  0340 12/05/23  0008 12/04/23  1820 12/04/23  1343   LACTATE mmol/L 1.2 1.2 1.7 1.5     Glucose   Date/Time Value Ref Range Status   12/05/2023 1122 86 70 - 130 mg/dL Final   12/04/2023 2306 76 70 - 130 mg/dL Final   12/04/2023 0526 87 70 - 130 mg/dL Final   12/04/2023 0455 64 (L) 70 - 130 mg/dL Final   12/04/2023 0416 70 70 - 130 mg/dL Final   12/04/2023 0048 169 (H) 70 - 130 mg/dL Final   12/03/2023 1950 120 70 - 130 mg/dL Final       No radiology results for the  last day    I have personally reviewed all medications:  Scheduled Medications  busPIRone, 5 mg, Oral, TID  heparin (porcine), 5,000 Units, Subcutaneous, Q8H  pantoprazole, 40 mg, Oral, Q AM  sodium bicarbonate, 650 mg, Oral, TID  sodium chloride, 10 mL, Intravenous, Q12H  sodium chloride, 10 mL, Intravenous, Q12H  sodium chloride, 10 mL, Intravenous, Q12H  sodium chloride, 10 mL, Intravenous, Q12H  sodium chloride, 10 mL, Intravenous, Q12H  Vancomycin Pharmacy Intermittent/Pulse Dosing, , Does not apply, Daily    Infusions  Pharmacy to dose vancomycin,   sodium chloride, 75 mL/hr, Last Rate: 75 mL/hr (12/06/23 1243)    Diet  Diet: Regular/House Diet; Texture: Regular Texture (IDDSI 7); Fluid Consistency: Thin (IDDSI 0)    I have personally reviewed:  [x]  Laboratory   [x]  Microbiology   [x]  Radiology   [x]  EKG/Telemetry  [x]  Cardiology/Vascular   []  Pathology    []  Records      Assessment/Plan     Active Hospital Problems    Diagnosis  POA    **Endocarditis of tricuspid valve [I07.9]  Yes    Elevated LFTs [R79.89]  Yes    Thrombocytopenia [D69.6]  Yes    GERD without esophagitis [K21.9]  Yes    Shock, septic [A41.9, R65.21]  Yes    MRSA bacteremia [R78.81, B95.62]  Yes    ANTONELLA (acute kidney injury) [N17.9]  Yes    Anemia, chronic disease [D63.8]  Yes    Polysubstance abuse [F19.10]  Yes      Resolved Hospital Problems   No resolved problems to display.       33 y.o. female admitted with Endocarditis of tricuspid valve.    Septic shock due to recurrent tricuspid valve endocarditis and possible aortic valve endocarditis from MRSA with bacteremia-on iv vancomycin per ID recommendations. CT surgery evaluated and felt not to be a candidate for intervention. Vegetations appear small and may clear with antibiotics alone.  ANTONELLA on CKD-due to sepsis/ATN. Creatinine up to 4.2-->5.1 today. Nephrology managing. IVF stopped recently due to volume overload.  Elevated liver enzymes-mild & unchanged  Anemia of chronic  disease-improved iron studies, B12 >2000, folate 9.69. Hgb 7.5 s/p transfusion   Thrombocytopenia--87-->70 today. Normal on admission, yet low in the past. History of hepatitis c as well as cirrhosis and hypersplenism on last CT abdomen/pelvis done in .   Bilateral lower extremity swelling-possibly secondary to hypoalbuminemia. Check bilateral duplexes to rule out DVT (refused by patient)  Candidal vaginitis-s/p a dose of diflucan on 12/3/23  GERD-restart ppi  Anxiety-prn hydroxyzine available  Hepatitis b and c  Cirrhosis with splenomegaly  Polysubstance and IV drug abuse with plan to monitor off narcotics due to increased lethargy   History of MRSA endocarditis of the tricuspid valve s/p 1 month of linazolid   History of osteomyelitis   Heparin SC for DVT prophylaxis.  Full code.  Discussed with patient, RN, & Dr. Jarrell as confirmed on KIRAN patient's mother prescribed Suboxone twice since 2023  Anticipate discharge TBD timing yet to be determined.      TYLOR Mcarthur  [facility]  23  16:17 EST        Electronically signed by Juan Reardon APRN at 23 1617       Elvira Coleman MD at 23 1150              [facility] Progress Note      Patient Name: Sammie Landeros  : 1990  MRN: [id number]  Primary Care Physician:  Melvi Landeros APRN  Date of admission: 2023    Subjective     Interval History:   The patient was seen and examined today for follow-up on  ANTONELLA.   Doing well overall.  Denies any nausea or vomiting.  No fever no chills.  Not very cooperative with exam.  Nursing staff reported poor oral intake    Review of Systems:   As noted above    Objective     Vitals:   Temp:  [97.2 °F (36.2 °C)-98.1 °F (36.7 °C)] 97.7 °F (36.5 °C)  Heart Rate:  [87-96] 87  Resp:  [16] 16  BP: ()/(50-87) 130/80    Intake/Output Summary (Last 24 hours) at 2023 1150  Last data filed at 2023 1917  Gross per 24 hour   Intake  443.42 ml   Output 1525 ml   Net -1081.58 ml       Physical Exam:    General Appearance: Frail and fatigued.  Sleepy but arousable  Skin: warm and dry  HEENT: oral mucosa normal, nonicteric sclera  Neck: supple, no JVD  Lungs: CTA  Heart: RRR, normal S1 and S2  Abdomen: soft, nontender, nondistended  : no palpable bladder  Extremities: Bilateral lower and upper extremity edema  Neuro: Not very cooperative with examination    Scheduled Meds:     busPIRone, 5 mg, Oral, TID  heparin (porcine), 5,000 Units, Subcutaneous, Q8H  pantoprazole, 40 mg, Oral, Q AM  sodium bicarbonate, 650 mg, Oral, TID  sodium chloride, 10 mL, Intravenous, Q12H  sodium chloride, 10 mL, Intravenous, Q12H  sodium chloride, 10 mL, Intravenous, Q12H  sodium chloride, 10 mL, Intravenous, Q12H  sodium chloride, 10 mL, Intravenous, Q12H  Vancomycin Pharmacy Intermittent/Pulse Dosing, , Does not apply, Daily      IV Meds:   Pharmacy to dose vancomycin,   sodium chloride, 75 mL/hr        Results Reviewed:   I have personally reviewed the results from the time of this admission to 12/6/2023 11:50 EST     Results from last 7 days   Lab Units 12/06/23  0614 12/05/23  0340 12/04/23  1343 12/04/23  0429   SODIUM mmol/L 136 134* 133* 133*   POTASSIUM mmol/L 4.4 4.1 4.2 3.9   CHLORIDE mmol/L 110* 109* 109* 106   CO2 mmol/L 16.0* 16.8* 16.0* 18.0*   BUN mg/dL 55* 48* 42* 40*   CREATININE mg/dL 5.13* 4.22* 4.10* 3.75*   CALCIUM mg/dL 8.2* 7.5* 7.0* 6.8*   BILIRUBIN mg/dL 2.4* 2.8*  --  2.3*   ALK PHOS U/L 361* 187*  --  114   ALT (SGPT) U/L 35* 36*  --  36*   AST (SGOT) U/L 56* 61*  --  56*   GLUCOSE mg/dL 87 81 89 77       Estimated Creatinine Clearance: 21.1 mL/min (A) (by C-G formula based on SCr of 5.13 mg/dL (H)).    Results from last 7 days   Lab Units 12/06/23  0614 12/05/23  0340 12/04/23  1343 12/04/23  0250   MAGNESIUM mg/dL  --   --   --  1.6   PHOSPHORUS mg/dL 3.5 2.7 2.8 3.1       Results from last 7 days   Lab Units 12/04/23  0250   URIC ACID  mg/dL 7.2*       Results from last 7 days   Lab Units 12/06/23  0614 12/05/23  2204 12/05/23  0340 12/04/23  0250 12/03/23  0512 12/02/23  1154 12/02/23  1110   WBC 10*3/mm3 4.57  --  3.87 6.97 16.86*  --  17.25*   HEMOGLOBIN g/dL 7.5* 7.9* 7.0* 7.2* 8.7*  --  9.7*   HEMOGLOBIN, POC   --   --   --   --   --    < >  --    PLATELETS 10*3/mm3 70*  --  87* 102* 124*  --  144    < > = values in this interval not displayed.             Assessment / Plan     ASSESSMENT:  Acute Kidney Injury secondary to sepsis and ATN from severe hypotension and GI loss associated with diarrhea superimposed on chronic kidney disease baseline SCr unclear. Scr was at 3.0 on similar hospital admission in May 2023. MAP goal >65.  Postinfectious GN is on differential but unlikely given normal C3 and C4 levels   NAG Metabolic Acidosis, severe sepsis, HCO3 is 15.0.   Shock most likely septic on vasopressors  Possible chronic kidney disease she had another episode of acute kidney injury in June 2023, prior to that creatinine was normal in 2016 and there is no labs checked between June 2023 and present time.  And she has not followed up in our office as instructed  Severe sepsis, positive blood culture (Staph aureus MRSA) on antibiotic with suspicion of endocarditis  Hypoglycemia, improved, treated with D50.  Elevated liver enzymes, total bili 2.6, history of chronic Hep B/C  Hypocalcemia, calcium 6.7 and albumin is 2.4, calcium is within acceptable range around 8.1 when corrected to albumin  Chronic anemia, hemoglobin today 8.7 on 6/19/2023 was seven-point  Thrombocytopenia,   Polysubstance abuse  Anemia with worsening hemoglobin  Bilateral extremity edema likely contributed by hypoalbuminemia and capillary leak due to sepsis        PLAN:   Renal function is worse and staff noted decrease oral intake  We will restart IV fluid   We will check serology   We will Check [id number] given  worsening thrombocytopenia   We will check LDH and haptoglobin    It seems the patient is heading toward hemodialysis.  Fortunately there is no indication for dialysis today.  Recommend alternative to vancomycin for possible  Labs in AM       Thank you for involving us in the care of Sammie Landeros.  Please feel free to call with any questions.    Elvira Coleman MD  23  11:50 EST    [facility]  [phone]    Parts of this note may be an electronic transcription/translation of spoken language to printed text using the Dragon dictation system.    Electronically signed by Elvira Coleman MD at 23 0728       Jazmine Higginbotham MD at 23 1012            Infectious Diseases Progress Note    Jazmine Higginbotham MD     [facility]  [place]: 4 days  Patient Identification:  Name: Sammie Landeros  Age: 33 y.o.  Sex: female  :  1990  MRN: [id number]         Primary Care Physician: Melvi Landeros APRN        Subjective: Feels about the same and concerned about her withdrawal.  Does not want to talk.  Interval History: See consultation note.    Objective:    Scheduled Meds:busPIRone, 5 mg, Oral, TID  heparin (porcine), 5,000 Units, Subcutaneous, Q8H  pantoprazole, 40 mg, Oral, Q AM  sodium bicarbonate, 650 mg, Oral, TID  sodium chloride, 10 mL, Intravenous, Q12H  sodium chloride, 10 mL, Intravenous, Q12H  sodium chloride, 10 mL, Intravenous, Q12H  sodium chloride, 10 mL, Intravenous, Q12H  sodium chloride, 10 mL, Intravenous, Q12H  Vancomycin Pharmacy Intermittent/Pulse Dosing, , Does not apply, Daily      Continuous Infusions:Pharmacy to dose vancomycin,         Vital signs in last 24 hours:  Temp:  [97.2 °F (36.2 °C)-98.1 °F (36.7 °C)] 97.7 °F (36.5 °C)  Heart Rate:  [87-96] 87  Resp:  [16] 16  BP: ()/(50-87) 130/80    Intake/Output:    Intake/Output Summary (Last 24 hours) at 2023 1012  Last data filed at 2023 1917  Gross per 24 hour   Intake 443.42 ml   Output 1525 ml   Net -1081.58 ml       Exam:  /80 (BP  "Location: Right arm, Patient Position: Lying)   Pulse 87   Temp 97.7 °F (36.5 °C) (Oral)   Resp 16   Ht 175.3 cm (69\")   Wt 119 kg (261 lb 11 oz)   LMP 11/22/2023 (Approximate)   SpO2 99%   BMI 38.64 kg/m²   Patient is examined using the personal protective equipment as per guidelines from infection control for this particular patient as enacted.  Hand washing was performed before and after patient interaction.  General Appearance:  Limited examination due to patient wants to be left alone.    Chronically ill nontoxic-appearing female who is withdrawn.       Data Review:    I reviewed the patient's new clinical results.  Results from last 7 days   Lab Units 12/06/23  0614 12/05/23  2204 12/05/23  0340 12/04/23  0250 12/03/23  0512 12/02/23  1154 12/02/23  1110   WBC 10*3/mm3 4.57  --  3.87 6.97 16.86*  --  17.25*   HEMOGLOBIN g/dL 7.5* 7.9* 7.0* 7.2* 8.7*  --  9.7*   HEMOGLOBIN, POC g/dL  --   --   --   --   --  9.5*  --    PLATELETS 10*3/mm3 70*  --  87* 102* 124*  --  144     Results from last 7 days   Lab Units 12/06/23  0614 12/05/23  0340 12/04/23  1343 12/04/23  0429 12/03/23  0512 12/02/23  1154 12/02/23  1110   SODIUM mmol/L 136 134* 133* 133* 135*  --  134*   POTASSIUM mmol/L 4.4 4.1 4.2 3.9 4.8  4.8  --  3.6   CHLORIDE mmol/L 110* 109* 109* 106 105  --  95*   CO2 mmol/L 16.0* 16.8* 16.0* 18.0* 15.0*  --  15.2*   BUN mg/dL 55* 48* 42* 40* 30*  --  21*   CREATININE mg/dL 5.13* 4.22* 4.10* 3.75* 3.18* 2.83 2.72*   CALCIUM mg/dL 8.2* 7.5* 7.0* 6.8* 6.7*  --  8.4*   GLUCOSE mg/dL 87 81 89 77 109*  --  64*     Microbiology Results (last 10 days)       Procedure Component Value - Date/Time    Gardnerella vaginalis, Trichomonas vaginalis, Candida albicans, DNA - Swab, Vagina [[id number]]  (Abnormal) Collected: 12/04/23 1652    Lab Status: Final result Specimen: Swab from Vagina Updated: 12/05/23 1412     Candida Species Positive     Gardnerella vaginalis, DNA Probe Negative     Trichomonas vaginosis " Negative    Narrative:      Performed at:  01 - [facility] [address]  [id number]  : Cayden Prieto PhD, Phone:  [phone]    Blood Culture - Blood, Cannula [[id number]]  (Normal) Collected: 12/04/23 0940    Lab Status: Preliminary result Specimen: Blood from Cannula Updated: 12/06/23 1000     Blood Culture No growth at 2 days    Wet Prep, Genital - Swab, Vagina [[id number]]  (Abnormal) Collected: 12/03/23 1200    Lab Status: Final result Specimen: Swab from Vagina Updated: 12/03/23 1251     YEAST 4+ Yeast seen     HYPHAL ELEMENTS No Hyphal elements seen     WBC'S 3+ WBC's seen     Clue Cells, Wet Prep No Clue cells seen     Trichomonas, Wet Prep No Trichomonas seen    Chlamydia trachomatis, PCR - Swab, Vagina [[id number]] Collected: 12/03/23 1159    Lab Status: Final result Specimen: Swab from Vagina Updated: 12/06/23 0711     Chlamydia trachomatis, [place] Negative    Narrative:      Performed at:  01 - [facility] [address]  [id number]  : Karuna Kamara MD, Phone:  [phone]    Blood Culture - Blood, Arm, Right [[id number]]  (Abnormal)  (Susceptibility) Collected: 12/02/23 1115    Lab Status: Edited Result - FINAL Specimen: Blood from Arm, Right Updated: 12/06/23 0625     Blood Culture Staphylococcus aureus, MRSA     Comment:   Infectious disease consultation is highly recommended to rule out distant foci of infection.  Methicillin resistant Staphylococcus aureus, Patient may be an isolation risk.        Isolated from Aerobic Bottle     Gram Stain Aerobic Bottle Gram positive cocci in clusters    Narrative:      Streptococcus spp not viable for growth.    Susceptibility        Staphylococcus aureus, MRSA      SARAH      Gentamicin Susceptible      Oxacillin Resistant      Rifampin Susceptible      Vancomycin Susceptible                       Susceptibility Comments       Staphylococcus aureus, MRSA    This isolate does not demonstrate inducible  clindamycin resistance in vitro.                 Blood Culture ID, PCR - Blood, Arm, Right [[id number]]  (Abnormal) Collected: 12/02/23 1115    Lab Status: Final result Specimen: Blood from Arm, Right Updated: 12/03/23 0040     BCID, PCR Staph aureus. mecA/C and MREJ (methicillin resistance gene) detected. Identification by BCID2 PCR.     BCID, PCR 2 Streptococcus spp, not A, B, or pneumoniae. Identification by BCID2 PCR.     BOTTLE TYPE Aerobic Bottle    Narrative:      Infectious disease consultation is highly recommended to rule out distant foci of infection.    Respiratory Panel PCR w/COVID-19(SARS-CoV-2) ALEAH/JOHNNIE/VENU/PAD/COR/DELORIS In-House, NP Swab in UTM/VTM, 2 HR TAT - Swab, Nasopharynx [[id number]]  (Normal) Collected: 12/02/23 1111    Lab Status: Final result Specimen: Swab from Nasopharynx Updated: 12/02/23 1224     ADENOVIRUS, PCR Not Detected     Coronavirus 229E Not Detected     Coronavirus HKU1 Not Detected     Coronavirus NL63 Not Detected     Coronavirus OC43 Not Detected     COVID19 Not Detected     Human Metapneumovirus Not Detected     Human Rhinovirus/Enterovirus Not Detected     Influenza A PCR Not Detected     Influenza B PCR Not Detected     Parainfluenza Virus 1 Not Detected     Parainfluenza Virus 2 Not Detected     Parainfluenza Virus 3 Not Detected     Parainfluenza Virus 4 Not Detected     RSV, PCR Not Detected     Bordetella pertussis pcr Not Detected     Bordetella parapertussis PCR Not Detected     Chlamydophila pneumoniae PCR Not Detected     Mycoplasma pneumo by PCR Not Detected    Narrative:      In the setting of a positive respiratory panel with a viral infection PLUS a negative procalcitonin without other underlying concern for bacterial infection, consider observing off antibiotics or discontinuation of antibiotics and continue supportive care. If the respiratory panel is positive for atypical bacterial infection (Bordetella pertussis, Chlamydophila pneumoniae, or Mycoplasma  pneumoniae), consider antibiotic de-escalation to target atypical bacterial infection.    Blood Culture - Blood, Arm, Left [[id number]]  (Abnormal) Collected: 12/02/23 1110    Lab Status: Final result Specimen: Blood from Arm, Left Updated: 12/05/23 0618     Blood Culture Staphylococcus aureus, MRSA     Comment:   Infectious disease consultation is highly recommended to rule out distant foci of infection.  Methicillin resistant Staphylococcus aureus, Patient may be an isolation risk.        Isolated from Aerobic Bottle     Gram Stain Aerobic Bottle Gram positive cocci in clusters    Narrative:      Refer to previous blood culture collected on 12/02/2023 1115 for MICs.              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  Follow-up on repeat blood cultures.    Continue with IV vancomycin.  Overall care will be difficult and prognosis is poor because of patient's lack of participation in her own wellbeing including documented noncompliance and active self-mutilation behavior with ongoing IV drug use.  If vancomycin is considered toxic to already declining renal function then if MRSA in the blood cultures documented to be sensitive to daptomycin then she could be changed to daptomycin to avoid further decline in renal function.  Management of  other issues including this complex psychosocial issues per primary team.  Duration of antibiotic therapy at present is open ended.  Jazmine Higginbotham MD  2023  10:12 EST    Parts of this note may be an electronic transcription/translation of spoken language to printed text using the Dragon dictation system.      Electronically signed by Jazmine Higginbotham MD at 23 0804       Placido Olmos MD at 23 1714              Name: Sammie Landeros ADMIT: 2023   : 1990  PCP: Melvi Landeros APRN    MRN: [id number] LOS: 3 days   AGE/SEX: 33 y.o. female  ROOM: [facility]     Subjective   Subjective     No events overnight. She complains of generalized pain and anxiety      Objective   Objective   Vital Signs  Temp:  [97.2 °F (36.2 °C)-98.1 °F (36.7 °C)] 97.2 °F (36.2 °C)  Heart Rate:  [] 94  Resp:  [16] 16  BP: ()/(50-75) 123/72  SpO2:  [98 %-100 %] 98 %  on   ;   Device (Oxygen Therapy): room air  Body mass index is 38.1 kg/m².  Physical Exam  Constitutional:       General: She is not in acute distress.     Appearance: She is ill-appearing. She is not toxic-appearing.   Cardiovascular:      Rate and Rhythm: Normal rate and regular rhythm.      Heart sounds: Normal heart sounds.   Pulmonary:      Effort: Pulmonary effort is normal. No respiratory distress.      Breath sounds: Normal breath sounds. No wheezing, rhonchi or rales.   Abdominal:      General: Bowel sounds are normal. There is no distension.      Palpations: Abdomen is soft.      Tenderness: There is abdominal tenderness. There is no guarding or rebound.   Musculoskeletal:      Right lower leg: Edema present.      Left lower leg: Edema present.   Neurological:      Mental Status: She is alert.   Psychiatric:         Mood and Affect: Mood normal.         Behavior: Behavior normal.         Results Review     I reviewed the patient's new clinical results.  Results from last 7 days   Lab Units 23  0340 23  0250 23  0512  12/02/23  1154 12/02/23  1110   WBC 10*3/mm3 3.87 6.97 16.86*  --  17.25*   HEMOGLOBIN g/dL 7.0* 7.2* 8.7*  --  9.7*   HEMOGLOBIN, POC g/dL  --   --   --  9.5*  --    PLATELETS 10*3/mm3 87* 102* 124*  --  144     Results from last 7 days   Lab Units 12/05/23  0340 12/04/23  1343 12/04/23  0429 12/03/23  0512   SODIUM mmol/L 134* 133* 133* 135*   POTASSIUM mmol/L 4.1 4.2 3.9 4.8  4.8   CHLORIDE mmol/L 109* 109* 106 105   CO2 mmol/L 16.8* 16.0* 18.0* 15.0*   BUN mg/dL 48* 42* 40* 30*   CREATININE mg/dL 4.22* 4.10* 3.75* 3.18*   GLUCOSE mg/dL 81 89 77 109*   Estimated Creatinine Clearance: 25.4 mL/min (A) (by C-G formula based on SCr of 4.22 mg/dL (H)).  Results from last 7 days   Lab Units 12/05/23  0340 12/04/23  1343 12/04/23  0429 12/03/23  0512 12/02/23  1110   ALBUMIN g/dL 2.0* 2.0* 2.1* 2.4* 2.8*   BILIRUBIN mg/dL 2.8*  --  2.3* 2.6* 2.8*   ALK PHOS U/L 187*  --  114 127* 212*   AST (SGOT) U/L 61*  --  56* 81* 111*   ALT (SGPT) U/L 36*  --  36* 48* 61*     Results from last 7 days   Lab Units 12/05/23  0340 12/04/23 1343 12/04/23 0429 12/04/23  0250 12/03/23  0512   CALCIUM mg/dL 7.5* 7.0* 6.8*  --  6.7*   ALBUMIN g/dL 2.0* 2.0* 2.1*  --  2.4*   MAGNESIUM mg/dL  --   --   --  1.6  --    PHOSPHORUS mg/dL 2.7 2.8  --  3.1  --      Results from last 7 days   Lab Units 12/05/23  0340 12/05/23  0008 12/04/23  1820 12/04/23  1343   LACTATE mmol/L 1.2 1.2 1.7 1.5     COVID19   Date Value Ref Range Status   12/02/2023 Not Detected Not Detected - Ref. Range Final     SARS-CoV-2, [place]   Date Value Ref Range Status   05/10/2023 NEGATIVE Negative Final     Comment:     The 2019-CoV rRT-PCR Assay is only for use under a Food and Drug Administration Emergency Use Authorization. The performance characteristics of the assay were verified by the Clinical Microbiology Laboratory at the [facility].   Results should be used in conjunction with the patient's clinical symptoms, medical history, and other  clinical/laboratory findings to determine an overall clinical diagnosis. Negative results do not preclude infection with SARS-CoV-2 (COVID-19).    Test parameters have not been validated for screening asymptomatic patients.     Glucose   Date/Time Value Ref Range Status   12/05/2023 1122 86 70 - 130 mg/dL Final   12/04/2023 2306 76 70 - 130 mg/dL Final   12/04/2023 0526 87 70 - 130 mg/dL Final   12/04/2023 0455 64 (L) 70 - 130 mg/dL Final   12/04/2023 0416 70 70 - 130 mg/dL Final   12/04/2023 0048 169 (H) 70 - 130 mg/dL Final   12/03/2023 1950 120 70 - 130 mg/dL Final       Adult Transthoracic Echo Complete W/ Cont if Necessary Per Protocol    Left ventricular systolic function is normal. Calculated left   ventricular EF = 65.2%    Left ventricular diastolic function was normal.    Estimated right ventricular systolic pressure from tricuspid   regurgitation is mildly elevated (35-45 mmHg). Calculated right   ventricular systolic pressure from tricuspid regurgitation is 44 mmHg.    Mild pulmonary hypertension is present.    The aortic valve is structurally normal with no regurgitation or   stenosis present. The aortic valve appears trileaflet. There may be a   vegetation in the LVOT seen best on image 68 and 69    The tricuspid valve is structurally normal with no significant stenosis   present. Moderate to severe tricuspid valve regurgitation is present.   Estimated right ventricular systolic pressure from tricuspid regurgitation   is mildly elevated (35-45 mmHg). Calculated right ventricular systolic   pressure from tricuspid regurgitation is 44.4 mmHg. Mild pulmonary   hypertension is present. Vegetation noted on the tricuspid valve measuring   0.5 x 2.2cm    Left atrial volume is mildly increased. Saline test results are   negative.    Scheduled Medications  heparin (porcine), 5,000 Units, Subcutaneous, Q8H  senna-docusate sodium, 2 tablet, Oral, BID  sodium bicarbonate, 650 mg, Oral, TID  sodium chloride, 10 mL,  Intravenous, Q12H  sodium chloride, 10 mL, Intravenous, Q12H  sodium chloride, 10 mL, Intravenous, Q12H  sodium chloride, 10 mL, Intravenous, Q12H  sodium chloride, 10 mL, Intravenous, Q12H  Vancomycin Pharmacy Intermittent/Pulse Dosing, , Does not apply, Daily    Infusions  Pharmacy to dose vancomycin,     Diet  Diet: Regular/House Diet; Texture: Regular Texture (IDDSI 7); Fluid Consistency: Thin (IDDSI 0)      Assessment/Plan     Active Hospital Problems    Diagnosis  POA    **Endocarditis of tricuspid valve [I07.9]  Yes    Elevated LFTs [R79.89]  Yes    Thrombocytopenia [D69.6]  Yes    GERD without esophagitis [K21.9]  Yes    Shock, septic [A41.9, R65.21]  Yes    MRSA bacteremia [R78.81, B95.62]  Yes    ANTONELLA (acute kidney injury) [N17.9]  Yes    Anemia, chronic disease [D63.8]  Yes    Polysubstance abuse [F19.10]  Yes      Resolved Hospital Problems   No resolved problems to display.       33 y.o. female admitted with Endocarditis of tricuspid valve.    Septic shock due to recurrent tricuspid valve endocarditis and possible aortic valve endocarditis from MRSA with bacteremia-on iv vancomycin per ID recommendations. She is has been evaluated by CT surgery and is not felt to be a candidate for intervention. Luckily, the vegetations appear small and may clear with antibiotics alone.  ANTONELLA on CKD-due to sepsis/ATN. Creatinine up to 4.2 today. Nephrology is managing. IVF stopped as she appears volume overloaded.  Elevated liver enzymes-mild and improving  Anemia of chronic disease-update iron studies, b12, folate. Receiving a transfusion today for hgb of 7  Thrombocytopenia-down to 87 today. Normal on admission, though has been low in the past. She does have a history of hepatitis c as well as cirrhosis and hypersplenism on her last CT abdomen/pelvis done in June. Check B12 and folate as above.    Bilateral lower extremity swelling-possibly secondary to hypoalbuminemia. Check bilateral duplexes to rule out DVT  Candidal  vaginitis-s/p a dose of diflucan on 12/3/23  GERD-restart ppi  Anxiety-prn hydroxyzine  Hepatitis b and c  Cirrhosis with splenomegaly  Polysubstance and IV drug abuse  History of MRSA endocarditis of the tricuspid valve s/p 1 month of linazolid   History of osteomyelitis   Heparin SC for DVT prophylaxis.  Full code.  Discussed with patient.  Anticipate discharge  TBD  timing yet to be determined.      Placido Olmos MD  [facility]  23  17:15 EST    I wore protective equipment throughout this patient encounter including a face mask, gloves and protective eyewear.  Hand hygiene was performed before donning protective equipment and after removal when leaving the room.         Electronically signed by Placido Olmos MD at 23 1929       Jovanni Ibarra MD at 23 1325            PROGRESS NOTE  Patient Name: Sammie Landeros  Age/Sex: 33 y.o. female  : 1990  MRN: [id number]    Date of Admission: 2023  Date of Encounter Visit: 23   LOS: 3 days   Patient Care Team:  Melvi Landeros APRN as PCP - General (Nurse Practitioner)    Chief Complaint: Septic shock, acute kidney injury, recurrent endocarditis    Hospital course: Patient has been having recurrent endocarditis, she has positive IV drug abuse, she does report that she finished a course of antibiotic after the last admission,  infectious disease team is suspecting poor compliance.  The patient did admit however that she did use contaminated needle and that may be the source of the current infection.  She is doing better clinically, she is on room air, she came off all the pressors  She has been followed by cardiothoracic surgery regarding her valvular disease.  She is also followed by nephrology  , She did develop acute renal failure because of the hypotension from the sepsis and the GI blood loss with post infectious clinical nephritis ruled out because of the normal C3 and C4 level.  Her creatinine seems to be  "plateauing around 4.2.  She is currently on the vancomycin and infectious disease have poor expectation as far as long-term prognosis given her history so far.  Cardiothoracic surgery evaluated and the plan is to treat medically for start and they will follow-up for further later recommendations.    REVIEW OF SYSTEMS:   CONSTITUTIONAL: no fever or chills  CARDIOVASCULAR: No chest pain, chest pressure or chest discomfort. No palpitations or edema.   RESPIRATORY: No shortness of breath, cough or sputum.   GASTROINTESTINAL: No anorexia, nausea, vomiting or diarrhea. No abdominal pain or blood.   HEMATOLOGIC: No bleeding or bruising.     Ventilator/Non-Invasive Ventilation Settings (From admission, onward)      On room air              Vital Signs  Temp:  [97.5 °F (36.4 °C)-98 °F (36.7 °C)] 97.5 °F (36.4 °C)  Heart Rate:  [] 99  BP: ()/(49-70) 106/61  SpO2:  [99 %-100 %] 99 %  on    Device (Oxygen Therapy): room air    Intake/Output Summary (Last 24 hours) at 12/5/2023 1325  Last data filed at 12/5/2023 0100  Gross per 24 hour   Intake --   Output 450 ml   Net -450 ml     Flowsheet Rows      Flowsheet Row First Filed Value   Admission Height 175.3 cm (69\") Documented at 12/02/2023 1103   Admission Weight 101 kg (221 lb 12.5 oz) Documented at 12/02/2023 1103          Body mass index is 37.7 kg/m².      12/04/23  0400 12/05/23  0300 12/05/23  0500   Weight: 110 kg (243 lb 9.7 oz) 116 kg (255 lb 4.7 oz) 116 kg (255 lb 4.7 oz)       Physical Exam:  GEN:  No acute distress, alert, cooperative, well developed   EYES:   Sclerae clear. No icterus. PERRL. Normal EOM  ENT:   External ears/nose normal, no oral lesions, no thrush, moist mucous membranes  NECK:  Supple, midline trachea, no JVD  LUNGS: Normal chest on inspection, very faint crackles posteriorly otherwise no wheezes or labored breathing. Respirations regular, even and unlabored.   CV:  Regular rhythm and borderline rapid heart rate. Normal S1/S2. No " murmurs, gallops, or rubs noted.  ABD:  Soft, nontender and nondistended. Normal bowel sounds. No guarding  EXT:  Moves all extremities well. No cyanosis. No redness.  Positive edema lower extremities  Skin: Dry, intact, no, skin graft over the lower extremities on the right with chronic edema and venous stasis hyperpigmentation and color changes    Results Review:    Results From Last 14 Days   Lab Units 12/05/23  0340 12/05/23  0008 12/04/23  1820 12/02/23  1154 12/02/23  1110   CRP mg/dL  --   --   --   --  12.82*   LACTATE mmol/L 1.2 1.2 1.7   < > 11.2*   SED RATE mm/hr  --   --   --   --  46*    < > = values in this interval not displayed.     Results from last 7 days   Lab Units 12/05/23  0340 12/04/23  1343 12/04/23  0429 12/03/23  0512 12/02/23  1154 12/02/23  1110   SODIUM mmol/L 134* 133* 133* 135*  --  134*   POTASSIUM mmol/L 4.1 4.2 3.9 4.8  4.8  --  3.6   CHLORIDE mmol/L 109* 109* 106 105  --  95*   CO2 mmol/L 16.8* 16.0* 18.0* 15.0*  --  15.2*   BUN mg/dL 48* 42* 40* 30*  --  21*   CREATININE mg/dL 4.22* 4.10* 3.75* 3.18* 2.83 2.72*   CALCIUM mg/dL 7.5* 7.0* 6.8* 6.7*  --  8.4*   AST (SGOT) U/L 61*  --  56* 81*  --  111*   ALT (SGPT) U/L 36*  --  36* 48*  --  61*   ANION GAP mmol/L 8.2 8.0 9.0 15.0  --  23.8*   ALBUMIN g/dL 2.0* 2.0* 2.1* 2.4*  --  2.8*                 Results from last 7 days   Lab Units 12/05/23  0340 12/04/23  0250 12/03/23  0512 12/02/23  1154 12/02/23  1110   WBC 10*3/mm3 3.87 6.97 16.86*  --  17.25*   HEMOGLOBIN g/dL 7.0* 7.2* 8.7*  --  9.7*   HEMOGLOBIN, POC g/dL  --   --   --  9.5*  --    HEMATOCRIT % 23.2* 23.1* 28.0*  --  32.4*   HEMATOCRIT POC %  --   --   --  28*  --    PLATELETS 10*3/mm3 87* 102* 124*  --  144   MCV fL 70.7* 71.5* 72.0*  --  73.6*   NEUTROPHIL % %  --  81.3*  --   --   --    LYMPHOCYTE % %  --  11.5*  --   --   --    MONOCYTES % %  --  4.0*  --   --   --    EOSINOPHIL % %  --  2.6  --   --   --    BASOPHIL % %  --  0.3  --   --   --          Results from  "last 7 days   Lab Units 12/04/23  0250   MAGNESIUM mg/dL 1.6           Invalid input(s): \"LDLCALC\"  Results from last 7 days   Lab Units 12/04/23  0929 12/02/23  1154   PH, ARTERIAL pH units 7.338* 7.144*   PCO2, ARTERIAL mm Hg 28.5* 36.2   PO2 ART mm Hg 96.3 79.8*   HCO3 ART mmol/L 15.3* 12.5*         Glucose   Date/Time Value Ref Range Status   12/05/2023 1122 86 70 - 130 mg/dL Final   12/04/2023 2306 76 70 - 130 mg/dL Final   12/04/2023 0526 87 70 - 130 mg/dL Final   12/04/2023 0455 64 (L) 70 - 130 mg/dL Final   12/04/2023 0416 70 70 - 130 mg/dL Final   12/04/2023 0048 169 (H) 70 - 130 mg/dL Final   12/03/2023 1950 120 70 - 130 mg/dL Final   12/03/2023 1524 95 70 - 130 mg/dL Final     Results from last 7 days   Lab Units 12/05/23  0340 12/05/23  0008 12/04/23  1820 12/04/23  1343 12/04/23  0429 12/03/23  2316 12/03/23  1747   LACTATE mmol/L 1.2 1.2 1.7 1.5 1.6 1.9 2.7*     Results from last 7 days   Lab Units 12/04/23  0940 12/02/23  1115 12/02/23  1110   BLOODCX  No growth at 24 hours Staphylococcus aureus, MRSA* Staphylococcus aureus, MRSA*   BCIDPCR   --  Staph aureus. mecA/C and MREJ (methicillin resistance gene) detected. Identification by BCID2 PCR.*  Streptococcus spp, not A, B, or pneumoniae. Identification by BCID2 PCR.*  --          Results from last 7 days   Lab Units 12/02/23  1111   COVID19  Not Detected   ADENOVIRUS DETECTION BY PCR  Not Detected   CORONAVIRUS 229E  Not Detected   CORONAVIRUS HKU1  Not Detected   CORONAVIRUS NL63  Not Detected   CORONAVIRUS OC43  Not Detected   HUMAN METAPNEUMOVIRUS  Not Detected   HUMAN RHINOVIRUS/ENTEROVIRUS  Not Detected   INFLUENZA B PCR  Not Detected   PARAINFLUENZA 1  Not Detected   PARAINFLUENZA VIRUS 2  Not Detected   PARAINFLUENZA VIRUS 3  Not Detected   PARAINFLUENZA VIRUS 4  Not Detected   BORDETELLA PERTUSSIS PCR  Not Detected   BORDETELLA PARAPERTUSSIS PCR  Not Detected   CHLAMYDOPHILA PNEUMONIAE PCR  Not Detected   MYCOPLAMA PNEUMO PCR  Not Detected "   RSV, PCR  Not Detected     Results from last 7 days   Lab Units 12/04/23  0250   URIC ACID mg/dL 7.2*           Imaging:   Imaging Results (All)       Procedure Component Value Units Date/Time    XR Chest Post CVA Port [[id number]] Collected: 12/02/23 1126            I reviewed the patient's new clinical results.  I personally viewed and interpreted the patient's imaging results:        Medication Review:   heparin (porcine), 5,000 Units, Subcutaneous, Q8H  senna-docusate sodium, 2 tablet, Oral, BID  sodium bicarbonate, 650 mg, Oral, TID  sodium chloride, 10 mL, Intravenous, Q12H  sodium chloride, 10 mL, Intravenous, Q12H  sodium chloride, 10 mL, Intravenous, Q12H  sodium chloride, 10 mL, Intravenous, Q12H  sodium chloride, 10 mL, Intravenous, Q12H  Vancomycin Pharmacy Intermittent/Pulse Dosing, , Does not apply, Daily        Pharmacy to dose vancomycin,         ASSESSMENT:   Septic shock  Tricuspid valve endocarditis with recurrent infection  MRSA bacteremia, recurrent  Anuric ANTONELLA on chronic kidney disease stage III, worsening  Severe lactic acidosis, resolved  Mild pulmonary hypertension RVSP of 44  Electrolyte disturbances  Vaginal discharge  Heroin IV drug abuse  History of osteomyelitis  Anemia, likely dilutional    PLAN:  Patient had a positive blood culture with MRSA, and she is on vancomycin with infectious disease following for the recommendations  Thoracic surgery did evaluate and they will be following as well and probably need to be notified once the patient is out of the ICU so that they can finalize whether medical or surgical treatment is needed  Respiratory wise she is on room air with no more distress  Kidney function continues to decline but she seems to be plateauing around 4.2 and that will be followed and managed by nephrology, she is on no pressors, and she will be taken off the IV fluid now that her p.o. intake is at normal level.   Glycemic control is satisfactory  Repeat blood culture so  far are no growth  She is cleared to transfer out of the intensive care unit  She has multiple comorbidities with multiple medical issues and we will ask the internal medicine team to step and to help manage her medical problems.  Discussed with the CICU rounding team and with the patient  The copied texts in this note were reviewed and they are accurate as of 12/05/23    Disposition: Depending on hospital course    Jovanni Ibarra MD  12/05/23  13:25 EST          Dictated utilizing Dragon dictation    Electronically signed by Jovanni Ibarra MD at 12/05/23 6299

## 2023-12-07 NOTE — PLAN OF CARE
Goal Outcome Evaluation:      Patient is alert and oriented on room air. Patient given iv antibiotics as ordered. Patient refused to get Duplex of lower extremities. Patient still refuses to keep a gown on. Bed in lowest position, call light within reach and bed alarm set and audible.

## 2023-12-07 NOTE — PROGRESS NOTES
"[facility] Clinical Pharmacy Services: Vancomycin Pharmacokinetic Initial Consult Note    Samime Landeros is a 33 y.o. female who is on day 5 of pharmacy to dose vancomycin.    Indication: Sepsis  Planned Duration of Therapy: 7 days  Last Dose: 750 mg on 12/4 at 21:30  Target: Dose by Levels    Vitals/Labs  Ht: 175.3 cm (69\"); Wt: 114 kg (251 lb 8.7 oz)  Temp Readings from Last 1 Encounters:   12/07/23 97.7 °F (36.5 °C) (Oral)    Estimated Creatinine Clearance: 20.9 mL/min (A) (by C-G formula based on SCr of 5.06 mg/dL (H)).     Results from last 7 days   Lab Units 12/07/23  0641 12/06/23  0614 12/05/23  0340   CREATININE mg/dL 5.06* 5.13* 4.22*   WBC 10*3/mm3 4.73 4.57 3.87     Assessment/Plan:    Vancomycin Dose:   will give a one time dose of 500 mg IV today  Vanc Random has been ordered for 12/9 with am labs     Pharmacy will follow patient's kidney function and will adjust doses and obtain levels as necessary. Thank you for involving pharmacy in this patient's care. Please contact pharmacy with any questions or concerns.                           Harsha López PharmD  Clinical Pharmacist    "

## 2023-12-07 NOTE — PROGRESS NOTES
"  Infectious Diseases Progress Note    Jazmine Higginbotham MD     [facility]  [place]: 5 days  Patient Identification:  Name: Sammie Landeros  Age: 33 y.o.  Sex: female  :  1990  MRN: [id number]         Primary Care Physician: Melvi Landeros APRN        Subjective: Covered up in the right lateral decubitus position and does not want to engage.  Claims that she is going through withdrawal.  Interval History: See consultation note.    Objective:    Scheduled Meds:buprenorphine-naloxone, 2 film, Sublingual, Daily  busPIRone, 5 mg, Oral, TID  heparin (porcine), 5,000 Units, Subcutaneous, Q8H  pantoprazole, 40 mg, Oral, Q AM  sodium bicarbonate, 650 mg, Oral, TID  sodium chloride, 10 mL, Intravenous, Q12H  sodium chloride, 10 mL, Intravenous, Q12H  sodium chloride, 10 mL, Intravenous, Q12H  sodium chloride, 10 mL, Intravenous, Q12H  sodium chloride, 10 mL, Intravenous, Q12H  Vancomycin Pharmacy Intermittent/Pulse Dosing, , Does not apply, Daily      Continuous Infusions:Pharmacy Consult - Pharmacy to dose,   Pharmacy to dose vancomycin,   sodium chloride, 75 mL/hr, Last Rate: 75 mL/hr (23 0010)        Vital signs in last 24 hours:  Temp:  [97.3 °F (36.3 °C)-98.6 °F (37 °C)] 97.7 °F (36.5 °C)  Heart Rate:  [78-94] 78  Resp:  [16-18] 18  BP: (122-139)/(64-88) 139/85    Intake/Output:    Intake/Output Summary (Last 24 hours) at 2023 0804  Last data filed at 2023 0000  Gross per 24 hour   Intake 1000 ml   Output 1975 ml   Net -975 ml       Exam:  /85 (BP Location: Right arm, Patient Position: Lying)   Pulse 78   Temp 97.7 °F (36.5 °C) (Oral)   Resp 18   Ht 175.3 cm (69\")   Wt 114 kg (251 lb 8.7 oz)   LMP 2023 (Approximate)   SpO2 98%   BMI 37.15 kg/m²   Patient is examined using the personal protective equipment as per guidelines from infection control for this particular patient as enacted.  Hand washing was performed before and after patient interaction.  General Appearance: "  Limited examination due to patient wants to be left alone.    Chronically ill nontoxic-appearing female who is withdrawn.       Data Review:    I reviewed the patient's new clinical results.  Results from last 7 days   Lab Units 12/07/23  0641 12/06/23  0614 12/05/23  2204 12/05/23  0340 12/04/23  0250 12/03/23  0512 12/02/23  1154 12/02/23  1110   WBC 10*3/mm3 4.73 4.57  --  3.87 6.97 16.86*  --  17.25*   HEMOGLOBIN g/dL 8.1* 7.5* 7.9* 7.0* 7.2* 8.7*  --  9.7*   HEMOGLOBIN, POC g/dL  --   --   --   --   --   --  9.5*  --    PLATELETS 10*3/mm3 83* 70*  --  87* 102* 124*  --  144     Results from last 7 days   Lab Units 12/07/23  0641 12/06/23  0614 12/05/23  0340 12/04/23  1343 12/04/23  0429 12/03/23  0512 12/02/23  1154 12/02/23  1110   SODIUM mmol/L 136 136 134* 133* 133* 135*  --  134*   POTASSIUM mmol/L 4.5 4.4 4.1 4.2 3.9 4.8  4.8  --  3.6   CHLORIDE mmol/L 110* 110* 109* 109* 106 105  --  95*   CO2 mmol/L 14.5* 16.0* 16.8* 16.0* 18.0* 15.0*  --  15.2*   BUN mg/dL 58* 55* 48* 42* 40* 30*  --  21*   CREATININE mg/dL 5.06* 5.13* 4.22* 4.10* 3.75* 3.18* 2.83 2.72*   CALCIUM mg/dL 8.2* 8.2* 7.5* 7.0* 6.8* 6.7*  --  8.4*   GLUCOSE mg/dL 96 87 81 89 77 109*  --  64*     Microbiology Results (last 10 days)       Procedure Component Value - Date/Time    Gardnerella vaginalis, Trichomonas vaginalis, Candida albicans, DNA - Swab, Vagina [[id number]]  (Abnormal) Collected: 12/04/23 1652    Lab Status: Final result Specimen: Swab from Vagina Updated: 12/05/23 1412     Candida Species Positive     Gardnerella vaginalis, DNA Probe Negative     Trichomonas vaginosis Negative    Narrative:      Performed at:  [address] [place] [address]  [id number]  : Cayden Prieto PhD, Phone:  [phone]    Blood Culture - Blood, Cannula [[id number]]  (Normal) Collected: 12/04/23 0940    Lab Status: Preliminary result Specimen: Blood from Cannula Updated: 12/06/23 1000     Blood Culture No growth at 2  days    Wet Prep, Genital - Swab, Vagina [[id number]]  (Abnormal) Collected: 12/03/23 1200    Lab Status: Final result Specimen: Swab from Vagina Updated: 12/03/23 1251     YEAST 4+ Yeast seen     HYPHAL ELEMENTS No Hyphal elements seen     WBC'S 3+ WBC's seen     Clue Cells, Wet Prep No Clue cells seen     Trichomonas, Wet Prep No Trichomonas seen    Chlamydia trachomatis, PCR - Swab, Vagina [[id number]] Collected: 12/03/23 1159    Lab Status: Final result Specimen: Swab from Vagina Updated: 12/06/23 0711     Chlamydia trachomatis, [place] Negative    Narrative:      Performed at:  [address]  [id number]  : Karuna Kamara MD, Phone:  [phone]    Blood Culture - Blood, Arm, Right [[id number]]  (Abnormal)  (Susceptibility) Collected: 12/02/23 1115    Lab Status: Edited Result - FINAL Specimen: Blood from Arm, Right Updated: 12/06/23 0625     Blood Culture Staphylococcus aureus, MRSA     Comment:   Infectious disease consultation is highly recommended to rule out distant foci of infection.  Methicillin resistant Staphylococcus aureus, Patient may be an isolation risk.        Isolated from Aerobic Bottle     Gram Stain Aerobic Bottle Gram positive cocci in clusters    Narrative:      Streptococcus spp not viable for growth.    Susceptibility        Staphylococcus aureus, MRSA      SARAH      Gentamicin Susceptible      Oxacillin Resistant      Rifampin Susceptible      Vancomycin Susceptible                       Susceptibility Comments       Staphylococcus aureus, MRSA    This isolate does not demonstrate inducible clindamycin resistance in vitro.                 Blood Culture ID, PCR - Blood, Arm, Right [[id number]]  (Abnormal) Collected: 12/02/23 1115    Lab Status: Final result Specimen: Blood from Arm, Right Updated: 12/03/23 0040     BCID, PCR Staph aureus. mecA/C and MREJ (methicillin resistance gene) detected. Identification by BCID2 PCR.     BCID, PCR 2  Streptococcus spp, not A, B, or pneumoniae. Identification by BCID2 PCR.     BOTTLE TYPE Aerobic Bottle    Narrative:      Infectious disease consultation is highly recommended to rule out distant foci of infection.    Respiratory Panel PCR w/COVID-19(SARS-CoV-2) ALEAH/JOHNNIE/VENU/PAD/COR/DELORIS In-House, NP Swab in UTM/VTM, 2 HR TAT - Swab, Nasopharynx [[id number]]  (Normal) Collected: 12/02/23 1111    Lab Status: Final result Specimen: Swab from Nasopharynx Updated: 12/02/23 1224     ADENOVIRUS, PCR Not Detected     Coronavirus 229E Not Detected     Coronavirus HKU1 Not Detected     Coronavirus NL63 Not Detected     Coronavirus OC43 Not Detected     COVID19 Not Detected     Human Metapneumovirus Not Detected     Human Rhinovirus/Enterovirus Not Detected     Influenza A PCR Not Detected     Influenza B PCR Not Detected     Parainfluenza Virus 1 Not Detected     Parainfluenza Virus 2 Not Detected     Parainfluenza Virus 3 Not Detected     Parainfluenza Virus 4 Not Detected     RSV, PCR Not Detected     Bordetella pertussis pcr Not Detected     Bordetella parapertussis PCR Not Detected     Chlamydophila pneumoniae PCR Not Detected     Mycoplasma pneumo by PCR Not Detected    Narrative:      In the setting of a positive respiratory panel with a viral infection PLUS a negative procalcitonin without other underlying concern for bacterial infection, consider observing off antibiotics or discontinuation of antibiotics and continue supportive care. If the respiratory panel is positive for atypical bacterial infection (Bordetella pertussis, Chlamydophila pneumoniae, or Mycoplasma pneumoniae), consider antibiotic de-escalation to target atypical bacterial infection.    Blood Culture - Blood, Arm, Left [[id number]]  (Abnormal) Collected: 12/02/23 1110    Lab Status: Final result Specimen: Blood from Arm, Left Updated: 12/05/23 0618     Blood Culture Staphylococcus aureus, MRSA     Comment:   Infectious disease consultation is highly  recommended to rule out distant foci of infection.  Methicillin resistant Staphylococcus aureus, Patient may be an isolation risk.        Isolated from Aerobic Bottle     Gram Stain Aerobic Bottle Gram positive cocci in clusters    Narrative:      Refer to previous blood culture collected on 12/02/2023 1115 for MICs.              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  Follow-up on repeat blood cultures.    Continue with IV vancomycin  Overall care will be difficult and prognosis is poor because of patient's lack of participation in her own wellbeing including documented noncompliance and active self-mutilation behavior with ongoing IV drug use.  If vancomycin is considered toxic to already declining renal function then if MRSA in the blood cultures documented to be sensitive to daptomycin then she could be changed to daptomycin to avoid further decline in renal function.  Management of other issues including this complex psychosocial issues per primary team.  Duration of antibiotic therapy will be 6 weeks to 8 weeks from last negative blood culture  Jazmine Higginbotham MD  12/7/2023  08:04 EST    Parts of this note may be an electronic transcription/translation of spoken language to printed text using the Dragon dictation system.

## 2023-12-07 NOTE — SIGNIFICANT NOTE
12/07/23 1127   OTHER   Discipline physical therapist   Rehab Time/Intention   Session Not Performed patient/family declined, not feeling well;patient/family declined evaluation   Recommendation   PT - Next Appointment 12/08/23

## 2023-12-07 NOTE — PROGRESS NOTES
[facility]"" Progress Note      Patient Name: Sammie Landeros  : 1990  MRN: [id number]  Primary Care Physician:  Melvi Landeros APRN  Date of admission: 2023    Subjective     Interval History:   Follow-up acute kidney injury.  1.9 L urine output recorded for 1 shift yesterday.  IV fluid intake recorded but no p.o. intake recorded.   Weight not consistent.  RN reports patient drinking a lot of fluid.  She is lying in bed with no hospital gown on uncovered from the waist up.  Does allow exam.  Answers yes/no questions says she is eating.  Drinking.  Bowels moving.  Review of Systems:   As noted above    Objective     Vitals:   Temp:  [97.3 °F (36.3 °C)-98.6 °F (37 °C)] 97.7 °F (36.5 °C)  Heart Rate:  [78-97] 97  Resp:  [16-18] 18  BP: (122-148)/(64-88) 148/82    Intake/Output Summary (Last 24 hours) at 2023 1302  Last data filed at 2023 0000  Gross per 24 hour   Intake 1000 ml   Output 975 ml   Net 25 ml       Physical Exam:    General Appearance: Very chronically ill.  Lying in bed uncovered from the waist up.  Does answer questions yes no.  Does not move, but did allow exam.  Skin: warm and dry  Neck: supple, no JVD  Lungs clear to auscultation bilaterally.  Heart: RRR, normal S1 and S2  Abdomen: soft, nontender,distended.+bs.  The wall edema  : no palpable bladder  Extremities: 2+ upper and lower extremity edema  Neuro: Answers yes/no questions appropriately.  Does not change position.    Scheduled Meds:     buprenorphine-naloxone, 2 film, Sublingual, Daily  busPIRone, 5 mg, Oral, TID  heparin (porcine), 5,000 Units, Subcutaneous, Q8H  pantoprazole, 40 mg, Oral, Q AM  sodium bicarbonate, 650 mg, Oral, TID  sodium chloride, 10 mL, Intravenous, Q12H  sodium chloride, 10 mL, Intravenous, Q12H  sodium chloride, 10 mL, Intravenous, Q12H  sodium chloride, 10 mL, Intravenous, Q12H  sodium chloride, 10 mL, Intravenous, Q12H  Vancomycin Pharmacy Intermittent/Pulse Dosing,  , Does not apply, Daily      IV Meds:   Pharmacy to dose vancomycin,   sodium chloride, 75 mL/hr, Last Rate: 75 mL/hr (12/07/23 0010)        Results Reviewed:   I have personally reviewed the results from the time of this admission to 12/7/2023 13:02 EST     Results from last 7 days   Lab Units 12/07/23  0641 12/06/23  0614 12/05/23  0340   SODIUM mmol/L 136 136 134*   POTASSIUM mmol/L 4.5 4.4 4.1   CHLORIDE mmol/L 110* 110* 109*   CO2 mmol/L 14.5* 16.0* 16.8*   BUN mg/dL 58* 55* 48*   CREATININE mg/dL 5.06* 5.13* 4.22*   CALCIUM mg/dL 8.2* 8.2* 7.5*   BILIRUBIN mg/dL 1.5* 2.4* 2.8*   ALK PHOS U/L 431* 361* 187*   ALT (SGPT) U/L 35* 35* 36*   AST (SGOT) U/L 53* 56* 61*   GLUCOSE mg/dL 96 87 81       Estimated Creatinine Clearance: 20.9 mL/min (A) (by C-G formula based on SCr of 5.06 mg/dL (H)).    Results from last 7 days   Lab Units 12/06/23  0614 12/05/23  0340 12/04/23  1343 12/04/23  0250   MAGNESIUM mg/dL  --   --   --  1.6   PHOSPHORUS mg/dL 3.5 2.7 2.8 3.1       Results from last 7 days   Lab Units 12/04/23  0250   URIC ACID mg/dL 7.2*       Results from last 7 days   Lab Units 12/07/23  0641 12/06/23  0614 12/05/23  2204 12/05/23  0340 12/04/23  0250 12/03/23  0512   WBC 10*3/mm3 4.73 4.57  --  3.87 6.97 16.86*   HEMOGLOBIN g/dL 8.1* 7.5* 7.9* 7.0* 7.2* 8.7*   PLATELETS 10*3/mm3 83* 70*  --  87* 102* 124*             Assessment / Plan     ASSESSMENT:  Acute kidney injury on CKD with unknown recent baseline., nonoliguric.  Certainly acute GN in the differential given her endocarditis and bacteremia.  Urine was somewhat active with moderate blood 11-20 red cells leukocytosis and pyuria.  30 milligrams per deciliter proteinuria.  C3 hypocomplementemia.  Positive CARMEN and  RA.  Her creatinine appears to have plateaued today.  Calcium normal.  Nonoliguric.  Bicarb 14.5 with anion gap 11.  No urgent indication for dialysis today.  Edema in part due to hypoalbuminemia.  2.  MRSA bacteremia with recurrent tricuspid  valve endocarditis.  Currently on vancomycin IV.  Given the plateau of her creatinine I would not change the vancomycin at this point.  Moderate tricuspid valve regurgitation with mild pulmonary hypertension.  3.  IV drug abuse abuse.  Heroin.  4.  Hepatitis C  5.  Medical noncompliance.  6.  Anemia of chronic disease.  No clear evidence of hemolysis.  Haptoglobin normal LDH mildly elevated.  7.  Thrombo-cytopenia  8.  Ellen protein calorie malnutrition with albumin 2.1.  PLAN:  Change IV fluids to bicarb -containing fluid.  No acute indication for dialysis.  3.  Monitor chemistries very closely.  4.  Hepatitis B surface antigen in the morning in case dialysis needed.  5. She would be a very poor dialysis candidate with her ongoing substance abuse issues and need for IV access.    Thank you for involving us in the care of Sammie Landeros.  Please feel free to call with any questions.    Myrna Stephens MD  12/07/23  13:02 [facility]    [facility]  [phone]    Please note that portions of this note were completed with a voice recognition program.

## 2023-12-07 NOTE — PROGRESS NOTES
Name: Sammie Landeros ADMIT: 2023   : 1990  PCP: LetiMelviTYLOR    MRN: [id number] LOS: 5 days   AGE/SEX: 33 y.o. female  ROOM: [facility]     Subjective   Subjective   Patient appears obese, generally weak, relatively comfortable, no apparent distress--more alert today with less moaning on exam.  Denies nausea / vomiting; however, still with poor intake / appetite.  Discussed with RN--patient also refused duplex bilateral lower extremity rule out DVT.       Objective   Objective   Vital Signs  Temp:  [97.3 °F (36.3 °C)-98.6 °F (37 °C)] 97.7 °F (36.5 °C)  Heart Rate:  [78-97] 97  Resp:  [16-18] 18  BP: (133-148)/(68-88) 148/82  SpO2:  [97 %-99 %] 99 %  on   ;   Device (Oxygen Therapy): room air  Body mass index is 37.15 kg/m².    Physical Exam  Constitutional:       General: She is not in acute distress.     Appearance: She is obese. She is ill-appearing (chronic). She is not toxic-appearing.      Comments: Generally weak   Cardiovascular:      Rate and Rhythm: Normal rate.      Heart sounds: Normal heart sounds.   Pulmonary:      Effort: Pulmonary effort is normal.      Comments: Diminished on expiration posteriorly  Abdominal:      General: Bowel sounds are normal.      Palpations: Abdomen is soft.   Musculoskeletal:      Right lower leg: Edema present.      Left lower leg: Edema present.   Skin:     General: Skin is warm and dry.      Comments: BLE healing wounds--suspect previous intravenous injection sites     *Physical exam performed on 2023 as per above    Results Review     I reviewed the patient's new clinical results.  Results from last 7 days   Lab Units 23  0641 23  0614 23  2204 23  0340 23  0250   WBC 10*3/mm3 4.73 4.57  --  3.87 6.97   HEMOGLOBIN g/dL 8.1* 7.5* 7.9* 7.0* 7.2*   PLATELETS 10*3/mm3 83* 70*  --  87* 102*     Results from last 7 days   Lab Units 23  0641 23  0614 23  0340 23  1343   SODIUM mmol/L 136 136 134* 133*    POTASSIUM mmol/L 4.5 4.4 4.1 4.2   CHLORIDE mmol/L 110* 110* 109* 109*   CO2 mmol/L 14.5* 16.0* 16.8* 16.0*   BUN mg/dL 58* 55* 48* 42*   CREATININE mg/dL 5.06* 5.13* 4.22* 4.10*   GLUCOSE mg/dL 96 87 81 89   EGFR mL/min/1.73 10.8* 10.6* 13.4* 13.9*     Results from last 7 days   Lab Units 12/07/23  0641 12/06/23  0614 12/05/23  0340 12/04/23  1343 12/04/23  0429   ALBUMIN g/dL 2.1* 2.1* 2.0* 2.0* 2.1*   BILIRUBIN mg/dL 1.5* 2.4* 2.8*  --  2.3*   ALK PHOS U/L 431* 361* 187*  --  114   AST (SGOT) U/L 53* 56* 61*  --  56*   ALT (SGPT) U/L 35* 35* 36*  --  36*     Results from last 7 days   Lab Units 12/07/23  0641 12/06/23  0614 12/05/23  0340 12/04/23  1343 12/04/23  0429 12/04/23  0250   CALCIUM mg/dL 8.2* 8.2* 7.5* 7.0*   < >  --    ALBUMIN g/dL 2.1* 2.1* 2.0* 2.0*   < >  --    MAGNESIUM mg/dL  --   --   --   --   --  1.6   PHOSPHORUS mg/dL  --  3.5 2.7 2.8  --  3.1    < > = values in this interval not displayed.     Results from last 7 days   Lab Units 12/05/23  0340 12/05/23  0008 12/04/23  1820 12/04/23  1343   LACTATE mmol/L 1.2 1.2 1.7 1.5     Glucose   Date/Time Value Ref Range Status   12/05/2023 1122 86 70 - 130 mg/dL Final   12/04/2023 2306 76 70 - 130 mg/dL Final       No radiology results for the last day    I have personally reviewed all medications:  Scheduled Medications  buprenorphine-naloxone, 2 film, Sublingual, Daily  busPIRone, 5 mg, Oral, TID  heparin (porcine), 5,000 Units, Subcutaneous, Q8H  pantoprazole, 40 mg, Oral, Q AM  sodium bicarbonate, 650 mg, Oral, TID  sodium chloride, 10 mL, Intravenous, Q12H  sodium chloride, 10 mL, Intravenous, Q12H  sodium chloride, 10 mL, Intravenous, Q12H  sodium chloride, 10 mL, Intravenous, Q12H  sodium chloride, 10 mL, Intravenous, Q12H  Vancomycin Pharmacy Intermittent/Pulse Dosing, , Does not apply, Daily    Infusions  Pharmacy to dose vancomycin,   sodium chloride 0.45 % 925 mL with sodium bicarbonate 8.4 % 75 mEq infusion,   sodium chloride, 75 mL/hr,  Last Rate: 75 mL/hr (12/07/23 0010)    Diet  Diet: Regular/House Diet; Texture: Regular Texture (IDDSI 7); Fluid Consistency: Thin (IDDSI 0)    I have personally reviewed:  [x]  Laboratory   [x]  Microbiology   []  Radiology   [x]  EKG/Telemetry  []  Cardiology/Vascular   []  Pathology    []  Records       Assessment/Plan     Active Hospital Problems    Diagnosis  POA    **Endocarditis of tricuspid valve [I07.9]  Yes    Elevated LFTs [R79.89]  Yes    Thrombocytopenia [D69.6]  Yes    GERD without esophagitis [K21.9]  Yes    Shock, septic [A41.9, R65.21]  Yes    MRSA bacteremia [R78.81, B95.62]  Yes    ANTONELLA (acute kidney injury) [N17.9]  Yes    Anemia, chronic disease [D63.8]  Yes    Polysubstance abuse [F19.10]  Yes      Resolved Hospital Problems   No resolved problems to display.       33 y.o. female admitted with Endocarditis of tricuspid valve.    Septic shock due to recurrent tricuspid valve endocarditis and possible aortic valve endocarditis from MRSA with bacteremia-on iv vancomycin per ID recommendations. CT surgery evaluated and felt not to be a candidate for intervention. Vegetations appear small and may clear with antibiotics alone.  Discussed with attending & nephrology recommend continue central line given poor access & increased concerns for treatment failure due to historical non-compliance & self-neglect with anasarca complicating IV access--agree with providers to continue central line--benefits outweigh risks.  ANTONELLA on CKD-due to sepsis/ATN. Creatinine up to 4.2-->5.1-->5.0 today. Nephrology managing. IVF started with bicarb.  Very poor dialysis candidate due to substance abuse.  Hepatitis B surface antigen.  Elevated liver enzymes-mild & unchanged  Anemia of chronic disease-improved iron studies, B12 >2000, folate 9.69. Hgb 8.1 s/p transfusion   Thrombocytopenia--platelet count waxing/waning. Normal on admission yet low in the past. History of hepatitis c as well as cirrhosis and hypersplenism on  last CT abdomen/pelvis done in June.   Bilateral lower extremity swelling-possibly secondary to hypoalbuminemia. Bilateral duplexes ordered to rule out DVT (refused by patient)  Candidal vaginitis-s/p a dose of diflucan on 12/3/23  GERD-restart ppi  Anxiety-prn hydroxyzine available  Hepatitis b and c  Cirrhosis with splenomegaly  Polysubstance and IV drug abuse with plan to resume Suboxone per pharmacy dosing.  Avoid sedation.  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  Full code.  Discussed with patient, RN, & attending as confirmed on KIRAN patient's mother prescribed Suboxone twice since 8/2023 (see above regarding continuation of Suboxone to enable treatment of endocarditis with hopeful patient compliance & prevent opioid craving; prevent AMA if at all possible to ensure treatment continues for successful recovery)  Anticipate discharge TBD timing yet to be determined.      TYLOR Mcarthur  [facility]  12/07/23  13:58 EST

## 2023-12-08 ENCOUNTER — APPOINTMENT (OUTPATIENT)
Dept: CARDIOLOGY | Facility: HOSPITAL | Age: 33
DRG: 871 | End: 2023-12-08
Payer: COMMERCIAL

## 2023-12-08 LAB
ALBUMIN SERPL-MCNC: 2 G/DL (ref 3.5–5.2)
ALBUMIN/GLOB SERPL: 0.5 G/DL
ALP SERPL-CCNC: 412 U/L (ref 39–117)
ALT SERPL W P-5'-P-CCNC: 29 U/L (ref 1–33)
ANION GAP SERPL CALCULATED.3IONS-SCNC: 11 MMOL/L (ref 5–15)
AST SERPL-CCNC: 42 U/L (ref 1–32)
BASOPHILS # BLD AUTO: 0.01 10*3/MM3 (ref 0–0.2)
BASOPHILS NFR BLD AUTO: 0.2 % (ref 0–1.5)
BH CV LOWER VASCULAR LEFT COMMON FEMORAL AUGMENT: NORMAL
BH CV LOWER VASCULAR LEFT COMMON FEMORAL COMPETENT: NORMAL
BH CV LOWER VASCULAR LEFT COMMON FEMORAL PHASIC: NORMAL
BH CV LOWER VASCULAR LEFT COMMON FEMORAL SPONT: NORMAL
BH CV LOWER VASCULAR LEFT SAPHENOFEMORAL JUNCTION COMPRESS: NORMAL
BH CV LOWER VASCULAR RIGHT COMMON FEMORAL AUGMENT: NORMAL
BH CV LOWER VASCULAR RIGHT COMMON FEMORAL COMPETENT: NORMAL
BH CV LOWER VASCULAR RIGHT COMMON FEMORAL COMPRESS: NORMAL
BH CV LOWER VASCULAR RIGHT COMMON FEMORAL PHASIC: NORMAL
BH CV LOWER VASCULAR RIGHT COMMON FEMORAL SPONT: NORMAL
BH CV LOWER VASCULAR RIGHT DISTAL FEMORAL AUGMENT: NORMAL
BH CV LOWER VASCULAR RIGHT DISTAL FEMORAL COMPETENT: NORMAL
BH CV LOWER VASCULAR RIGHT DISTAL FEMORAL PHASIC: NORMAL
BH CV LOWER VASCULAR RIGHT DISTAL FEMORAL SPONT: NORMAL
BH CV LOWER VASCULAR RIGHT GASTRONEMIUS COMPRESS: NORMAL
BH CV LOWER VASCULAR RIGHT GREATER SAPH AK COMPRESS: NORMAL
BH CV LOWER VASCULAR RIGHT GREATER SAPH BK COMPRESS: NORMAL
BH CV LOWER VASCULAR RIGHT LESSER SAPH COMPRESS: NORMAL
BH CV LOWER VASCULAR RIGHT MID FEMORAL AUGMENT: NORMAL
BH CV LOWER VASCULAR RIGHT MID FEMORAL COMPETENT: NORMAL
BH CV LOWER VASCULAR RIGHT MID FEMORAL COMPRESS: NORMAL
BH CV LOWER VASCULAR RIGHT MID FEMORAL PHASIC: NORMAL
BH CV LOWER VASCULAR RIGHT MID FEMORAL SPONT: NORMAL
BH CV LOWER VASCULAR RIGHT PERONEAL COMPRESS: NORMAL
BH CV LOWER VASCULAR RIGHT POPLITEAL AUGMENT: NORMAL
BH CV LOWER VASCULAR RIGHT POPLITEAL COMPETENT: NORMAL
BH CV LOWER VASCULAR RIGHT POPLITEAL COMPRESS: NORMAL
BH CV LOWER VASCULAR RIGHT POPLITEAL PHASIC: NORMAL
BH CV LOWER VASCULAR RIGHT POPLITEAL SPONT: NORMAL
BH CV LOWER VASCULAR RIGHT POSTERIOR TIBIAL COMPRESS: NORMAL
BH CV LOWER VASCULAR RIGHT PROFUNDA FEMORAL COMPRESS: NORMAL
BH CV LOWER VASCULAR RIGHT PROXIMAL FEMORAL COMPRESS: NORMAL
BH CV LOWER VASCULAR RIGHT SAPHENOFEMORAL JUNCTION COMPRESS: NORMAL
BILIRUB SERPL-MCNC: 1 MG/DL (ref 0–1.2)
BUN SERPL-MCNC: 54 MG/DL (ref 6–20)
BUN/CREAT SERPL: 12.1 (ref 7–25)
CALCIUM SPEC-SCNC: 7.6 MG/DL (ref 8.6–10.5)
CHLORIDE SERPL-SCNC: 111 MMOL/L (ref 98–107)
CO2 SERPL-SCNC: 15 MMOL/L (ref 22–29)
CREAT SERPL-MCNC: 4.48 MG/DL (ref 0.57–1)
DEPRECATED RDW RBC AUTO: 41.6 FL (ref 37–54)
EGFRCR SERPLBLD CKD-EPI 2021: 12.5 ML/MIN/1.73
EOSINOPHIL # BLD AUTO: 0.11 10*3/MM3 (ref 0–0.4)
EOSINOPHIL NFR BLD AUTO: 2.3 % (ref 0.3–6.2)
ERYTHROCYTE [DISTWIDTH] IN BLOOD BY AUTOMATED COUNT: 17 % (ref 12.3–15.4)
GLOBULIN UR ELPH-MCNC: 3.8 GM/DL
GLUCOSE SERPL-MCNC: 114 MG/DL (ref 65–99)
HBV SURFACE AG SERPL QL IA: NORMAL
HCT VFR BLD AUTO: 24.9 % (ref 34–46.6)
HGB BLD-MCNC: 7.9 G/DL (ref 12–15.9)
LYMPHOCYTES # BLD AUTO: 1.25 10*3/MM3 (ref 0.7–3.1)
LYMPHOCYTES NFR BLD AUTO: 26.4 % (ref 19.6–45.3)
MAGNESIUM SERPL-MCNC: 1.5 MG/DL (ref 1.6–2.6)
MAGNESIUM SERPL-MCNC: 2.6 MG/DL (ref 1.6–2.6)
MCH RBC QN AUTO: 23 PG (ref 26.6–33)
MCHC RBC AUTO-ENTMCNC: 31.7 G/DL (ref 31.5–35.7)
MCV RBC AUTO: 72.4 FL (ref 79–97)
MONOCYTES # BLD AUTO: 0.64 10*3/MM3 (ref 0.1–0.9)
MONOCYTES NFR BLD AUTO: 13.5 % (ref 5–12)
NEUTROPHILS NFR BLD AUTO: 2.64 10*3/MM3 (ref 1.7–7)
NEUTROPHILS NFR BLD AUTO: 55.9 % (ref 42.7–76)
PHOSPHATE SERPL-MCNC: 5.2 MG/DL (ref 2.5–4.5)
PLATELET # BLD AUTO: 86 10*3/MM3 (ref 140–450)
PMV BLD AUTO: 9 FL (ref 6–12)
POTASSIUM SERPL-SCNC: 3.8 MMOL/L (ref 3.5–5.2)
POTASSIUM SERPL-SCNC: 4 MMOL/L (ref 3.5–5.2)
PROT SERPL-MCNC: 5.8 G/DL (ref 6–8.5)
RBC # BLD AUTO: 3.44 10*6/MM3 (ref 3.77–5.28)
SODIUM SERPL-SCNC: 137 MMOL/L (ref 136–145)
WBC NRBC COR # BLD AUTO: 4.73 10*3/MM3 (ref 3.4–10.8)

## 2023-12-08 PROCEDURE — 86235 NUCLEAR ANTIGEN ANTIBODY: CPT | Performed by: HOSPITALIST

## 2023-12-08 PROCEDURE — 80053 COMPREHEN METABOLIC PANEL: CPT | Performed by: INTERNAL MEDICINE

## 2023-12-08 PROCEDURE — 93971 EXTREMITY STUDY: CPT

## 2023-12-08 PROCEDURE — 85025 COMPLETE CBC W/AUTO DIFF WBC: CPT | Performed by: INTERNAL MEDICINE

## 2023-12-08 PROCEDURE — 84132 ASSAY OF SERUM POTASSIUM: CPT | Performed by: NURSE PRACTITIONER

## 2023-12-08 PROCEDURE — 87341 HEP B SURFACE AG NEUTRLZJ IA: CPT | Performed by: INTERNAL MEDICINE

## 2023-12-08 PROCEDURE — 83735 ASSAY OF MAGNESIUM: CPT | Performed by: INTERNAL MEDICINE

## 2023-12-08 PROCEDURE — 83516 IMMUNOASSAY NONANTIBODY: CPT | Performed by: HOSPITALIST

## 2023-12-08 PROCEDURE — 87340 HEPATITIS B SURFACE AG IA: CPT | Performed by: INTERNAL MEDICINE

## 2023-12-08 PROCEDURE — 83735 ASSAY OF MAGNESIUM: CPT | Performed by: NURSE PRACTITIONER

## 2023-12-08 PROCEDURE — 86225 DNA ANTIBODY NATIVE: CPT | Performed by: HOSPITALIST

## 2023-12-08 PROCEDURE — 25010000002 HEPARIN (PORCINE) PER 1000 UNITS: Performed by: INTERNAL MEDICINE

## 2023-12-08 PROCEDURE — 84100 ASSAY OF PHOSPHORUS: CPT | Performed by: INTERNAL MEDICINE

## 2023-12-08 RX ORDER — SODIUM BICARBONATE 650 MG/1
1300 TABLET ORAL 3 TIMES DAILY
Status: DISCONTINUED | OUTPATIENT
Start: 2023-12-08 | End: 2023-12-11 | Stop reason: HOSPADM

## 2023-12-08 RX ORDER — FERROUS SULFATE 325(65) MG
325 TABLET ORAL
Status: DISCONTINUED | OUTPATIENT
Start: 2023-12-08 | End: 2023-12-11 | Stop reason: HOSPADM

## 2023-12-08 RX ADMIN — Medication 10 ML: at 21:08

## 2023-12-08 RX ADMIN — Medication 10 ML: at 10:05

## 2023-12-08 RX ADMIN — FERROUS SULFATE TAB 325 MG (65 MG ELEMENTAL FE) 325 MG: 325 (65 FE) TAB at 13:22

## 2023-12-08 RX ADMIN — HEPARIN SODIUM 5000 UNITS: 5000 INJECTION INTRAVENOUS; SUBCUTANEOUS at 13:22

## 2023-12-08 RX ADMIN — SODIUM BICARBONATE 1300 MG: 650 TABLET ORAL at 21:06

## 2023-12-08 RX ADMIN — BUSPIRONE HYDROCHLORIDE 5 MG: 5 TABLET ORAL at 15:11

## 2023-12-08 RX ADMIN — HEPARIN SODIUM 5000 UNITS: 5000 INJECTION INTRAVENOUS; SUBCUTANEOUS at 21:06

## 2023-12-08 RX ADMIN — BUSPIRONE HYDROCHLORIDE 5 MG: 5 TABLET ORAL at 21:06

## 2023-12-08 RX ADMIN — PANTOPRAZOLE SODIUM 40 MG: 40 TABLET, DELAYED RELEASE ORAL at 05:48

## 2023-12-08 RX ADMIN — Medication 10 ML: at 10:03

## 2023-12-08 RX ADMIN — HEPARIN SODIUM 5000 UNITS: 5000 INJECTION INTRAVENOUS; SUBCUTANEOUS at 05:35

## 2023-12-08 RX ADMIN — BUSPIRONE HYDROCHLORIDE 5 MG: 5 TABLET ORAL at 10:03

## 2023-12-08 RX ADMIN — BUPRENORPHINE AND NALOXONE 2 FILM: 8; 2 FILM BUCCAL; SUBLINGUAL at 10:03

## 2023-12-08 RX ADMIN — SODIUM BICARBONATE 1300 MG: 650 TABLET ORAL at 15:11

## 2023-12-08 RX ADMIN — Medication 10 ML: at 21:07

## 2023-12-08 RX ADMIN — SODIUM BICARBONATE 650 MG: 650 TABLET ORAL at 10:03

## 2023-12-08 NOTE — PROGRESS NOTES
"    Name: Sammie Landeros ADMIT: 2023   : 1990  PCP: Melvi LanderosTYLOR    MRN: [id number] LOS: 6 days   AGE/SEX: 33 y.o. female  ROOM: [facility]     Subjective   Subjective   Patient appears obese, generally weak, relatively comfortable, no apparent distress--calm appearance today & no moaning on exam.  Intake improved.  Disheveled \"boyfriend\" at bedside encouraging patient to work with staff / treatment plan for recovery.  Discussed with RN--no current suspicion from visitor.       Objective   Objective   Vital Signs  Temp:  [97.2 °F (36.2 °C)] 97.2 °F (36.2 °C)  Heart Rate:  [76-96] 96  Resp:  [16-18] 16  BP: (133)/(78) 133/78  SpO2:  [99 %] 99 %  on   ;   Device (Oxygen Therapy): room air  Body mass index is 36.98 kg/m².    Physical Exam  Constitutional:       General: She is not in acute distress.     Appearance: She is obese. She is ill-appearing (chronic). She is not toxic-appearing.      Comments: Generally weak   Cardiovascular:      Rate and Rhythm: Normal rate.      Heart sounds: Normal heart sounds.   Pulmonary:      Effort: Pulmonary effort is normal.      Comments: Diminished on expiration posteriorly  Abdominal:      General: Bowel sounds are normal.      Palpations: Abdomen is soft.   Musculoskeletal:         General: Swelling (BUE) present.      Right lower leg: Edema (non-pitting) present.      Left lower leg: Edema present.   Skin:     General: Skin is warm and dry.      Comments: BLE healing wounds--suspect previous intravenous injection sites   Neurological:      Mental Status: She is oriented to person, place, and time.   Psychiatric:         Behavior: Behavior normal.         Thought Content: Thought content normal.     *Physical exam performed on 2023 as per above    Results Review     I reviewed the patient's new clinical results.  Results from last 7 days   Lab Units 23  0641 23  0614 23  2204 23  0340 23  0250   WBC 10*3/mm3 4.73 4.57  --  3.87 " "6.97   HEMOGLOBIN g/dL 8.1* 7.5* 7.9* 7.0* 7.2*   PLATELETS 10*3/mm3 83* 70*  --  87* 102*     Results from last 7 days   Lab Units 12/08/23  0537 12/07/23  0641 12/06/23  0614 12/05/23  0340   SODIUM mmol/L 137 136 136 134*   POTASSIUM mmol/L 3.8 4.5 4.4 4.1   CHLORIDE mmol/L 111* 110* 110* 109*   CO2 mmol/L 15.0* 14.5* 16.0* 16.8*   BUN mg/dL 54* 58* 55* 48*   CREATININE mg/dL 4.48* 5.06* 5.13* 4.22*   GLUCOSE mg/dL 114* 96 87 81   EGFR mL/min/1.73 12.5* 10.8* 10.6* 13.4*     Results from last 7 days   Lab Units 12/08/23 0537 12/07/23  0641 12/06/23 0614 12/05/23  0340   ALBUMIN g/dL 2.0* 2.1* 2.1* 2.0*   BILIRUBIN mg/dL 1.0 1.5* 2.4* 2.8*   ALK PHOS U/L 412* 431* 361* 187*   AST (SGOT) U/L 42* 53* 56* 61*   ALT (SGPT) U/L 29 35* 35* 36*     Results from last 7 days   Lab Units 12/08/23 0537 12/07/23  0641 12/06/23  0614 12/05/23  0340 12/04/23  1343 12/04/23  0429 12/04/23  0250   CALCIUM mg/dL 7.6* 8.2* 8.2* 7.5* 7.0*   < >  --    ALBUMIN g/dL 2.0* 2.1* 2.1* 2.0* 2.0*   < >  --    MAGNESIUM mg/dL 2.6  --   --   --   --   --  1.6   PHOSPHORUS mg/dL 5.2*  --  3.5 2.7 2.8  --  3.1    < > = values in this interval not displayed.     Results from last 7 days   Lab Units 12/05/23 0340 12/05/23  0008 12/04/23  1820 12/04/23  1343   LACTATE mmol/L 1.2 1.2 1.7 1.5     No results found for: \"HGBA1C\", \"POCGLU\"      No radiology results for the last day    I have personally reviewed all medications:  Scheduled Medications  buprenorphine-naloxone, 2 film, Sublingual, Daily  busPIRone, 5 mg, Oral, TID  ferrous sulfate, 325 mg, Oral, Daily With Breakfast  heparin (porcine), 5,000 Units, Subcutaneous, Q8H  pantoprazole, 40 mg, Oral, Q AM  sodium bicarbonate, 1,300 mg, Oral, TID  sodium chloride, 10 mL, Intravenous, Q12H  sodium chloride, 10 mL, Intravenous, Q12H  sodium chloride, 10 mL, Intravenous, Q12H  sodium chloride, 10 mL, Intravenous, Q12H  sodium chloride, 10 mL, Intravenous, Q12H  Vancomycin Pharmacy " Intermittent/Pulse Dosing, , Does not apply, Daily  Vancomycin Pharmacy Intermittent/Pulse Dosing, , Does not apply, Daily    Infusions  Pharmacy to dose vancomycin,   sodium chloride 0.45 % 925 mL with sodium bicarbonate 8.4 % 75 mEq infusion, , Last Rate: 100 mL/hr at 12/07/23 1432    Diet  Diet: Regular/House Diet; Texture: Regular Texture (IDDSI 7); Fluid Consistency: Thin (IDDSI 0)    I have personally reviewed:  [x]  Laboratory   [x]  Microbiology   []  Radiology   [x]  EKG/Telemetry  []  Cardiology/Vascular   []  Pathology    []  Records       Assessment/Plan     Active Hospital Problems    Diagnosis  POA    **Endocarditis of tricuspid valve [I07.9]  Yes    Elevated LFTs [R79.89]  Yes    Thrombocytopenia [D69.6]  Yes    GERD without esophagitis [K21.9]  Yes    Shock, septic [A41.9, R65.21]  Yes    MRSA bacteremia [R78.81, B95.62]  Yes    ANTONELLA (acute kidney injury) [N17.9]  Yes    Anemia, chronic disease [D63.8]  Yes    Polysubstance abuse [F19.10]  Yes      Resolved Hospital Problems   No resolved problems to display.       33 y.o. female admitted with Endocarditis of tricuspid valve.    Septic shock due to recurrent tricuspid valve endocarditis and possible aortic valve endocarditis from MRSA with bacteremia--on IV vancomycin per ID recommendations for 6-8 weeks. CT surgery evaluated and felt not to be a candidate for intervention. Vegetations appear small and may clear with antibiotics alone.  Discussed with attending & nephrology recommend continue central line given poor access & increased concerns for treatment failure due to historical non-compliance & self-neglect with anasarca complicating IV access--agree with providers to continue central line--benefits outweigh risks.  ANTONELLA on CKD-due to sepsis/ATN. Creatinine up to 4.2-->5.1-->5.0-->4.4 today. Nephrology managing & continuing IVF & increased bicarb.  Very poor dialysis candidate due to substance abuse.  Hepatitis B surface antigen.  Elevated liver  enzymes-mild & unchanged  Anemia of chronic disease-improved iron studies, B12 >2000, folate 9.69. Hgb 8.1 s/p transfusion.  Oral iron replacement.  Thrombocytopenia--platelet count waxing/waning. Normal on admission yet low in the past. History of hepatitis c as well as cirrhosis and hypersplenism on last CT abdomen/pelvis done in June.   Bilateral lower extremity swelling-possibly secondary to hypoalbuminemia. Bilateral duplexes ordered to rule out DVT (refused by patient)  Candidal vaginitis-s/p a dose of diflucan on 12/3/23  GERD-restart ppi  Anxiety-prn hydroxyzine available  Hepatitis b and c  Cirrhosis with splenomegaly  Polysubstance and IV drug abuse with plan to resume Suboxone per pharmacy dosing.  Avoid sedation.  Noted patient refused PT & some labs on 12/8/2023.  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  Full code.  Discussed with patient, boyfriend, RN  Anticipate discharge TBD timing yet to be determined.      TYLOR Mcarthur  [facility]  12/08/23  13:22 EST

## 2023-12-08 NOTE — PROGRESS NOTES
[facility] Progress Note      Patient Name: Sammie Landeros  : 1990  MRN: [id number]  Primary Care Physician:  Melvi Landeros APRN  Date of admission: 2023    Subjective     Interval History:   Follow-up acute kidney injury.  1.9 L urine output recorded for 1 shift yesterday.    Awake.  Not better oral intake.  Urine output is 1100 cc last 24 hours but not completely recorded      Review of Systems:   As noted above    Objective     Vitals:   Temp:  [97.2 °F (36.2 °C)-97.7 °F (36.5 °C)] 97.2 °F (36.2 °C)  Heart Rate:  [76-97] 96  Resp:  [16-18] 16  BP: (133-148)/(78-82) 133/78    Intake/Output Summary (Last 24 hours) at 2023 1232  Last data filed at 2023 0950  Gross per 24 hour   Intake 840 ml   Output 2250 ml   Net -1410 ml       Physical Exam:    General Appearance: Very chronically ill.  Lying in bed uncovered from the waist up.  Does answer questions yes no.  Does not move, but did allow exam.  Skin: warm and dry  Neck: supple, no JVD  Lungs clear to auscultation bilaterally.  Heart: RRR, normal S1 and S2  Abdomen: soft, nontender,distended.+bs.  The wall edema  : no palpable bladder  Extremities: 2+ upper and lower extremity edema  Neuro: Answers yes/no questions appropriately.  Does not change position.    Scheduled Meds:     buprenorphine-naloxone, 2 film, Sublingual, Daily  busPIRone, 5 mg, Oral, TID  heparin (porcine), 5,000 Units, Subcutaneous, Q8H  pantoprazole, 40 mg, Oral, Q AM  sodium bicarbonate, 650 mg, Oral, TID  sodium chloride, 10 mL, Intravenous, Q12H  sodium chloride, 10 mL, Intravenous, Q12H  sodium chloride, 10 mL, Intravenous, Q12H  sodium chloride, 10 mL, Intravenous, Q12H  sodium chloride, 10 mL, Intravenous, Q12H  Vancomycin Pharmacy Intermittent/Pulse Dosing, , Does not apply, Daily  Vancomycin Pharmacy Intermittent/Pulse Dosing, , Does not apply, Daily      IV Meds:   Pharmacy to dose vancomycin,   sodium chloride 0.45 % 925 mL with  sodium bicarbonate 8.4 % 75 mEq infusion, , Last Rate: 100 mL/hr at 12/07/23 1432        Results Reviewed:   I have personally reviewed the results from the time of this admission to 12/8/2023 12:32 EST     Results from last 7 days   Lab Units 12/08/23  0537 12/07/23  0641 12/06/23  0614   SODIUM mmol/L 137 136 136   POTASSIUM mmol/L 3.8 4.5 4.4   CHLORIDE mmol/L 111* 110* 110*   CO2 mmol/L 15.0* 14.5* 16.0*   BUN mg/dL 54* 58* 55*   CREATININE mg/dL 4.48* 5.06* 5.13*   CALCIUM mg/dL 7.6* 8.2* 8.2*   BILIRUBIN mg/dL 1.0 1.5* 2.4*   ALK PHOS U/L 412* 431* 361*   ALT (SGPT) U/L 29 35* 35*   AST (SGOT) U/L 42* 53* 56*   GLUCOSE mg/dL 114* 96 87       Estimated Creatinine Clearance: 23.6 mL/min (A) (by C-G formula based on SCr of 4.48 mg/dL (H)).    Results from last 7 days   Lab Units 12/08/23  0537 12/06/23  0614 12/05/23  0340 12/04/23  1343 12/04/23  0250   MAGNESIUM mg/dL 2.6  --   --   --  1.6   PHOSPHORUS mg/dL 5.2* 3.5 2.7   < > 3.1    < > = values in this interval not displayed.       Results from last 7 days   Lab Units 12/04/23  0250   URIC ACID mg/dL 7.2*       Results from last 7 days   Lab Units 12/07/23  0641 12/06/23  0614 12/05/23  2204 12/05/23  0340 12/04/23  0250 12/03/23  0512   WBC 10*3/mm3 4.73 4.57  --  3.87 6.97 16.86*   HEMOGLOBIN g/dL 8.1* 7.5* 7.9* 7.0* 7.2* 8.7*   PLATELETS 10*3/mm3 83* 70*  --  87* 102* 124*             Assessment / Plan     ASSESSMENT:  Acute kidney injury on CKD with unknown recent baseline., nonoliguric.  Certainly acute GN in the differential given her endocarditis and bacteremia.  Urine was somewhat active with moderate blood 11-20 red cells leukocytosis and pyuria.  30 milligrams per deciliter proteinuria.  C3 hypocomplementemia.  Positive CARMEN and  RA.  Her creatinine is trending down calcium normal.  Edema in part due to hypoalbuminemia.  2.  MRSA bacteremia with recurrent tricuspid valve endocarditis.  Currently on vancomycin IV.  Given the plateau of her creatinine  I would not change the vancomycin at this point.  Moderate tricuspid valve regurgitation with mild pulmonary hypertension.  3.  IV drug abuse abuse.  Heroin.  4.  Hepatitis C  5.  Medical noncompliance.  6.  Anemia of chronic disease.  No clear evidence of hemolysis.  Haptoglobin normal LDH mildly elevated.  7.  Thrombo-cytopenia  8.  Ellen protein calorie malnutrition with albumin 2.1.  PLAN:  Creatinine seems to be trending down and urine output is picking likely recovering from ATN  Electrolytes are acceptable except for low bicarbonate  Increase sodium bicarbonate to 1300 mg 3 times daily  Will continue IV hydration  Labs in a.m.  Iron panel in favor of Lantus anemia start oral iron replacement avoiding IV iron given recent infection  Will check CARMEN profile  Encourage p.o. intake      Thank you for involving us in the care of Sammie Landeros.  Please feel free to call with any questions.    Elvira Coleman MD  12/08/23  12:32 [facility]    [facility]  [phone]    Please note that portions of this note were completed with a voice recognition program.

## 2023-12-08 NOTE — NURSING NOTE
"Pt noncompliant with AM lab draws. As RN was drawing labs, pt stated \"stop, I don't want you to keep doing that\". Educated the pt on why AM labs are important for her care.  Able to draw 1 green tube, will send down.   "

## 2023-12-08 NOTE — PLAN OF CARE
Goal Outcome Evaluation:   Pt refused vital signs overnight. HR stable.  0.45 NS w/ sodium bicarb @ 100ml/hr.  Pt turns self, voiding via purewick with good urine output.  Pt agreeable to take medications.

## 2023-12-08 NOTE — PLAN OF CARE
Problem: Adult Inpatient Plan of Care  Goal: Plan of Care Review  Outcome: Ongoing, Progressing  Goal: Patient-Specific Goal (Individualized)  Outcome: Ongoing, Progressing  Goal: Absence of Hospital-Acquired Illness or Injury  Outcome: Ongoing, Progressing  Intervention: Identify and Manage Fall Risk  Recent Flowsheet Documentation  Taken 12/8/2023 1600 by Louis Fontanez RN  Safety Promotion/Fall Prevention:   activity supervised   clutter free environment maintained   fall prevention program maintained   lighting adjusted   nonskid shoes/slippers when out of bed   room organization consistent   safety round/check completed  Taken 12/8/2023 1400 by Louis Fontanez RN  Safety Promotion/Fall Prevention:   activity supervised   clutter free environment maintained   fall prevention program maintained   lighting adjusted   nonskid shoes/slippers when out of bed   room organization consistent   safety round/check completed  Taken 12/8/2023 1200 by Louis Fontanez RN  Safety Promotion/Fall Prevention:   activity supervised   clutter free environment maintained   fall prevention program maintained   lighting adjusted   nonskid shoes/slippers when out of bed   room organization consistent   safety round/check completed  Taken 12/8/2023 1000 by Louis Fontanez RN  Safety Promotion/Fall Prevention:   activity supervised   clutter free environment maintained   fall prevention program maintained   lighting adjusted   nonskid shoes/slippers when out of bed   room organization consistent   safety round/check completed  Taken 12/8/2023 0800 by Louis Fontanez RN  Safety Promotion/Fall Prevention:   activity supervised   clutter free environment maintained   fall prevention program maintained   lighting adjusted   nonskid shoes/slippers when out of bed   room organization consistent   safety round/check completed  Intervention: Prevent Infection  Recent Flowsheet Documentation  Taken 12/8/2023 1600 by Louis Fontanez RN  Infection  Prevention:   rest/sleep promoted   single patient room provided   hand hygiene promoted  Taken 12/8/2023 1400 by Louis Fontanez RN  Infection Prevention:   single patient room provided   rest/sleep promoted   hand hygiene promoted  Taken 12/8/2023 1200 by Louis Fontanez RN  Infection Prevention:   rest/sleep promoted   hand hygiene promoted   single patient room provided  Taken 12/8/2023 1000 by Louis Fontanez RN  Infection Prevention:   rest/sleep promoted   single patient room provided   hand hygiene promoted  Taken 12/8/2023 0800 by Louis Fontanez RN  Infection Prevention:   rest/sleep promoted   single patient room provided   hand hygiene promoted  Goal: Optimal Comfort and Wellbeing  Outcome: Ongoing, Progressing  Intervention: Provide Person-Centered Care  Recent Flowsheet Documentation  Taken 12/8/2023 1600 by Louis Fontanez RN  Trust Relationship/Rapport:   care explained   thoughts/feelings acknowledged   reassurance provided   questions encouraged   questions answered  Taken 12/8/2023 0800 by Louis Fontanez RN  Trust Relationship/Rapport:   care explained   thoughts/feelings acknowledged   reassurance provided   questions answered   empathic listening provided   questions encouraged  Goal: Readiness for Transition of Care  Outcome: Ongoing, Progressing     Problem: Adjustment to Illness (Sepsis/Septic Shock)  Goal: Optimal Coping  Outcome: Ongoing, Progressing  Intervention: Optimize Psychosocial Adjustment to Illness  Recent Flowsheet Documentation  Taken 12/8/2023 1600 by Louis Fontanez RN  Supportive Measures: active listening utilized  Taken 12/8/2023 0800 by Louis Fontanez RN  Supportive Measures: active listening utilized     Problem: Bleeding (Sepsis/Septic Shock)  Goal: Absence of Bleeding  Outcome: Ongoing, Progressing     Problem: Glycemic Control Impaired (Sepsis/Septic Shock)  Goal: Blood Glucose Level Within Desired Range  Outcome: Ongoing, Progressing     Problem: Infection Progression  (Sepsis/Septic Shock)  Goal: Absence of Infection Signs and Symptoms  Outcome: Ongoing, Progressing  Intervention: Initiate Sepsis Management  Recent Flowsheet Documentation  Taken 12/8/2023 1600 by Louis Fontanez RN  Infection Prevention:   rest/sleep promoted   single patient room provided   hand hygiene promoted  Isolation Precautions: precautions maintained  Taken 12/8/2023 1400 by Louis Fontanez RN  Infection Prevention:   single patient room provided   rest/sleep promoted   hand hygiene promoted  Isolation Precautions: precautions maintained  Taken 12/8/2023 1200 by Louis Fontanez RN  Infection Prevention:   rest/sleep promoted   hand hygiene promoted   single patient room provided  Isolation Precautions: precautions maintained  Taken 12/8/2023 1000 by Louis Fontanez RN  Infection Prevention:   rest/sleep promoted   single patient room provided   hand hygiene promoted  Isolation Precautions: precautions maintained  Taken 12/8/2023 0800 by Louis Fontanez RN  Infection Prevention:   rest/sleep promoted   single patient room provided   hand hygiene promoted  Isolation Precautions: precautions maintained  Intervention: Promote Recovery  Recent Flowsheet Documentation  Taken 12/8/2023 1600 by Louis Fontanez RN  Sleep/Rest Enhancement: awakenings minimized  Taken 12/8/2023 0800 by Louis Fontanez RN  Sleep/Rest Enhancement: awakenings minimized  Intervention: Promote Stabilization  Recent Flowsheet Documentation  Taken 12/8/2023 1600 by Louis Fontanez RN  Fever Reduction/Comfort Measures:   lightweight clothing   lightweight bedding  Taken 12/8/2023 0800 by Louis Fontanez RN  Fluid/Electrolyte Management: fluids provided  Fever Reduction/Comfort Measures:   lightweight clothing   lightweight bedding     Problem: Nutrition Impaired (Sepsis/Septic Shock)  Goal: Optimal Nutrition Intake  Outcome: Ongoing, Progressing     Problem: Skin Injury Risk Increased  Goal: Skin Health and Integrity  Outcome: Ongoing,  Progressing     Problem: Pain Acute  Goal: Acceptable Pain Control and Functional Ability  Outcome: Ongoing, Progressing  Intervention: Prevent or Manage Pain  Recent Flowsheet Documentation  Taken 12/8/2023 1600 by Louis Fontanez RN  Bowel Elimination Promotion: adequate fluid intake promoted  Sleep/Rest Enhancement: awakenings minimized  Medication Review/Management: medications reviewed  Taken 12/8/2023 1400 by Louis Fontanez RN  Medication Review/Management: medications reviewed  Taken 12/8/2023 1200 by Louis Fontanez RN  Medication Review/Management: medications reviewed  Taken 12/8/2023 1000 by Louis Fontanez RN  Medication Review/Management: medications reviewed  Taken 12/8/2023 0800 by Louis Fontanez RN  Sleep/Rest Enhancement: awakenings minimized  Medication Review/Management: medications reviewed  Intervention: Optimize Psychosocial Wellbeing  Recent Flowsheet Documentation  Taken 12/8/2023 1600 by Louis Fontanez RN  Supportive Measures: active listening utilized  Taken 12/8/2023 0800 by Louis Fontanez RN  Supportive Measures: active listening utilized     Problem: Fall Injury Risk  Goal: Absence of Fall and Fall-Related Injury  Outcome: Ongoing, Progressing  Intervention: Identify and Manage Contributors  Recent Flowsheet Documentation  Taken 12/8/2023 1600 by Louis Fontanez RN  Medication Review/Management: medications reviewed  Taken 12/8/2023 1400 by Louis Fontanez RN  Medication Review/Management: medications reviewed  Taken 12/8/2023 1200 by Louis Fontanez RN  Medication Review/Management: medications reviewed  Taken 12/8/2023 1000 by Louis Fontanez RN  Medication Review/Management: medications reviewed  Taken 12/8/2023 0800 by Louis Fontanez RN  Medication Review/Management: medications reviewed  Intervention: Promote Injury-Free Environment  Recent Flowsheet Documentation  Taken 12/8/2023 1600 by Louis Fotnanez RN  Safety Promotion/Fall Prevention:   activity supervised   clutter free environment  maintained   fall prevention program maintained   lighting adjusted   nonskid shoes/slippers when out of bed   room organization consistent   safety round/check completed  Taken 12/8/2023 1400 by Louis Fontanez RN  Safety Promotion/Fall Prevention:   activity supervised   clutter free environment maintained   fall prevention program maintained   lighting adjusted   nonskid shoes/slippers when out of bed   room organization consistent   safety round/check completed  Taken 12/8/2023 1200 by Louis Fontanez RN  Safety Promotion/Fall Prevention:   activity supervised   clutter free environment maintained   fall prevention program maintained   lighting adjusted   nonskid shoes/slippers when out of bed   room organization consistent   safety round/check completed  Taken 12/8/2023 1000 by Louis Fontanez RN  Safety Promotion/Fall Prevention:   activity supervised   clutter free environment maintained   fall prevention program maintained   lighting adjusted   nonskid shoes/slippers when out of bed   room organization consistent   safety round/check completed  Taken 12/8/2023 0800 by Louis Fontanez RN  Safety Promotion/Fall Prevention:   activity supervised   clutter free environment maintained   fall prevention program maintained   lighting adjusted   nonskid shoes/slippers when out of bed   room organization consistent   safety round/check completed   Goal Outcome Evaluation:      Patient is currently down getting duplex of BLE. Patient began this shift very withdrawn and refusing to take part in care. Refused PT. Patient's boyfriend came to room and patient became more agreeable. Patient took all meds this shift. Patient continues bicarb drip. No signs of pain or distress this shift

## 2023-12-08 NOTE — PROGRESS NOTES
"  Infectious Diseases Progress Note    Jazmine Higginbotham MD     [facility]  [place]: 6 days  Patient Identification:  Name: Sammie Landeros  Age: 33 y.o.  Sex: female  :  1990  MRN: [id number]         Primary Care Physician: Melvi Landeros APRN        Subjective: Feeling much better after introduction of Suboxone.  Denies any fever and chills.  Interval History: See consultation note.    Objective:    Scheduled Meds:buprenorphine-naloxone, 2 film, Sublingual, Daily  busPIRone, 5 mg, Oral, TID  heparin (porcine), 5,000 Units, Subcutaneous, Q8H  pantoprazole, 40 mg, Oral, Q AM  sodium bicarbonate, 650 mg, Oral, TID  sodium chloride, 10 mL, Intravenous, Q12H  sodium chloride, 10 mL, Intravenous, Q12H  sodium chloride, 10 mL, Intravenous, Q12H  sodium chloride, 10 mL, Intravenous, Q12H  sodium chloride, 10 mL, Intravenous, Q12H  Vancomycin Pharmacy Intermittent/Pulse Dosing, , Does not apply, Daily      Continuous Infusions:Pharmacy to dose vancomycin,   sodium chloride 0.45 % 925 mL with sodium bicarbonate 8.4 % 75 mEq infusion, , Last Rate: 100 mL/hr at 23 1432        Vital signs in last 24 hours:  Temp:  [97.7 °F (36.5 °C)] 97.7 °F (36.5 °C)  Heart Rate:  [76-97] 76  Resp:  [18] 18  BP: (139-148)/(82-85) 148/82    Intake/Output:    Intake/Output Summary (Last 24 hours) at 2023 0639  Last data filed at 2023 0500  Gross per 24 hour   Intake 958 ml   Output 1150 ml   Net -192 ml       Exam:  /82 (BP Location: Right arm, Patient Position: Lying)   Pulse 76   Temp 97.7 °F (36.5 °C) (Oral)   Resp 18   Ht 175.3 cm (69\")   Wt 114 kg (250 lb 7.1 oz)   LMP 2023 (Approximate)   SpO2 99%   BMI 36.98 kg/m²   Patient is examined using the personal protective equipment as per guidelines from infection control for this particular patient as enacted.  Hand washing was performed before and after patient interaction.  General Appearance: More interactive but still withdrawn.  Does not " appear to be in any acute distress.       Data Review:    I reviewed the patient's new clinical results.  Results from last 7 days   Lab Units 12/07/23  0641 12/06/23  0614 12/05/23  2204 12/05/23  0340 12/04/23  0250 12/03/23  0512 12/02/23  1154 12/02/23  1110   WBC 10*3/mm3 4.73 4.57  --  3.87 6.97 16.86*  --  17.25*   HEMOGLOBIN g/dL 8.1* 7.5* 7.9* 7.0* 7.2* 8.7*  --  9.7*   HEMOGLOBIN, POC g/dL  --   --   --   --   --   --  9.5*  --    PLATELETS 10*3/mm3 83* 70*  --  87* 102* 124*  --  144     Results from last 7 days   Lab Units 12/08/23  0537 12/07/23  0641 12/06/23  0614 12/05/23  0340 12/04/23  1343 12/04/23  0429 12/03/23  0512   SODIUM mmol/L 137 136 136 134* 133* 133* 135*   POTASSIUM mmol/L 3.8 4.5 4.4 4.1 4.2 3.9 4.8  4.8   CHLORIDE mmol/L 111* 110* 110* 109* 109* 106 105   CO2 mmol/L 15.0* 14.5* 16.0* 16.8* 16.0* 18.0* 15.0*   BUN mg/dL 54* 58* 55* 48* 42* 40* 30*   CREATININE mg/dL 4.48* 5.06* 5.13* 4.22* 4.10* 3.75* 3.18*   CALCIUM mg/dL 7.6* 8.2* 8.2* 7.5* 7.0* 6.8* 6.7*   GLUCOSE mg/dL 114* 96 87 81 89 77 109*     Microbiology Results (last 10 days)       Procedure Component Value - Date/Time    Gardnerella vaginalis, Trichomonas vaginalis, Candida albicans, DNA - Swab, Vagina [[id number]]  (Abnormal) Collected: 12/04/23 1652    Lab Status: Final result Specimen: Swab from Vagina Updated: 12/05/23 1412     Candida Species Positive     Gardnerella vaginalis, DNA Probe Negative     Trichomonas vaginosis Negative    Narrative:      Performed at:   - [address]  [id number]  : Cayden Prieto PhD, Phone:  [phone]    Blood Culture - Blood, Cannula [[id number]]  (Normal) Collected: 12/04/23 0940    Lab Status: Preliminary result Specimen: Blood from Cannula Updated: 12/07/23 1000     Blood Culture No growth at 3 days    Wet Prep, Genital - Swab, Vagina [[id number]]  (Abnormal) Collected: 12/03/23 1200    Lab Status: Final result Specimen: Swab from Vagina  Updated: 12/03/23 1251     YEAST 4+ Yeast seen     HYPHAL ELEMENTS No Hyphal elements seen     WBC'S 3+ WBC's seen     Clue Cells, Wet Prep No Clue cells seen     Trichomonas, Wet Prep No Trichomonas seen    Chlamydia trachomatis, PCR - Swab, Vagina [[id number]] Collected: 12/03/23 1159    Lab Status: Final result Specimen: Swab from Vagina Updated: 12/06/23 0711     Chlamydia trachomatis, [place] Negative    Narrative:      Performed at:  [address] [place] [address] [place]  [id number]  : Karuna Kamara MD, Phone:  [phone]    Blood Culture - Blood, Arm, Right [[id number]]  (Abnormal)  (Susceptibility) Collected: 12/02/23 1115    Lab Status: Edited Result - FINAL Specimen: Blood from Arm, Right Updated: 12/06/23 0625     Blood Culture Staphylococcus aureus, MRSA     Comment:   Infectious disease consultation is highly recommended to rule out distant foci of infection.  Methicillin resistant Staphylococcus aureus, Patient may be an isolation risk.        Isolated from Aerobic Bottle     Gram Stain Aerobic Bottle Gram positive cocci in clusters    Narrative:      Streptococcus spp not viable for growth.    Susceptibility        Staphylococcus aureus, MRSA      SARAH      Gentamicin Susceptible      Oxacillin Resistant      Rifampin Susceptible      Vancomycin Susceptible                       Susceptibility Comments       Staphylococcus aureus, MRSA    This isolate does not demonstrate inducible clindamycin resistance in vitro.                 Blood Culture ID, PCR - Blood, Arm, Right [[id number]]  (Abnormal) Collected: 12/02/23 1115    Lab Status: Final result Specimen: Blood from Arm, Right Updated: 12/03/23 0040     BCID, PCR Staph aureus. mecA/C and MREJ (methicillin resistance gene) detected. Identification by BCID2 PCR.     BCID, PCR 2 Streptococcus spp, not A, B, or pneumoniae. Identification by BCID2 PCR.     BOTTLE TYPE Aerobic Bottle    Narrative:      Infectious disease consultation  is highly recommended to rule out distant foci of infection.    Respiratory Panel PCR w/COVID-19(SARS-CoV-2) ALEAH/JOHNINE/VENU/PAD/COR/DELORIS In-House, NP Swab in UTM/VTM, 2 HR TAT - Swab, Nasopharynx [[id number]]  (Normal) Collected: 12/02/23 1111    Lab Status: Final result Specimen: Swab from Nasopharynx Updated: 12/02/23 1224     ADENOVIRUS, PCR Not Detected     Coronavirus 229E Not Detected     Coronavirus HKU1 Not Detected     Coronavirus NL63 Not Detected     Coronavirus OC43 Not Detected     COVID19 Not Detected     Human Metapneumovirus Not Detected     Human Rhinovirus/Enterovirus Not Detected     Influenza A PCR Not Detected     Influenza B PCR Not Detected     Parainfluenza Virus 1 Not Detected     Parainfluenza Virus 2 Not Detected     Parainfluenza Virus 3 Not Detected     Parainfluenza Virus 4 Not Detected     RSV, PCR Not Detected     Bordetella pertussis pcr Not Detected     Bordetella parapertussis PCR Not Detected     Chlamydophila pneumoniae PCR Not Detected     Mycoplasma pneumo by PCR Not Detected    Narrative:      In the setting of a positive respiratory panel with a viral infection PLUS a negative procalcitonin without other underlying concern for bacterial infection, consider observing off antibiotics or discontinuation of antibiotics and continue supportive care. If the respiratory panel is positive for atypical bacterial infection (Bordetella pertussis, Chlamydophila pneumoniae, or Mycoplasma pneumoniae), consider antibiotic de-escalation to target atypical bacterial infection.    Blood Culture - Blood, Arm, Left [[id number]]  (Abnormal) Collected: 12/02/23 1110    Lab Status: Final result Specimen: Blood from Arm, Left Updated: 12/05/23 0618     Blood Culture Staphylococcus aureus, MRSA     Comment:   Infectious disease consultation is highly recommended to rule out distant foci of infection.  Methicillin resistant Staphylococcus aureus, Patient may be an isolation risk.        Isolated from  Aerobic Bottle     Gram Stain Aerobic Bottle Gram positive cocci in clusters    Narrative:      Refer to previous blood culture collected on 12/02/2023 1115 for MICs.              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  Follow-up on repeat blood cultures.    Continue with IV vancomycin  Overall care will be difficult and prognosis is poor because of patient's lack of participation in her own wellbeing including documented noncompliance and active self-mutilation behavior with ongoing IV drug use.  If vancomycin is considered toxic to already declining renal function then if MRSA in the blood cultures documented to be sensitive to daptomycin then she could be changed to daptomycin to avoid further decline in renal function.  Management of other issues including this complex psychosocial issues per primary team.  Duration of antibiotic therapy will be 6 weeks to 8 weeks from last negative blood culture  Jazmine Higginbotham MD  12/8/2023  06:39 EST    Parts of this note may be an electronic transcription/translation of spoken language to printed text using the Dragon dictation system.

## 2023-12-08 NOTE — PLAN OF CARE
"Goal Outcome Evaluation:                 Patient refused attempting to sit on the edge of bed during PT today.  Refusing all PT.  Attempted education on at least using bedside commode for toileting needs instead of purewick.  Pt continued yelling at PT \"Leave me alone!\"  Has refused dopplers and morning blood draw.      Pt has been in bed since admission on 12/2 but appears to have some compliance and participation in care issues.  Will discharge PT at this  time. Discussed with RN.  Feel free to re-order PT once pt agreeable to participate or at least is trying to get out of bed to urinate.         "

## 2023-12-09 LAB
ALBUMIN SERPL-MCNC: 2.2 G/DL (ref 3.5–5.2)
ALBUMIN/GLOB SERPL: 0.5 G/DL
ALP SERPL-CCNC: 442 U/L (ref 39–117)
ALT SERPL W P-5'-P-CCNC: 32 U/L (ref 1–33)
ANION GAP SERPL CALCULATED.3IONS-SCNC: 11.4 MMOL/L (ref 5–15)
AST SERPL-CCNC: 54 U/L (ref 1–32)
BACTERIA SPEC AEROBE CULT: NORMAL
BASOPHILS # BLD AUTO: 0.02 10*3/MM3 (ref 0–0.2)
BASOPHILS NFR BLD AUTO: 0.5 % (ref 0–1.5)
BILIRUB SERPL-MCNC: 1 MG/DL (ref 0–1.2)
BUN SERPL-MCNC: 40 MG/DL (ref 6–20)
BUN/CREAT SERPL: 10.2 (ref 7–25)
CALCIUM SPEC-SCNC: 7.5 MG/DL (ref 8.6–10.5)
CHLORIDE SERPL-SCNC: 108 MMOL/L (ref 98–107)
CO2 SERPL-SCNC: 18.6 MMOL/L (ref 22–29)
CREAT SERPL-MCNC: 3.93 MG/DL (ref 0.57–1)
DEPRECATED RDW RBC AUTO: 42.7 FL (ref 37–54)
EGFRCR SERPLBLD CKD-EPI 2021: 14.6 ML/MIN/1.73
EOSINOPHIL # BLD AUTO: 0.12 10*3/MM3 (ref 0–0.4)
EOSINOPHIL NFR BLD AUTO: 2.7 % (ref 0.3–6.2)
ERYTHROCYTE [DISTWIDTH] IN BLOOD BY AUTOMATED COUNT: 17.4 % (ref 12.3–15.4)
GLOBULIN UR ELPH-MCNC: 4.2 GM/DL
GLUCOSE SERPL-MCNC: 114 MG/DL (ref 65–99)
HBV SURFACE AG SERPL QL IA: NORMAL
HBV SURFACE AG SERPL QL NT: POSITIVE
HCT VFR BLD AUTO: 23.3 % (ref 34–46.6)
HGB BLD-MCNC: 7.3 G/DL (ref 12–15.9)
LYMPHOCYTES # BLD AUTO: 1.27 10*3/MM3 (ref 0.7–3.1)
LYMPHOCYTES NFR BLD AUTO: 28.7 % (ref 19.6–45.3)
MCH RBC QN AUTO: 22.6 PG (ref 26.6–33)
MCHC RBC AUTO-ENTMCNC: 31.3 G/DL (ref 31.5–35.7)
MCV RBC AUTO: 72.1 FL (ref 79–97)
MONOCYTES # BLD AUTO: 0.56 10*3/MM3 (ref 0.1–0.9)
MONOCYTES NFR BLD AUTO: 12.7 % (ref 5–12)
NEUTROPHILS NFR BLD AUTO: 2.39 10*3/MM3 (ref 1.7–7)
NEUTROPHILS NFR BLD AUTO: 54 % (ref 42.7–76)
PHOSPHATE SERPL-MCNC: 4.5 MG/DL (ref 2.5–4.5)
PLATELET # BLD AUTO: 79 10*3/MM3 (ref 140–450)
PMV BLD AUTO: 9.2 FL (ref 6–12)
POTASSIUM SERPL-SCNC: 3.8 MMOL/L (ref 3.5–5.2)
PROT SERPL-MCNC: 6.4 G/DL (ref 6–8.5)
RBC # BLD AUTO: 3.23 10*6/MM3 (ref 3.77–5.28)
SODIUM SERPL-SCNC: 138 MMOL/L (ref 136–145)
VANCOMYCIN SERPL-MCNC: 12.5 MCG/ML (ref 5–40)
WBC NRBC COR # BLD AUTO: 4.42 10*3/MM3 (ref 3.4–10.8)

## 2023-12-09 PROCEDURE — 80202 ASSAY OF VANCOMYCIN: CPT | Performed by: NURSE PRACTITIONER

## 2023-12-09 PROCEDURE — 84100 ASSAY OF PHOSPHORUS: CPT | Performed by: HOSPITALIST

## 2023-12-09 PROCEDURE — 85025 COMPLETE CBC W/AUTO DIFF WBC: CPT | Performed by: INTERNAL MEDICINE

## 2023-12-09 PROCEDURE — 80053 COMPREHEN METABOLIC PANEL: CPT | Performed by: INTERNAL MEDICINE

## 2023-12-09 PROCEDURE — 25010000002 ONDANSETRON PER 1 MG: Performed by: INTERNAL MEDICINE

## 2023-12-09 PROCEDURE — 25810000003 SODIUM CHLORIDE 0.9 % SOLUTION 250 ML FLEX CONT: Performed by: INTERNAL MEDICINE

## 2023-12-09 PROCEDURE — 25010000002 VANCOMYCIN 750 MG RECONSTITUTED SOLUTION 1 EACH VIAL: Performed by: INTERNAL MEDICINE

## 2023-12-09 RX ADMIN — SODIUM BICARBONATE 1300 MG: 650 TABLET ORAL at 10:45

## 2023-12-09 RX ADMIN — FERROUS SULFATE TAB 325 MG (65 MG ELEMENTAL FE) 325 MG: 325 (65 FE) TAB at 10:46

## 2023-12-09 RX ADMIN — Medication 10 ML: at 10:03

## 2023-12-09 RX ADMIN — SODIUM BICARBONATE: 84 INJECTION, SOLUTION INTRAVENOUS at 00:38

## 2023-12-09 RX ADMIN — VANCOMYCIN HYDROCHLORIDE 750 MG: 750 INJECTION, POWDER, LYOPHILIZED, FOR SOLUTION INTRAVENOUS at 09:49

## 2023-12-09 RX ADMIN — BUSPIRONE HYDROCHLORIDE 5 MG: 5 TABLET ORAL at 10:46

## 2023-12-09 RX ADMIN — Medication 10 ML: at 20:45

## 2023-12-09 RX ADMIN — BUPRENORPHINE AND NALOXONE 2 FILM: 8; 2 FILM BUCCAL; SUBLINGUAL at 10:45

## 2023-12-09 RX ADMIN — BUSPIRONE HYDROCHLORIDE 5 MG: 5 TABLET ORAL at 15:31

## 2023-12-09 RX ADMIN — SODIUM BICARBONATE: 84 INJECTION, SOLUTION INTRAVENOUS at 22:30

## 2023-12-09 RX ADMIN — ONDANSETRON 4 MG: 2 INJECTION INTRAMUSCULAR; INTRAVENOUS at 09:49

## 2023-12-09 RX ADMIN — SODIUM BICARBONATE 1300 MG: 650 TABLET ORAL at 20:43

## 2023-12-09 RX ADMIN — SODIUM BICARBONATE 1300 MG: 650 TABLET ORAL at 15:31

## 2023-12-09 RX ADMIN — BUSPIRONE HYDROCHLORIDE 5 MG: 5 TABLET ORAL at 20:43

## 2023-12-09 NOTE — PROGRESS NOTES
Name: Sammie Landeros ADMIT: 2023   : 1990  PCP: Melvi Landeros APRALEE    MRN: [id number] LOS: 7 days   AGE/SEX: 33 y.o. female  ROOM: [facility]   Subjective   Chief Complaint   Patient presents with    Drug Overdose    Hypoglycemia     On IV ABX  On IVFs  Making urine  H/o substance use      Objective   Vital Signs  Temp:  [98.2 °F (36.8 °C)-98.8 °F (37.1 °C)] 98.2 °F (36.8 °C)  Heart Rate:  [] 103  Resp:  [18-20] 18  BP: (135-152)/(73-92) 135/82  SpO2:  [98 %-100 %] 99 %  on  Flow (L/min):  [3] 3;   Device (Oxygen Therapy): room air  Body mass index is 37.57 kg/m².    Physical Exam  Constitutional:       General: She is not in acute distress.  HENT:      Head: Normocephalic and atraumatic.   Eyes:      General: No scleral icterus.  Pulmonary:      Effort: Pulmonary effort is normal. No respiratory distress.   Abdominal:      General: There is no distension.      Palpations: Abdomen is soft.   Musculoskeletal:      Cervical back: Neck supple.   Neurological:      Mental Status: She is alert.   Psychiatric:         Behavior: Behavior normal.     Chronically ill  Resting in bed     Results Review:       I reviewed the patient's new clinical results.  Results from last 7 days   Lab Units 23  0521 23  1606 23  0641 23  0614   WBC 10*3/mm3 4.42 4.73 4.73 4.57   HEMOGLOBIN g/dL 7.3* 7.9* 8.1* 7.5*   PLATELETS 10*3/mm3 79* 86* 83* 70*     Results from last 7 days   Lab Units 23  0521 23  2108 23  0537 23  0641 23  0614   SODIUM mmol/L 138  --  137 136 136   POTASSIUM mmol/L 3.8 4.0 3.8 4.5 4.4   CHLORIDE mmol/L 108*  --  111* 110* 110*   CO2 mmol/L 18.6*  --  15.0* 14.5* 16.0*   BUN mg/dL 40*  --  54* 58* 55*   CREATININE mg/dL 3.93*  --  4.48* 5.06* 5.13*   GLUCOSE mg/dL 114*  --  114* 96 87   Estimated Creatinine Clearance: 27 mL/min (A) (by C-G formula based on SCr of 3.93 mg/dL (H)).  Results from last 7 days   Lab Units 23  0515  "12/08/23  0537 12/07/23  0641 12/06/23  0614   ALBUMIN g/dL 2.2* 2.0* 2.1* 2.1*   BILIRUBIN mg/dL 1.0 1.0 1.5* 2.4*   ALK PHOS U/L 442* 412* 431* 361*   AST (SGOT) U/L 54* 42* 53* 56*   ALT (SGPT) U/L 32 29 35* 35*     Results from last 7 days   Lab Units 12/09/23  0521 12/08/23  2108 12/08/23  0537 12/07/23  0641 12/06/23  0614 12/05/23  0340 12/04/23  0429 12/04/23  0250   CALCIUM mg/dL 7.5*  --  7.6* 8.2* 8.2* 7.5*   < >  --    ALBUMIN g/dL 2.2*  --  2.0* 2.1* 2.1* 2.0*   < >  --    MAGNESIUM mg/dL  --  1.5* 2.6  --   --   --   --  1.6   PHOSPHORUS mg/dL 4.5  --  5.2*  --  3.5 2.7   < > 3.1    < > = values in this interval not displayed.     Results from last 7 days   Lab Units 12/05/23  0340 12/05/23  0008 12/04/23  1820 12/04/23  1343   LACTATE mmol/L 1.2 1.2 1.7 1.5       Coag     HbA1C No results found for: \"HGBA1C\"  Infection   Results from last 7 days   Lab Units 12/04/23  0940 12/02/23  1115 12/02/23  1110   BLOODCX  No growth at 4 days Staphylococcus aureus, MRSA* Staphylococcus aureus, MRSA*   BCIDPCR   --  Staph aureus. mecA/C and MREJ (methicillin resistance gene) detected. Identification by BCID2 PCR.*  Streptococcus spp, not A, B, or pneumoniae. Identification by BCID2 PCR.*  --      Radiology(recent) No radiology results for the last day  No results found for: \"TROPONINT\", \"TROPONINI\", \"BNP\"  No components found for: \"TSH;2\"    buprenorphine-naloxone, 2 film, Sublingual, Daily  busPIRone, 5 mg, Oral, TID  ferrous sulfate, 325 mg, Oral, Daily With Breakfast  heparin (porcine), 5,000 Units, Subcutaneous, Q8H  pantoprazole, 40 mg, Oral, Q AM  sodium bicarbonate, 1,300 mg, Oral, TID  sodium chloride, 10 mL, Intravenous, Q12H  sodium chloride, 10 mL, Intravenous, Q12H  sodium chloride, 10 mL, Intravenous, Q12H  sodium chloride, 10 mL, Intravenous, Q12H  sodium chloride, 10 mL, Intravenous, Q12H  vancomycin, 750 mg, Intravenous, Once  Vancomycin Pharmacy Intermittent/Pulse Dosing, , Does not apply, " Daily      Pharmacy to dose vancomycin,   sodium chloride 0.45 % 925 mL with sodium bicarbonate 8.4 % 75 mEq infusion, , Last Rate: 100 mL/hr at 12/09/23 0038    Diet: Regular/House Diet; Texture: Regular Texture (IDDSI 7); Fluid Consistency: Thin (IDDSI 0)      Assessment & Plan      Active Hospital Problems    Diagnosis  POA    **Endocarditis of tricuspid valve [I07.9]  Yes    Elevated LFTs [R79.89]  Yes    Thrombocytopenia [D69.6]  Yes    GERD without esophagitis [K21.9]  Yes    Shock, septic [A41.9, R65.21]  Yes    MRSA bacteremia [R78.81, B95.62]  Yes    ANTONELLA (acute kidney injury) [N17.9]  Yes    Anemia, chronic disease [D63.8]  Yes    Polysubstance abuse [F19.10]  Yes      Resolved Hospital Problems   No resolved problems to display.       On IV ABX as recommended by ID  Monitor renal failure and urine output, nephrology following  Monitor anemia of chronic disease and transfuse if Hgb <7.  H/o cirrhosis. Duplex ordered previously but patient refused per documentation  On chronic suboxone.       DW staff  Reviewed records       Jayant Rose MD  [facility]  12/09/23  09:23 EST

## 2023-12-09 NOTE — PROGRESS NOTES
"[facility] Clinical Pharmacy Services: Vancomycin Monitoring Note    Sammie Landeros is a 33 y.o. female who is on day 8 of pharmacy to dose vancomycin for Bacteremia and sepsis.    Previous Vancomycin Dose: Dosing by levels- last dose was 500 mg on 12/7 @1049.  Updated Cultures and Sensitivities:   12/2 Blood cultures 2/2: MRSA    Results from last 7 days   Lab Units 12/09/23  0521 12/07/23  0641 12/06/23  0614   VANCOMYCIN RM mcg/mL 12.50 14.20 20.90     Vitals/Labs  Ht: 175.3 cm (69\"); Wt: 115 kg (254 lb 6.6 oz)   Temp Readings from Last 1 Encounters:   12/09/23 98.2 °F (36.8 °C) (Oral)     Estimated Creatinine Clearance: 27 mL/min (A) (by C-G formula based on SCr of 3.93 mg/dL (H)).     Results from last 7 days   Lab Units 12/09/23  0521 12/08/23  1606 12/08/23  0537 12/07/23  0641   CREATININE mg/dL 3.93*  --  4.48* 5.06*   WBC 10*3/mm3 4.42 4.73  --  4.73     Assessment/Plan    Based on today's vancomycin level, will give a dose of 750 mg x 1 today, and since renal function seems to be improving, will follow-up with a vanc random level tomorrow with AM labs.   Next Level Date and Time: Vanc Random on 12/10 at 0600.  We will continue to monitor patient changes and renal function     Thank you for involving pharmacy in this patient's care. Please contact pharmacy with any questions or concerns.       Gibran Mai, [facility]  Clinical Pharmacist         "

## 2023-12-09 NOTE — NURSING NOTE
Patient had 5 beat runs of SVT. A notified.   Will continue to monitor her for the rest of the shift. Awaiting lab orders.

## 2023-12-09 NOTE — PLAN OF CARE
Problem: Adult Inpatient Plan of Care  Goal: Plan of Care Review  Outcome: Ongoing, Progressing  Goal: Patient-Specific Goal (Individualized)  Outcome: Ongoing, Progressing  Goal: Absence of Hospital-Acquired Illness or Injury  Outcome: Ongoing, Progressing  Intervention: Identify and Manage Fall Risk  Recent Flowsheet Documentation  Taken 12/9/2023 1600 by Louis Fontanez RN  Safety Promotion/Fall Prevention:   activity supervised   clutter free environment maintained   fall prevention program maintained   lighting adjusted   nonskid shoes/slippers when out of bed   safety round/check completed   room organization consistent  Taken 12/9/2023 1200 by Louis Fontanez RN  Safety Promotion/Fall Prevention:   activity supervised   clutter free environment maintained   fall prevention program maintained   lighting adjusted   nonskid shoes/slippers when out of bed   room organization consistent   safety round/check completed  Taken 12/9/2023 1000 by Louis Fontanez RN  Safety Promotion/Fall Prevention:   activity supervised   clutter free environment maintained   fall prevention program maintained   lighting adjusted   nonskid shoes/slippers when out of bed   room organization consistent   safety round/check completed  Taken 12/9/2023 0800 by Louis Fontanez RN  Safety Promotion/Fall Prevention:   activity supervised   clutter free environment maintained   fall prevention program maintained   lighting adjusted   nonskid shoes/slippers when out of bed   room organization consistent   safety round/check completed  Intervention: Prevent and Manage VTE (Venous Thromboembolism) Risk  Recent Flowsheet Documentation  Taken 12/9/2023 1600 by Louis Fontanez RN  VTE Prevention/Management: (refusing) other (see comments)  Intervention: Prevent Infection  Recent Flowsheet Documentation  Taken 12/9/2023 1600 by Louis Fontanez RN  Infection Prevention:   single patient room provided   rest/sleep promoted   hand hygiene promoted  Taken  12/9/2023 1200 by Louis Fontanez RN  Infection Prevention:   hand hygiene promoted   rest/sleep promoted   single patient room provided  Taken 12/9/2023 1000 by Louis Fontanez RN  Infection Prevention:   rest/sleep promoted   hand hygiene promoted   single patient room provided  Taken 12/9/2023 0800 by Louis Fontanez RN  Infection Prevention:   rest/sleep promoted   single patient room provided   hand hygiene promoted  Goal: Optimal Comfort and Wellbeing  Outcome: Ongoing, Progressing  Intervention: Provide Person-Centered Care  Recent Flowsheet Documentation  Taken 12/9/2023 1600 by Louis Fontanez RN  Trust Relationship/Rapport:   care explained   thoughts/feelings acknowledged   reassurance provided   questions encouraged   questions answered   empathic listening provided  Taken 12/9/2023 0800 by Louis Fontanez RN  Trust Relationship/Rapport:   care explained   thoughts/feelings acknowledged   reassurance provided   questions encouraged   questions answered   empathic listening provided  Goal: Readiness for Transition of Care  Outcome: Ongoing, Progressing     Problem: Adjustment to Illness (Sepsis/Septic Shock)  Goal: Optimal Coping  Outcome: Ongoing, Progressing  Intervention: Optimize Psychosocial Adjustment to Illness  Recent Flowsheet Documentation  Taken 12/9/2023 0800 by Louis Fontanez RN  Supportive Measures: active listening utilized     Problem: Bleeding (Sepsis/Septic Shock)  Goal: Absence of Bleeding  Outcome: Ongoing, Progressing     Problem: Glycemic Control Impaired (Sepsis/Septic Shock)  Goal: Blood Glucose Level Within Desired Range  Outcome: Ongoing, Progressing     Problem: Infection Progression (Sepsis/Septic Shock)  Goal: Absence of Infection Signs and Symptoms  Outcome: Ongoing, Progressing  Intervention: Initiate Sepsis Management  Recent Flowsheet Documentation  Taken 12/9/2023 1600 by Louis Fontanez RN  Infection Prevention:   single patient room provided   rest/sleep promoted   hand  hygiene promoted  Isolation Precautions: precautions maintained  Taken 12/9/2023 1200 by Louis Fontanez RN  Infection Prevention:   hand hygiene promoted   rest/sleep promoted   single patient room provided  Isolation Precautions: precautions maintained  Taken 12/9/2023 1000 by Louis Fontanez RN  Infection Prevention:   rest/sleep promoted   hand hygiene promoted   single patient room provided  Isolation Precautions: precautions maintained  Taken 12/9/2023 0800 by Louis Fontanez RN  Infection Prevention:   rest/sleep promoted   single patient room provided   hand hygiene promoted  Isolation Precautions: precautions maintained  Intervention: Promote Recovery  Recent Flowsheet Documentation  Taken 12/9/2023 0800 by Louis Fontanez RN  Sleep/Rest Enhancement: awakenings minimized  Intervention: Promote Stabilization  Recent Flowsheet Documentation  Taken 12/9/2023 0800 by Louis Fontanez RN  Fever Reduction/Comfort Measures:   lightweight bedding   lightweight clothing     Problem: Nutrition Impaired (Sepsis/Septic Shock)  Goal: Optimal Nutrition Intake  Outcome: Ongoing, Progressing     Problem: Skin Injury Risk Increased  Goal: Skin Health and Integrity  Outcome: Ongoing, Progressing     Problem: Pain Acute  Goal: Acceptable Pain Control and Functional Ability  Outcome: Ongoing, Progressing  Intervention: Prevent or Manage Pain  Recent Flowsheet Documentation  Taken 12/9/2023 1600 by Louis Fontanez RN  Medication Review/Management: medications reviewed  Taken 12/9/2023 1200 by Louis Fontanez RN  Medication Review/Management: medications reviewed  Taken 12/9/2023 1000 by Louis Fontanez RN  Medication Review/Management: medications reviewed  Taken 12/9/2023 0800 by Louis Fontanez RN  Bowel Elimination Promotion: adequate fluid intake promoted  Sleep/Rest Enhancement: awakenings minimized  Medication Review/Management: medications reviewed  Intervention: Optimize Psychosocial Wellbeing  Recent Flowsheet  Documentation  Taken 12/9/2023 0800 by Louis Fontanez RN  Supportive Measures: active listening utilized     Problem: Fall Injury Risk  Goal: Absence of Fall and Fall-Related Injury  Outcome: Ongoing, Progressing  Intervention: Identify and Manage Contributors  Recent Flowsheet Documentation  Taken 12/9/2023 1600 by Louis Fontanez RN  Medication Review/Management: medications reviewed  Taken 12/9/2023 1200 by Louis Fontanez RN  Medication Review/Management: medications reviewed  Taken 12/9/2023 1000 by Louis Fontanez RN  Medication Review/Management: medications reviewed  Taken 12/9/2023 0800 by Louis Fontanez RN  Medication Review/Management: medications reviewed  Intervention: Promote Injury-Free Environment  Recent Flowsheet Documentation  Taken 12/9/2023 1600 by Louis Fontanez RN  Safety Promotion/Fall Prevention:   activity supervised   clutter free environment maintained   fall prevention program maintained   lighting adjusted   nonskid shoes/slippers when out of bed   safety round/check completed   room organization consistent  Taken 12/9/2023 1200 by Louis Fontanez RN  Safety Promotion/Fall Prevention:   activity supervised   clutter free environment maintained   fall prevention program maintained   lighting adjusted   nonskid shoes/slippers when out of bed   room organization consistent   safety round/check completed  Taken 12/9/2023 1000 by Louis Fontanez RN  Safety Promotion/Fall Prevention:   activity supervised   clutter free environment maintained   fall prevention program maintained   lighting adjusted   nonskid shoes/slippers when out of bed   room organization consistent   safety round/check completed  Taken 12/9/2023 0800 by Louis Fontanez RN  Safety Promotion/Fall Prevention:   activity supervised   clutter free environment maintained   fall prevention program maintained   lighting adjusted   nonskid shoes/slippers when out of bed   room organization consistent   safety round/check completed    Goal Outcome Evaluation:         Patient laying in bed with eyes closed. Patient is very withdrawn. Patient has to be encouraged to take part in care. Patient refused heparin. Patient has to be heavily encouraged to take scheduled medication. Dressings to IJ and EJ changed. No signs of pain or distress at this time. Call light in reach.

## 2023-12-09 NOTE — PROGRESS NOTES
[facility] Progress Note      Patient Name: Sammie Landeros  : 1990  MRN: [id number]  Primary Care Physician:  Melvi Landeros APRN  Date of admission: 2023    Subjective     Interval History:   Follow-up acute kidney injury.    Creatinine trending down patient making urine but not all urine has been recorded  No issues noted today.  Denies any nausea or vomiting.  No fever no chill  Review of Systems:   As noted above    Objective     Vitals:   Temp:  [98.2 °F (36.8 °C)-98.8 °F (37.1 °C)] 98.2 °F (36.8 °C)  Heart Rate:  [] 103  Resp:  [18-20] 18  BP: (135-152)/(73-92) 135/82  Flow (L/min):  [3] 3    Intake/Output Summary (Last 24 hours) at 2023 0913  Last data filed at 2023 0950  Gross per 24 hour   Intake --   Output 1100 ml   Net -1100 ml       Physical Exam:    General Appearance: Ill looking   Skin: warm and dry  Neck: supple, no JVD  Lungs clear to auscultation bilaterally.  Heart: RRR, normal S1 and S2  Abdomen: soft, nontender,distended.+bs.  The wall edema  : no palpable bladder  Extremities:  upper and lower extremity edema  Neuro: Answers yes/no questions appropriately.  Does not change position.    Scheduled Meds:     buprenorphine-naloxone, 2 film, Sublingual, Daily  busPIRone, 5 mg, Oral, TID  ferrous sulfate, 325 mg, Oral, Daily With Breakfast  heparin (porcine), 5,000 Units, Subcutaneous, Q8H  pantoprazole, 40 mg, Oral, Q AM  sodium bicarbonate, 1,300 mg, Oral, TID  sodium chloride, 10 mL, Intravenous, Q12H  sodium chloride, 10 mL, Intravenous, Q12H  sodium chloride, 10 mL, Intravenous, Q12H  sodium chloride, 10 mL, Intravenous, Q12H  sodium chloride, 10 mL, Intravenous, Q12H  vancomycin, 750 mg, Intravenous, Once  Vancomycin Pharmacy Intermittent/Pulse Dosing, , Does not apply, Daily      IV Meds:   Pharmacy to dose vancomycin,   sodium chloride 0.45 % 925 mL with sodium bicarbonate 8.4 % 75 mEq infusion, , Last Rate: 100 mL/hr at 23  0038        Results Reviewed:   I have personally reviewed the results from the time of this admission to 12/9/2023 09:13 EST     Results from last 7 days   Lab Units 12/09/23 0521 12/08/23 2108 12/08/23  0537 12/07/23  0641   SODIUM mmol/L 138  --  137 136   POTASSIUM mmol/L 3.8 4.0 3.8 4.5   CHLORIDE mmol/L 108*  --  111* 110*   CO2 mmol/L 18.6*  --  15.0* 14.5*   BUN mg/dL 40*  --  54* 58*   CREATININE mg/dL 3.93*  --  4.48* 5.06*   CALCIUM mg/dL 7.5*  --  7.6* 8.2*   BILIRUBIN mg/dL 1.0  --  1.0 1.5*   ALK PHOS U/L 442*  --  412* 431*   ALT (SGPT) U/L 32  --  29 35*   AST (SGOT) U/L 54*  --  42* 53*   GLUCOSE mg/dL 114*  --  114* 96       Estimated Creatinine Clearance: 27 mL/min (A) (by C-G formula based on SCr of 3.93 mg/dL (H)).    Results from last 7 days   Lab Units 12/09/23 0521 12/08/23 2108 12/08/23 0537 12/06/23  0614 12/04/23  1343 12/04/23  0250   MAGNESIUM mg/dL  --  1.5* 2.6  --   --  1.6   PHOSPHORUS mg/dL 4.5  --  5.2* 3.5   < > 3.1    < > = values in this interval not displayed.       Results from last 7 days   Lab Units 12/04/23  0250   URIC ACID mg/dL 7.2*       Results from last 7 days   Lab Units 12/09/23  0521 12/08/23  1606 12/07/23  0641 12/06/23  0614 12/05/23  2204 12/05/23  0340   WBC 10*3/mm3 4.42 4.73 4.73 4.57  --  3.87   HEMOGLOBIN g/dL 7.3* 7.9* 8.1* 7.5* 7.9* 7.0*   PLATELETS 10*3/mm3 79* 86* 83* 70*  --  87*             Assessment / Plan     ASSESSMENT:  Acute kidney injury on CKD with unknown recent baseline., nonoliguric.  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  30 milligrams per deciliter proteinuria.  C3 hypocomplementemia.  Positive CARMEN and  RA.  Her creatinine is trending down calcium normal.  Edema in part due to hypoalbuminemia.  2.  MRSA bacteremia with recurrent tricuspid valve endocarditis.  Currently on vancomycin IV.  Given the plateau of her creatinine I would not  change the vancomycin at this point.  Moderate tricuspid valve regurgitation with mild pulmonary hypertension.  3.  IV drug abuse abuse.  Heroin.  4.  Hepatitis C  5.  Medical noncompliance.  6.  Anemia of chronic disease.  No clear evidence of hemolysis.  Haptoglobin normal LDH mildly elevated.  7.  Thrombo-cytopenia  8.  Ellen protein calorie malnutrition with albumin 2.1.  PLAN:  Creatinine is trending down urine output has been excellent.  Will decrease IV fluids to 75 cc an hour  Increase oral intake  Awaiting CARMEN profile   Labs in a.m.      Thank you for involving us in the care of Sammie Landeros.  Please feel free to call with any questions.    Elvira Coleman MD  12/09/23  09:13 [facility]    [facility]  [phone]    Please note that portions of this note were completed with a voice recognition program.

## 2023-12-10 LAB
ALBUMIN SERPL-MCNC: 2.3 G/DL (ref 3.5–5.2)
ALBUMIN/GLOB SERPL: 0.5 G/DL
ALP SERPL-CCNC: 431 U/L (ref 39–117)
ALT SERPL W P-5'-P-CCNC: 37 U/L (ref 1–33)
ANION GAP SERPL CALCULATED.3IONS-SCNC: 10.3 MMOL/L (ref 5–15)
AST SERPL-CCNC: 59 U/L (ref 1–32)
BASOPHILS # BLD AUTO: 0.02 10*3/MM3 (ref 0–0.2)
BASOPHILS NFR BLD AUTO: 0.4 % (ref 0–1.5)
BILIRUB SERPL-MCNC: 1 MG/DL (ref 0–1.2)
BUN SERPL-MCNC: 47 MG/DL (ref 6–20)
BUN/CREAT SERPL: 16.7 (ref 7–25)
CALCIUM SPEC-SCNC: 7.5 MG/DL (ref 8.6–10.5)
CHLORIDE SERPL-SCNC: 108 MMOL/L (ref 98–107)
CO2 SERPL-SCNC: 21.7 MMOL/L (ref 22–29)
CREAT SERPL-MCNC: 2.82 MG/DL (ref 0.57–1)
DEPRECATED RDW RBC AUTO: 44.4 FL (ref 37–54)
EGFRCR SERPLBLD CKD-EPI 2021: 21.8 ML/MIN/1.73
EOSINOPHIL # BLD AUTO: 0.1 10*3/MM3 (ref 0–0.4)
EOSINOPHIL NFR BLD AUTO: 2 % (ref 0.3–6.2)
ERYTHROCYTE [DISTWIDTH] IN BLOOD BY AUTOMATED COUNT: 17.9 % (ref 12.3–15.4)
GLOBULIN UR ELPH-MCNC: 4.2 GM/DL
GLUCOSE SERPL-MCNC: 88 MG/DL (ref 65–99)
HCT VFR BLD AUTO: 22.3 % (ref 34–46.6)
HGB BLD-MCNC: 7.1 G/DL (ref 12–15.9)
LYMPHOCYTES # BLD AUTO: 1.43 10*3/MM3 (ref 0.7–3.1)
LYMPHOCYTES NFR BLD AUTO: 29.2 % (ref 19.6–45.3)
MCH RBC QN AUTO: 23.4 PG (ref 26.6–33)
MCHC RBC AUTO-ENTMCNC: 31.8 G/DL (ref 31.5–35.7)
MCV RBC AUTO: 73.6 FL (ref 79–97)
MONOCYTES # BLD AUTO: 0.56 10*3/MM3 (ref 0.1–0.9)
MONOCYTES NFR BLD AUTO: 11.5 % (ref 5–12)
NEUTROPHILS NFR BLD AUTO: 2.74 10*3/MM3 (ref 1.7–7)
NEUTROPHILS NFR BLD AUTO: 56.1 % (ref 42.7–76)
PHOSPHATE SERPL-MCNC: 4.8 MG/DL (ref 2.5–4.5)
PLATELET # BLD AUTO: 81 10*3/MM3 (ref 140–450)
PMV BLD AUTO: 9.6 FL (ref 6–12)
POTASSIUM SERPL-SCNC: 4.2 MMOL/L (ref 3.5–5.2)
PROT SERPL-MCNC: 6.5 G/DL (ref 6–8.5)
RBC # BLD AUTO: 3.03 10*6/MM3 (ref 3.77–5.28)
SODIUM SERPL-SCNC: 140 MMOL/L (ref 136–145)
VANCOMYCIN SERPL-MCNC: 12.5 MCG/ML (ref 5–40)
VANCOMYCIN SERPL-MCNC: 21.3 MCG/ML (ref 5–40)
WBC NRBC COR # BLD AUTO: 4.89 10*3/MM3 (ref 3.4–10.8)

## 2023-12-10 PROCEDURE — 80202 ASSAY OF VANCOMYCIN: CPT | Performed by: INTERNAL MEDICINE

## 2023-12-10 PROCEDURE — 25010000002 VANCOMYCIN PER 500 MG: Performed by: INTERNAL MEDICINE

## 2023-12-10 PROCEDURE — 84100 ASSAY OF PHOSPHORUS: CPT | Performed by: HOSPITALIST

## 2023-12-10 PROCEDURE — 80053 COMPREHEN METABOLIC PANEL: CPT | Performed by: INTERNAL MEDICINE

## 2023-12-10 PROCEDURE — 85025 COMPLETE CBC W/AUTO DIFF WBC: CPT | Performed by: INTERNAL MEDICINE

## 2023-12-10 RX ORDER — VANCOMYCIN HYDROCHLORIDE 1 G/200ML
1000 INJECTION, SOLUTION INTRAVENOUS ONCE
Status: COMPLETED | OUTPATIENT
Start: 2023-12-10 | End: 2023-12-10

## 2023-12-10 RX ADMIN — BUSPIRONE HYDROCHLORIDE 5 MG: 5 TABLET ORAL at 20:20

## 2023-12-10 RX ADMIN — BUSPIRONE HYDROCHLORIDE 5 MG: 5 TABLET ORAL at 15:04

## 2023-12-10 RX ADMIN — Medication 10 ML: at 20:21

## 2023-12-10 RX ADMIN — SODIUM BICARBONATE: 84 INJECTION, SOLUTION INTRAVENOUS at 11:00

## 2023-12-10 RX ADMIN — Medication 10 ML: at 08:03

## 2023-12-10 RX ADMIN — BUPRENORPHINE AND NALOXONE 2 FILM: 8; 2 FILM BUCCAL; SUBLINGUAL at 10:39

## 2023-12-10 RX ADMIN — SODIUM BICARBONATE 1300 MG: 650 TABLET ORAL at 20:20

## 2023-12-10 RX ADMIN — VANCOMYCIN HYDROCHLORIDE 1000 MG: 1 INJECTION, SOLUTION INTRAVENOUS at 08:31

## 2023-12-10 RX ADMIN — SODIUM BICARBONATE 1300 MG: 650 TABLET ORAL at 10:39

## 2023-12-10 RX ADMIN — Medication 10 ML: at 20:22

## 2023-12-10 RX ADMIN — BUSPIRONE HYDROCHLORIDE 5 MG: 5 TABLET ORAL at 10:39

## 2023-12-10 RX ADMIN — SODIUM BICARBONATE 1300 MG: 650 TABLET ORAL at 15:04

## 2023-12-10 NOTE — PLAN OF CARE
Goal Outcome Evaluation: Alert and oriented x 4.  Room air.  Assist x1 to BSC.  NSR.  IVF discontinued per MD.  Central lines in place.  Patient refused lots of care from nursing staff such as help getting clean, bed changes.  Refused a.m. meds intially this a.m. and then decided to take after this RN attempted a second time later in the morning.  Patient does not wear any articles of clothing in the bed, only chooses to wear a brief as she is on her menses.  Refused vital signs, refused Sub Q heparin.  Remains in contact isolation for MRSA.  Denies pain/distress and call light is within reach.

## 2023-12-10 NOTE — PROGRESS NOTES
"[facility] Clinical Pharmacy Services: Vancomycin Monitoring Note    Sammie Landeros is a 33 y.o. female who is on day 9 of pharmacy to dose vancomycin for Sepsis.    Previous Vancomycin Dose:   750 mg IV on 12/9/23 at 0949  Updated Cultures and Sensitivities:   12/2 Blood cultures 2/2: MRSA   Results from last 7 days   Lab Units 12/10/23  0403 12/09/23  0521 12/07/23  0641   VANCOMYCIN RM mcg/mL 12.50 12.50 14.20     Vitals/Labs  Ht: 175.3 cm (69\"); Wt: 115 kg (254 lb 6.6 oz)   Temp Readings from Last 1 Encounters:     Estimated Creatinine Clearance: 37.7 mL/min (A) (by C-G formula based on SCr of 2.82 mg/dL (H)).        Results from last 7 days   Lab Units 12/10/23  0403 12/09/23  0521 12/08/23  1606 12/08/23  0537   CREATININE mg/dL 2.82* 3.93*  --  4.48*   WBC 10*3/mm3 4.89 4.42 4.73  --      Assessment/Plan    Current Vancomycin Dose: Will order Vancomycin 1000 mg iv now  Will continue to intermittently dose Vancomycin based on levels.  Next Level Date and Time: Vanc Random on 12/10/23 at 1800  We will continue to monitor patient changes and renal function     Thank you for involving pharmacy in this patient's care. Please contact pharmacy with any questions or concerns.       Rigoberto Salcido [facility]  Clinical Pharmacist          "

## 2023-12-10 NOTE — PROGRESS NOTES
"  Infectious Diseases Progress Note    Jazmine Higginbotham MD     [facility]  [place]: 7 days  Patient Identification:  Name: Sammie Landeros  Age: 33 y.o.  Sex: female  :  1990  MRN: [id number]         Primary Care Physician: Melvi Landeros APRN        Subjective: Overall feeling better denies any neck pain back pain hip pain or shoulder pain.  Interval History: See consultation note.    Objective:    Scheduled Meds:buprenorphine-naloxone, 2 film, Sublingual, Daily  busPIRone, 5 mg, Oral, TID  ferrous sulfate, 325 mg, Oral, Daily With Breakfast  heparin (porcine), 5,000 Units, Subcutaneous, Q8H  pantoprazole, 40 mg, Oral, Q AM  sodium bicarbonate, 1,300 mg, Oral, TID  sodium chloride, 10 mL, Intravenous, Q12H  sodium chloride, 10 mL, Intravenous, Q12H  sodium chloride, 10 mL, Intravenous, Q12H  sodium chloride, 10 mL, Intravenous, Q12H  sodium chloride, 10 mL, Intravenous, Q12H  Vancomycin Pharmacy Intermittent/Pulse Dosing, , Does not apply, Daily      Continuous Infusions:Pharmacy to dose vancomycin,   sodium chloride 0.45 % 925 mL with sodium bicarbonate 8.4 % 75 mEq infusion, , Last Rate: 75 mL/hr at 23 1045        Vital signs in last 24 hours:  Temp:  [98.2 °F (36.8 °C)-98.8 °F (37.1 °C)] 98.8 °F (37.1 °C)  Heart Rate:  [] 102  Resp:  [18-20] 18  BP: (135-151)/(73-87) 135/82    Intake/Output:  No intake or output data in the 24 hours ending 23 193      Exam:  /82 (BP Location: Right arm, Patient Position: Lying)   Pulse 102   Temp 98.8 °F (37.1 °C) (Oral)   Resp 18   Ht 175.3 cm (69\")   Wt 115 kg (254 lb 6.6 oz)   LMP 2023 (Approximate)   SpO2 98%   BMI 37.57 kg/m²   Patient is examined using the personal protective equipment as per guidelines from infection control for this particular patient as enacted.  Hand washing was performed before and after patient interaction.  General Appearance: More interactive but still withdrawn.  Does not appear to be in any " acute distress.       Data Review:    I reviewed the patient's new clinical results.  Results from last 7 days   Lab Units 12/09/23  0521 12/08/23  1606 12/07/23  0641 12/06/23  0614 12/05/23  2204 12/05/23  0340 12/04/23  0250 12/03/23  0512   WBC 10*3/mm3 4.42 4.73 4.73 4.57  --  3.87 6.97 16.86*   HEMOGLOBIN g/dL 7.3* 7.9* 8.1* 7.5* 7.9* 7.0* 7.2* 8.7*   PLATELETS 10*3/mm3 79* 86* 83* 70*  --  87* 102* 124*     Results from last 7 days   Lab Units 12/09/23  0521 12/08/23  2108 12/08/23  0537 12/07/23  0641 12/06/23  0614 12/05/23  0340 12/04/23  1343 12/04/23  0429   SODIUM mmol/L 138  --  137 136 136 134* 133* 133*   POTASSIUM mmol/L 3.8 4.0 3.8 4.5 4.4 4.1 4.2 3.9   CHLORIDE mmol/L 108*  --  111* 110* 110* 109* 109* 106   CO2 mmol/L 18.6*  --  15.0* 14.5* 16.0* 16.8* 16.0* 18.0*   BUN mg/dL 40*  --  54* 58* 55* 48* 42* 40*   CREATININE mg/dL 3.93*  --  4.48* 5.06* 5.13* 4.22* 4.10* 3.75*   CALCIUM mg/dL 7.5*  --  7.6* 8.2* 8.2* 7.5* 7.0* 6.8*   GLUCOSE mg/dL 114*  --  114* 96 87 81 89 77     Microbiology Results (last 10 days)       Procedure Component Value - Date/Time    Gardnerella vaginalis, Trichomonas vaginalis, Candida albicans, DNA - Swab, Vagina [[id number]]  (Abnormal) Collected: 12/04/23 1652    Lab Status: Final result Specimen: Swab from Vagina Updated: 12/05/23 1412     Candida Species Positive     Gardnerella vaginalis, DNA Probe Negative     Trichomonas vaginosis Negative    Narrative:      Performed at:  [address]  [id number]  : Cayden Prieto PhD, Phone:  [phone]    Blood Culture - Blood, Cannula [[id number]]  (Normal) Collected: 12/04/23 0940    Lab Status: Final result Specimen: Blood from Cannula Updated: 12/09/23 1000     Blood Culture No growth at 5 days    Wet Prep, Genital - Swab, Vagina [[id number]]  (Abnormal) Collected: 12/03/23 1200    Lab Status: Final result Specimen: Swab from Vagina Updated: 12/03/23 1251     YEAST 4+ Yeast  seen     HYPHAL ELEMENTS No Hyphal elements seen     WBC'S 3+ WBC's seen     Clue Cells, Wet Prep No Clue cells seen     Trichomonas, Wet Prep No Trichomonas seen    Chlamydia trachomatis, PCR - Swab, Vagina [[id number]] Collected: 12/03/23 1159    Lab Status: Final result Specimen: Swab from Vagina Updated: 12/06/23 0711     Chlamydia trachomatis, [place] Negative    Narrative:      Performed at:  [address] [place] [address]  [id number]  : Karuna Kamara MD, Phone:  [phone]    Blood Culture - Blood, Arm, Right [[id number]]  (Abnormal)  (Susceptibility) Collected: 12/02/23 1115    Lab Status: Edited Result - FINAL Specimen: Blood from Arm, Right Updated: 12/06/23 0625     Blood Culture Staphylococcus aureus, MRSA     Comment:   Infectious disease consultation is highly recommended to rule out distant foci of infection.  Methicillin resistant Staphylococcus aureus, Patient may be an isolation risk.        Isolated from Aerobic Bottle     Gram Stain Aerobic Bottle Gram positive cocci in clusters    Narrative:      Streptococcus spp not viable for growth.    Susceptibility        Staphylococcus aureus, MRSA      SARAH      Gentamicin Susceptible      Oxacillin Resistant      Rifampin Susceptible      Vancomycin Susceptible                       Susceptibility Comments       Staphylococcus aureus, MRSA    This isolate does not demonstrate inducible clindamycin resistance in vitro.                 Blood Culture ID, PCR - Blood, Arm, Right [[id number]]  (Abnormal) Collected: 12/02/23 1115    Lab Status: Final result Specimen: Blood from Arm, Right Updated: 12/03/23 0040     BCID, PCR Staph aureus. mecA/C and MREJ (methicillin resistance gene) detected. Identification by BCID2 PCR.     BCID, PCR 2 Streptococcus spp, not A, B, or pneumoniae. Identification by BCID2 PCR.     BOTTLE TYPE Aerobic Bottle    Narrative:      Infectious disease consultation is highly recommended to rule out distant  foci of infection.    Respiratory Panel PCR w/COVID-19(SARS-CoV-2) ALEAH/JOHNNIE/VENU/PAD/COR/DELORIS In-House, NP Swab in UTM/VTM, 2 HR TAT - Swab, Nasopharynx [[id number]]  (Normal) Collected: 12/02/23 1111    Lab Status: Final result Specimen: Swab from Nasopharynx Updated: 12/02/23 1224     ADENOVIRUS, PCR Not Detected     Coronavirus 229E Not Detected     Coronavirus HKU1 Not Detected     Coronavirus NL63 Not Detected     Coronavirus OC43 Not Detected     COVID19 Not Detected     Human Metapneumovirus Not Detected     Human Rhinovirus/Enterovirus Not Detected     Influenza A PCR Not Detected     Influenza B PCR Not Detected     Parainfluenza Virus 1 Not Detected     Parainfluenza Virus 2 Not Detected     Parainfluenza Virus 3 Not Detected     Parainfluenza Virus 4 Not Detected     RSV, PCR Not Detected     Bordetella pertussis pcr Not Detected     Bordetella parapertussis PCR Not Detected     Chlamydophila pneumoniae PCR Not Detected     Mycoplasma pneumo by PCR Not Detected    Narrative:      In the setting of a positive respiratory panel with a viral infection PLUS a negative procalcitonin without other underlying concern for bacterial infection, consider observing off antibiotics or discontinuation of antibiotics and continue supportive care. If the respiratory panel is positive for atypical bacterial infection (Bordetella pertussis, Chlamydophila pneumoniae, or Mycoplasma pneumoniae), consider antibiotic de-escalation to target atypical bacterial infection.    Blood Culture - Blood, Arm, Left [[id number]]  (Abnormal) Collected: 12/02/23 1110    Lab Status: Final result Specimen: Blood from Arm, Left Updated: 12/05/23 0618     Blood Culture Staphylococcus aureus, MRSA     Comment:   Infectious disease consultation is highly recommended to rule out distant foci of infection.  Methicillin resistant Staphylococcus aureus, Patient may be an isolation risk.        Isolated from Aerobic Bottle     Gram Stain Aerobic Bottle  Gram positive cocci in clusters    Narrative:      Refer to previous blood culture collected on 12/02/2023 1115 for MICs.              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  Follow-up on repeat blood cultures.    Continue with IV vancomycin  Overall care will be difficult and prognosis is poor because of patient's lack of participation in her own wellbeing including documented noncompliance and active self-mutilation behavior with ongoing IV drug use.  If vancomycin is considered toxic to already declining renal function then if MRSA in the blood cultures documented to be sensitive to daptomycin then she could be changed to daptomycin to avoid further decline in renal function.  Management of other issues including this complex psychosocial issues per primary team.  Duration of antibiotic therapy will be 6 weeks to 8 weeks from last negative blood culture  Jazmine Higginbotham MD  12/9/2023  19:35 EST    Parts of this note may be an electronic transcription/translation of spoken language to printed text using the Dragon dictation system.

## 2023-12-10 NOTE — PROGRESS NOTES
Name: Sammie Landeros ADMIT: 2023   : 1990  PCP: Melvi Landeros TYLOR    MRN: [id number] LOS: 8 days   AGE/SEX: 33 y.o. female  ROOM: [facility]   Subjective   Chief Complaint   Patient presents with    Drug Overdose    Hypoglycemia     On IV ABX  On IVFs  Making urine  H/o substance use  Wants to go home    Objective   Vital Signs  Temp:  [99.3 °F (37.4 °C)] 99.3 °F (37.4 °C)  Heart Rate:  [99] 99  Resp:  [18] 18  SpO2:  [98 %] 98 %  on   ;   Device (Oxygen Therapy): room air  Body mass index is 37.57 kg/m².    Physical Exam  Constitutional:       General: She is not in acute distress.  HENT:      Head: Normocephalic and atraumatic.   Eyes:      General: No scleral icterus.  Pulmonary:      Effort: Pulmonary effort is normal. No respiratory distress.   Abdominal:      General: There is no distension.      Palpations: Abdomen is soft.   Musculoskeletal:      Cervical back: Neck supple.   Neurological:      Mental Status: She is alert.   Psychiatric:         Behavior: Behavior normal.     Chronically ill  Resting in bed     Results Review:       I reviewed the patient's new clinical results.  Results from last 7 days   Lab Units 12/10/23  0403 23  0521 23  1606 23  0641   WBC 10*3/mm3 4.89 4.42 4.73 4.73   HEMOGLOBIN g/dL 7.1* 7.3* 7.9* 8.1*   PLATELETS 10*3/mm3 81* 79* 86* 83*     Results from last 7 days   Lab Units 12/10/23  0403 23  0521 23  2108 23  0537 23  0641   SODIUM mmol/L 140 138  --  137 136   POTASSIUM mmol/L 4.2 3.8 4.0 3.8 4.5   CHLORIDE mmol/L 108* 108*  --  111* 110*   CO2 mmol/L 21.7* 18.6*  --  15.0* 14.5*   BUN mg/dL 47* 40*  --  54* 58*   CREATININE mg/dL 2.82* 3.93*  --  4.48* 5.06*   GLUCOSE mg/dL 88 114*  --  114* 96   Estimated Creatinine Clearance: 37.7 mL/min (A) (by C-G formula based on SCr of 2.82 mg/dL (H)).  Results from last 7 days   Lab Units 12/10/23  0403 23  0521 23  0537 23  0641   ALBUMIN g/dL 2.3* 2.2* 2.0*  "2.1*   BILIRUBIN mg/dL 1.0 1.0 1.0 1.5*   ALK PHOS U/L 431* 442* 412* 431*   AST (SGOT) U/L 59* 54* 42* 53*   ALT (SGPT) U/L 37* 32 29 35*     Results from last 7 days   Lab Units 12/10/23  0403 12/09/23  0521 12/08/23  2108 12/08/23  0537 12/07/23  0641 12/06/23  0614 12/04/23  0429 12/04/23  0250   CALCIUM mg/dL 7.5* 7.5*  --  7.6* 8.2* 8.2*   < >  --    ALBUMIN g/dL 2.3* 2.2*  --  2.0* 2.1* 2.1*   < >  --    MAGNESIUM mg/dL  --   --  1.5* 2.6  --   --   --  1.6   PHOSPHORUS mg/dL 4.8* 4.5  --  5.2*  --  3.5   < > 3.1    < > = values in this interval not displayed.     Results from last 7 days   Lab Units 12/05/23  0340 12/05/23  0008 12/04/23  1820 12/04/23  1343   LACTATE mmol/L 1.2 1.2 1.7 1.5       Coag     HbA1C No results found for: \"HGBA1C\"  Infection   Results from last 7 days   Lab Units 12/04/23  0940   BLOODCX  No growth at 5 days     Radiology(recent) No radiology results for the last day  No results found for: \"TROPONINT\", \"TROPONINI\", \"BNP\"  No components found for: \"TSH;2\"    buprenorphine-naloxone, 2 film, Sublingual, Daily  busPIRone, 5 mg, Oral, TID  ferrous sulfate, 325 mg, Oral, Daily With Breakfast  heparin (porcine), 5,000 Units, Subcutaneous, Q8H  pantoprazole, 40 mg, Oral, Q AM  sodium bicarbonate, 1,300 mg, Oral, TID  sodium chloride, 10 mL, Intravenous, Q12H  sodium chloride, 10 mL, Intravenous, Q12H  sodium chloride, 10 mL, Intravenous, Q12H  sodium chloride, 10 mL, Intravenous, Q12H  sodium chloride, 10 mL, Intravenous, Q12H  Vancomycin Pharmacy Intermittent/Pulse Dosing, , Does not apply, Daily      Pharmacy to dose vancomycin,     Diet: Regular/House Diet; Texture: Regular Texture (IDDSI 7); Fluid Consistency: Thin (IDDSI 0)      Assessment & Plan      Active Hospital Problems    Diagnosis  POA    **Endocarditis of tricuspid valve [I07.9]  Yes    Elevated LFTs [R79.89]  Yes    Thrombocytopenia [D69.6]  Yes    GERD without esophagitis [K21.9]  Yes    Shock, septic [A41.9, R65.21]  Yes "    MRSA bacteremia [R78.81, B95.62]  Yes    ANTONELLA (acute kidney injury) [N17.9]  Yes    Anemia, chronic disease [D63.8]  Yes    Polysubstance abuse [F19.10]  Yes      Resolved Hospital Problems   No resolved problems to display.       On IV ABX as recommended by ID  Monitor renal failure and urine output, nephrology following  Monitor anemia of chronic disease and transfuse if Hgb <7.  H/o cirrhosis. Duplex ordered previously but patient refused per documentation  On chronic suboxone.     Pt wishing to go home but no great plan currently regarding IV abx at home as well as still has kidney dysfunction, cannot discharge her at this time (she would have to leave the hospital against medical advice if she wanted to leave today)    DW staff  Reviewed records       Jayant Rose MD  [facility]  12/10/23  09:23 EST

## 2023-12-10 NOTE — PLAN OF CARE
"Goal Outcome Evaluation:      Pt refused all vitals. Pt did allow RN to draw labs this am. 1/2 NS with sodium bicarb still infusing at 75ml/hr. Pt reports feeling hungry and has had multiple snacks. Pt did refuse her heparin injection. Educated pt on the importance of getting out of the bed and moving if she was going to refuse. Pt refused am medications, saying \"I don't anything right now\".                   "

## 2023-12-10 NOTE — PROGRESS NOTES
[facility] Progress Note      Patient Name: Sammie Landeros  : 1990  MRN: [id number]  Primary Care Physician:  Melvi Landeros APRN  Date of admission: 2023    Subjective     Interval History:   Follow-up acute kidney injury.    Continues to be frail.  Fatigue.  Denies any chest pain and no shortness of breath.  Wants to go home.  Review of Systems:   As noted above    Objective     Vitals:   Temp:  [99.3 °F (37.4 °C)] 99.3 °F (37.4 °C)  Heart Rate:  [99] 99  Resp:  [18] 18    Intake/Output Summary (Last 24 hours) at 12/10/2023 1531  Last data filed at 12/10/2023 0340  Gross per 24 hour   Intake 600 ml   Output --   Net 600 ml       Physical Exam:    General Appearance: Ill looking   Skin: warm and dry  Neck: supple, no JVD  Lungs clear to auscultation bilaterally.  Heart: RRR, normal S1 and S2  Abdomen: soft, nontender,distended.+bs.  The wall edema  : no palpable bladder  Extremities:  upper and lower extremity edema improved  Neuro: Answers yes/no questions appropriately.  Does not change position.    Scheduled Meds:     buprenorphine-naloxone, 2 film, Sublingual, Daily  busPIRone, 5 mg, Oral, TID  ferrous sulfate, 325 mg, Oral, Daily With Breakfast  heparin (porcine), 5,000 Units, Subcutaneous, Q8H  pantoprazole, 40 mg, Oral, Q AM  sodium bicarbonate, 1,300 mg, Oral, TID  sodium chloride, 10 mL, Intravenous, Q12H  sodium chloride, 10 mL, Intravenous, Q12H  sodium chloride, 10 mL, Intravenous, Q12H  sodium chloride, 10 mL, Intravenous, Q12H  sodium chloride, 10 mL, Intravenous, Q12H  Vancomycin Pharmacy Intermittent/Pulse Dosing, , Does not apply, Daily      IV Meds:   Pharmacy to dose vancomycin,   sodium chloride 0.45 % 925 mL with sodium bicarbonate 8.4 % 75 mEq infusion, , Last Rate: 75 mL/hr at 12/10/23 1100        Results Reviewed:   I have personally reviewed the results from the time of this admission to 12/10/2023 15:31 EST     Results from last 7 days   Lab  Units 12/10/23  0403 12/09/23  0521 12/08/23  2108 12/08/23  0537   SODIUM mmol/L 140 138  --  137   POTASSIUM mmol/L 4.2 3.8 4.0 3.8   CHLORIDE mmol/L 108* 108*  --  111*   CO2 mmol/L 21.7* 18.6*  --  15.0*   BUN mg/dL 47* 40*  --  54*   CREATININE mg/dL 2.82* 3.93*  --  4.48*   CALCIUM mg/dL 7.5* 7.5*  --  7.6*   BILIRUBIN mg/dL 1.0 1.0  --  1.0   ALK PHOS U/L 431* 442*  --  412*   ALT (SGPT) U/L 37* 32  --  29   AST (SGOT) U/L 59* 54*  --  42*   GLUCOSE mg/dL 88 114*  --  114*       Estimated Creatinine Clearance: 37.7 mL/min (A) (by C-G formula based on SCr of 2.82 mg/dL (H)).    Results from last 7 days   Lab Units 12/10/23  0403 12/09/23  0521 12/08/23  2108 12/08/23  0537 12/04/23  1343 12/04/23  0250   MAGNESIUM mg/dL  --   --  1.5* 2.6  --  1.6   PHOSPHORUS mg/dL 4.8* 4.5  --  5.2*   < > 3.1    < > = values in this interval not displayed.       Results from last 7 days   Lab Units 12/04/23  0250   URIC ACID mg/dL 7.2*       Results from last 7 days   Lab Units 12/10/23  0403 12/09/23  0521 12/08/23  1606 12/07/23  0641 12/06/23  0614   WBC 10*3/mm3 4.89 4.42 4.73 4.73 4.57   HEMOGLOBIN g/dL 7.1* 7.3* 7.9* 8.1* 7.5*   PLATELETS 10*3/mm3 81* 79* 86* 83* 70*             Assessment / Plan     ASSESSMENT:  Acute kidney injury on CKD with unknown recent baseline., nonoliguric.  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  30 milligrams per deciliter proteinuria.  C3 hypocomplementemia.  Positive CARMEN and  RA.  Her creatinine is trending down calcium normal.  Edema in part due to hypoalbuminemia.  2.  MRSA bacteremia with recurrent tricuspid valve endocarditis.  Currently on vancomycin IV.  Given the plateau of her creatinine I would not change the vancomycin at this point.  Moderate tricuspid valve regurgitation with mild pulmonary hypertension.  3.  IV drug abuse abuse.  Heroin.  4.  Hepatitis C  5.  Medical  noncompliance.  6.  Anemia of chronic disease.  No clear evidence of hemolysis.  Haptoglobin normal LDH mildly elevated.  7.  Thrombo-cytopenia  8.  protein calorie malnutrition with albumin 2.1.  9.  Hypocalcemia.  Corrected calcium to albumin is acceptable    PLAN:  Kidney function continues to improve and creatinine is down to 2.8  CARMEN profile is still pending  Will DC IV fluids given improved oral intake  Labs in a.m.  Continue oral sodium bicarbonate      Thank you for involving us in the care of Sammie Landeros.  Please feel free to call with any questions.    Elvira Coleman MD  12/10/23  15:31 [facility]    [facility]  [phone]    Please note that portions of this note were completed with a voice recognition program.

## 2023-12-11 VITALS
WEIGHT: 254.41 LBS | SYSTOLIC BLOOD PRESSURE: 142 MMHG | RESPIRATION RATE: 18 BRPM | HEART RATE: 99 BPM | OXYGEN SATURATION: 95 % | DIASTOLIC BLOOD PRESSURE: 79 MMHG | TEMPERATURE: 97.9 F | HEIGHT: 69 IN | BODY MASS INDEX: 37.68 KG/M2

## 2023-12-11 LAB
CENTROMERE B AB SER-ACNC: <0.2 AI (ref 0–0.9)
CHROMATIN AB SERPL-ACNC: <0.2 AI (ref 0–0.9)
DSDNA AB SER-ACNC: 13 IU/ML (ref 0–9)
ENA JO1 AB SER-ACNC: <0.2 AI (ref 0–0.9)
ENA RNP AB SER-ACNC: <0.2 AI (ref 0–0.9)
ENA SCL70 AB SER-ACNC: <0.2 AI (ref 0–0.9)
ENA SM AB SER-ACNC: <0.2 AI (ref 0–0.9)
ENA SM+RNP AB SER-ACNC: <0.2 AI (ref 0–0.9)
ENA SS-A AB SER-ACNC: <0.2 AI (ref 0–0.9)
ENA SS-B AB SER-ACNC: <0.2 AI (ref 0–0.9)
Lab: ABNORMAL
RIBOSOMAL P AB SER-ACNC: 0.2 AI (ref 0–0.9)

## 2023-12-11 PROCEDURE — 25010000002 VANCOMYCIN 750 MG RECONSTITUTED SOLUTION 1 EACH VIAL: Performed by: INTERNAL MEDICINE

## 2023-12-11 PROCEDURE — 25810000003 SODIUM CHLORIDE 0.9 % SOLUTION 250 ML FLEX CONT: Performed by: INTERNAL MEDICINE

## 2023-12-11 RX ORDER — SODIUM BICARBONATE 650 MG/1
1300 TABLET ORAL 3 TIMES DAILY
Qty: 120 TABLET | Refills: 0 | Status: SHIPPED | OUTPATIENT
Start: 2023-12-11

## 2023-12-11 RX ORDER — DOXYCYCLINE HYCLATE 100 MG/1
100 CAPSULE ORAL 2 TIMES DAILY
Qty: 84 CAPSULE | Refills: 0 | Status: SHIPPED | OUTPATIENT
Start: 2023-12-11

## 2023-12-11 RX ADMIN — BUSPIRONE HYDROCHLORIDE 5 MG: 5 TABLET ORAL at 11:06

## 2023-12-11 RX ADMIN — BUPRENORPHINE AND NALOXONE 2 FILM: 8; 2 FILM BUCCAL; SUBLINGUAL at 11:06

## 2023-12-11 RX ADMIN — FERROUS SULFATE TAB 325 MG (65 MG ELEMENTAL FE) 325 MG: 325 (65 FE) TAB at 11:06

## 2023-12-11 RX ADMIN — VANCOMYCIN HYDROCHLORIDE 750 MG: 750 INJECTION, POWDER, LYOPHILIZED, FOR SOLUTION INTRAVENOUS at 09:00

## 2023-12-11 RX ADMIN — SODIUM BICARBONATE 1300 MG: 650 TABLET ORAL at 11:06

## 2023-12-11 RX ADMIN — PANTOPRAZOLE SODIUM 40 MG: 40 TABLET, DELAYED RELEASE ORAL at 11:06

## 2023-12-11 NOTE — PROGRESS NOTES
"[facility] Clinical Pharmacy Services: Vancomycin Monitoring Note    Sammie Landeros is a 33 y.o. female who is on day 10/42 of pharmacy to dose vancomycin for MRSA septicemia and endocarditis.    Previous Vancomycin Dose: Intermittent dosing    Updated Cultures and Sensitivities:   12/2: BCx-MRSA (2/2 bottles)  12/3: Chlamydia-negative  12/3: Vaginal swab-4+ yeast  12/4: BCx-negative  12/4: Vaginal Candida-positive    Results from last 7 days   Lab Units 12/10/23  1801 12/10/23  0403 12/09/23  0521   VANCOMYCIN RM mcg/mL 21.30 12.50 12.50     Vitals/Labs  Ht: 175.3 cm (69\"); Wt: 115 kg (254 lb 6.6 oz)   Temp Readings from Last 1 Encounters:   12/11/23 97.9 °F (36.6 °C) (Oral)     Estimated Creatinine Clearance: 37.7 mL/min (A) (by C-G formula based on SCr of 2.82 mg/dL (H)).     Results from last 7 days   Lab Units 12/10/23  0403 12/09/23  0521 12/08/23  1606 12/08/23  0537   CREATININE mg/dL 2.82* 3.93*  --  4.48*   WBC 10*3/mm3 4.89 4.42 4.73  --      Assessment/Plan    Current Vancomycin Dose: Patient received a dose of 750 mg IV once this morning at 0900. Since renal function is unstable, will continue with intermittent dosing. Goal vancomycin level is 15-20 mcg/mL.  Next Level Date and Time: Vanc Random has been scheduled for tomorrow morning 12/12 with am labs.  We will continue to monitor patient changes and renal function-SCr is trending down, and is now 2.82. CMP has been ordered daily.     Thank you for involving pharmacy in this patient's care. Please contact pharmacy with any questions or concerns.       Komal Hameed, Pharm.D., [facility]   Clinical Pharmacist  "

## 2023-12-11 NOTE — PAYOR COMM NOTE
"Sammie bAraham (33 y.o. Female)     PLEASE SEE ATTACHED FOR DC NOTICE    REF #  [id number]     THANK YOU  FREDI MAHAJAN RN/ DEPT  [facility]   [phone]  [phone]        Date of Birth   1990    Social Security Number       Address   [address] [address]    Home Phone   [phone]    MRN   [id number]       Confucianism   Mandaeism    Marital Status   Single                            Admission Date   23    Admission Type   Emergency    Admitting Provider   Jovanni Ibarra MD    Attending Provider       Department, Room/Bed   [address], N2/       Discharge Date   2023    Discharge Disposition   Left Against Medical Advice    Discharge Destination                                 Attending Provider: (none)   Allergies: No Known Allergies    Isolation: Contact   Infection: MRSA (23)   Code Status: CPR    Ht: 175.3 cm (69\")   Wt: 115 kg (254 lb 6.6 oz)    Admission Cmt: None   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]                   Active Insurance as of 2023       Primary Coverage       Payor Plan Insurance Group Employer/Plan Group    [facility] BY [facility]ONA [id number]       Payor Plan Address Payor Plan Phone Number Payor Plan Fax Number Effective Dates    [address] [address]   2022 - None Entered    [facility] [phone]         Subscriber Name Subscriber Birth Date Member ID       SAMMIE ABRAHAM 1990 [id number]                     Emergency Contacts        (Rel.) Home Phone Work Phone Mobile Phone    Melvi Abraham (Mother) [phone] -- --              [place]: [facility] [phone]  Tax ID [id number]     Discharge Summary        Jayant Rose MD at 23 1208                 NAME: Sammie Abraham ADMIT: 2023   : 1990  PCP: Melvi Abraham APRN    MRN: [id number] LOS: 9 days   AGE/SEX: 33 y.o. female  ROOM: [facility]     Date of Admission: "  12/2/2023  Date of Discharge:  12/11/2023    PCP: Melvi Landeros APRN    CHIEF COMPLAINT  Drug Overdose and Hypoglycemia      DISCHARGE DIAGNOSIS  Active Hospital Problems    Diagnosis  POA    **Endocarditis of tricuspid valve [I07.9]  Yes    Elevated LFTs [R79.89]  Yes    Thrombocytopenia [D69.6]  Yes    GERD without esophagitis [K21.9]  Yes    Shock, septic [A41.9, R65.21]  Yes    MRSA bacteremia [R78.81, B95.62]  Yes    ANTONELLA (acute kidney injury) [N17.9]  Yes    Anemia, chronic disease [D63.8]  Yes    Polysubstance abuse [F19.10]  Yes      Resolved Hospital Problems   No resolved problems to display.       SECONDARY DIAGNOSES  Past Medical History:   Diagnosis Date    Drug abuse     Hepatitis C     Hyperlipidemia     Nausea vomiting and diarrhea 06/03/2023       CONSULTS   Nephrology  CT Surgery  ID  Psychiatry     HOSPITAL COURSE  Patient is a 33 y.o. female with history of polysubstance abuse known tricuspid valve endocarditis, hepatitis C, cirrhosis and other medical issues.  She was recently admitted to [facility] but had left AMA.  She was found to have MRSA bacteremia and endocarditis and was found to be using IV drugs while admitted.  She presented with recurrent sepsis related to her endocarditis.     She was given IV antibiotics and seen by cardiothoracic surgery.  They recommended medical management with antibiotic therapy and did not feel she was a surgical candidate especially as she was not abstaining from substances and felt the vegetation to be small.    She was also treated for acute renal failure on top of her liver disease and other medical issues while here.  She had issues with noncompliance during some of the hospitalization refusing medications as well as labs at times.  At this point she wishes to leave the hospital.  This is AGAINST MEDICAL ADVICE as the ideal therapy for her endocarditis is IV antibiotics in the hospital followed by IV antibiotics at a skilled nursing facility but she does  not wish to stay in the hospital longer and does not wish to go to a nursing facility.    Even though this is not ideal therapy, to hopefully decrease her risk of recurrent sepsis I will prescribe her p.o. doxycycline antibiotic.  I discussed with her that this is not ideal therapy to truly treat her endocarditis.  She still has an element of kidney failure and metabolic acidosis so prescribing p.o. bicarbonate which she has been receiving here and she should follow-up with nephrology closely as outpatient to monitor her renal function as well as she should see her primary care physician and her other providers. She does have some abnormalities in anca panel as well as CARMEN/RA. Should follow up with Melvi Landeros APRN. Could be related to her renal failure and hepatitis but also autoimmute but not candidate for steroids at this time. Again she is leaving AMA.       DIAGNOSTICS    12/02/2023 1115 12/06/2023 0625 Blood Culture - Blood, Arm, Right [[id number]]     (Abnormal)   Blood from Arm, Right    Edited Result - FINAL Component Value   Blood Culture Staphylococcus aureus, MRSA Critical       Infectious disease consultation is highly recommended to rule out distant foci of infection.  Methicillin resistant Staphylococcus aureus, Patient may be an isolation risk.   Isolated from Aerobic Bottle   Gram Stain Aerobic Bottle Gram positive cocci in clusters Critical        Susceptibility     Staphylococcus aureus, MRSA     SARAH     Gentamicin <=0.5 Susceptible     Oxacillin >=4 Resistant     Rifampin <=0.5 Susceptible     Vancomycin <=0.5 Susceptible               Linear View     Susceptibility Comments    Staphylococcus aureus, MRSA   This isolate does not demonstrate inducible clindamycin resistance in vitro.         12/02/2023 1115 12/03/2023 0040 Blood Culture ID, PCR - Blood, Arm, Right [[id number]]    (Abnormal)   Blood from Arm, Right    Final result Component Value   BCID, PCR Staph aureus. mecA/C and MREJ  (methicillin resistance gene) detected. Identification by BCID2 PCR. Critical    BCID, PCR 2 Streptococcus spp, not A, B, or pneumoniae. Identification by BCID2 PCR. Abnormal    BOTTLE TYPE Aerobic Bottle         Duplex Venous Lower Extremity - Right CAR [[id number]] Juan Ramon as Reviewed   Order Status: Completed Resulted: 12/08/23 1802    Updated: 12/08/23 1806    Right Common Femoral Spont Y    Right Common Femoral Competent Y    Right Common Femoral Phasic Y    Right Common Femoral Compress C    Right Common Femoral Augment Y    Right Saphenofemoral Junction Compress C    Right Profunda Femoral Compress C    Right Proximal Femoral Compress C    Right Mid Femoral Spont Y    Right Mid Femoral Competent Y    Right Mid Femoral Phasic Y    Right Mid Femoral Compress C    Right Mid Femoral Augment Y    Right Distal Femoral Spont Y    Right Distal Femoral Competent Y    Right Distal Femoral Phasic Y    Right Distal Femoral Augment Y    Right Popliteal Spont Y    Right Popliteal Competent Y    Right Popliteal Phasic Y    Right Popliteal Compress C    Right Popliteal Augment Y    Right Posterior Tibial Compress C    Right Peroneal Compress C    Right Gastronemius Compress C    Right Greater Saph AK Compress C    Right Greater Saph BK Compress C    Right Lesser Saph Compress C    Left Common Femoral Spont Y    Left Common Femoral Competent Y    Left Common Femoral Phasic Y    Left Common Femoral Augment Y    Left Saphenofemoral Junction Compress C   Narrative:       Normal right lower extremity venous duplex scan.    XR Chest Post CVA Port [[id number]] Juan Ramon as Reviewed   Order Status: Completed Collected: 12/02/23 2319    Updated: 12/02/23 2323   Narrative:     XR CHEST POST CVA PORT-     HISTORY: Central line placement.     COMPARISON: Chest radiograph 12/2/2023     FINDINGS:    A single view of the chest was obtained. There is a new right IJ central  line with the tip projecting over the expected location of the  superior  cavoatrial junction. The cardiac silhouette is upper normal to mildly  enlarged. There are low lung volumes. There is no new focal  consolidation. There is no definite pneumothorax. The visualized osseous  structures are unchanged.     This report was finalized on 12/2/2023 11:20 PM by Dr. Darleen Santoyo M.D  on Workstation: [id number]     /10/2023 1801 12/10/2023 1831 Vancomycin, Random [[id number]]    Blood    Final result Component Value Units   Vancomycin Random 21.30 mcg/mL          12/10/2023 0403 12/10/2023 0443 Comprehensive Metabolic Panel [[id number]]    (Abnormal)   Blood    Final result Component Value Units   Glucose 88 mg/dL   BUN 47 High  mg/dL   Creatinine 2.82 High  mg/dL   Sodium 140 mmol/L   Potassium 4.2 mmol/L   Chloride 108 High  mmol/L   CO2 21.7 Low  mmol/L   Calcium 7.5 Low  mg/dL   Total Protein 6.5 g/dL   Albumin 2.3 Low  g/dL   ALT (SGPT) 37 High  U/L   AST (SGOT) 59 High  U/L   Alkaline Phosphatase 431 High  U/L   Total Bilirubin 1.0 mg/dL   Globulin 4.2 gm/dL   A/G Ratio 0.5 g/dL   BUN/Creatinine Ratio 16.7    Anion Gap 10.3 mmol/L   eGFR 21.8 Low  mL/min/1.73          12/10/2023 0403 12/10/2023 0443 Vancomycin, Random [[id number]]    Blood    Final result Component Value Units   Vancomycin Random 12.50 mcg/mL          12/10/2023 0403 12/10/2023 0425 CBC Auto Differential [[id number]]   (Abnormal)   Blood    Final result Component Value Units   WBC 4.89 10*3/mm3   RBC 3.03 Low  10*6/mm3   Hemoglobin 7.1 Low  g/dL   Hematocrit 22.3 Low  %   MCV 73.6 Low  fL   MCH 23.4 Low  pg   MCHC 31.8 g/dL   RDW 17.9 High  %   RDW-SD 44.4 fl   MPV 9.6 fL   Platelets 81 Low  10*3/mm3   Neutrophil % 56.1 %   Lymphocyte % 29.2 %   Monocyte % 11.5 %   Eosinophil % 2.0 %   Basophil % 0.4 %   Neutrophils, Absolute 2.74 10*3/mm3   Lymphocytes, Absolute 1.43 10*3/mm3   Monocytes, Absolute 0.56 10*3/mm3   Eosinophils, Absolute 0.10 10*3/mm3        2/06/2023 2350 12/07/2023 0026 Microalbumin / Creatinine  "Urine Ratio - Urine, Clean Catch [[id number]]   (Abnormal)   Urine, Clean Catch    Final result Component Value Units   Microalbumin/Creatinine Ratio 84.6 High  mg/g   Creatinine, Urine 27.2 mg/dL   Microalbumin, Urine 2.3 mg/dL          12/06/2023 2350 12/07/2023 0040 Urinalysis, Microscopic Only - Urine, Clean Catch [[id number]]   (Abnormal)   Urine, Clean Catch    Final result Component Value Units   RBC, UA 11-20 Abnormal  /HPF   WBC, UA 21-50 Abnormal  /HPF   Bacteria, UA None Seen /HPF   Squamous Epithelial Cells, UA 3-6 Abnormal  /HPF   Transitional Epithelial Cells, UA 0-2 /HPF   Hyaline Casts, UA 0-2 /LPF   Methodology Manual Light Microscopy           12/06/2023 1827 12/07/2023 1116 C3+C4+CARMEN+RA [[id number]]    (Abnormal)   Blood    Final result Component Value Units   C3 Complement 71 Low  mg/dL   C4 Complement 16 mg/dL   CARMEN Direct Positive Abnormal     RA Latex Turbid 43.3 High  IU/mL          12/06/2023 1827 12/07/2023 2107 ANCA Panel [[id number]]    (Abnormal)   Blood    Final result Component Value Units   ANTI-MPO ANTIBODIES <0.2 units   ANTI-PR3 ANTIBODIES <0.2 units   C-ANCA 1:20 High  titer   P-ANCA <1:20  titer   Atypical pANCA <1:20  titer          12/06/2023 1827 12/07/2023 2008 Glomerular Basement Membrane Antibodies [[id number]]    Blood    Final result Component Value Units   Glomerular Basement Membrane Ab <0.2 units         EXAM  Objective:  Vital signs: (most recent): Blood pressure 142/79, pulse 99, temperature 97.9 °F (36.6 °C), temperature source Oral, resp. rate 18, height 175.3 cm (69\"), weight 115 kg (254 lb 6.6 oz), last menstrual period 11/22/2023, SpO2 95%, not currently breastfeeding.                Alert  Oriented x 3  No resp distress  +LE edema  Very chronically ill  Wishing to be discharged      CONDITION ON DISCHARGE  Stable.      DISCHARGE DISPOSITION   Left Against Medical Advice      DISCHARGE MEDICATIONS       Your medication list        START taking these medications       "  Instructions Last Dose Given Next Dose Due   doxycycline 100 MG capsule  Commonly known as: VIBRAMYCIN      Take 1 capsule by mouth 2 (Two) Times a Day.       sodium bicarbonate 650 MG tablet      Take 2 tablets by mouth 3 (Three) Times a Day.              CONTINUE taking these medications        Instructions Last Dose Given Next Dose Due   busPIRone 5 MG tablet  Commonly known as: BUSPAR      Take 1 tablet by mouth 3 (Three) Times a Day.       pantoprazole 40 MG EC tablet  Commonly known as: PROTONIX      Take 1 tablet by mouth Every Morning.              STOP taking these medications      furosemide 40 MG tablet  Commonly known as: LASIX        metoprolol tartrate 25 MG tablet  Commonly known as: LOPRESSOR        Senexon-S 8.6-50 MG per tablet  Generic drug: sennosides-docusate        vitamin D 1.25 MG (45333 UT) capsule capsule  Commonly known as: ERGOCALCIFEROL                  Where to Get Your Medications        These medications were sent to [facility] [place] [id number]      Hours: Monday to Friday 7 AM to 6 PM, Saturday & Sunday 8 AM to 4:30 PM (Closed 12 PM to 12:30 PM) Phone: [phone]   doxycycline 100 MG capsule  sodium bicarbonate 650 MG tablet        No future appointments.  Additional Instructions for the Follow-ups that You Need to Schedule       Discharge Follow-up with Specialty: PCP in 1 week, Nephrology in 1-2 weeks, CT Surgery in 4 weeks   As directed      Specialty: PCP in 1 week, Nephrology in 1-2 weeks, CT Surgery in 4 weeks               Follow-up Information       Melvi Landeros APRN .    Specialty: Nurse Practitioner  Contact information:  [address] [place] [address] [address] [place] [place] [address] [phone]                             TEST  RESULTS PENDING AT DISCHARGE           Jayant Rose MD  [facility]  12/11/23  12:17 EST      Time: greater than 40 minutes on discharge        Electronically signed by Jayant Rose MD  at 12/11/23 1220       Discharge Order (From admission, onward)       Start     Ordered    12/11/23 1155  Discharge patient  Once        Expected Discharge Date: 12/11/23   Discharge Disposition: Left Against Medical Advice   Physician of Record for Attribution - Please select from Treatment Team: YAZAN BOURNE [[id number]]   Review needed by CMO to determine Physician of Record: No      Question Answer Comment   Physician of Record for Attribution - Please select from Treatment Team YAZAN BOURNE    Review needed by CMO to determine Physician of Record No        12/11/23 1157

## 2023-12-11 NOTE — PLAN OF CARE
Goal Outcome Evaluation:      Pt did agree to having her vitals taken once during the shift. Pt refused am lab draw. Pt did agree to allow RN to change her bed sheets. Pt said she was getting tired of being in the hospital and wants to go home but does not want to leave AMA. Vitals WDL's and sinus rhythm on the monitor. Pt refusing all am medications.

## 2023-12-11 NOTE — DISCHARGE SUMMARY
NAME: Sammie Landeros ADMIT: 2023   : 1990  PCP: Melvi Landeros APRN    MRN: [id number] LOS: 9 days   AGE/SEX: 33 y.o. female  ROOM: [facility]     Date of Admission:  2023  Date of Discharge:  2023    PCP: Melvi Landeros APRN    CHIEF COMPLAINT  Drug Overdose and Hypoglycemia      DISCHARGE DIAGNOSIS  Active Hospital Problems    Diagnosis  POA    **Endocarditis of tricuspid valve [I07.9]  Yes    Elevated LFTs [R79.89]  Yes    Thrombocytopenia [D69.6]  Yes    GERD without esophagitis [K21.9]  Yes    Shock, septic [A41.9, R65.21]  Yes    MRSA bacteremia [R78.81, B95.62]  Yes    ANTONELLA (acute kidney injury) [N17.9]  Yes    Anemia, chronic disease [D63.8]  Yes    Polysubstance abuse [F19.10]  Yes      Resolved Hospital Problems   No resolved problems to display.       SECONDARY DIAGNOSES  Past Medical History:   Diagnosis Date    Drug abuse     Hepatitis C     Hyperlipidemia     Nausea vomiting and diarrhea 2023       CONSULTS   Nephrology  CT Surgery  ID  Psychiatry     HOSPITAL COURSE  Patient is a 33 y.o. female with history of polysubstance abuse known tricuspid valve endocarditis, hepatitis C, cirrhosis and other medical issues.  She was recently admitted to [facility] but had left AMA.  She was found to have MRSA bacteremia and endocarditis and was found to be using IV drugs while admitted.  She presented with recurrent sepsis related to her endocarditis.     She was given IV antibiotics and seen by cardiothoracic surgery.  They recommended medical management with antibiotic therapy and did not feel she was a surgical candidate especially as she was not abstaining from substances and felt the vegetation to be small.    She was also treated for acute renal failure on top of her liver disease and other medical issues while here.  She had issues with noncompliance during some of the hospitalization refusing medications as well as labs at times.  At this point she wishes to leave the hospital.   This is AGAINST MEDICAL ADVICE as the ideal therapy for her endocarditis is IV antibiotics in the hospital followed by IV antibiotics at a skilled nursing facility but she does not wish to stay in the hospital longer and does not wish to go to a nursing facility.    Even though this is not ideal therapy, to hopefully decrease her risk of recurrent sepsis I will prescribe her p.o. doxycycline antibiotic.  I discussed with her that this is not ideal therapy to truly treat her endocarditis.  She still has an element of kidney failure and metabolic acidosis so prescribing p.o. bicarbonate which she has been receiving here and she should follow-up with nephrology closely as outpatient to monitor her renal function as well as she should see her primary care physician and her other providers. She does have some abnormalities in anca panel as well as CARMEN/RA. Should follow up with Melvi Landeros APRN. Could be related to her renal failure and hepatitis but also autoimmute but not candidate for steroids at this time. Again she is leaving AMA.       DIAGNOSTICS    12/02/2023 1115 12/06/2023 0625 Blood Culture - Blood, Arm, Right [[id number]]     (Abnormal)   Blood from Arm, Right    Edited Result - FINAL Component Value   Blood Culture Staphylococcus aureus, MRSA Critical       Infectious disease consultation is highly recommended to rule out distant foci of infection.  Methicillin resistant Staphylococcus aureus, Patient may be an isolation risk.   Isolated from Aerobic Bottle   Gram Stain Aerobic Bottle Gram positive cocci in clusters Critical        Susceptibility     Staphylococcus aureus, MRSA     SARAH     Gentamicin <=0.5 Susceptible     Oxacillin >=4 Resistant     Rifampin <=0.5 Susceptible     Vancomycin <=0.5 Susceptible               Linear View     Susceptibility Comments    Staphylococcus aureus, MRSA   This isolate does not demonstrate inducible clindamycin resistance in vitro.         12/02/2023 1115 12/03/2023  0040 Blood Culture ID, PCR - Blood, Arm, Right [[id number]]    (Abnormal)   Blood from Arm, Right    Final result Component Value   BCID, PCR Staph aureus. mecA/C and MREJ (methicillin resistance gene) detected. Identification by BCID2 PCR. Critical    BCID, PCR 2 Streptococcus spp, not A, B, or pneumoniae. Identification by BCID2 PCR. Abnormal    BOTTLE TYPE Aerobic Bottle         Duplex Venous Lower Extremity - Right CAR [[id number]] Juan Ramon as Reviewed   Order Status: Completed Resulted: 12/08/23 1802    Updated: 12/08/23 1806    Right Common Femoral Spont Y    Right Common Femoral Competent Y    Right Common Femoral Phasic Y    Right Common Femoral Compress C    Right Common Femoral Augment Y    Right Saphenofemoral Junction Compress C    Right Profunda Femoral Compress C    Right Proximal Femoral Compress C    Right Mid Femoral Spont Y    Right Mid Femoral Competent Y    Right Mid Femoral Phasic Y    Right Mid Femoral Compress C    Right Mid Femoral Augment Y    Right Distal Femoral Spont Y    Right Distal Femoral Competent Y    Right Distal Femoral Phasic Y    Right Distal Femoral Augment Y    Right Popliteal Spont Y    Right Popliteal Competent Y    Right Popliteal Phasic Y    Right Popliteal Compress C    Right Popliteal Augment Y    Right Posterior Tibial Compress C    Right Peroneal Compress C    Right Gastronemius Compress C    Right Greater Saph AK Compress C    Right Greater Saph BK Compress C    Right Lesser Saph Compress C    Left Common Femoral Spont Y    Left Common Femoral Competent Y    Left Common Femoral Phasic Y    Left Common Femoral Augment Y    Left Saphenofemoral Junction Compress C   Narrative:       Normal right lower extremity venous duplex scan.    XR Chest Post CVA Port [[id number]] Juan Ramon as Reviewed   Order Status: Completed Collected: 12/02/23 2319    Updated: 12/02/23 2323   Narrative:     XR CHEST POST CVA PORT-     HISTORY: Central line placement.     COMPARISON: Chest radiograph  12/2/2023     FINDINGS:    A single view of the chest was obtained. There is a new right IJ central  line with the tip projecting over the expected location of the superior  cavoatrial junction. The cardiac silhouette is upper normal to mildly  enlarged. There are low lung volumes. There is no new focal  consolidation. There is no definite pneumothorax. The visualized osseous  structures are unchanged.     This report was finalized on 12/2/2023 11:20 PM by Dr. Darleen Santoyo M.D  on Workstation: [id number]     /10/2023 1801 12/10/2023 1831 Vancomycin, Random [[id number]]    Blood    Final result Component Value Units   Vancomycin Random 21.30 mcg/mL          12/10/2023 0403 12/10/2023 0443 Comprehensive Metabolic Panel [[id number]]    (Abnormal)   Blood    Final result Component Value Units   Glucose 88 mg/dL   BUN 47 High  mg/dL   Creatinine 2.82 High  mg/dL   Sodium 140 mmol/L   Potassium 4.2 mmol/L   Chloride 108 High  mmol/L   CO2 21.7 Low  mmol/L   Calcium 7.5 Low  mg/dL   Total Protein 6.5 g/dL   Albumin 2.3 Low  g/dL   ALT (SGPT) 37 High  U/L   AST (SGOT) 59 High  U/L   Alkaline Phosphatase 431 High  U/L   Total Bilirubin 1.0 mg/dL   Globulin 4.2 gm/dL   A/G Ratio 0.5 g/dL   BUN/Creatinine Ratio 16.7    Anion Gap 10.3 mmol/L   eGFR 21.8 Low  mL/min/1.73          12/10/2023 0403 12/10/2023 0443 Vancomycin, Random [[id number]]    Blood    Final result Component Value Units   Vancomycin Random 12.50 mcg/mL          12/10/2023 0403 12/10/2023 0425 CBC Auto Differential [[id number]]   (Abnormal)   Blood    Final result Component Value Units   WBC 4.89 10*3/mm3   RBC 3.03 Low  10*6/mm3   Hemoglobin 7.1 Low  g/dL   Hematocrit 22.3 Low  %   MCV 73.6 Low  fL   MCH 23.4 Low  pg   MCHC 31.8 g/dL   RDW 17.9 High  %   RDW-SD 44.4 fl   MPV 9.6 fL   Platelets 81 Low  10*3/mm3   Neutrophil % 56.1 %   Lymphocyte % 29.2 %   Monocyte % 11.5 %   Eosinophil % 2.0 %   Basophil % 0.4 %   Neutrophils, Absolute 2.74 10*3/mm3   Lymphocytes,  "Absolute 1.43 10*3/mm3   Monocytes, Absolute 0.56 10*3/mm3   Eosinophils, Absolute 0.10 10*3/mm3        2/06/2023 2350 12/07/2023 0026 Microalbumin / Creatinine Urine Ratio - Urine, Clean Catch [[id number]]   (Abnormal)   Urine, Clean Catch    Final result Component Value Units   Microalbumin/Creatinine Ratio 84.6 High  mg/g   Creatinine, Urine 27.2 mg/dL   Microalbumin, Urine 2.3 mg/dL          12/06/2023 2350 12/07/2023 0040 Urinalysis, Microscopic Only - Urine, Clean Catch [[id number]]   (Abnormal)   Urine, Clean Catch    Final result Component Value Units   RBC, UA 11-20 Abnormal  /HPF   WBC, UA 21-50 Abnormal  /HPF   Bacteria, UA None Seen /HPF   Squamous Epithelial Cells, UA 3-6 Abnormal  /HPF   Transitional Epithelial Cells, UA 0-2 /HPF   Hyaline Casts, UA 0-2 /LPF   Methodology Manual Light Microscopy           12/06/2023 1827 12/07/2023 1116 C3+C4+CARMEN+RA [[id number]]    (Abnormal)   Blood    Final result Component Value Units   C3 Complement 71 Low  mg/dL   C4 Complement 16 mg/dL   CARMEN Direct Positive Abnormal     RA Latex Turbid 43.3 High  IU/mL          12/06/2023 1827 12/07/2023 2107 ANCA Panel [[id number]]    (Abnormal)   Blood    Final result Component Value Units   ANTI-MPO ANTIBODIES <0.2 units   ANTI-PR3 ANTIBODIES <0.2 units   C-ANCA 1:20 High  titer   P-ANCA <1:20  titer   Atypical pANCA <1:20  titer          12/06/2023 1827 12/07/2023 2008 Glomerular Basement Membrane Antibodies [[id number]]    Blood    Final result Component Value Units   Glomerular Basement Membrane Ab <0.2 units         EXAM  Objective:  Vital signs: (most recent): Blood pressure 142/79, pulse 99, temperature 97.9 °F (36.6 °C), temperature source Oral, resp. rate 18, height 175.3 cm (69\"), weight 115 kg (254 lb 6.6 oz), last menstrual period 11/22/2023, SpO2 95%, not currently breastfeeding.                Alert  Oriented x 3  No resp distress  +LE edema  Very chronically ill  Wishing to be discharged      CONDITION ON " DISCHARGE  Stable.      DISCHARGE DISPOSITION   Left Against Medical Advice      DISCHARGE MEDICATIONS       Your medication list        START taking these medications        Instructions Last Dose Given Next Dose Due   doxycycline 100 MG capsule  Commonly known as: VIBRAMYCIN      Take 1 capsule by mouth 2 (Two) Times a Day.       sodium bicarbonate 650 MG tablet      Take 2 tablets by mouth 3 (Three) Times a Day.              CONTINUE taking these medications        Instructions Last Dose Given Next Dose Due   busPIRone 5 MG tablet  Commonly known as: BUSPAR      Take 1 tablet by mouth 3 (Three) Times a Day.       pantoprazole 40 MG EC tablet  Commonly known as: PROTONIX      Take 1 tablet by mouth Every Morning.              STOP taking these medications      furosemide 40 MG tablet  Commonly known as: LASIX        metoprolol tartrate 25 MG tablet  Commonly known as: LOPRESSOR        Senexon-S 8.6-50 MG per tablet  Generic drug: sennosides-docusate        vitamin D 1.25 MG (75856 UT) capsule capsule  Commonly known as: ERGOCALCIFEROL                  Where to Get Your Medications        These medications were sent to [facility] [id number]      Hours: Monday to Friday 7 AM to 6 PM, Saturday & Sunday 8 AM to 4:30 PM (Closed 12 PM to 12:30 PM) Phone: [phone]   doxycycline 100 MG capsule  sodium bicarbonate 650 MG tablet        No future appointments.  Additional Instructions for the Follow-ups that You Need to Schedule       Discharge Follow-up with Specialty: PCP in 1 week, Nephrology in 1-2 weeks, CT Surgery in 4 weeks   As directed      Specialty: PCP in 1 week, Nephrology in 1-2 weeks, CT Surgery in 4 weeks               Follow-up Information       Melvi Landeros APRN .    Specialty: Nurse Practitioner  Contact information:  [address] [place] [address] [address] [place] [address] [phone]                             TEST  RESULTS PENDING AT DISCHARGE           Jayant Cruz  MD [place]  [facility]  12/11/23  12:17 EST      Time: greater than 40 minutes on discharge

## 2023-12-11 NOTE — PROGRESS NOTES
[facility] Progress Note      Patient Name: Sammie Landeros  : 1990  MRN: [id number]  Primary Care Physician:  Melvi Landeros APRN  Date of admission: 2023    Subjective     Interval History:   Follow-up acute kidney injury.      Seen and examined.  Denies any chest pain and no shortness of breath.  Wants to go home.  Review of Systems:   As noted above    Objective     Vitals:   Temp:  [97.9 °F (36.6 °C)-98.9 °F (37.2 °C)] 97.9 °F (36.6 °C)  Heart Rate:  [99] 99  Resp:  [18] 18  BP: (111-142)/(59-79) 142/79    Intake/Output Summary (Last 24 hours) at 2023 0831  Last data filed at 2023 0154  Gross per 24 hour   Intake 580 ml   Output --   Net 580 ml       Physical Exam:    General Appearance: Ill looking   Skin: warm and dry  Neck: supple, no JVD  Lungs clear to auscultation bilaterally.  Heart: RRR, normal S1 and S2  Abdomen: soft, nontender,distended.+bs.  The wall edema  : no palpable bladder  Extremities:  upper and lower extremity edema improved  Neuro: Answers yes/no questions appropriately.  Does not change position.    Scheduled Meds:     buprenorphine-naloxone, 2 film, Sublingual, Daily  busPIRone, 5 mg, Oral, TID  ferrous sulfate, 325 mg, Oral, Daily With Breakfast  heparin (porcine), 5,000 Units, Subcutaneous, Q8H  pantoprazole, 40 mg, Oral, Q AM  sodium bicarbonate, 1,300 mg, Oral, TID  sodium chloride, 10 mL, Intravenous, Q12H  sodium chloride, 10 mL, Intravenous, Q12H  sodium chloride, 10 mL, Intravenous, Q12H  sodium chloride, 10 mL, Intravenous, Q12H  sodium chloride, 10 mL, Intravenous, Q12H  vancomycin, 750 mg, Intravenous, Once  Vancomycin Pharmacy Intermittent/Pulse Dosing, , Does not apply, Daily      IV Meds:   Pharmacy to dose vancomycin,         Results Reviewed:   I have personally reviewed the results from the time of this admission to 2023 08:31 EST     Results from last 7 days   Lab Units 12/10/23  0403 23  0555  12/08/23 2108 12/08/23  0537   SODIUM mmol/L 140 138  --  137   POTASSIUM mmol/L 4.2 3.8 4.0 3.8   CHLORIDE mmol/L 108* 108*  --  111*   CO2 mmol/L 21.7* 18.6*  --  15.0*   BUN mg/dL 47* 40*  --  54*   CREATININE mg/dL 2.82* 3.93*  --  4.48*   CALCIUM mg/dL 7.5* 7.5*  --  7.6*   BILIRUBIN mg/dL 1.0 1.0  --  1.0   ALK PHOS U/L 431* 442*  --  412*   ALT (SGPT) U/L 37* 32  --  29   AST (SGOT) U/L 59* 54*  --  42*   GLUCOSE mg/dL 88 114*  --  114*       Estimated Creatinine Clearance: 37.7 mL/min (A) (by C-G formula based on SCr of 2.82 mg/dL (H)).    Results from last 7 days   Lab Units 12/10/23  0403 12/09/23  0521 12/08/23 2108 12/08/23  0537   MAGNESIUM mg/dL  --   --  1.5* 2.6   PHOSPHORUS mg/dL 4.8* 4.5  --  5.2*               Results from last 7 days   Lab Units 12/10/23  0403 12/09/23  0521 12/08/23  1606 12/07/23  0641 12/06/23  0614   WBC 10*3/mm3 4.89 4.42 4.73 4.73 4.57   HEMOGLOBIN g/dL 7.1* 7.3* 7.9* 8.1* 7.5*   PLATELETS 10*3/mm3 81* 79* 86* 83* 70*             Assessment / Plan     ASSESSMENT:  Acute kidney injury on CKD with unknown recent baseline., nonoliguric.  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  30 milligrams per deciliter proteinuria.  C3 hypocomplementemia.  Positive CARMEN and  RA.  Her creatinine is trending down calcium normal.  Edema in part due to hypoalbuminemia.  2.  MRSA bacteremia with recurrent tricuspid valve endocarditis.  Currently on vancomycin IV.  Given the plateau of her creatinine I would not change the vancomycin at this point.  Moderate tricuspid valve regurgitation with mild pulmonary hypertension.  3.  IV drug abuse abuse.  Heroin.  4.  Hepatitis C  5.  Medical noncompliance.  6.  Anemia of chronic disease.  No clear evidence of hemolysis.  Haptoglobin normal LDH mildly elevated.  7.  Thrombo-cytopenia  8.  protein calorie malnutrition with albumin 2.1.  9.  Hypocalcemia.   Corrected calcium to albumin is acceptable    PLAN:    Kidney function continues to improve.  Patient refused blood work early in the morning  Positive C-ANCA But negative P ANCA and atypical p-ANCA that does not appear to be clinically significant.  Will repeat urine analysis  Daily labs      Thank you for involving us in the care of Sammie Landeros.  Please feel free to call with any questions.    Heydi Maria MD  12/11/23  08:31 [facility]    [facility]  [phone]    Please note that portions of this note were completed with a voice recognition program.

## 2023-12-11 NOTE — PROGRESS NOTES
[facility] Clinical Pharmacy Services: Vancomycin Level Monitoring Note    Sammie Landeros is a 33 y.o. female who is on day 9 of pharmacy to dose vancomycin for Endocarditis.    Estimated Creatinine Clearance: 37.7 mL/min (A) (by C-G formula based on SCr of 2.82 mg/dL (H)).    Current Vanc Dose: 750 mg IV once 12/11 @ 0600  Results from last 7 days   Lab Units 12/10/23  1801 12/10/23  0403 12/09/23  0521   VANCOMYCIN RM mcg/mL 21.30 12.50 12.50     Renal function not stable.  Will give vancomycin 750 mg IV once in AM once level expected to be ~15.  Defer further dosing and levels to clinical pharmacist in AM once AM BMP returns.    No changes at this time. Pharmacy is continuing to monitor and will adjust as needed.    Umair Zhang III, [facility]  Clinical Pharmacist

## 2023-12-11 NOTE — CASE MANAGEMENT/SOCIAL WORK
"Continued Stay Note  [facility]     Patient Name: Sammie Landeros  MRN: [id number]  Today's Date: 12/11/2023    Admit Date: 12/2/2023    Plan: Home after IV ABX tx vs substance use tx with IV ABX   Discharge Plan       Row Name 12/11/23 1013       Plan    Plan Home after IV ABX tx vs substance use tx with IV ABX    Patient/Family in Agreement with Plan other (see comments)    Provided Post Acute Provider List? Refused    Plan Comments CCP followed up with pt at bedside, introduced self and explained CCP role. Discussed dc planning and going to Drug Rehab that could provide IV ABX for endocarditis. Pt declines, and states she has to\" go home that she has pets/animals to care for and bills to be paid\". CCP encouraged rehab at dc to continue sobriety and pt states she will do an outpt drug program. Offered to provide her those resources or have [facility] bring her resources and she declines. Pt reports she knows where to get services/treatment classes. Pt considering leaving AMA. CCP encouraged her to remain in hospital for remainder of treatment, or to let CCP know what we could assist with for compliance. Pt declines. MD to order po abx if pt leaves AMA. Pt denies any other dc needs or concerns. CCP to follow. Felisa SLAUGHTER/CCP                   Discharge Codes    No documentation.                 Expected Discharge Date and Time       Expected Discharge Date Expected Discharge Time    Dec 12, 2023               Franci Vargas RN    "

## 2023-12-11 NOTE — PROGRESS NOTES
"  Infectious Diseases Progress Note    Jazmine Higginbotham MD     [facility]  [place]: 9 days  Patient Identification:  Name: Sammie Landeros  Age: 33 y.o.  Sex: female  :  1990  MRN: [id number]         Primary Care Physician: Melvi Landeros APRN        Subjective: denies any complaints and wants to go home  Interval History: See consultation note.    Objective:    Scheduled Meds:buprenorphine-naloxone, 2 film, Sublingual, Daily  busPIRone, 5 mg, Oral, TID  ferrous sulfate, 325 mg, Oral, Daily With Breakfast  heparin (porcine), 5,000 Units, Subcutaneous, Q8H  pantoprazole, 40 mg, Oral, Q AM  sodium bicarbonate, 1,300 mg, Oral, TID  sodium chloride, 10 mL, Intravenous, Q12H  sodium chloride, 10 mL, Intravenous, Q12H  sodium chloride, 10 mL, Intravenous, Q12H  sodium chloride, 10 mL, Intravenous, Q12H  sodium chloride, 10 mL, Intravenous, Q12H  vancomycin, 750 mg, Intravenous, Once  Vancomycin Pharmacy Intermittent/Pulse Dosing, , Does not apply, Daily      Continuous Infusions:Pharmacy to dose vancomycin,         Vital signs in last 24 hours:  Temp:  [97.9 °F (36.6 °C)-98.9 °F (37.2 °C)] 97.9 °F (36.6 °C)  Heart Rate:  [99] 99  Resp:  [18] 18  BP: (111-142)/(59-79) 142/79    Intake/Output:    Intake/Output Summary (Last 24 hours) at 2023 0858  Last data filed at 2023 0154  Gross per 24 hour   Intake 580 ml   Output --   Net 580 ml         Exam:  /79 (BP Location: Right arm, Patient Position: Lying)   Pulse 99   Temp 97.9 °F (36.6 °C) (Oral)   Resp 18   Ht 175.3 cm (69\")   Wt 115 kg (254 lb 6.6 oz)   LMP 2023 (Approximate)   SpO2 95%   BMI 37.57 kg/m²   Patient is examined using the personal protective equipment as per guidelines from infection control for this particular patient as enacted.  Hand washing was performed before and after patient interaction.  General Appearance: More interactive but still withdrawn.  Does not appear to be in any acute distress.       Data " Review:    I reviewed the patient's new clinical results.  Results from last 7 days   Lab Units 12/10/23  0403 12/09/23  0521 12/08/23  1606 12/07/23  0641 12/06/23  0614 12/05/23  2204 12/05/23  0340   WBC 10*3/mm3 4.89 4.42 4.73 4.73 4.57  --  3.87   HEMOGLOBIN g/dL 7.1* 7.3* 7.9* 8.1* 7.5* 7.9* 7.0*   PLATELETS 10*3/mm3 81* 79* 86* 83* 70*  --  87*     Results from last 7 days   Lab Units 12/10/23  0403 12/09/23  0521 12/08/23  2108 12/08/23  0537 12/07/23  0641 12/06/23  0614 12/05/23  0340 12/04/23  1343   SODIUM mmol/L 140 138  --  137 136 136 134* 133*   POTASSIUM mmol/L 4.2 3.8 4.0 3.8 4.5 4.4 4.1 4.2   CHLORIDE mmol/L 108* 108*  --  111* 110* 110* 109* 109*   CO2 mmol/L 21.7* 18.6*  --  15.0* 14.5* 16.0* 16.8* 16.0*   BUN mg/dL 47* 40*  --  54* 58* 55* 48* 42*   CREATININE mg/dL 2.82* 3.93*  --  4.48* 5.06* 5.13* 4.22* 4.10*   CALCIUM mg/dL 7.5* 7.5*  --  7.6* 8.2* 8.2* 7.5* 7.0*   GLUCOSE mg/dL 88 114*  --  114* 96 87 81 89     Microbiology Results (last 10 days)       Procedure Component Value - Date/Time    Gardnerella vaginalis, Trichomonas vaginalis, Candida albicans, DNA - Swab, Vagina [[id number]]  (Abnormal) Collected: 12/04/23 1652    Lab Status: Final result Specimen: Swab from Vagina Updated: 12/05/23 1412     Candida Species Positive     Gardnerella vaginalis, DNA Probe Negative     Trichomonas vaginosis Negative    Narrative:      Performed at:  [address]  [id number]  : Cayden Prieto PhD, Phone:  [phone]    Blood Culture - Blood, Cannula [[id number]]  (Normal) Collected: 12/04/23 0940    Lab Status: Final result Specimen: Blood from Cannula Updated: 12/09/23 1000     Blood Culture No growth at 5 days    Wet Prep, Genital - Swab, Vagina [[id number]]  (Abnormal) Collected: 12/03/23 1200    Lab Status: Final result Specimen: Swab from Vagina Updated: 12/03/23 1251     YEAST 4+ Yeast seen     HYPHAL ELEMENTS No Hyphal elements seen     WBC'S 3+  WBC's seen     Clue Cells, Wet Prep No Clue cells seen     Trichomonas, Wet Prep No Trichomonas seen    Chlamydia trachomatis, PCR - Swab, Vagina [[id number]] Collected: 12/03/23 1159    Lab Status: Final result Specimen: Swab from Vagina Updated: 12/06/23 0711     Chlamydia trachomatis, [place] Negative    Narrative:      Performed at:  [address] [place] [address]  [id number]  : Karuna Kamara MD, Phone:  [phone]    Blood Culture - Blood, Arm, Right [[id number]]  (Abnormal)  (Susceptibility) Collected: 12/02/23 1115    Lab Status: Edited Result - FINAL Specimen: Blood from Arm, Right Updated: 12/06/23 0625     Blood Culture Staphylococcus aureus, MRSA     Comment:   Infectious disease consultation is highly recommended to rule out distant foci of infection.  Methicillin resistant Staphylococcus aureus, Patient may be an isolation risk.        Isolated from Aerobic Bottle     Gram Stain Aerobic Bottle Gram positive cocci in clusters    Narrative:      Streptococcus spp not viable for growth.    Susceptibility        Staphylococcus aureus, MRSA      SARAH      Gentamicin Susceptible      Oxacillin Resistant      Rifampin Susceptible      Vancomycin Susceptible                       Susceptibility Comments       Staphylococcus aureus, MRSA    This isolate does not demonstrate inducible clindamycin resistance in vitro.                 Blood Culture ID, PCR - Blood, Arm, Right [[id number]]  (Abnormal) Collected: 12/02/23 1115    Lab Status: Final result Specimen: Blood from Arm, Right Updated: 12/03/23 0040     BCID, PCR Staph aureus. mecA/C and MREJ (methicillin resistance gene) detected. Identification by BCID2 PCR.     BCID, PCR 2 Streptococcus spp, not A, B, or pneumoniae. Identification by BCID2 PCR.     BOTTLE TYPE Aerobic Bottle    Narrative:      Infectious disease consultation is highly recommended to rule out distant foci of infection.    Respiratory Panel PCR  w/COVID-19(SARS-CoV-2) ALEAH/JOHNNIE/VENU/PAD/COR/DELORIS In-House, NP Swab in UTM/VTM, 2 HR TAT - Swab, Nasopharynx [[id number]]  (Normal) Collected: 12/02/23 1111    Lab Status: Final result Specimen: Swab from Nasopharynx Updated: 12/02/23 1224     ADENOVIRUS, PCR Not Detected     Coronavirus 229E Not Detected     Coronavirus HKU1 Not Detected     Coronavirus NL63 Not Detected     Coronavirus OC43 Not Detected     COVID19 Not Detected     Human Metapneumovirus Not Detected     Human Rhinovirus/Enterovirus Not Detected     Influenza A PCR Not Detected     Influenza B PCR Not Detected     Parainfluenza Virus 1 Not Detected     Parainfluenza Virus 2 Not Detected     Parainfluenza Virus 3 Not Detected     Parainfluenza Virus 4 Not Detected     RSV, PCR Not Detected     Bordetella pertussis pcr Not Detected     Bordetella parapertussis PCR Not Detected     Chlamydophila pneumoniae PCR Not Detected     Mycoplasma pneumo by PCR Not Detected    Narrative:      In the setting of a positive respiratory panel with a viral infection PLUS a negative procalcitonin without other underlying concern for bacterial infection, consider observing off antibiotics or discontinuation of antibiotics and continue supportive care. If the respiratory panel is positive for atypical bacterial infection (Bordetella pertussis, Chlamydophila pneumoniae, or Mycoplasma pneumoniae), consider antibiotic de-escalation to target atypical bacterial infection.    Blood Culture - Blood, Arm, Left [[id number]]  (Abnormal) Collected: 12/02/23 1110    Lab Status: Final result Specimen: Blood from Arm, Left Updated: 12/05/23 0618     Blood Culture Staphylococcus aureus, MRSA     Comment:   Infectious disease consultation is highly recommended to rule out distant foci of infection.  Methicillin resistant Staphylococcus aureus, Patient may be an isolation risk.        Isolated from Aerobic Bottle     Gram Stain Aerobic Bottle Gram positive cocci in clusters    Narrative:       Refer to previous blood culture collected on 12/02/2023 1115 for MICs.              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  Follow-up on repeat blood cultures.    Continue with IV vancomycin  Overall care will be difficult and prognosis is poor because of patient's lack of participation in her own wellbeing including documented noncompliance and active self-mutilation behavior with ongoing IV drug use.  If vancomycin is considered toxic to already declining renal function then if MRSA in the blood cultures documented to be sensitive to daptomycin then she could be changed to daptomycin to avoid further decline in renal function.  Management of other issues including this complex psychosocial issues per primary team.  Duration of antibiotic therapy will be 6 weeks to 8 weeks from last negative blood culture  Jazmine Higginbotham MD  12/11/2023  08:58 EST    Parts of this note may be an electronic transcription/translation of spoken language to printed text using the Dragon dictation system.

## 2023-12-13 NOTE — CASE MANAGEMENT/SOCIAL WORK
Case Management Discharge Note      Final Note: left [facility]    Provided Post Acute Provider List?: Refused  N/A Provider List Comment: Pending ACCESS consult.  Provided Post Acute Provider Quality & Resource List?: N/A    Selected Continued Care - Discharged on 12/11/2023 Admission date: 12/2/2023 - Discharge disposition: Left Against Medical Advice      Destination    No services have been selected for the patient.                Durable Medical Equipment    No services have been selected for the patient.                Dialysis/Infusion    No services have been selected for the patient.                Home Medical Care    No services have been selected for the patient.                Therapy    No services have been selected for the patient.                Community Resources    No services have been selected for the patient.                Community & DME    No services have been selected for the patient.                    Transportation Services  Private: Car    Final Discharge Disposition Code: 07 - left SALTY

## 2024-02-18 ENCOUNTER — APPOINTMENT (OUTPATIENT)
Dept: GENERAL RADIOLOGY | Facility: HOSPITAL | Age: 34
DRG: 872 | End: 2024-02-18
Payer: COMMERCIAL

## 2024-02-18 ENCOUNTER — APPOINTMENT (OUTPATIENT)
Dept: ULTRASOUND IMAGING | Facility: HOSPITAL | Age: 34
DRG: 872 | End: 2024-02-18
Payer: COMMERCIAL

## 2024-02-18 ENCOUNTER — HOSPITAL ENCOUNTER (INPATIENT)
Facility: HOSPITAL | Age: 34
LOS: 6 days | Discharge: HOME | DRG: 872 | End: 2024-02-24
Attending: EMERGENCY MEDICINE | Admitting: STUDENT IN AN ORGANIZED HEALTH CARE EDUCATION/TRAINING PROGRAM
Payer: COMMERCIAL

## 2024-02-18 ENCOUNTER — APPOINTMENT (OUTPATIENT)
Dept: CT IMAGING | Facility: HOSPITAL | Age: 34
DRG: 872 | End: 2024-02-18
Payer: COMMERCIAL

## 2024-02-18 DIAGNOSIS — K72.00 SEPSIS WITH ACUTE LIVER FAILURE WITHOUT HEPATIC COMA OR SEPTIC SHOCK, DUE TO UNSPECIFIED ORGANISM: Primary | ICD-10-CM

## 2024-02-18 DIAGNOSIS — R65.20 SEPSIS WITH ACUTE LIVER FAILURE WITHOUT HEPATIC COMA OR SEPTIC SHOCK, DUE TO UNSPECIFIED ORGANISM: Primary | ICD-10-CM

## 2024-02-18 DIAGNOSIS — D64.9 ACUTE ON CHRONIC ANEMIA: ICD-10-CM

## 2024-02-18 DIAGNOSIS — A41.9 SEPSIS WITH ACUTE LIVER FAILURE WITHOUT HEPATIC COMA OR SEPTIC SHOCK, DUE TO UNSPECIFIED ORGANISM: Primary | ICD-10-CM

## 2024-02-18 DIAGNOSIS — N18.9 CHRONIC KIDNEY DISEASE, UNSPECIFIED CKD STAGE: ICD-10-CM

## 2024-02-18 DIAGNOSIS — E80.6 HYPERBILIRUBINEMIA: ICD-10-CM

## 2024-02-18 DIAGNOSIS — M00.9 PYOGENIC ARTHRITIS OF RIGHT KNEE JOINT, DUE TO UNSPECIFIED ORGANISM: ICD-10-CM

## 2024-02-18 DIAGNOSIS — M25.461 EFFUSION OF RIGHT KNEE: ICD-10-CM

## 2024-02-18 PROBLEM — R17 PAINLESS JAUNDICE: Status: ACTIVE | Noted: 2024-02-18

## 2024-02-18 PROBLEM — M25.561 RIGHT KNEE PAIN: Status: ACTIVE | Noted: 2024-02-18

## 2024-02-18 PROBLEM — F11.90 OPIOID USE DISORDER: Status: ACTIVE | Noted: 2024-02-18

## 2024-02-18 PROBLEM — N18.4 STAGE 4 CHRONIC KIDNEY DISEASE: Status: ACTIVE | Noted: 2024-02-18

## 2024-02-18 PROBLEM — K74.60 CHRONIC HEPATITIS C WITH CIRRHOSIS: Status: ACTIVE | Noted: 2024-02-18

## 2024-02-18 PROBLEM — B18.2 CHRONIC HEPATITIS C WITH CIRRHOSIS: Status: ACTIVE | Noted: 2024-02-18

## 2024-02-18 PROBLEM — B19.10 HEPATITIS B INFECTION: Status: ACTIVE | Noted: 2024-02-18

## 2024-02-18 PROBLEM — E87.20 LACTIC ACIDOSIS: Status: ACTIVE | Noted: 2024-02-18

## 2024-02-18 LAB
ABO GROUP BLD: NORMAL
ALBUMIN SERPL-MCNC: 2.3 G/DL (ref 3.5–5.2)
ALBUMIN/GLOB SERPL: 0.6 G/DL
ALP SERPL-CCNC: 333 U/L (ref 39–117)
ALT SERPL W P-5'-P-CCNC: 42 U/L (ref 1–33)
AMPHET+METHAMPHET UR QL: POSITIVE
ANION GAP SERPL CALCULATED.3IONS-SCNC: 15 MMOL/L (ref 5–15)
ANISOCYTOSIS BLD QL: ABNORMAL
AST SERPL-CCNC: 63 U/L (ref 1–32)
BACTERIA BLD CULT: ABNORMAL
BACTERIA UR QL AUTO: ABNORMAL /HPF
BARBITURATES UR QL SCN: NEGATIVE
BASOPHILS # BLD MANUAL: 0.1 10*3/MM3 (ref 0–0.2)
BASOPHILS NFR BLD MANUAL: 1 % (ref 0–1.5)
BENZODIAZ UR QL SCN: NEGATIVE
BILIRUB CONJ SERPL-MCNC: 9.3 MG/DL (ref 0–0.3)
BILIRUB INDIRECT SERPL-MCNC: 1.6 MG/DL
BILIRUB SERPL-MCNC: 10.9 MG/DL (ref 0–1.2)
BILIRUB SERPL-MCNC: 11.3 MG/DL (ref 0–1.2)
BILIRUB UR QL STRIP: ABNORMAL
BLD GP AB SCN SERPL QL: NEGATIVE
BOTTLE TYPE: ABNORMAL
BUN SERPL-MCNC: 29 MG/DL (ref 6–20)
BUN/CREAT SERPL: 15.3 (ref 7–25)
BURR CELLS BLD QL SMEAR: ABNORMAL
CALCIUM SPEC-SCNC: 8.3 MG/DL (ref 8.6–10.5)
CANNABINOIDS SERPL QL: POSITIVE
CHLORIDE SERPL-SCNC: 103 MMOL/L (ref 98–107)
CK SERPL-CCNC: 42 U/L (ref 20–180)
CLARITY UR: ABNORMAL
CO2 SERPL-SCNC: 17 MMOL/L (ref 22–29)
COCAINE UR QL: NEGATIVE
COLOR UR: ABNORMAL
CREAT SERPL-MCNC: 1.9 MG/DL (ref 0.57–1)
CRP SERPL-MCNC: 23.43 MG/DL (ref 0–0.5)
D-LACTATE SERPL-SCNC: 2.6 MMOL/L (ref 0.5–2)
D-LACTATE SERPL-SCNC: 3.7 MMOL/L (ref 0.5–2)
D-LACTATE SERPL-SCNC: 4 MMOL/L (ref 0.5–2)
D-LACTATE SERPL-SCNC: 5.6 MMOL/L (ref 0.5–2)
DEPRECATED RDW RBC AUTO: 40.4 FL (ref 37–54)
EGFRCR SERPLBLD CKD-EPI 2021: 34.9 ML/MIN/1.73
ELLIPTOCYTES BLD QL SMEAR: ABNORMAL
EOSINOPHIL # BLD MANUAL: 0.1 10*3/MM3 (ref 0–0.4)
EOSINOPHIL NFR BLD MANUAL: 1 % (ref 0.3–6.2)
ERYTHROCYTE [DISTWIDTH] IN BLOOD BY AUTOMATED COUNT: 16.7 % (ref 12.3–15.4)
ERYTHROCYTE [SEDIMENTATION RATE] IN BLOOD: 84 MM/HR (ref 0–20)
ETHANOL BLD-MCNC: <10 MG/DL (ref 0–10)
ETHANOL UR QL: <0.01 %
FENTANYL UR-MCNC: POSITIVE NG/ML
GLOBULIN UR ELPH-MCNC: 3.8 GM/DL
GLUCOSE SERPL-MCNC: 102 MG/DL (ref 65–99)
GLUCOSE UR STRIP-MCNC: NEGATIVE MG/DL
HAPTOGLOB SERPL-MCNC: 35 MG/DL (ref 30–200)
HAV IGM SERPL QL IA: ABNORMAL
HBV CORE IGM SERPL QL IA: ABNORMAL
HBV SURFACE AG SERPL QL IA: ABNORMAL
HCG SERPL QL: NEGATIVE
HCT VFR BLD AUTO: 26.7 % (ref 34–46.6)
HCV AB SER DONR QL: REACTIVE
HGB BLD-MCNC: 7.5 G/DL (ref 12–15.9)
HGB UR QL STRIP.AUTO: ABNORMAL
HYALINE CASTS UR QL AUTO: ABNORMAL /LPF
HYPOCHROMIA BLD QL: ABNORMAL
INR PPP: 1.51 (ref 0.9–1.1)
KETONES UR QL STRIP: NEGATIVE
LDH SERPL-CCNC: 198 U/L (ref 135–214)
LEUKOCYTE ESTERASE UR QL STRIP.AUTO: ABNORMAL
LYMPHOCYTES # BLD MANUAL: 0.4 10*3/MM3 (ref 0.7–3.1)
LYMPHOCYTES NFR BLD MANUAL: 5 % (ref 5–12)
MACROCYTES BLD QL SMEAR: ABNORMAL
MCH RBC QN AUTO: 19.1 PG (ref 26.6–33)
MCHC RBC AUTO-ENTMCNC: 28.1 G/DL (ref 31.5–35.7)
MCV RBC AUTO: 67.9 FL (ref 79–97)
METHADONE UR QL SCN: NEGATIVE
MICROCYTES BLD QL: ABNORMAL
MONOCYTES # BLD: 0.5 10*3/MM3 (ref 0.1–0.9)
NEUTROPHILS # BLD AUTO: 8.84 10*3/MM3 (ref 1.7–7)
NEUTROPHILS NFR BLD MANUAL: 89 % (ref 42.7–76)
NITRITE UR QL STRIP: POSITIVE
OPIATES UR QL: POSITIVE
OXYCODONE UR QL SCN: NEGATIVE
PH UR STRIP.AUTO: 5.5 [PH] (ref 5–8)
PLAT MORPH BLD: NORMAL
PLATELET # BLD AUTO: 65 10*3/MM3 (ref 140–450)
POIKILOCYTOSIS BLD QL SMEAR: ABNORMAL
POLYCHROMASIA BLD QL SMEAR: ABNORMAL
POTASSIUM SERPL-SCNC: 4.1 MMOL/L (ref 3.5–5.2)
PROCALCITONIN SERPL-MCNC: 19.6 NG/ML (ref 0–0.25)
PROT SERPL-MCNC: 6.1 G/DL (ref 6–8.5)
PROT UR QL STRIP: ABNORMAL
PROTHROMBIN TIME: 18.5 SECONDS (ref 11.7–14.2)
QT INTERVAL: 372 MS
QTC INTERVAL: 510 MS
RBC # BLD AUTO: 3.93 10*6/MM3 (ref 3.77–5.28)
RBC # UR STRIP: ABNORMAL /HPF
REF LAB TEST METHOD: ABNORMAL
RETICS # AUTO: 0.06 10*6/MM3 (ref 0.02–0.13)
RETICS/RBC NFR AUTO: 1.74 % (ref 0.7–1.9)
RH BLD: POSITIVE
SODIUM SERPL-SCNC: 135 MMOL/L (ref 136–145)
SP GR UR STRIP: 1.02 (ref 1–1.03)
SQUAMOUS #/AREA URNS HPF: ABNORMAL /HPF
T&S EXPIRATION DATE: NORMAL
UROBILINOGEN UR QL STRIP: ABNORMAL
VARIANT LYMPHS NFR BLD MANUAL: 4 % (ref 19.6–45.3)
WBC # UR STRIP: ABNORMAL /HPF
WBC CLUMPS # UR AUTO: ABNORMAL /HPF
WBC MORPH BLD: NORMAL
WBC NRBC COR # BLD AUTO: 9.93 10*3/MM3 (ref 3.4–10.8)

## 2024-02-18 PROCEDURE — 25010000002 VANCOMYCIN 750 MG RECONSTITUTED SOLUTION 1 EACH VIAL: Performed by: STUDENT IN AN ORGANIZED HEALTH CARE EDUCATION/TRAINING PROGRAM

## 2024-02-18 PROCEDURE — 93005 ELECTROCARDIOGRAM TRACING: CPT | Performed by: EMERGENCY MEDICINE

## 2024-02-18 PROCEDURE — 25010000002 MORPHINE PER 10 MG: Performed by: EMERGENCY MEDICINE

## 2024-02-18 PROCEDURE — 80307 DRUG TEST PRSMV CHEM ANLYZR: CPT | Performed by: EMERGENCY MEDICINE

## 2024-02-18 PROCEDURE — 25010000002 CEFEPIME PER 500 MG: Performed by: STUDENT IN AN ORGANIZED HEALTH CARE EDUCATION/TRAINING PROGRAM

## 2024-02-18 PROCEDURE — 82248 BILIRUBIN DIRECT: CPT | Performed by: INTERNAL MEDICINE

## 2024-02-18 PROCEDURE — 86901 BLOOD TYPING SEROLOGIC RH(D): CPT | Performed by: EMERGENCY MEDICINE

## 2024-02-18 PROCEDURE — 84703 CHORIONIC GONADOTROPIN ASSAY: CPT | Performed by: EMERGENCY MEDICINE

## 2024-02-18 PROCEDURE — 85652 RBC SED RATE AUTOMATED: CPT | Performed by: ORTHOPAEDIC SURGERY

## 2024-02-18 PROCEDURE — 93010 ELECTROCARDIOGRAM REPORT: CPT | Performed by: INTERNAL MEDICINE

## 2024-02-18 PROCEDURE — 85007 BL SMEAR W/DIFF WBC COUNT: CPT | Performed by: EMERGENCY MEDICINE

## 2024-02-18 PROCEDURE — 86140 C-REACTIVE PROTEIN: CPT | Performed by: ORTHOPAEDIC SURGERY

## 2024-02-18 PROCEDURE — 83605 ASSAY OF LACTIC ACID: CPT | Performed by: EMERGENCY MEDICINE

## 2024-02-18 PROCEDURE — 87517 HEPATITIS B DNA QUANT: CPT | Performed by: INTERNAL MEDICINE

## 2024-02-18 PROCEDURE — 82077 ASSAY SPEC XCP UR&BREATH IA: CPT | Performed by: EMERGENCY MEDICINE

## 2024-02-18 PROCEDURE — 25010000002 CEFEPIME PER 500 MG: Performed by: EMERGENCY MEDICINE

## 2024-02-18 PROCEDURE — 74176 CT ABD & PELVIS W/O CONTRAST: CPT

## 2024-02-18 PROCEDURE — 82247 BILIRUBIN TOTAL: CPT | Performed by: INTERNAL MEDICINE

## 2024-02-18 PROCEDURE — 80053 COMPREHEN METABOLIC PANEL: CPT | Performed by: EMERGENCY MEDICINE

## 2024-02-18 PROCEDURE — 87147 CULTURE TYPE IMMUNOLOGIC: CPT | Performed by: EMERGENCY MEDICINE

## 2024-02-18 PROCEDURE — 83010 ASSAY OF HAPTOGLOBIN QUANT: CPT | Performed by: INTERNAL MEDICINE

## 2024-02-18 PROCEDURE — 25010000002 VANCOMYCIN 10 G RECONSTITUTED SOLUTION: Performed by: EMERGENCY MEDICINE

## 2024-02-18 PROCEDURE — 87186 SC STD MICRODIL/AGAR DIL: CPT | Performed by: EMERGENCY MEDICINE

## 2024-02-18 PROCEDURE — 99254 IP/OBS CNSLTJ NEW/EST MOD 60: CPT | Performed by: INTERNAL MEDICINE

## 2024-02-18 PROCEDURE — 73560 X-RAY EXAM OF KNEE 1 OR 2: CPT

## 2024-02-18 PROCEDURE — 25010000002 ONDANSETRON PER 1 MG: Performed by: EMERGENCY MEDICINE

## 2024-02-18 PROCEDURE — 25010000002 HYDROMORPHONE PER 4 MG: Performed by: STUDENT IN AN ORGANIZED HEALTH CARE EDUCATION/TRAINING PROGRAM

## 2024-02-18 PROCEDURE — 87522 HEPATITIS C REVRS TRNSCRPJ: CPT | Performed by: INTERNAL MEDICINE

## 2024-02-18 PROCEDURE — 25810000003 SODIUM CHLORIDE 0.9 % SOLUTION 250 ML FLEX CONT: Performed by: STUDENT IN AN ORGANIZED HEALTH CARE EDUCATION/TRAINING PROGRAM

## 2024-02-18 PROCEDURE — 87341 HEP B SURFACE AG NEUTRLZJ IA: CPT | Performed by: INTERNAL MEDICINE

## 2024-02-18 PROCEDURE — 85045 AUTOMATED RETICULOCYTE COUNT: CPT | Performed by: INTERNAL MEDICINE

## 2024-02-18 PROCEDURE — 36415 COLL VENOUS BLD VENIPUNCTURE: CPT

## 2024-02-18 PROCEDURE — 36415 COLL VENOUS BLD VENIPUNCTURE: CPT | Performed by: EMERGENCY MEDICINE

## 2024-02-18 PROCEDURE — 86850 RBC ANTIBODY SCREEN: CPT | Performed by: EMERGENCY MEDICINE

## 2024-02-18 PROCEDURE — 86900 BLOOD TYPING SEROLOGIC ABO: CPT | Performed by: EMERGENCY MEDICINE

## 2024-02-18 PROCEDURE — 85610 PROTHROMBIN TIME: CPT | Performed by: STUDENT IN AN ORGANIZED HEALTH CARE EDUCATION/TRAINING PROGRAM

## 2024-02-18 PROCEDURE — 84145 PROCALCITONIN (PCT): CPT | Performed by: EMERGENCY MEDICINE

## 2024-02-18 PROCEDURE — 87154 CUL TYP ID BLD PTHGN 6+ TRGT: CPT | Performed by: EMERGENCY MEDICINE

## 2024-02-18 PROCEDURE — 85025 COMPLETE CBC W/AUTO DIFF WBC: CPT | Performed by: EMERGENCY MEDICINE

## 2024-02-18 PROCEDURE — 82550 ASSAY OF CK (CPK): CPT | Performed by: EMERGENCY MEDICINE

## 2024-02-18 PROCEDURE — 76705 ECHO EXAM OF ABDOMEN: CPT

## 2024-02-18 PROCEDURE — 81001 URINALYSIS AUTO W/SCOPE: CPT | Performed by: EMERGENCY MEDICINE

## 2024-02-18 PROCEDURE — 99291 CRITICAL CARE FIRST HOUR: CPT

## 2024-02-18 PROCEDURE — 71045 X-RAY EXAM CHEST 1 VIEW: CPT

## 2024-02-18 PROCEDURE — 87040 BLOOD CULTURE FOR BACTERIA: CPT | Performed by: EMERGENCY MEDICINE

## 2024-02-18 PROCEDURE — 25010000002 HYDROMORPHONE 1 MG/ML SOLUTION: Performed by: EMERGENCY MEDICINE

## 2024-02-18 PROCEDURE — 25810000003 SODIUM CHLORIDE 0.9 % SOLUTION: Performed by: EMERGENCY MEDICINE

## 2024-02-18 PROCEDURE — 83615 LACTATE (LD) (LDH) ENZYME: CPT | Performed by: INTERNAL MEDICINE

## 2024-02-18 PROCEDURE — 25810000003 LACTATED RINGERS PER 1000 ML: Performed by: STUDENT IN AN ORGANIZED HEALTH CARE EDUCATION/TRAINING PROGRAM

## 2024-02-18 PROCEDURE — 80074 ACUTE HEPATITIS PANEL: CPT | Performed by: INTERNAL MEDICINE

## 2024-02-18 RX ORDER — UREA 10 %
3 LOTION (ML) TOPICAL NIGHTLY PRN
Status: DISCONTINUED | OUTPATIENT
Start: 2024-02-18 | End: 2024-02-24 | Stop reason: HOSPADM

## 2024-02-18 RX ORDER — LIDOCAINE HYDROCHLORIDE AND EPINEPHRINE 10; 10 MG/ML; UG/ML
10 INJECTION, SOLUTION INFILTRATION; PERINEURAL ONCE
Status: COMPLETED | OUTPATIENT
Start: 2024-02-18 | End: 2024-02-18

## 2024-02-18 RX ORDER — MORPHINE SULFATE 2 MG/ML
4 INJECTION, SOLUTION INTRAMUSCULAR; INTRAVENOUS ONCE
Status: COMPLETED | OUTPATIENT
Start: 2024-02-18 | End: 2024-02-18

## 2024-02-18 RX ORDER — SODIUM CHLORIDE 0.9 % (FLUSH) 0.9 %
10 SYRINGE (ML) INJECTION AS NEEDED
Status: DISCONTINUED | OUTPATIENT
Start: 2024-02-18 | End: 2024-02-24 | Stop reason: HOSPADM

## 2024-02-18 RX ORDER — BISACODYL 5 MG/1
5 TABLET, DELAYED RELEASE ORAL DAILY PRN
Status: DISCONTINUED | OUTPATIENT
Start: 2024-02-18 | End: 2024-02-24 | Stop reason: HOSPADM

## 2024-02-18 RX ORDER — HYDROMORPHONE HYDROCHLORIDE 1 MG/ML
0.5 INJECTION, SOLUTION INTRAMUSCULAR; INTRAVENOUS; SUBCUTANEOUS
Status: DISCONTINUED | OUTPATIENT
Start: 2024-02-18 | End: 2024-02-20

## 2024-02-18 RX ORDER — ONDANSETRON 4 MG/1
4 TABLET, ORALLY DISINTEGRATING ORAL EVERY 6 HOURS PRN
Status: DISCONTINUED | OUTPATIENT
Start: 2024-02-18 | End: 2024-02-24 | Stop reason: HOSPADM

## 2024-02-18 RX ORDER — AMOXICILLIN 250 MG
2 CAPSULE ORAL 2 TIMES DAILY PRN
Status: DISCONTINUED | OUTPATIENT
Start: 2024-02-18 | End: 2024-02-24 | Stop reason: HOSPADM

## 2024-02-18 RX ORDER — POLYETHYLENE GLYCOL 3350 17 G/17G
17 POWDER, FOR SOLUTION ORAL DAILY PRN
Status: DISCONTINUED | OUTPATIENT
Start: 2024-02-18 | End: 2024-02-24 | Stop reason: HOSPADM

## 2024-02-18 RX ORDER — ONDANSETRON 2 MG/ML
4 INJECTION INTRAMUSCULAR; INTRAVENOUS ONCE
Status: COMPLETED | OUTPATIENT
Start: 2024-02-18 | End: 2024-02-18

## 2024-02-18 RX ORDER — BISACODYL 10 MG
10 SUPPOSITORY, RECTAL RECTAL DAILY PRN
Status: DISCONTINUED | OUTPATIENT
Start: 2024-02-18 | End: 2024-02-24 | Stop reason: HOSPADM

## 2024-02-18 RX ORDER — ONDANSETRON 2 MG/ML
4 INJECTION INTRAMUSCULAR; INTRAVENOUS EVERY 6 HOURS PRN
Status: DISCONTINUED | OUTPATIENT
Start: 2024-02-18 | End: 2024-02-24 | Stop reason: HOSPADM

## 2024-02-18 RX ORDER — SODIUM CHLORIDE, SODIUM LACTATE, POTASSIUM CHLORIDE, CALCIUM CHLORIDE 600; 310; 30; 20 MG/100ML; MG/100ML; MG/100ML; MG/100ML
100 INJECTION, SOLUTION INTRAVENOUS CONTINUOUS
Status: DISCONTINUED | OUTPATIENT
Start: 2024-02-18 | End: 2024-02-24 | Stop reason: HOSPADM

## 2024-02-18 RX ORDER — ACETAMINOPHEN 325 MG/1
650 TABLET ORAL EVERY 4 HOURS PRN
Status: DISCONTINUED | OUTPATIENT
Start: 2024-02-18 | End: 2024-02-24 | Stop reason: HOSPADM

## 2024-02-18 RX ADMIN — VANCOMYCIN HYDROCHLORIDE 750 MG: 750 INJECTION, POWDER, LYOPHILIZED, FOR SOLUTION INTRAVENOUS at 23:05

## 2024-02-18 RX ADMIN — LIDOCAINE HYDROCHLORIDE,EPINEPHRINE BITARTRATE 10 ML: 10; .01 INJECTION, SOLUTION INFILTRATION; PERINEURAL at 09:53

## 2024-02-18 RX ADMIN — SODIUM CHLORIDE 1000 ML: 9 INJECTION, SOLUTION INTRAVENOUS at 09:26

## 2024-02-18 RX ADMIN — HYDROMORPHONE HYDROCHLORIDE 0.5 MG: 1 INJECTION, SOLUTION INTRAMUSCULAR; INTRAVENOUS; SUBCUTANEOUS at 18:02

## 2024-02-18 RX ADMIN — MORPHINE SULFATE 4 MG: 2 INJECTION, SOLUTION INTRAMUSCULAR; INTRAVENOUS at 09:25

## 2024-02-18 RX ADMIN — SODIUM CHLORIDE, POTASSIUM CHLORIDE, SODIUM LACTATE AND CALCIUM CHLORIDE 100 ML/HR: 600; 310; 30; 20 INJECTION, SOLUTION INTRAVENOUS at 18:06

## 2024-02-18 RX ADMIN — CEFEPIME 2000 MG: 2 INJECTION, POWDER, FOR SOLUTION INTRAVENOUS at 10:30

## 2024-02-18 RX ADMIN — HYDROMORPHONE HYDROCHLORIDE 0.5 MG: 1 INJECTION, SOLUTION INTRAMUSCULAR; INTRAVENOUS; SUBCUTANEOUS at 13:41

## 2024-02-18 RX ADMIN — HYDROMORPHONE HYDROCHLORIDE 1 MG: 1 INJECTION, SOLUTION INTRAMUSCULAR; INTRAVENOUS; SUBCUTANEOUS at 10:08

## 2024-02-18 RX ADMIN — HYDROMORPHONE HYDROCHLORIDE 0.5 MG: 1 INJECTION, SOLUTION INTRAMUSCULAR; INTRAVENOUS; SUBCUTANEOUS at 23:23

## 2024-02-18 RX ADMIN — HYDROMORPHONE HYDROCHLORIDE 0.5 MG: 1 INJECTION, SOLUTION INTRAMUSCULAR; INTRAVENOUS; SUBCUTANEOUS at 16:05

## 2024-02-18 RX ADMIN — VANCOMYCIN HYDROCHLORIDE 2250 MG: 10 INJECTION, POWDER, LYOPHILIZED, FOR SOLUTION INTRAVENOUS at 11:00

## 2024-02-18 RX ADMIN — SODIUM CHLORIDE 1000 ML: 9 INJECTION, SOLUTION INTRAVENOUS at 13:13

## 2024-02-18 RX ADMIN — CEFEPIME 2000 MG: 2 INJECTION, POWDER, FOR SOLUTION INTRAVENOUS at 23:04

## 2024-02-18 RX ADMIN — ONDANSETRON 4 MG: 2 INJECTION INTRAMUSCULAR; INTRAVENOUS at 09:25

## 2024-02-18 NOTE — PROGRESS NOTES
"[facility] Clinical [facility]acy Services: Vancomycin Pharmacokinetic Initial Consult Note    Sammie Landeros is a 33 y.o. female who is on day 1 of pharmacy to dose vancomycin.    Indication: Sepsis  Consulting Provider: Dr. Blas  Planned Duration of Therapy: 7 days  Loading Dose Ordered or Given: 2250 mg on 2/18 at 1100  Culture/Source: Blood cultures in progress  History of recent MRSA bacteremia in December 2023   Known tricuspid valve endocarditis   Target: -600 mg/L.hr   Pertinent Vanc Dosing History: Most recent vancomycin course here was dosed by levels due to significant ANTONELLA with peak SCr 5.13  Other Antimicrobials: Cefepime    Vitals/Labs  Ht: 175.3 cm (69\"); Wt: 118 kg (261 lb)  Temp Readings from Last 1 Encounters:   02/18/24 98.4 °F (36.9 °C)    Estimated Creatinine Clearance: 56.7 mL/min (A) (by C-G formula based on SCr of 1.9 mg/dL (H)).     Results from last 7 days   Lab Units 02/18/24  0920   CREATININE mg/dL 1.90*   WBC 10*3/mm3 9.93     Assessment/Plan:    Vancomycin Dose:   750 mg IV every 12 hours  Predictive AUC level for the dose ordered is 467 mg/L.hr, which is within the target of 400-600 mg/L.hr  Will check vancomycin trough early to ensure not accumulating. Renal function not at baseline but has recovered significant from extent of ANTONELLA present in December,.   Vanc Trough has been ordered for 2/19 at 1030 prior to third dose      Pharmacy will follow patient's kidney function and will adjust doses and obtain levels as necessary. Thank you for involving pharmacy in this patient's care. Please contact pharmacy with any questions or concerns.                           Astrid Vidales, PharmD, BCPS, [facility]  Clinical Pharmacy Specialist, Emergency Medicine   Phone: [phone]      "

## 2024-02-18 NOTE — ED NOTES
"Nursing report ED to floor  Sammie Landeros  33 y.o.  female    HPI :   Chief Complaint   Patient presents with    Addiction Problem    Generalized Body Aches       Admitting doctor:   Lucas Blas MD    Admitting diagnosis:   The primary encounter diagnosis was Sepsis with acute liver failure without hepatic coma or septic shock, due to unspecified organism. Diagnoses of Hyperbilirubinemia and Pyogenic arthritis of right knee joint, due to unspecified organism were also pertinent to this visit.    Code status:   Current Code Status       Date Active Code Status Order ID Comments User Context       2/18/2024 1130 CPR (Attempt to Resuscitate) [id number]  Lucas Blas MD ED        Question Answer    Code Status (Patient has no pulse and is not breathing) CPR (Attempt to Resuscitate)    Medical Interventions (Patient has pulse or is breathing) Full    Level Of Support Discussed With Patient                    Allergies:   Patient has no known allergies.    Isolation:   Contact    Intake and Output    Intake/Output Summary (Last 24 hours) at 2/18/2024 1142  Last data filed at 2/18/2024 1055  Gross per 24 hour   Intake 1100 ml   Output --   Net 1100 ml       Weight:       02/18/24  0901   Weight: 118 kg (261 lb)       Most recent vitals:   Vitals:    02/18/24 0849 02/18/24 0901 02/18/24 0931 02/18/24 1101   BP: 142/74  123/68 116/57   BP Location: Right arm      Patient Position: Lying      Pulse: 120  112 112   Resp: 20  20 20   Temp: 98 °F (36.7 °C)   98.4 °F (36.9 °C)   TempSrc: Oral      SpO2: 98%  94% 100%   Weight:  118 kg (261 lb)     Height:  175.3 cm (69\")         Active LDAs/IV Access:   Lines, Drains & Airways       Active LDAs       Name Placement date Placement time Site Days    Peripheral IV 02/18/24 0922 Posterior;Right Hand 02/18/24 0922  Hand  less than 1    Peripheral IV 02/18/24 0955 Anterior;Right;Upper Arm 02/18/24 0955  Arm  less than 1                    Labs (abnormal labs have a " "star):   Labs Reviewed   COMPREHENSIVE METABOLIC PANEL - Abnormal; Notable for the following components:       Result Value    Glucose 102 (*)     BUN 29 (*)     Creatinine 1.90 (*)     Sodium 135 (*)     CO2 17.0 (*)     Calcium 8.3 (*)     Albumin 2.3 (*)     ALT (SGPT) 42 (*)     AST (SGOT) 63 (*)     Alkaline Phosphatase 333 (*)     Total Bilirubin 11.3 (*)     eGFR 34.9 (*)     All other components within normal limits    Narrative:     GFR Normal >60  Chronic Kidney Disease <60  Kidney Failure <15     LACTIC ACID, PLASMA - Abnormal; Notable for the following components:    Lactate 5.6 (*)     All other components within normal limits   PROCALCITONIN - Abnormal; Notable for the following components:    Procalcitonin 19.60 (*)     All other components within normal limits    Narrative:     As a Marker for Sepsis (Non-Neonates):    1. <0.5 ng/mL represents a low risk of severe sepsis and/or septic shock.  2. >2 ng/mL represents a high risk of severe sepsis and/or septic shock.    As a Marker for Lower Respiratory Tract Infections that require antibiotic therapy:    PCT on Admission    Antibiotic Therapy       6-12 Hrs later    >0.5                Strongly Recommended  >0.25 - <0.5        Recommended   0.1 - 0.25          Discouraged              Remeasure/reassess PCT  <0.1                Strongly Discouraged     Remeasure/reassess PCT    As 28 day mortality risk marker: \"Change in Procalcitonin Result\" (>80% or <=80%) if Day 0 (or Day 1) and Day 4 values are available. Refer to http://www.[facility]-pct-calculator.com    Change in PCT <=80%  A decrease of PCT levels below or equal to 80% defines a positive change in PCT test result representing a higher risk for 28-day all-cause mortality of patients diagnosed with severe sepsis for septic shock.    Change in PCT >80%  A decrease of PCT levels of more than 80% defines a negative change in PCT result representing a lower risk for 28-day all-cause mortality of " patients diagnosed with severe sepsis or septic shock.      CBC WITH AUTO DIFFERENTIAL - Abnormal; Notable for the following components:    Hemoglobin 7.5 (*)     Hematocrit 26.7 (*)     MCV 67.9 (*)     MCH 19.1 (*)     MCHC 28.1 (*)     RDW 16.7 (*)     Platelets 65 (*)     All other components within normal limits   MANUAL DIFFERENTIAL - Abnormal; Notable for the following components:    Neutrophil % 89.0 (*)     Lymphocyte % 4.0 (*)     Neutrophils Absolute 8.84 (*)     Lymphocytes Absolute 0.40 (*)     All other components within normal limits   HCG, SERUM, QUALITATIVE - Normal   CK - Normal   BLOOD CULTURE   BLOOD CULTURE   ETHANOL   URINALYSIS W/ MICROSCOPIC IF INDICATED (NO CULTURE)   URINE DRUG SCREEN   LACTIC ACID, REFLEX   PROTIME-INR   SEDIMENTATION RATE   C-REACTIVE PROTEIN   TYPE AND SCREEN   CBC AND DIFFERENTIAL    Narrative:     The following orders were created for panel order CBC & Differential.  Procedure                               Abnormality         Status                     ---------                               -----------         ------                     CBC Auto Differential[[id number]]        Abnormal            Final result                 Please view results for these tests on the individual orders.       EKG:   ECG 12 Lead Other; fever, pain   Preliminary Result   HEART RATE= 113  bpm   RR Interval= 531  ms   VT Interval= 144  ms   P Horizontal Axis= 28  deg   P Front Axis= 36  deg   QRSD Interval= 107  ms   QT Interval= 372  ms   QTcB= 510  ms   QRS Axis= 69  deg   T Wave Axis= 33  deg   - ABNORMAL ECG -   Sinus tachycardia   Probable left ventricular hypertrophy   Prolonged QT interval   Electronically Signed By:    Date and Time of Study: 2024-02-18 09:28:51          Meds given in ED:   Medications   sodium chloride 0.9 % flush 10 mL (has no administration in time range)   vancomycin 2250 mg/500 mL 0.9% NS IVPB ([facility]) (2,250 mg Intravenous New Bag 2/18/24 1100)   acetaminophen  (TYLENOL) tablet 650 mg (has no administration in time range)   ondansetron ODT (ZOFRAN-ODT) disintegrating tablet 4 mg (has no administration in time range)     Or   ondansetron (ZOFRAN) injection 4 mg (has no administration in time range)   melatonin tablet 3 mg (has no administration in time range)   sennosides-docusate (PERICOLACE) 8.6-50 MG per tablet 2 tablet (has no administration in time range)     And   polyethylene glycol (MIRALAX) packet 17 g (has no administration in time range)     And   bisacodyl (DULCOLAX) EC tablet 5 mg (has no administration in time range)     And   bisacodyl (DULCOLAX) suppository 10 mg (has no administration in time range)   HYDROmorphone (DILAUDID) injection 0.5 mg (has no administration in time range)   cefepime 2000 mg IVPB in 100 mL NS (VTB) (has no administration in time range)   Pharmacy to dose vancomycin (has no administration in time range)   sodium chloride 0.9 % bolus 1,000 mL (0 mL Intravenous Stopped 2/18/24 1055)   morphine injection 4 mg (4 mg Intravenous Given 2/18/24 0925)   ondansetron (ZOFRAN) injection 4 mg (4 mg Intravenous Given 2/18/24 0925)   cefepime 2000 mg IVPB in 100 mL NS (VTB) (0 mg Intravenous Stopped 2/18/24 1054)   lidocaine 1% - EPINEPHrine 1:057926 (XYLOCAINE W/EPI) 1 %-1:288643 injection 10 mL (10 mL Injection Given 2/18/24 0953)   HYDROmorphone (DILAUDID) injection 1 mg (1 mg Intravenous Given 2/18/24 1008)       Imaging results:  CT Abdomen Pelvis Without Contrast    Result Date: 2/18/2024   1. Cirrhosis. 2. Massive splenomegaly. 3. Mild volume ascites. 4. Trace right pleural fluid. 5. Nodular opacities at the bases, with cavitation in the right lower lobe. Suspect septic emboli.  This report was finalized on 2/18/2024 11:23 AM by Dr. Charlie Harris M.D on Workstation: [id number]       Ambulatory status:   - assist    Social issues:   Social History     Socioeconomic History    Marital status: Single   Tobacco Use    Smoking status: Former  "    Packs/day: 1     Types: Cigarettes   Vaping Use    Vaping Use: Some days   Substance and Sexual Activity    Alcohol use: No    Drug use: Yes     Frequency: 21.0 times per week     Types: Heroin     Comment: \"USE $20-&50 UP TO 3 TIMES PER DAY\"    Sexual activity: Defer       NIH Stroke Scale:         Agustina Cormier RN  02/18/24 11:42 EST       "

## 2024-02-18 NOTE — ED NOTES
Pt to ed from home via EMS    Pt reports feeling bad for 4 days. Pt c/o bodywide pain, fever, and R knee painn, NKI. Pt used heroin last night.
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
## 2024-02-18 NOTE — SIGNIFICANT NOTE
"   02/18/24 0956   Peripheral IV 02/18/24 0955 Anterior;Right;Upper Arm   Placement date: If unknown, DO NOT use \"Add Comment\" note/Placement time: If unknown, DO NOT use \"Add Comment\" note: 02/18/24 0955   Hand Hygiene Completed: Yes  Size (Gauge): 20 G  Orientation: Anterior;Right;Upper  Location: Arm  Site Prep: Chlorhex...   Site Assessment Clean;Dry;Intact   Dressing Type Transparent   Line Status Blood return noted;Flushed;Saline locked   Dressing Status Clean;Dry;Intact   Dressing Intervention New dressing   Phlebitis 0-->no symptoms     Ultrasound Inserted IV Site:[facility]    Catheter Length:2.5 in    Diameter:0.60 cm    Depth:1.0 cm      Vascular Access Score=5  1) Palpable / Visible / Dis  2) Palpable / Viasible / Not Distended  3) Easily Palpable / Not Visibile  4) Poorly Palpable / Visible  5) Poorly / Nonpalpable / NV   "

## 2024-02-18 NOTE — CONSULTS
Gastroenterology   Initial Inpatient Consult Note    Referring Provider: Lucas Blas     Reason for Consultation: chronic liver disease, elevated bilirubin    Subjective     History of present illness:    33 y.o. female who we are asked to see for elevated LFTs.  The patient is admitted due to right knee pain from IV drug use of heroin into her joint.  It is swollen and attempted to have had fluid drained today but it was too painful for the patient.  The patient's bilirubin was noted to be 11.3 with an ALT of 42 and an AST of 63 alkaline phosphatase of 333 the alk phos and transaminases are actually improved over some previous levels she has had before but the total bilirubin is extremely elevated compared to normal baseline or mildly elevated baseline in the past.  The patient has been diagnosed with both hepatitis B and C her INR is 1.51 her lactate is 4.0 her hemoglobin is 7.5 white count is 9.93 her procalcitonin is 19.6    The patient was just in the hospital in December 2023.  That admission was for a known tricuspid valve endocarditis hepatitis C cirrhosis and other medical issues she had been at [facility] but left that hospital [place] where she was found to have MRSA bacteremia and endocarditis.  She was then admitted here for recurrent sepsis related to her endocarditis given IV antibiotics seen by cardiothoracic surgery medical management with antibiotic was recommended she had complications of acute renal insufficiency on top of her liver disease.  She left AGAINST MEDICAL ADVICE from that admission as well    She had a positive hepatitis B antigen and hepatitis C antibody at that workup.  She has not been treated for either of these.  This admission she is admitted for right knee pain which has limited her ability to get out of bed for the last 4 days due to the pain.  She has noted flulike symptoms as well and has become jaundiced.  Still actively using as recently as the day prior to  admission    CT Abdomen Pelvis Without Contrast (02/18/2024 10:53) reveals cirrhosis massive splenomegaly mild volume ascites trace right pleural effusion nodular opacities at the base with cavitation in the right lower lobe suspected of septic emboli    Cefepime and vancomycin have been started    She has had some nausea vomiting and diarrhea    Hepatitis B Surface Antigen (12/08/2023 16:06)   Hepatitis B Surface Antigen (12/08/2023 16:06)   Hepatitis C Antibody (12/03/2023 17:47)   Past Medical History:  Past Medical History:   Diagnosis Date    Drug abuse     Hepatitis C     Hyperlipidemia     Nausea vomiting and diarrhea 06/03/2023     Past Surgical History:  Past Surgical History:   Procedure Laterality Date    ADENOIDECTOMY      BACK SURGERY      INCISION AND DRAINAGE LEG Left 7/20/2016    Procedure: Incision and Drainage left ankle;  Surgeon: Zechariah Kunz MD;  Location: [facility];  Service:     TONSILLECTOMY        Social History:   Social History     Tobacco Use    Smoking status: Former     Packs/day: 1     Types: Cigarettes    Smokeless tobacco: Not on file   Substance Use Topics    Alcohol use: No      Family History:  Family History   Problem Relation Age of Onset    Other Mother         ADOPTED       Home Meds:  Medications Prior to Admission   Medication Sig Dispense Refill Last Dose    busPIRone (BUSPAR) 5 MG tablet Take 1 tablet by mouth 3 (Three) Times a Day. 90 tablet 0 Past Week    pantoprazole (PROTONIX) 40 MG EC tablet Take 1 tablet by mouth Every Morning. 30 tablet 0 Past Week    sodium bicarbonate 650 MG tablet Take 2 tablets by mouth 3 (Three) Times a Day. 120 tablet 0 Past Week     Current Meds:   cefepime, 2,000 mg, Intravenous, Q12H  vancomycin, 750 mg, Intravenous, Q12H      Allergies:  No Known Allergies  Review of Systems  Pertinent items are noted in HPI, all other systems reviewed and negative    Objective     Vital Signs  Temp:  [98 °F (36.7 °C)-98.4 °F (36.9 °C)] 98.1 °F  (36.7 °C)  Heart Rate:  [111-120] 111  Resp:  [20] 20  BP: (103-142)/(51-74) 103/51    Physical Exam:  CONSTITUTIONAL:  today's vital signs reviewed, chronically ill-appearing in mild distress  EARS NOSE THROAT: trachea midline and no deformity of the nares  EYES: Scleral icterus  GASTROINTESTINAL: abdomen is soft, tender to palpation, nondistended with normal active bowel sounds, no masses are appreciated, jaundiced  PSYCHIATRIC: appropriate mood and affect  RESPIRATORY: normal inspiratory effort with no increased work of breathing  NEUROLOGIC: patient is awake and alert  DERMATOLOGIC: Jaundiced with multiple scabbed over lesions on the abdomen and lower extremities  LYMPHATIC: no periumbilical lymphadenopathy   Right knee swollen    Results Review:              I reviewed the patient's new clinical results.    Results from last 7 days   Lab Units 02/18/24  0920   WBC 10*3/mm3 9.93   HEMOGLOBIN g/dL 7.5*   HEMATOCRIT % 26.7*   PLATELETS 10*3/mm3 65*     Results from last 7 days   Lab Units 02/18/24  0920   SODIUM mmol/L 135*   POTASSIUM mmol/L 4.1   CHLORIDE mmol/L 103   CO2 mmol/L 17.0*   BUN mg/dL 29*   CREATININE mg/dL 1.90*   CALCIUM mg/dL 8.3*   BILIRUBIN mg/dL 11.3*   ALK PHOS U/L 333*   ALT (SGPT) U/L 42*   AST (SGOT) U/L 63*   GLUCOSE mg/dL 102*     Results from last 7 days   Lab Units 02/18/24  1340   INR  1.51*     Lab Results   Lab Value Date/Time    LIPASE 14 12/02/2023 1110    LIPASE 11 (L) 06/03/2023 0520    LIPASE 31 12/21/2022 1556       Radiology:  CT Abdomen Pelvis Without Contrast   Final Result       1. Cirrhosis.   2. Massive splenomegaly.   3. Mild volume ascites.   4. Trace right pleural fluid.   5. Nodular opacities at the bases, with cavitation in the right lower   lobe. Suspect septic emboli.       This report was finalized on 2/18/2024 11:23 AM by Dr. Charlie Harris M.D on Workstation: [id number]          XR Knee 1 or 2 View Right   Final Result      XR Chest 1 View   Final Result       FL Guided Aspiration Joint    (Results Pending)       Assessment & Plan   Active Hospital Problems    Diagnosis     **Sepsis     Hyperbilirubinemia     Stage 4 chronic kidney disease     Lactic acidosis     Opioid use disorder     Chronic hepatitis C with cirrhosis     Right knee pain     Effusion of right knee     Painless jaundice     Polysubstance abuse        Assessment:  Right knee septic arthritis with lactic acidosis effusion and imaging concerning for septic emboli  IV drug abuse, active  Hepatitis C  Hepatitis B antigen + December 2023, no treatment  Nonadherence  Cirrhosis  Splenomegaly  Small volume ascites  Elevated LFTs actually not as high as they have been previously with the exception of the total bilirubin which is markedly elevated at 11 compared to a normal or slightly elevated baseline.  This could be the result of obstructive process, sepsis, hemolysis  History of endocarditis    Plan:  Patient is being covered with broad-spectrum antibiotics she has been started on cefepime and vancomycin with plans for monitoring.  ID has been consulted.  Plans for repeat arthrocentesis in interventional radiology given her discomfort with the attempt in the emergency room  Plans to cover her for her opioid use, watch for withdrawal symptoms and aggressively manage  Will fractionate her bilirubin and send labs for hemolysis to look for underlying cause of her elevated bilirubin acutely which would be more likely related to her underlying sepsis or hemolysis then advancement of her liver disease  We will get right upper quadrant ultrasound to assess ducts for obstructive process and amount of ascites  We will check hepatitis B antigen and antibody to see if this has changed over the last few months  Check hepatitis C RNA  Check hep B DNA  Treatment options regarding hepatitis C and B will need to be discussed with the patient in the context of both her current infectious issues going on and adherence  issues in the past  Counseling regarding her drug use  Workup for source of her septic emboli per ID and primary service      I discussed the patients findings and my recommendations with patient and nursing staff.           Yuri Faye M.D.  [facility] [place] [address]  Office: [phone]
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
## 2024-02-19 ENCOUNTER — APPOINTMENT (OUTPATIENT)
Dept: GENERAL RADIOLOGY | Facility: HOSPITAL | Age: 34
DRG: 872 | End: 2024-02-19
Payer: COMMERCIAL

## 2024-02-19 LAB
ALBUMIN SERPL-MCNC: 2 G/DL (ref 3.5–5.2)
ALBUMIN/GLOB SERPL: 0.5 G/DL
ALP SERPL-CCNC: 231 U/L (ref 39–117)
ALT SERPL W P-5'-P-CCNC: 35 U/L (ref 1–33)
ANION GAP SERPL CALCULATED.3IONS-SCNC: 10.8 MMOL/L (ref 5–15)
ANISOCYTOSIS BLD QL: ABNORMAL
AST SERPL-CCNC: 54 U/L (ref 1–32)
BILIRUB SERPL-MCNC: 9.3 MG/DL (ref 0–1.2)
BUN SERPL-MCNC: 27 MG/DL (ref 6–20)
BUN/CREAT SERPL: 17.9 (ref 7–25)
CALCIUM SPEC-SCNC: 7.9 MG/DL (ref 8.6–10.5)
CHLORIDE SERPL-SCNC: 106 MMOL/L (ref 98–107)
CO2 SERPL-SCNC: 20.2 MMOL/L (ref 22–29)
CREAT SERPL-MCNC: 1.51 MG/DL (ref 0.57–1)
D-LACTATE SERPL-SCNC: 1.4 MMOL/L (ref 0.5–2)
DEPRECATED RDW RBC AUTO: 39.8 FL (ref 37–54)
EGFRCR SERPLBLD CKD-EPI 2021: 46 ML/MIN/1.73
ELLIPTOCYTES BLD QL SMEAR: ABNORMAL
EOSINOPHIL # BLD MANUAL: 0.09 10*3/MM3 (ref 0–0.4)
EOSINOPHIL NFR BLD MANUAL: 1 % (ref 0.3–6.2)
ERYTHROCYTE [DISTWIDTH] IN BLOOD BY AUTOMATED COUNT: 17.9 % (ref 12.3–15.4)
GLOBULIN UR ELPH-MCNC: 3.9 GM/DL
GLUCOSE SERPL-MCNC: 98 MG/DL (ref 65–99)
HCT VFR BLD AUTO: 23.4 % (ref 34–46.6)
HGB BLD-MCNC: 7 G/DL (ref 12–15.9)
HYPOCHROMIA BLD QL: ABNORMAL
LYMPHOCYTES # BLD MANUAL: 0.8 10*3/MM3 (ref 0.7–3.1)
LYMPHOCYTES NFR BLD MANUAL: 6.1 % (ref 5–12)
MCH RBC QN AUTO: 19.9 PG (ref 26.6–33)
MCHC RBC AUTO-ENTMCNC: 29.9 G/DL (ref 31.5–35.7)
MCV RBC AUTO: 66.5 FL (ref 79–97)
MICROCYTES BLD QL: ABNORMAL
MONOCYTES # BLD: 0.53 10*3/MM3 (ref 0.1–0.9)
NEUTROPHILS # BLD AUTO: 7.31 10*3/MM3 (ref 1.7–7)
NEUTROPHILS NFR BLD MANUAL: 83.7 % (ref 42.7–76)
NRBC SPEC MANUAL: 1 /100 WBC (ref 0–0.2)
OVALOCYTES BLD QL SMEAR: ABNORMAL
PLAT MORPH BLD: NORMAL
PLATELET # BLD AUTO: 77 10*3/MM3 (ref 140–450)
PMV BLD AUTO: ABNORMAL FL
POIKILOCYTOSIS BLD QL SMEAR: ABNORMAL
POLYCHROMASIA BLD QL SMEAR: ABNORMAL
POTASSIUM SERPL-SCNC: 4 MMOL/L (ref 3.5–5.2)
PROT SERPL-MCNC: 5.9 G/DL (ref 6–8.5)
RBC # BLD AUTO: 3.52 10*6/MM3 (ref 3.77–5.28)
SODIUM SERPL-SCNC: 137 MMOL/L (ref 136–145)
VANCOMYCIN TROUGH SERPL-MCNC: 14.2 MCG/ML (ref 5–20)
VARIANT LYMPHS NFR BLD MANUAL: 1 % (ref 0–5)
VARIANT LYMPHS NFR BLD MANUAL: 8.2 % (ref 19.6–45.3)
WBC MORPH BLD: NORMAL
WBC NRBC COR # BLD AUTO: 8.73 10*3/MM3 (ref 3.4–10.8)

## 2024-02-19 PROCEDURE — 25010000002 CEFEPIME PER 500 MG: Performed by: STUDENT IN AN ORGANIZED HEALTH CARE EDUCATION/TRAINING PROGRAM

## 2024-02-19 PROCEDURE — 25010000002 HYDROMORPHONE PER 4 MG: Performed by: STUDENT IN AN ORGANIZED HEALTH CARE EDUCATION/TRAINING PROGRAM

## 2024-02-19 PROCEDURE — 25010000002 VANCOMYCIN 750 MG RECONSTITUTED SOLUTION 1 EACH VIAL: Performed by: STUDENT IN AN ORGANIZED HEALTH CARE EDUCATION/TRAINING PROGRAM

## 2024-02-19 PROCEDURE — 25010000002 ONDANSETRON PER 1 MG: Performed by: STUDENT IN AN ORGANIZED HEALTH CARE EDUCATION/TRAINING PROGRAM

## 2024-02-19 PROCEDURE — 25010000002 VANCOMYCIN PER 500 MG: Performed by: INTERNAL MEDICINE

## 2024-02-19 PROCEDURE — 80202 ASSAY OF VANCOMYCIN: CPT | Performed by: STUDENT IN AN ORGANIZED HEALTH CARE EDUCATION/TRAINING PROGRAM

## 2024-02-19 PROCEDURE — 36415 COLL VENOUS BLD VENIPUNCTURE: CPT | Performed by: STUDENT IN AN ORGANIZED HEALTH CARE EDUCATION/TRAINING PROGRAM

## 2024-02-19 PROCEDURE — 85025 COMPLETE CBC W/AUTO DIFF WBC: CPT | Performed by: STUDENT IN AN ORGANIZED HEALTH CARE EDUCATION/TRAINING PROGRAM

## 2024-02-19 PROCEDURE — 63710000001 ONDANSETRON ODT 4 MG TABLET DISPERSIBLE: Performed by: STUDENT IN AN ORGANIZED HEALTH CARE EDUCATION/TRAINING PROGRAM

## 2024-02-19 PROCEDURE — 99232 SBSQ HOSP IP/OBS MODERATE 35: CPT | Performed by: INTERNAL MEDICINE

## 2024-02-19 PROCEDURE — 25810000003 LACTATED RINGERS PER 1000 ML: Performed by: STUDENT IN AN ORGANIZED HEALTH CARE EDUCATION/TRAINING PROGRAM

## 2024-02-19 PROCEDURE — 25810000003 SODIUM CHLORIDE 0.9 % SOLUTION 250 ML FLEX CONT: Performed by: STUDENT IN AN ORGANIZED HEALTH CARE EDUCATION/TRAINING PROGRAM

## 2024-02-19 PROCEDURE — 85007 BL SMEAR W/DIFF WBC COUNT: CPT | Performed by: STUDENT IN AN ORGANIZED HEALTH CARE EDUCATION/TRAINING PROGRAM

## 2024-02-19 PROCEDURE — 80053 COMPREHEN METABOLIC PANEL: CPT | Performed by: STUDENT IN AN ORGANIZED HEALTH CARE EDUCATION/TRAINING PROGRAM

## 2024-02-19 PROCEDURE — 25010000002 CEFEPIME PER 500 MG: Performed by: INTERNAL MEDICINE

## 2024-02-19 PROCEDURE — 83605 ASSAY OF LACTIC ACID: CPT | Performed by: EMERGENCY MEDICINE

## 2024-02-19 RX ORDER — SODIUM BICARBONATE 650 MG/1
325 TABLET ORAL 3 TIMES DAILY
Status: DISCONTINUED | OUTPATIENT
Start: 2024-02-19 | End: 2024-02-24 | Stop reason: HOSPADM

## 2024-02-19 RX ORDER — PANTOPRAZOLE SODIUM 40 MG/1
40 TABLET, DELAYED RELEASE ORAL
Status: DISCONTINUED | OUTPATIENT
Start: 2024-02-20 | End: 2024-02-24 | Stop reason: HOSPADM

## 2024-02-19 RX ORDER — VANCOMYCIN HYDROCHLORIDE 1 G/200ML
1000 INJECTION, SOLUTION INTRAVENOUS EVERY 12 HOURS
Status: DISCONTINUED | OUTPATIENT
Start: 2024-02-19 | End: 2024-02-23

## 2024-02-19 RX ADMIN — ONDANSETRON 4 MG: 2 INJECTION INTRAMUSCULAR; INTRAVENOUS at 11:54

## 2024-02-19 RX ADMIN — HYDROMORPHONE HYDROCHLORIDE 0.5 MG: 1 INJECTION, SOLUTION INTRAMUSCULAR; INTRAVENOUS; SUBCUTANEOUS at 22:39

## 2024-02-19 RX ADMIN — HYDROMORPHONE HYDROCHLORIDE 0.5 MG: 1 INJECTION, SOLUTION INTRAMUSCULAR; INTRAVENOUS; SUBCUTANEOUS at 07:22

## 2024-02-19 RX ADMIN — VANCOMYCIN HYDROCHLORIDE 1000 MG: 1 INJECTION, SOLUTION INTRAVENOUS at 22:39

## 2024-02-19 RX ADMIN — CEFEPIME 2000 MG: 2 INJECTION, POWDER, FOR SOLUTION INTRAVENOUS at 17:07

## 2024-02-19 RX ADMIN — HYDROMORPHONE HYDROCHLORIDE 0.5 MG: 1 INJECTION, SOLUTION INTRAMUSCULAR; INTRAVENOUS; SUBCUTANEOUS at 09:20

## 2024-02-19 RX ADMIN — HYDROMORPHONE HYDROCHLORIDE 0.5 MG: 1 INJECTION, SOLUTION INTRAMUSCULAR; INTRAVENOUS; SUBCUTANEOUS at 14:46

## 2024-02-19 RX ADMIN — HYDROMORPHONE HYDROCHLORIDE 0.5 MG: 1 INJECTION, SOLUTION INTRAMUSCULAR; INTRAVENOUS; SUBCUTANEOUS at 01:51

## 2024-02-19 RX ADMIN — HYDROMORPHONE HYDROCHLORIDE 0.5 MG: 1 INJECTION, SOLUTION INTRAMUSCULAR; INTRAVENOUS; SUBCUTANEOUS at 04:42

## 2024-02-19 RX ADMIN — HYDROMORPHONE HYDROCHLORIDE 0.5 MG: 1 INJECTION, SOLUTION INTRAMUSCULAR; INTRAVENOUS; SUBCUTANEOUS at 11:54

## 2024-02-19 RX ADMIN — SODIUM CHLORIDE, POTASSIUM CHLORIDE, SODIUM LACTATE AND CALCIUM CHLORIDE 100 ML/HR: 600; 310; 30; 20 INJECTION, SOLUTION INTRAVENOUS at 04:46

## 2024-02-19 RX ADMIN — ONDANSETRON 4 MG: 4 TABLET, ORALLY DISINTEGRATING ORAL at 05:12

## 2024-02-19 RX ADMIN — HYDROMORPHONE HYDROCHLORIDE 0.5 MG: 1 INJECTION, SOLUTION INTRAMUSCULAR; INTRAVENOUS; SUBCUTANEOUS at 20:27

## 2024-02-19 RX ADMIN — VANCOMYCIN HYDROCHLORIDE 750 MG: 750 INJECTION, POWDER, LYOPHILIZED, FOR SOLUTION INTRAVENOUS at 11:54

## 2024-02-19 RX ADMIN — HYDROMORPHONE HYDROCHLORIDE 0.5 MG: 1 INJECTION, SOLUTION INTRAMUSCULAR; INTRAVENOUS; SUBCUTANEOUS at 17:07

## 2024-02-19 RX ADMIN — CEFEPIME 2000 MG: 2 INJECTION, POWDER, FOR SOLUTION INTRAVENOUS at 09:20
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
## 2024-02-19 NOTE — PLAN OF CARE
Goal Outcome Evaluation:  Plan of Care Reviewed With: patient        Progress: no change  Outcome Evaluation: Pt refusing to turn. Only wants to lay on left side. Minimal movement in bed due to c/o pain to rt leg. Pain meds given as per prn. Purewick utilized for ease of voiding. MIVF infusing. VSS. IV abx given as ordered. Will CTM.

## 2024-02-19 NOTE — CONSULTS
CONSULT NOTE    Infectious Diseases - Jazmine Gillis MD  [facility]       Patient Identification:  Name: Sammie Landeros  Age: 33 y.o.  Sex: female  :  1990  MRN: [id number]             Date of Consultation: 2024      Primary Care Physician: Melvi Landeros APRN                               Requesting Physician: Dr. Xander miranda  Reason for Consultation: History of noncompliance MRSA bacteremia endocarditis and suspected right knee septic arthritis.    History of presenting illness: Patient is a 33-year-old female with history of substance abuse, hepatitis C, recurrent hospitalization with signing out AMA for MRSA bacteremic sepsis with latest hospitalization for 9 days from 2023 through 2023.  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  It is unclear whether she took her doxycycline.  In this background patient presented to our facility early this morning last night with complaints of right knee pain and right leg pain ongoing for the last 4 days started when she attempted to get up 4 days ago in the morning and could not do it.  She also has 1 week history of bodyaches fever and chills.  She admitted to using heroin on a daily basis up to a gram per day with last use of heroin was on 2024.  She admitted that her living place medical advice was due to craving for heroin.  Blood cultures were drawn and patient was started on IV cefepime and vancomycin and infectious disease service was consulted.  Initially the consult was called to Dr. Xander Flannery who transferred her care to me because of the last interaction I had was in 2023.  At present patient's major complaint is pain in her knee as well as inflammation involving the right leg and thigh.  Patient has obvious wounds on the right thigh and gluteal area which she is unable to characterize  how and when they happen.  She also had significant swelling and redness of the left leg below the knee joint.  Attempt was given in the emergency room to drain her left knee which was not performed because of the significant intolerance and pain and the procedure was aborted.  Impression:  This presentation and above context is concerning for:  1-systemic sepsis with probable right lower extremity cellulitis involving the lower leg and thigh area with likely systemic bacteremic sepsis with pathogen to consider would be MRSA MSSA and strep species  2-probable right knee septic arthritis  3-active IV drug use with phlebitis and evidence of septic emboli with 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    Recommendations/Discussions:  This is a difficult situation.  It appears that orthopedic surgery has already evaluated the patient for her right knee and IR consult has been written for aspiration of the right knee joint.  Empirically vancomycin and cefepime is reasonable which can be further de-escalated based on the culture results and or evolving clinical course.  Patient need to be monitored closely for evolving right lower extremity cellulitis antibiotic assessment for need for intervention especially the lateral aspect of the right leg and gluteal area.  Goals of care counseling and access evaluation is needed as she needs to be approached as substance abuse disorder and see if community and institutional resources can be mobilized to get her out of this spiraling down cervical that she is in.  Without addressing her substance abuse and craving and poor decision-making it is very difficult to achieve proper care plan and execute the care plan.  And  hence prognosis is guarded.  Thank you very much for letting me be the part of your patient care.          Past Medical History:  Past Medical History:   Diagnosis Date    Drug abuse     Hepatitis C     Hyperlipidemia     Nausea vomiting and diarrhea 06/03/2023     Past Surgical History:  Past Surgical History:   Procedure Laterality Date    ADENOIDECTOMY      BACK SURGERY      INCISION AND DRAINAGE LEG Left 7/20/2016    Procedure: Incision and Drainage left ankle;  Surgeon: Zechariah Kunz MD;  Location: [facility];  Service:     TONSILLECTOMY        Home Meds:  Medications Prior to Admission   Medication Sig Dispense Refill Last Dose    busPIRone (BUSPAR) 5 MG tablet Take 1 tablet by mouth 3 (Three) Times a Day. 90 tablet 0 Past Week    pantoprazole (PROTONIX) 40 MG EC tablet Take 1 tablet by mouth Every Morning. 30 tablet 0 Past Week    sodium bicarbonate 650 MG tablet Take 2 tablets by mouth 3 (Three) Times a Day. 120 tablet 0 Past Week     Current Meds:     Current Facility-Administered Medications:     acetaminophen (TYLENOL) tablet 650 mg, 650 mg, Oral, Q4H PRN, Lucas Blas MD    sennosides-docusate (PERICOLACE) 8.6-50 MG per tablet 2 tablet, 2 tablet, Oral, BID PRN **AND** polyethylene glycol (MIRALAX) packet 17 g, 17 g, Oral, Daily PRN **AND** bisacodyl (DULCOLAX) EC tablet 5 mg, 5 mg, Oral, Daily PRN **AND** bisacodyl (DULCOLAX) suppository 10 mg, 10 mg, Rectal, Daily PRN, Lucas Blas MD    cefepime 2000 mg IVPB in 100 mL NS (VTB), 2,000 mg, Intravenous, Q12H, Lucas Blas MD    HYDROmorphone (DILAUDID) injection 0.5 mg, 0.5 mg, Intravenous, Q2H PRN, Lucas Blas MD, 0.5 mg at 02/18/24 1802    influenza vac split quad (FLUZONE,FLUARIX,AFLURIA,FLULAVAL) injection 0.5 mL, 0.5 mL, Intramuscular, During Hospitalization, Lucas Blas MD    lactated ringers infusion, 100 mL/hr, Intravenous, Continuous, Lucas Blas MD, Last Rate: 100 mL/hr at  02/18/24 1806, 100 mL/hr at 02/18/24 1806    melatonin tablet 3 mg, 3 mg, Oral, Nightly PRN, Lucas Blas MD    ondansetron ODT (ZOFRAN-ODT) disintegrating tablet 4 mg, 4 mg, Oral, Q6H PRN **OR** ondansetron (ZOFRAN) injection 4 mg, 4 mg, Intravenous, Q6H PRN, Lucas Blas MD    Pharmacy to dose vancomycin, , Does not apply, Continuous PRN, Lucas Blas MD    [COMPLETED] Insert Peripheral IV, , , Once **AND** sodium chloride 0.9 % flush 10 mL, 10 mL, Intravenous, PRN, Aries Pat MD    vancomycin 750 mg in sodium chloride 0.9 % 250 mL IVPB-VTB, 750 mg, Intravenous, Q12H, Lucas Blas MD  Allergies:  No Known Allergies  Social History:   Social History     Tobacco Use    Smoking status: Former     Packs/day: 1     Types: Cigarettes    Smokeless tobacco: Not on file   Substance Use Topics    Alcohol use: No      Family History:  Family History   Problem Relation Age of Onset    Other Mother         ADOPTED          Review of Systems  See history of present illness and past medical history.    Does not want to talk and engage with me at this time.  Very withdrawn.  But at the time of admission  Review of system was performed as follows by the admitting team.  Remainder of ROS is negative.  Constitutional:  Positive for chills, fatigue and fever. Negative for activity change.   HENT:  Negative for congestion, drooling, facial swelling, rhinorrhea and sore throat.    Respiratory:  Negative for apnea, chest tightness and shortness of breath.    Gastrointestinal:  Negative for abdominal pain, blood in stool and diarrhea.   Genitourinary:  Negative for difficulty urinating, dysuria and urgency.   Musculoskeletal:  Positive for arthralgias, gait problem and joint swelling.   Skin:  Positive for color change.   Neurological:  Positive for weakness. Negative for dizziness, syncope and numbness.   Hematological:  Negative for adenopathy.   Psychiatric/Behavioral:  Negative for  "agitation, dysphoric mood and self-injury.      Vitals:   /51 (BP Location: Left arm, Patient Position: Lying)   Pulse 111   Temp 98.1 °F (36.7 °C) (Oral)   Resp 20   Ht 175.3 cm (69\")   Wt 118 kg (261 lb)   SpO2 94%   BMI 38.54 kg/m²   I/O:   Intake/Output Summary (Last 24 hours) at 2/18/2024 1913  Last data filed at 2/18/2024 1055  Gross per 24 hour   Intake 1100 ml   Output --   Net 1100 ml     Exam:  Patient is examined using the personal protective equipment as per guidelines from infection control for this particular patient as enacted.  Hand washing was performed before and after patient interaction.  General Appearance:  Withdrawn and engaging female who is morbidly obese.   Head:    Normocephalic, without obvious abnormality, atraumatic   Eyes:    PERRL, conjunctivae/corneas clear, EOM's intact, both eyes   Ears:    Normal external ear canals, both ears   Nose:   Nares normal, septum midline, mucosa normal, no drainage    or sinus tenderness   Throat:   Lips, tongue, gums normal; oral mucosa pink and moist   Neck: Supple and no adenopathy   Back:     Symmetric, no curvature, ROM normal, no CVA tenderness   Lungs:   Decreased breath sounds at the bases   Chest Wall:    No tenderness or deformity    Heart:  S1-S2 tachycardia   Abdomen:   Soft mild generalized tenderness, distended   Extremities: Cellulitis of the right lateral leg with skin lesions swelling and warmth and tenderness and decreased motion of the right knee and scabbed wound with surrounding cellulitis of the right gluteal area noted.   Pulses:   Pulses palpable in all extremities; symmetric all extremities   Skin: Needle track marks as well as open obvious wound on the right gluteal area and erythema of the right leg noted.   Neurologic: Grossly nonfocal cannot move her right knee and leg because of pain.       Data Review:    I reviewed the patient's new clinical results.  Results from last 7 days   Lab Units 02/18/24  0920   WBC " 10*3/mm3 9.93   HEMOGLOBIN g/dL 7.5*   PLATELETS 10*3/mm3 65*     Results from last 7 days   Lab Units 02/18/24  0920   SODIUM mmol/L 135*   POTASSIUM mmol/L 4.1   CHLORIDE mmol/L 103   CO2 mmol/L 17.0*   BUN mg/dL 29*   CREATININE mg/dL 1.90*   CALCIUM mg/dL 8.3*   GLUCOSE mg/dL 102*     Microbiology Results (last 10 days)       ** No results found for the last 240 hours. **              Assessment:  Active Hospital Problems    Diagnosis  POA    **Sepsis [A41.9]  Yes    Hyperbilirubinemia [E80.6]  Yes    Stage 4 chronic kidney disease [N18.4]  Yes    Lactic acidosis [E87.20]  Yes    Opioid use disorder [F11.90]  Yes    Chronic hepatitis C with cirrhosis [B18.2, K74.60]  Yes    Right knee pain [M25.561]  Yes    Effusion of right knee [M25.461]  Yes    Painless jaundice [R17]  Yes    Hepatitis B infection [B19.10]  Yes    Polysubstance abuse [F19.10]  Yes      Resolved Hospital Problems   No resolved problems to display.         Plan:  See above  Jazmine Higginbotham MD   2/18/2024  19:13 EST    Parts of this note may be an electronic transcription/translation of spoken language to printed text using the Dragon dictation system.

## 2024-02-19 NOTE — PROGRESS NOTES
"[facility] Clinical Pharmacy Services: Vancomycin Monitoring Note    Sammie Landeros is a 33 y.o. female who is on day 2 of pharmacy to dose vancomycin for Sepsis.    Previous Vancomycin Dose:   750 mg IV every  12  hours  Updated Cultures and Sensitivities:   2/18 - Bcx GBS  Results from last 7 days   Lab Units 02/19/24  1024   VANCOMYCIN TR mcg/mL 14.20     Vitals/Labs  Ht: 175.3 cm (69\"); Wt: 118 kg (261 lb)   Temp Readings from Last 1 Encounters:   02/19/24 97.5 °F (36.4 °C) (Oral)     Estimated Creatinine Clearance: 71.3 mL/min (A) (by C-G formula based on SCr of 1.51 mg/dL (H)).        Results from last 7 days   Lab Units 02/19/24  0236 02/18/24  0920   CREATININE mg/dL 1.51* 1.90*   WBC 10*3/mm3 8.73 9.93     Assessment/Plan    Blood cultures drawn 2/18 growing Group B Strep. Plan for arthrocentesis w/IR. Renal function continues to recover from previous ANTONELLA in December, uncertain of new baseline.    Current Vancomycin Dose: 1000 mg IV every  12  hours; provides a predicted  mg/L.hr   Next Level Date and Time: No level pending, will follow daily, monitoring renal function closely, and will order level as clinically appropriate.   We will continue to monitor patient changes and renal function     Thank you for involving pharmacy in this patient's care. Please contact pharmacy with any questions or concerns.       Jian Gee, [facility]  Clinical Pharmacist          "

## 2024-02-19 NOTE — PROGRESS NOTES
Dedicated to [facility]    [phone]   LOS: 1 day     Name: Sammie Landeros  Age/Sex: 33 y.o. female  :  1990        PCP: Melvi Landeros APRN  Chief Complaint   Patient presents with    Addiction Problem    Generalized Body Aches      Subjective   She is somnolent but wakes up and tells me she is in tremendous pain.  She falls asleep midsentence.  She is arousable but falls back asleep.    cefepime, 2,000 mg, Intravenous, Q12H  vancomycin, 750 mg, Intravenous, Q12H      lactated ringers, 100 mL/hr, Last Rate: 100 mL/hr (24 0446)  Pharmacy to dose vancomycin,         Objective   Vital Signs  Temp:  [97.5 °F (36.4 °C)-98.1 °F (36.7 °C)] 97.5 °F (36.4 °C)  Heart Rate:  [100-111] 100  Resp:  [18-20] 18  BP: (103-141)/(51-78) 141/78  Body mass index is 38.54 kg/m².    Intake/Output Summary (Last 24 hours) at 2024 1203  Last data filed at 2024 0235  Gross per 24 hour   Intake --   Output 725 ml   Net -725 ml       Physical Exam  Vitals and nursing note reviewed.   Constitutional:       General: She is not in acute distress.     Appearance: She is obese. She is ill-appearing.   Cardiovascular:      Rate and Rhythm: Regular rhythm. Tachycardia present.   Pulmonary:      Effort: No respiratory distress.      Breath sounds: Normal breath sounds.   Abdominal:      General: Bowel sounds are normal. There is no distension.      Palpations: Abdomen is soft.   Neurological:      General: No focal deficit present.      Mental Status: She is alert and oriented to person, place, and time.           Results Review:       I reviewed the patient's new clinical results.  Results from last 7 days   Lab Units 24  0236 24  0920   WBC 10*3/mm3 8.73 9.93   HEMOGLOBIN g/dL 7.0* 7.5*   PLATELETS 10*3/mm3 77* 65*     Results from last 7 days   Lab Units 24  0236 24  0920   SODIUM mmol/L 137 135*   POTASSIUM mmol/L 4.0 4.1   CHLORIDE mmol/L 106 103   CO2 mmol/L 20.2* 17.0*   BUN mg/dL 27* 29*    CREATININE mg/dL 1.51* 1.90*   CALCIUM mg/dL 7.9* 8.3*   Estimated Creatinine Clearance: 71.3 mL/min (A) (by C-G formula based on SCr of 1.51 mg/dL (H)).  Results from last 7 days   Lab Units 02/19/24  0236 02/18/24  1710 02/18/24  0920   ALK PHOS U/L 231*  --  333*   BILIRUBIN mg/dL 9.3* 10.9* 11.3*   BILIRUBIN DIRECT mg/dL  --  9.3*  --    ALT (SGPT) U/L 35*  --  42*   AST (SGOT) U/L 54*  --  63*         Assessment & Plan   Active Hospital Problems    Diagnosis  POA    **Sepsis [A41.9]  Yes    Hyperbilirubinemia [E80.6]  Yes    Stage 4 chronic kidney disease [N18.4]  Yes    Lactic acidosis [E87.20]  Yes    Opioid use disorder [F11.90]  Yes    Chronic hepatitis C with cirrhosis [B18.2, K74.60]  Yes    Right knee pain [M25.561]  Yes    Effusion of right knee [M25.461]  Yes    Painless jaundice [R17]  Yes    Hepatitis B infection [B19.10]  Yes    Polysubstance abuse [F19.10]  Yes      Resolved Hospital Problems   No resolved problems to display.       PLAN  Ms. Landeros is a 33 y.o. female with polysubstance use disorder, chronic hepatitis C with cirrhosis, CKD, history of TV endocarditis/MRSA bacteremia presenting with right knee pain and is admitted to the hospital with concern for septic arthritis     Suspected R knee septic arthritis/R knee pain/effusion  Hx TV endocarditis/MRSA bacteremia  Lactic acidosis  -Xray w/ R knee effusion  -Arthrocentesis attempted in ED, patient mainly uncooperative so reordered with IR  -CT w/ concern for septic emboli  -cx pending  -ID recs reviewed  -cont cefepime, vancomycin (monitor trough)     2.   Opioid use disorder  -most recently used heroin yesterday  -cover with Dilaudid q2h PRN for now     3.  CKD4  -Crt 1.9 OA (b/l ~1.5-1.9)     4.  Hepatitis C cirrhosis   Hepatitis B  Painless jaundice/hyperbilirubinemia  -bili 11.3  -denies EtOH use  -CT w/ cirrhosis, splenomegaly, mild ascites  -GI recommendations reviewed    --> This is truly a sad situation at her age with her  comorbidities and ongoing continued drug use I do not see a bright future for her.    Disposition  Expected Discharge Date: 2/20/2024; Expected Discharge Time:        Tim Magdaleno MD  [facility]  02/19/24  12:03 EST

## 2024-02-19 NOTE — CASE MANAGEMENT/SOCIAL WORK
Discharge Planning Assessment  [facility]     Patient Name: Sammie Landeros  MRN: [id number]  Today's Date: 2/19/2024    Admit Date: 2/18/2024    Plan: Home with significant other   Discharge Needs Assessment       Row Name 02/19/24 1546       Living Environment    People in Home significant other    Name(s) of People in Home Frederic    Current Living Arrangements apartment    Potentially Unsafe Housing Conditions none    Primary Care Provided by self    Family Caregiver if Needed parent(s);significant other    Quality of Family Relationships helpful;involved    Able to Return to Prior Arrangements yes       Resource/Environmental Concerns    Resource/Environmental Concerns none       Transition Planning    Patient/Family Anticipates Transition to home    Patient/Family Anticipated Services at Transition none    Transportation Anticipated family or friend will provide       Discharge Needs Assessment    Readmission Within the Last 30 Days no previous admission in last 30 days    Equipment Currently Used at Home none    Concerns to be Addressed adjustment to diagnosis/illness    Anticipated Changes Related to Illness none    Equipment Needed After Discharge none                   Discharge Plan       Row Name 02/19/24 1547       Plan    Plan Home with significant other    Patient/Family in Agreement with Plan yes    Plan Comments Spoke with pt at bedside, introduced self and explained CCP role, and confirmed pharmacy and face sheet information verified. Her mother is her PCP.  Pt lives with her boyfriend Frederic in [facility] with 1 step to enter, she is IADL's, uses no medical equipment, no HH or SNF history.  She plans home with her mother to transport no anticipated needs.  CCP will follow - Amita JOHNSON                  Continued Care and Services - Admitted Since 2/18/2024    Coordination has not been started for this encounter.       Expected Discharge Date and Time       Expected Discharge Date Expected Discharge Time    Feb  21, 2024            Demographic Summary       Row Name 02/19/24 1544       General Information    Admission Type inpatient                   Functional Status       Row Name 02/19/24 1544       Functional Status    Usual Activity Tolerance good    Current Activity Tolerance moderate       Assessment of Health Literacy    Health Literacy Fair       Functional Status, IADL    Medications independent    Meal Preparation independent    Housekeeping independent    Laundry independent    Shopping independent       Mental Status    General Appearance WDL WDL       Mental Status Summary    Recent Changes in Mental Status/Cognitive Functioning behavior patterns;mental status;mood    Mental Status Comments pt continoually moaning and saying please/drink.. explained the aide was bringing it.                   Psychosocial    No documentation.                  Abuse/Neglect    No documentation.                  Legal       Row Name 02/19/24 1546       Financial/Legal    Who Manages Finances if Patient Unable Mother                   Substance Abuse    No documentation.                  Patient Forms    No documentation.                     Amita Kumar RN

## 2024-02-19 NOTE — CONSULTS
Orthopaedic Consultation      Patient: Sammie Landeros    Date of Admission: 2/18/2024  8:51 AM    YOB: 1990    Medical Record Number: [id number]    Attending Physician:  Tim Magdaleno MD    Consulting Physician:  Bobo Alvarez PA-C        Chief Complaints: Right knee pain    History of Present Illness:     Patient is a 33-year-old female.  She has past medical history of polysubstance abuse, chronic hepatitis C with cirrhosis, chronic kidney disease, history of TV endocarditis/MRSA bacteremia.  She presented to [facility] with right knee pain.  She states that she has been not been able to get out of bed because of the increasing pain within the knee.  She has had flulike symptoms over the past course of 1 to 2 weeks with fevers and general malaise.  She was initially seen and evaluated in the emergency room.  They did attempt to perform aspiration of the knee.  However patient was unable to tolerate this in the emergency room.  She was subsequently admitted to the hospital.  Gastroenterology as well as infectious disease has been consulted as well.  Orthopedics has been consulted for evaluation.    Allergies: No Known Allergies    Medications:   Home Medications:  Medications Prior to Admission   Medication Sig Dispense Refill Last Dose    busPIRone (BUSPAR) 5 MG tablet Take 1 tablet by mouth 3 (Three) Times a Day. 90 tablet 0 Past Week    pantoprazole (PROTONIX) 40 MG EC tablet Take 1 tablet by mouth Every Morning. 30 tablet 0 Past Week    sodium bicarbonate 650 MG tablet Take 2 tablets by mouth 3 (Three) Times a Day. 120 tablet 0 Past Week       Current Medications:  Scheduled Meds:cefepime, 2,000 mg, Intravenous, Q12H  vancomycin, 750 mg, Intravenous, Q12H      Continuous Infusions:lactated ringers, 100 mL/hr, Last Rate: 100 mL/hr (02/19/24 0446)  Pharmacy to dose vancomycin,       PRN Meds:.  acetaminophen    senna-docusate sodium **AND** polyethylene glycol **AND** bisacodyl  "**AND** bisacodyl    HYDROmorphone    influenza vaccine    melatonin    ondansetron ODT **OR** ondansetron    Pharmacy to dose vancomycin    [COMPLETED] Insert Peripheral IV **AND** sodium chloride    Past Medical History:   Diagnosis Date    Drug abuse     Hepatitis C     Hyperlipidemia     Nausea vomiting and diarrhea 06/03/2023     Past Surgical History:   Procedure Laterality Date    ADENOIDECTOMY      BACK SURGERY      INCISION AND DRAINAGE LEG Left 7/20/2016    Procedure: Incision and Drainage left ankle;  Surgeon: Zechariah Kunz MD;  Location: [facility] OR;  Service:     TONSILLECTOMY       Social History     Occupational History    Not on file   Tobacco Use    Smoking status: Former     Packs/day: 1     Types: Cigarettes    Smokeless tobacco: Not on file   Vaping Use    Vaping Use: Some days   Substance and Sexual Activity    Alcohol use: No    Drug use: Yes     Frequency: 21.0 times per week     Types: Heroin     Comment: \"USE $20-&50 UP TO 3 TIMES PER DAY\"    Sexual activity: Defer      Social History     Social History Narrative    Not on file     Family History   Problem Relation Age of Onset    Other Mother         ADOPTED         Review of Systems:   Positive for right knee pain    Vital signs in last 24 hours:  Temp:  [97.7 °F (36.5 °C)-98.4 °F (36.9 °C)] 97.7 °F (36.5 °C)  Heart Rate:  [102-120] 102  Resp:  [18-20] 18  BP: (103-142)/(51-74) 123/66  Vitals:    02/18/24 1101 02/18/24 1253 02/18/24 2207 02/19/24 0020   BP: 116/57 103/51 123/62 123/66   BP Location:  Left arm Left arm Left arm   Patient Position:  Lying Lying Lying   Pulse: 112 111 105 102   Resp: 20 20 18 18   Temp: 98.4 °F (36.9 °C) 98.1 °F (36.7 °C) 97.7 °F (36.5 °C) 97.7 °F (36.5 °C)   TempSrc:  Oral Oral Oral   SpO2: 100% 94% 96% 94%   Weight:       Height:              Physical Exam: 33 y.o. female         General Appearance:  Alert, cooperative, in no acute distress    HEENT:    Atraumatic, Pupils are equal   Neck:   Cervical " spine midline, no appreciable JVD   Lungs:     Breathing non-labored and chest rise symmetric    Heart:   Abdomen:     Rectal:       Extremities:   Pulses  Neurovascular:   Skin:   Musculoskeletal:      Pulse regular    Soft, Non-tender or distended    Deferred        No clubbing, cyanosis, or edema    Intact    Cranial nerves 2 - 12 grossly intact, sensation intact    No skin lesions  Physical examination of the patient's right lower extremity was performed.  Patient is resting in her hospital bed today.  She is lying on her left side.  There is a wound over her right thigh/lateral aspect of the right leg and gluteal area.  Physical examination is somewhat limited given the patient's extent of pain.  She is very apprehensive to any physical exam.  She will not let me move the leg.  She is very exquisitely tender to any type of palpation around the knee.     Diagnostic Tests:    Results from last 7 days   Lab Units 02/19/24  0236   WBC 10*3/mm3 8.73   HEMOGLOBIN g/dL 7.0*   HEMATOCRIT % 23.4*   PLATELETS 10*3/mm3 77*     Results from last 7 days   Lab Units 02/19/24  0236   SODIUM mmol/L 137   POTASSIUM mmol/L 4.0   CHLORIDE mmol/L 106   CO2 mmol/L 20.2*   BUN mg/dL 27*   CREATININE mg/dL 1.51*   GLUCOSE mg/dL 98   CALCIUM mg/dL 7.9*     Results from last 7 days   Lab Units 02/18/24  1340   INR  1.51*       Study Result    Narrative & Impression   2 VIEW RIGHT KNEE     HISTORY: Generalized pain and fever. Right knee pain. Drug abuse.     FINDINGS: There is mild to moderate degenerative change with joint space  narrowing and spurring involving the lateral compartment and to a lesser  extent the patella. There appears to be a large joint effusion. Given  the history of fever, further evaluation with joint aspiration may be  appropriate.     ONE-VIEW PORTABLE CHEST     HISTORY: Fever. Generalized body aches.     FINDINGS: The lungs are moderately expanded which accentuates an  appearance of mild cardiomegaly and mild  vascular congestion that  appears slightly more prominent since the study of 12/2/2023.     This report was finalized on 2/18/2024 9:30 AM by Dr. Bo Pratt M.D  on Workstation: [id number]       Assessment:    Sepsis    Polysubstance abuse    Hyperbilirubinemia    Stage 4 chronic kidney disease    Lactic acidosis    Opioid use disorder    Chronic hepatitis C with cirrhosis    Right knee pain    Effusion of right knee    Painless jaundice    Hepatitis B infection        Plan:      I did have an extensive discussion with the patient regards her situation in the hospital today.  I have reviewed the above.  Patient continuing following with infectious disease.  She likely has probable right lower extremity cellulitis involving the lower leg and thigh area.  She has previous history of MRSA bacteremia.  She is being followed by infectious disease.  She is previously left with history of heroin abuse with noncompliance and leaving AGAINST MEDICAL ADVICE.  History of autoimmune hepatitis and chronic hepatitis C with evolving liver cirrhosis.  We have been consulted to evaluate the patient for probable right knee septic arthritis.  She does have a significant effusion on the x-ray.  They did attempt aspiration in the emergency room.  However patient was not able to tolerate it in the emergency room.  Therefore interventional radiology has been consulted for aspiration of the right knee.  We will await the results of the aspiration.  Will continue to follow along.  Thank you very much for the consultation allowing us to be part of the patient's care.  Will follow-up on the patient once the aspiration is performed.    All findings have been discussed my supervising physician Dr. Schuyler Winkler.  He is in agreement with the plan.  All questions answered.    Date: 2/19/2024  Bobo Alvarez PA-C

## 2024-02-19 NOTE — PROGRESS NOTES
[facility]  Inpatient Progress Note    Reason for Followup:  Chronic liver disease    Subjective     Interval History:   No new issues    Current Facility-Administered Medications:     acetaminophen (TYLENOL) tablet 650 mg, 650 mg, Oral, Q4H PRN, Lucas Blas MD    sennosides-docusate (PERICOLACE) 8.6-50 MG per tablet 2 tablet, 2 tablet, Oral, BID PRN **AND** polyethylene glycol (MIRALAX) packet 17 g, 17 g, Oral, Daily PRN **AND** bisacodyl (DULCOLAX) EC tablet 5 mg, 5 mg, Oral, Daily PRN **AND** bisacodyl (DULCOLAX) suppository 10 mg, 10 mg, Rectal, Daily PRN, Lucas Blas MD    cefepime 2000 mg IVPB in 100 mL NS (VTB), 2,000 mg, Intravenous, Q12H, Lucas Blas MD, 2,000 mg at 02/18/24 2304    HYDROmorphone (DILAUDID) injection 0.5 mg, 0.5 mg, Intravenous, Q2H PRN, Lucas Blas MD, 0.5 mg at 02/19/24 0722    influenza vac split quad (FLUZONE,FLUARIX,AFLURIA,FLULAVAL) injection 0.5 mL, 0.5 mL, Intramuscular, During Hospitalization, Lucas Blas MD    lactated ringers infusion, 100 mL/hr, Intravenous, Continuous, Lucas Blas MD, Last Rate: 100 mL/hr at 02/19/24 0446, 100 mL/hr at 02/19/24 0446    melatonin tablet 3 mg, 3 mg, Oral, Nightly PRN, Lucas Blas MD    ondansetron ODT (ZOFRAN-ODT) disintegrating tablet 4 mg, 4 mg, Oral, Q6H PRN, 4 mg at 02/19/24 0512 **OR** ondansetron (ZOFRAN) injection 4 mg, 4 mg, Intravenous, Q6H PRN, Lucas Blas MD    Pharmacy to dose vancomycin, , Does not apply, Continuous PRN, Lucas Blas MD    [COMPLETED] Insert Peripheral IV, , , Once **AND** sodium chloride 0.9 % flush 10 mL, 10 mL, Intravenous, PRN, Aries Pat MD    vancomycin 750 mg in sodium chloride 0.9 % 250 mL IVPB-VTB, 750 mg, Intravenous, Q12H, Lucas Blas MD, Last Rate: 333.3 mL/hr at 02/18/24 2305, 750 mg at 02/18/24 2305    Objective     Vital Signs  Temp:  [97.7 °F (36.5 °C)-98.4 °F  (36.9 °C)] 97.7 °F (36.5 °C)  Heart Rate:  [102-120] 102  Resp:  [18-20] 18  BP: (103-142)/(51-74) 123/66  Body mass index is 38.54 kg/m².    Intake/Output Summary (Last 24 hours) at 2/19/2024 0727  Last data filed at 2/19/2024 0235  Gross per 24 hour   Intake 1100 ml   Output 725 ml   Net 375 ml     No intake/output data recorded.     Physical Exam:   General: patient awake, alert and cooperative   Abdomen: soft, nontender, nondistended; normal bowel sounds     Results Review:     I reviewed the patient's new clinical results.  I reviewed the patient's new imaging results and agree with the interpretation.    Results from last 7 days   Lab Units 02/19/24  0236 02/18/24  0920   WBC 10*3/mm3 8.73 9.93   HEMOGLOBIN g/dL 7.0* 7.5*   HEMATOCRIT % 23.4* 26.7*   PLATELETS 10*3/mm3 77* 65*     Results from last 7 days   Lab Units 02/19/24  0236 02/18/24  1710 02/18/24  0920   SODIUM mmol/L 137  --  135*   POTASSIUM mmol/L 4.0  --  4.1   CHLORIDE mmol/L 106  --  103   CO2 mmol/L 20.2*  --  17.0*   BUN mg/dL 27*  --  29*   CREATININE mg/dL 1.51*  --  1.90*   CALCIUM mg/dL 7.9*  --  8.3*   BILIRUBIN mg/dL 9.3* 10.9* 11.3*   ALK PHOS U/L 231*  --  333*   ALT (SGPT) U/L 35*  --  42*   AST (SGOT) U/L 54*  --  63*   GLUCOSE mg/dL 98  --  102*     Results from last 7 days   Lab Units 02/18/24  1340   INR  1.51*     Lab Results   Lab Value Date/Time    LIPASE 14 12/02/2023 1110    LIPASE 11 (L) 06/03/2023 0520    LIPASE 31 12/21/2022 1559       Radiology:  [email or web address]@      Assessment & Plan   Assessment:   Right knee septic arthritis with lactic acidosis effusion and imaging concerning for septic emboli  IV drug abuse, active  Hepatitis C  Hepatitis B antigen + December 2023, no treatment  Nonadherence  Cirrhosis  Splenomegaly  Small volume ascites  Elevated LFTs actually not as high as they have been previously with the exception of the total bilirubin which is markedly elevated at 11 compared to a normal or slightly elevated  baseline.  This could be the result of obstructive process, sepsis, hemolysis  History of endocarditis    All problems new to me today    Plan:   Abx per ID  Await HBV DNA/HCV RNA  Trend lFTs.  TB down a little today, predominately direct bilirubin on fractionation.  No e/o obstruction or dilation of CBD on imaging, suspect secondary to intrinsic liver disease and exacerbated by sepsis  Will need outpt f/u with  hepatology to potentially address her HCV and Hep B        I discussed the patient's findings and my recommendations with patient.          Cullen Lemon M.D.  [facility]  [address] [place] [address]  Office: [phone]

## 2024-02-19 NOTE — PLAN OF CARE
Problem: Adult Inpatient Plan of Care  Goal: Plan of Care Review  Outcome: Ongoing, Not Progressing  Flowsheets (Taken 2/19/2024 1749)  Progress: no change  Plan of Care Reviewed With: patient  Outcome Evaluation:   Pt continues to be extremely noncompliant with care. Pt refuses care consistently. She refusesto be bathed or cleaned after voiding   refuses medications at times   refuses labs   and refused to have knee aspiration again today. Pt constantly screams out for staff and requires frequent redirection. Pt is restricted on visitors and is to have her mother visit only.     Problem: Adult Inpatient Plan of Care  Goal: Absence of Hospital-Acquired Illness or Injury  Outcome: Ongoing, Not Progressing  Intervention: Identify and Manage Fall Risk  Recent Flowsheet Documentation  Taken 2/19/2024 1620 by Carlene Joshi RN  Safety Promotion/Fall Prevention:   activity supervised   assistive device/personal items within reach   clutter free environment maintained   fall prevention program maintained   room organization consistent   safety round/check completed  Taken 2/19/2024 1419 by Carlene Joshi RN  Safety Promotion/Fall Prevention:   activity supervised   assistive device/personal items within reach   clutter free environment maintained   fall prevention program maintained   room organization consistent   safety round/check completed  Taken 2/19/2024 1223 by Carlene Joshi, RN  Safety Promotion/Fall Prevention:   activity supervised   assistive device/personal items within reach   clutter free environment maintained   fall prevention program maintained   room organization consistent   safety round/check completed  Taken 2/19/2024 1042 by Carlene Joshi, RN  Safety Promotion/Fall Prevention:   activity supervised   assistive device/personal items within reach   clutter free environment maintained   fall prevention program maintained   room organization consistent   safety round/check completed  Taken  2/19/2024 0920 by Carlene Joshi RN  Safety Promotion/Fall Prevention: safety round/check completed  Intervention: Prevent Skin Injury  Recent Flowsheet Documentation  Taken 2/19/2024 1620 by Carlene Joshi RN  Body Position: patient/family refused  Taken 2/19/2024 1419 by Carlene Joshi RN  Body Position: patient/family refused  Taken 2/19/2024 1223 by Carlene Joshi RN  Body Position: patient/family refused  Taken 2/19/2024 1042 by Carlene Joshi RN  Body Position: patient/family refused  Taken 2/19/2024 0920 by Carlene Joshi RN  Body Position:   position changed independently   patient/family refused  Skin Protection:   adhesive use limited   incontinence pads utilized   skin-to-device areas padded   tubing/devices free from skin contact  Intervention: Prevent and Manage VTE (Venous Thromboembolism) Risk  Recent Flowsheet Documentation  Taken 2/19/2024 0920 by Carlene Joshi RN  Activity Management: activity encouraged  Range of Motion:   active ROM (range of motion) encouraged   ROM (range of motion) performed  Intervention: Prevent Infection  Recent Flowsheet Documentation  Taken 2/19/2024 1620 by Carlene Joshi RN  Infection Prevention:   environmental surveillance performed   equipment surfaces disinfected   hand hygiene promoted   personal protective equipment utilized   rest/sleep promoted   single patient room provided  Taken 2/19/2024 1419 by Carlene Joshi RN  Infection Prevention:   environmental surveillance performed   equipment surfaces disinfected   hand hygiene promoted   personal protective equipment utilized   rest/sleep promoted   single patient room provided  Taken 2/19/2024 1223 by Carlene Joshi RN  Infection Prevention:   environmental surveillance performed   equipment surfaces disinfected   hand hygiene promoted   personal protective equipment utilized   rest/sleep promoted   single patient room provided   visitors restricted/screened  Taken 2/19/2024 1042 by  Carlene Joshi RN  Infection Prevention:   environmental surveillance performed   equipment surfaces disinfected   hand hygiene promoted   personal protective equipment utilized   rest/sleep promoted   single patient room provided  Taken 2/19/2024 0920 by Carlene Joshi RN  Infection Prevention:   environmental surveillance performed   equipment surfaces disinfected   hand hygiene promoted   personal protective equipment utilized   rest/sleep promoted   single patient room provided     Problem: Adult Inpatient Plan of Care  Goal: Optimal Comfort and Wellbeing  Outcome: Ongoing, Not Progressing  Intervention: Monitor Pain and Promote Comfort  Recent Flowsheet Documentation  Taken 2/19/2024 0920 by Carlene Joshi RN  Pain Management Interventions:   see MAR   position adjusted   pillow support provided  Intervention: Provide Person-Centered Care  Recent Flowsheet Documentation  Taken 2/19/2024 0920 by Carlene Joshi RN  Trust Relationship/Rapport:   care explained   choices provided   questions encouraged   questions answered     Problem: Skin Injury Risk Increased  Goal: Skin Health and Integrity  Outcome: Ongoing, Not Progressing  Intervention: Optimize Skin Protection  Recent Flowsheet Documentation  Taken 2/19/2024 1620 by Carlene Joshi RN  Head of Bed (HOB) Positioning: HOB elevated  Taken 2/19/2024 1419 by Carlene Joshi RN  Head of Bed (HOB) Positioning: HOB elevated  Taken 2/19/2024 1223 by Carlene Joshi RN  Head of Bed (HOB) Positioning: HOB elevated  Taken 2/19/2024 1042 by Carlene Joshi RN  Head of Bed (HOB) Positioning: HOB elevated  Taken 2/19/2024 0920 by Carlene Joshi RN  Pressure Reduction Techniques:   frequent weight shift encouraged   weight shift assistance provided  Head of Bed (HOB) Positioning: HOB elevated  Pressure Reduction Devices:   alternating pressure pump (ADD)   positioning supports utilized  Skin Protection:   adhesive use limited   incontinence pads  utilized   skin-to-device areas padded   tubing/devices free from skin contact     Problem: Behavioral Health Comorbidity  Goal: Maintenance of Behavioral Health Symptom Control  Outcome: Ongoing, Not Progressing  Intervention: Maintain Behavioral Health Symptom Control  Recent Flowsheet Documentation  Taken 2/19/2024 1620 by Carlene Joshi RN  Medication Review/Management: medications reviewed  Taken 2/19/2024 1419 by Carlene Joshi RN  Medication Review/Management: medications reviewed  Taken 2/19/2024 1223 by Carlene Joshi RN  Medication Review/Management: medications reviewed  Taken 2/19/2024 1042 by Carlene Joshi RN  Medication Review/Management: medications reviewed  Taken 2/19/2024 0920 by Carlene Joshi RN  Medication Review/Management: medications reviewed     Problem: Pain Chronic (Persistent) (Comorbidity Management)  Goal: Acceptable Pain Control and Functional Ability  Outcome: Ongoing, Not Progressing  Intervention: Manage Persistent Pain  Recent Flowsheet Documentation  Taken 2/19/2024 1620 by Carlene Joshi RN  Medication Review/Management: medications reviewed  Taken 2/19/2024 1419 by Carlene Joshi RN  Medication Review/Management: medications reviewed  Taken 2/19/2024 1223 by Carlene Joshi RN  Medication Review/Management: medications reviewed  Taken 2/19/2024 1042 by Carlene Joshi RN  Medication Review/Management: medications reviewed  Taken 2/19/2024 0920 by Carlene Joshi RN  Medication Review/Management: medications reviewed  Intervention: Develop Pain Management Plan  Recent Flowsheet Documentation  Taken 2/19/2024 0920 by Carlene Joshi RN  Pain Management Interventions:   see MAR   position adjusted   pillow support provided  Intervention: Optimize Psychosocial Wellbeing  Recent Flowsheet Documentation  Taken 2/19/2024 0920 by Carlene Joshi RN  Diversional Activities: television  Family/Support System Care:   involvement promoted   self-care  encouraged     Problem: Adjustment to Illness (Sepsis/Septic Shock)  Goal: Optimal Coping  Outcome: Ongoing, Not Progressing  Intervention: Optimize Psychosocial Adjustment to Illness  Recent Flowsheet Documentation  Taken 2/19/2024 0920 by Carlene Joshi RN  Family/Support System Care:   involvement promoted   self-care encouraged     Problem: Infection Progression (Sepsis/Septic Shock)  Goal: Absence of Infection Signs and Symptoms  Outcome: Ongoing, Not Progressing  Intervention: Initiate Sepsis Management  Recent Flowsheet Documentation  Taken 2/19/2024 1620 by Carlene Joshi RN  Infection Prevention:   environmental surveillance performed   equipment surfaces disinfected   hand hygiene promoted   personal protective equipment utilized   rest/sleep promoted   single patient room provided  Isolation Precautions:   contact   precautions maintained  Taken 2/19/2024 1419 by Carlene Joshi RN  Infection Prevention:   environmental surveillance performed   equipment surfaces disinfected   hand hygiene promoted   personal protective equipment utilized   rest/sleep promoted   single patient room provided  Isolation Precautions:   contact   precautions maintained  Taken 2/19/2024 1223 by Carlene Joshi RN  Infection Prevention:   environmental surveillance performed   equipment surfaces disinfected   hand hygiene promoted   personal protective equipment utilized   rest/sleep promoted   single patient room provided   visitors restricted/screened  Isolation Precautions:   contact   precautions maintained  Taken 2/19/2024 1042 by Carlene Joshi RN  Infection Prevention:   environmental surveillance performed   equipment surfaces disinfected   hand hygiene promoted   personal protective equipment utilized   rest/sleep promoted   single patient room provided  Isolation Precautions:   contact   precautions maintained  Taken 2/19/2024 0920 by Carlene Joshi RN  Infection Prevention:   environmental  surveillance performed   equipment surfaces disinfected   hand hygiene promoted   personal protective equipment utilized   rest/sleep promoted   single patient room provided  Isolation Precautions:   contact   precautions maintained  Intervention: Promote Recovery  Recent Flowsheet Documentation  Taken 2/19/2024 0920 by Carlene Joshi RN  Activity Management: activity encouraged  Intervention: Promote Stabilization  Recent Flowsheet Documentation  Taken 2/19/2024 0920 by Carlene Joshi RN  Fluid/Electrolyte Management: fluids provided     Problem: Fall Injury Risk  Goal: Absence of Fall and Fall-Related Injury  Outcome: Ongoing, Not Progressing  Intervention: Identify and Manage Contributors  Recent Flowsheet Documentation  Taken 2/19/2024 1620 by Carlene Joshi RN  Medication Review/Management: medications reviewed  Taken 2/19/2024 1419 by Carlene Joshi RN  Medication Review/Management: medications reviewed  Taken 2/19/2024 1223 by Carlene Joshi RN  Medication Review/Management: medications reviewed  Taken 2/19/2024 1042 by Carlene Joshi RN  Medication Review/Management: medications reviewed  Taken 2/19/2024 0920 by Carlene Joshi RN  Medication Review/Management: medications reviewed  Self-Care Promotion: independence encouraged  Intervention: Promote Injury-Free Environment  Recent Flowsheet Documentation  Taken 2/19/2024 1620 by Carlene Joshi RN  Safety Promotion/Fall Prevention:   activity supervised   assistive device/personal items within reach   clutter free environment maintained   fall prevention program maintained   room organization consistent   safety round/check completed  Taken 2/19/2024 1419 by Carlene Joshi RN  Safety Promotion/Fall Prevention:   activity supervised   assistive device/personal items within reach   clutter free environment maintained   fall prevention program maintained   room organization consistent   safety round/check completed  Taken 2/19/2024 1223  by Carlene Joshi, RN  Safety Promotion/Fall Prevention:   activity supervised   assistive device/personal items within reach   clutter free environment maintained   fall prevention program maintained   room organization consistent   safety round/check completed  Taken 2/19/2024 1042 by Carlene Joshi, RN  Safety Promotion/Fall Prevention:   activity supervised   assistive device/personal items within reach   clutter free environment maintained   fall prevention program maintained   room organization consistent   safety round/check completed  Taken 2/19/2024 0920 by Carlene Joshi, RN  Safety Promotion/Fall Prevention: safety round/check completed   Goal Outcome Evaluation:  Plan of Care Reviewed With: patient        Progress: no change  Outcome Evaluation: Pt continues to be extremely noncompliant with care. Pt refuses care consistently. She refusesto be bathed or cleaned after voiding; refuses medications at times; refuses labs; and refused to have knee aspiration again today. Pt constantly screams out for staff and requires frequent redirection. Pt is restricted on visitors and is to have her mother visit only.

## 2024-02-19 NOTE — NURSING NOTE
[facility] attempted evaluation. Patient not appropriate at this time. [facility] will check back tomorrow.

## 2024-02-20 ENCOUNTER — APPOINTMENT (OUTPATIENT)
Dept: GENERAL RADIOLOGY | Facility: HOSPITAL | Age: 34
DRG: 872 | End: 2024-02-20
Payer: COMMERCIAL

## 2024-02-20 LAB
ALBUMIN SERPL-MCNC: 1.9 G/DL (ref 3.5–5.2)
ALBUMIN/GLOB SERPL: 0.5 G/DL
ALP SERPL-CCNC: 303 U/L (ref 39–117)
ALT SERPL W P-5'-P-CCNC: 27 U/L (ref 1–33)
ANION GAP SERPL CALCULATED.3IONS-SCNC: 9 MMOL/L (ref 5–15)
APPEARANCE FLD: ABNORMAL
AST SERPL-CCNC: 56 U/L (ref 1–32)
BACTERIA SPEC AEROBE CULT: ABNORMAL
BILIRUB SERPL-MCNC: 7.2 MG/DL (ref 0–1.2)
BUN SERPL-MCNC: 21 MG/DL (ref 6–20)
BUN/CREAT SERPL: 18.1 (ref 7–25)
CALCIUM SPEC-SCNC: 7.6 MG/DL (ref 8.6–10.5)
CHLORIDE SERPL-SCNC: 104 MMOL/L (ref 98–107)
CO2 SERPL-SCNC: 20 MMOL/L (ref 22–29)
COLOR FLD: YELLOW
CREAT SERPL-MCNC: 1.16 MG/DL (ref 0.57–1)
CRYSTALS FLD MICRO: NORMAL
DEPRECATED RDW RBC AUTO: 38.9 FL (ref 37–54)
EGFRCR SERPLBLD CKD-EPI 2021: 63.2 ML/MIN/1.73
ELLIPTOCYTES BLD QL SMEAR: ABNORMAL
ERYTHROCYTE [DISTWIDTH] IN BLOOD BY AUTOMATED COUNT: 18.7 % (ref 12.3–15.4)
GLOBULIN UR ELPH-MCNC: 4.2 GM/DL
GLUCOSE SERPL-MCNC: 103 MG/DL (ref 65–99)
GRAM STN SPEC: ABNORMAL
HBV DNA SERPL NAA+PROBE-ACNC: NORMAL IU/ML
HBV DNA SERPL NAA+PROBE-LOG IU: 6.56 LOG10 IU/ML
HBV SURFACE AG SERPL QL IA: NORMAL
HBV SURFACE AG SERPL QL NT: POSITIVE
HCT VFR BLD AUTO: 24.3 % (ref 34–46.6)
HCV RNA SERPL NAA+PROBE-ACNC: NORMAL IU/ML
HGB BLD-MCNC: 7.3 G/DL (ref 12–15.9)
HYPOCHROMIA BLD QL: ABNORMAL
ISOLATED FROM: ABNORMAL
LYMPHOCYTES # BLD MANUAL: 1.18 10*3/MM3 (ref 0.7–3.1)
LYMPHOCYTES NFR BLD MANUAL: 3.9 % (ref 5–12)
LYMPHOCYTES NFR FLD MANUAL: 1 %
MCH RBC QN AUTO: 19.6 PG (ref 26.6–33)
MCHC RBC AUTO-ENTMCNC: 30 G/DL (ref 31.5–35.7)
MCV RBC AUTO: 65.1 FL (ref 79–97)
METHOD: ABNORMAL
MICROCYTES BLD QL: ABNORMAL
MONOCYTES # BLD: 0.59 10*3/MM3 (ref 0.1–0.9)
MONOCYTES NFR FLD: 6 %
NEUTROPHILS # BLD AUTO: 13.31 10*3/MM3 (ref 1.7–7)
NEUTROPHILS NFR BLD MANUAL: 88.2 % (ref 42.7–76)
NEUTROPHILS NFR FLD MANUAL: 93 %
NUC CELL # FLD: ABNORMAL /MM3
PLAT MORPH BLD: NORMAL
PLATELET # BLD AUTO: 81 10*3/MM3 (ref 140–450)
POIKILOCYTOSIS BLD QL SMEAR: ABNORMAL
POLYCHROMASIA BLD QL SMEAR: ABNORMAL
POTASSIUM SERPL-SCNC: 4 MMOL/L (ref 3.5–5.2)
PROT SERPL-MCNC: 6.1 G/DL (ref 6–8.5)
RBC # BLD AUTO: 3.73 10*6/MM3 (ref 3.77–5.28)
RBC # FLD AUTO: ABNORMAL /MM3
REF LAB TEST REF RANGE: NORMAL
SODIUM SERPL-SCNC: 133 MMOL/L (ref 136–145)
TARGETS BLD QL SMEAR: ABNORMAL
TEST INFORMATION: NORMAL
VARIANT LYMPHS NFR BLD MANUAL: 7.8 % (ref 19.6–45.3)
WBC MORPH BLD: NORMAL
WBC NRBC COR # BLD AUTO: 15.09 10*3/MM3 (ref 3.4–10.8)

## 2024-02-20 PROCEDURE — 87077 CULTURE AEROBIC IDENTIFY: CPT | Performed by: ORTHOPAEDIC SURGERY

## 2024-02-20 PROCEDURE — 89060 EXAM SYNOVIAL FLUID CRYSTALS: CPT | Performed by: ORTHOPAEDIC SURGERY

## 2024-02-20 PROCEDURE — 87015 SPECIMEN INFECT AGNT CONCNTJ: CPT | Performed by: ORTHOPAEDIC SURGERY

## 2024-02-20 PROCEDURE — 77002 NEEDLE LOCALIZATION BY XRAY: CPT

## 2024-02-20 PROCEDURE — 25010000002 VANCOMYCIN PER 500 MG: Performed by: INTERNAL MEDICINE

## 2024-02-20 PROCEDURE — 89051 BODY FLUID CELL COUNT: CPT | Performed by: ORTHOPAEDIC SURGERY

## 2024-02-20 PROCEDURE — 87040 BLOOD CULTURE FOR BACTERIA: CPT | Performed by: INTERNAL MEDICINE

## 2024-02-20 PROCEDURE — 0S9C3ZX DRAINAGE OF RIGHT KNEE JOINT, PERCUTANEOUS APPROACH, DIAGNOSTIC: ICD-10-PCS | Performed by: RADIOLOGY

## 2024-02-20 PROCEDURE — 99232 SBSQ HOSP IP/OBS MODERATE 35: CPT | Performed by: INTERNAL MEDICINE

## 2024-02-20 PROCEDURE — 80053 COMPREHEN METABOLIC PANEL: CPT | Performed by: STUDENT IN AN ORGANIZED HEALTH CARE EDUCATION/TRAINING PROGRAM

## 2024-02-20 PROCEDURE — 87070 CULTURE OTHR SPECIMN AEROBIC: CPT | Performed by: ORTHOPAEDIC SURGERY

## 2024-02-20 PROCEDURE — 25010000002 HYDROMORPHONE PER 4 MG: Performed by: STUDENT IN AN ORGANIZED HEALTH CARE EDUCATION/TRAINING PROGRAM

## 2024-02-20 PROCEDURE — 85007 BL SMEAR W/DIFF WBC COUNT: CPT | Performed by: STUDENT IN AN ORGANIZED HEALTH CARE EDUCATION/TRAINING PROGRAM

## 2024-02-20 PROCEDURE — 25010000002 CEFEPIME PER 500 MG: Performed by: INTERNAL MEDICINE

## 2024-02-20 PROCEDURE — 87205 SMEAR GRAM STAIN: CPT | Performed by: ORTHOPAEDIC SURGERY

## 2024-02-20 PROCEDURE — 25810000003 LACTATED RINGERS PER 1000 ML: Performed by: STUDENT IN AN ORGANIZED HEALTH CARE EDUCATION/TRAINING PROGRAM

## 2024-02-20 PROCEDURE — 85025 COMPLETE CBC W/AUTO DIFF WBC: CPT | Performed by: STUDENT IN AN ORGANIZED HEALTH CARE EDUCATION/TRAINING PROGRAM

## 2024-02-20 PROCEDURE — 25010000002 HYDROMORPHONE 1 MG/ML SOLUTION: Performed by: NURSE PRACTITIONER

## 2024-02-20 PROCEDURE — 87186 SC STD MICRODIL/AGAR DIL: CPT | Performed by: ORTHOPAEDIC SURGERY

## 2024-02-20 PROCEDURE — 25010000002 CEFAZOLIN IN DEXTROSE 2-4 GM/100ML-% SOLUTION: Performed by: INTERNAL MEDICINE

## 2024-02-20 RX ORDER — DICYCLOMINE HYDROCHLORIDE 10 MG/1
10 CAPSULE ORAL 3 TIMES DAILY PRN
Status: DISCONTINUED | OUTPATIENT
Start: 2024-02-20 | End: 2024-02-24 | Stop reason: HOSPADM

## 2024-02-20 RX ORDER — CLONIDINE HYDROCHLORIDE 0.1 MG/1
0.1 TABLET ORAL 3 TIMES DAILY PRN
Status: DISPENSED | OUTPATIENT
Start: 2024-02-22 | End: 2024-02-23

## 2024-02-20 RX ORDER — CLONIDINE HYDROCHLORIDE 0.1 MG/1
0.1 TABLET ORAL 4 TIMES DAILY PRN
Status: ACTIVE | OUTPATIENT
Start: 2024-02-20 | End: 2024-02-21

## 2024-02-20 RX ORDER — GABAPENTIN 300 MG/1
600 CAPSULE ORAL EVERY 6 HOURS PRN
Status: DISCONTINUED | OUTPATIENT
Start: 2024-02-20 | End: 2024-02-24 | Stop reason: HOSPADM

## 2024-02-20 RX ORDER — CEFAZOLIN SODIUM 2 G/100ML
2000 INJECTION, SOLUTION INTRAVENOUS EVERY 8 HOURS
Qty: 4200 ML | Refills: 0 | Status: DISCONTINUED | OUTPATIENT
Start: 2024-02-20 | End: 2024-02-24 | Stop reason: HOSPADM

## 2024-02-20 RX ORDER — CYCLOBENZAPRINE HCL 10 MG
10 TABLET ORAL 3 TIMES DAILY PRN
Status: DISCONTINUED | OUTPATIENT
Start: 2024-02-20 | End: 2024-02-24 | Stop reason: HOSPADM

## 2024-02-20 RX ORDER — CLONIDINE HYDROCHLORIDE 0.1 MG/1
0.1 TABLET ORAL ONCE AS NEEDED
Status: DISCONTINUED | OUTPATIENT
Start: 2024-02-24 | End: 2024-02-24 | Stop reason: HOSPADM

## 2024-02-20 RX ORDER — LIDOCAINE HYDROCHLORIDE 10 MG/ML
10 INJECTION, SOLUTION INFILTRATION; PERINEURAL
Status: DISCONTINUED | OUTPATIENT
Start: 2024-02-20 | End: 2024-02-24 | Stop reason: HOSPADM

## 2024-02-20 RX ORDER — CLONIDINE HYDROCHLORIDE 0.1 MG/1
0.1 TABLET ORAL 2 TIMES DAILY PRN
Status: DISPENSED | OUTPATIENT
Start: 2024-02-23 | End: 2024-02-24

## 2024-02-20 RX ORDER — CLONIDINE HYDROCHLORIDE 0.1 MG/1
0.1 TABLET ORAL 4 TIMES DAILY PRN
Status: DISPENSED | OUTPATIENT
Start: 2024-02-21 | End: 2024-02-22

## 2024-02-20 RX ORDER — HYDROXYZINE 50 MG/1
50 TABLET, FILM COATED ORAL 3 TIMES DAILY PRN
Status: DISCONTINUED | OUTPATIENT
Start: 2024-02-20 | End: 2024-02-24 | Stop reason: HOSPADM

## 2024-02-20 RX ADMIN — CEFEPIME 2000 MG: 2 INJECTION, POWDER, FOR SOLUTION INTRAVENOUS at 00:49

## 2024-02-20 RX ADMIN — SODIUM CHLORIDE, POTASSIUM CHLORIDE, SODIUM LACTATE AND CALCIUM CHLORIDE 100 ML/HR: 600; 310; 30; 20 INJECTION, SOLUTION INTRAVENOUS at 12:08

## 2024-02-20 RX ADMIN — GABAPENTIN 600 MG: 300 CAPSULE ORAL at 13:06

## 2024-02-20 RX ADMIN — HYDROMORPHONE HYDROCHLORIDE 0.5 MG: 1 INJECTION, SOLUTION INTRAMUSCULAR; INTRAVENOUS; SUBCUTANEOUS at 04:50

## 2024-02-20 RX ADMIN — CLONIDINE HYDROCHLORIDE 0.1 MG: 0.1 TABLET ORAL at 20:20

## 2024-02-20 RX ADMIN — CYCLOBENZAPRINE 10 MG: 10 TABLET, FILM COATED ORAL at 20:20

## 2024-02-20 RX ADMIN — VANCOMYCIN HYDROCHLORIDE 1000 MG: 1 INJECTION, SOLUTION INTRAVENOUS at 23:23

## 2024-02-20 RX ADMIN — HYDROMORPHONE HYDROCHLORIDE 0.5 MG: 1 INJECTION, SOLUTION INTRAMUSCULAR; INTRAVENOUS; SUBCUTANEOUS at 11:59

## 2024-02-20 RX ADMIN — HYDROMORPHONE HYDROCHLORIDE 0.5 MG: 1 INJECTION, SOLUTION INTRAMUSCULAR; INTRAVENOUS; SUBCUTANEOUS at 02:49

## 2024-02-20 RX ADMIN — SODIUM BICARBONATE 325 MG: 650 TABLET ORAL at 09:05

## 2024-02-20 RX ADMIN — CEFAZOLIN SODIUM 2000 MG: 2 INJECTION, SOLUTION INTRAVENOUS at 09:04

## 2024-02-20 RX ADMIN — GABAPENTIN 600 MG: 300 CAPSULE ORAL at 20:19

## 2024-02-20 RX ADMIN — CEFAZOLIN SODIUM 2000 MG: 2 INJECTION, SOLUTION INTRAVENOUS at 16:52

## 2024-02-20 RX ADMIN — HYDROMORPHONE HYDROCHLORIDE 1 MG: 1 INJECTION, SOLUTION INTRAMUSCULAR; INTRAVENOUS; SUBCUTANEOUS at 13:05

## 2024-02-20 RX ADMIN — VANCOMYCIN HYDROCHLORIDE 1000 MG: 1 INJECTION, SOLUTION INTRAVENOUS at 12:01

## 2024-02-20 RX ADMIN — CYCLOBENZAPRINE 10 MG: 10 TABLET, FILM COATED ORAL at 13:06

## 2024-02-20 RX ADMIN — HYDROMORPHONE HYDROCHLORIDE 1 MG: 1 INJECTION, SOLUTION INTRAMUSCULAR; INTRAVENOUS; SUBCUTANEOUS at 16:57

## 2024-02-20 RX ADMIN — HYDROMORPHONE HYDROCHLORIDE 0.5 MG: 1 INJECTION, SOLUTION INTRAMUSCULAR; INTRAVENOUS; SUBCUTANEOUS at 09:05

## 2024-02-20 RX ADMIN — HYDROMORPHONE HYDROCHLORIDE 0.5 MG: 1 INJECTION, SOLUTION INTRAMUSCULAR; INTRAVENOUS; SUBCUTANEOUS at 00:49

## 2024-02-20 RX ADMIN — HYDROXYZINE HYDROCHLORIDE 50 MG: 50 TABLET ORAL at 17:41

## 2024-02-20 RX ADMIN — HYDROMORPHONE HYDROCHLORIDE 0.5 MG: 1 INJECTION, SOLUTION INTRAMUSCULAR; INTRAVENOUS; SUBCUTANEOUS at 06:49

## 2024-02-20 RX ADMIN — HYDROMORPHONE HYDROCHLORIDE 1 MG: 1 INJECTION, SOLUTION INTRAMUSCULAR; INTRAVENOUS; SUBCUTANEOUS at 20:20

## 2024-02-20 NOTE — NURSING NOTE
[facility] attempted evaluation with patient. The patient requested [facility] return later to complete evaluation. [facility] will re-attempt evaluation later.

## 2024-02-20 NOTE — PLAN OF CARE
Problem: Adult Inpatient Plan of Care  Goal: Plan of Care Review  Outcome: Ongoing, Progressing  Flowsheets (Taken 2/20/2024 0336)  Progress: no change  Plan of Care Reviewed With: patient  Goal: Absence of Hospital-Acquired Illness or Injury  Outcome: Ongoing, Progressing  Intervention: Identify and Manage Fall Risk  Recent Flowsheet Documentation  Taken 2/20/2024 0220 by Lucita Marvin RN  Safety Promotion/Fall Prevention:   assistive device/personal items within reach   clutter free environment maintained   fall prevention program maintained   lighting adjusted   nonskid shoes/slippers when out of bed   safety round/check completed   room organization consistent  Taken 2/20/2024 0008 by Lucita Marvin RN  Safety Promotion/Fall Prevention:   assistive device/personal items within reach   clutter free environment maintained   fall prevention program maintained   lighting adjusted   nonskid shoes/slippers when out of bed   safety round/check completed   room organization consistent  Taken 2/19/2024 2239 by Lucita Marvin RN  Safety Promotion/Fall Prevention:   assistive device/personal items within reach   clutter free environment maintained   fall prevention program maintained   lighting adjusted   nonskid shoes/slippers when out of bed   room organization consistent   safety round/check completed  Taken 2/19/2024 2027 by Lucita Marvin RN  Safety Promotion/Fall Prevention:   activity supervised   assistive device/personal items within reach   clutter free environment maintained   fall prevention program maintained   lighting adjusted   nonskid shoes/slippers when out of bed   room organization consistent   safety round/check completed  Intervention: Prevent Skin Injury  Recent Flowsheet Documentation  Taken 2/20/2024 0220 by Lucita Marvin RN  Body Position: position changed independently  Taken 2/20/2024 0008 by Lucita Marvin RN  Body Position: position changed independently  Taken 2/19/2024 2239 by Yon  SUKHJINDER Landrum  Body Position: position changed independently  Taken 2/19/2024 2027 by Lucita Marvin RN  Body Position: position changed independently  Intervention: Prevent and Manage VTE (Venous Thromboembolism) Risk  Recent Flowsheet Documentation  Taken 2/20/2024 0220 by Lucita Marvin RN  Activity Management: activity minimized  Taken 2/20/2024 0008 by Lucita Marvin RN  Activity Management: activity minimized  Taken 2/19/2024 2239 by Lucita Marvin RN  Activity Management: activity encouraged  Taken 2/19/2024 2027 by Lucita Marvin RN  Activity Management: activity encouraged  VTE Prevention/Management: patient refused intervention  Intervention: Prevent Infection  Recent Flowsheet Documentation  Taken 2/19/2024 2027 by Lucita Marvin RN  Infection Prevention:   environmental surveillance performed   hand hygiene promoted   personal protective equipment utilized   rest/sleep promoted   single patient room provided  Goal: Optimal Comfort and Wellbeing  Outcome: Ongoing, Progressing  Intervention: Provide Person-Centered Care  Recent Flowsheet Documentation  Taken 2/19/2024 2027 by Lucita Marvin RN  Trust Relationship/Rapport:   care explained   questions answered   questions encouraged  Goal: Readiness for Transition of Care  Outcome: Ongoing, Progressing     Problem: Skin Injury Risk Increased  Goal: Skin Health and Integrity  Outcome: Ongoing, Progressing     Problem: Behavioral Health Comorbidity  Goal: Maintenance of Behavioral Health Symptom Control  Outcome: Ongoing, Progressing  Intervention: Maintain Behavioral Health Symptom Control  Recent Flowsheet Documentation  Taken 2/19/2024 2027 by Lucita Marvin RN  Medication Review/Management: medications reviewed     Problem: Pain Chronic (Persistent) (Comorbidity Management)  Goal: Acceptable Pain Control and Functional Ability  Outcome: Ongoing, Progressing  Intervention: Manage Persistent Pain  Recent Flowsheet Documentation  Taken 2/19/2024 2027 by Yon  SUKHJINDER Landrum  Medication Review/Management: medications reviewed  Intervention: Optimize Psychosocial Wellbeing  Recent Flowsheet Documentation  Taken 2/19/2024 2027 by Lucita Marvin RN  Diversional Activities:   television   smartphone     Problem: Fall Injury Risk  Goal: Absence of Fall and Fall-Related Injury  Outcome: Ongoing, Progressing  Intervention: Identify and Manage Contributors  Recent Flowsheet Documentation  Taken 2/19/2024 2027 by Lucita Marvin RN  Medication Review/Management: medications reviewed  Intervention: Promote Injury-Free Environment  Recent Flowsheet Documentation  Taken 2/20/2024 0220 by Lucita Marvin, SUKHJINDER  Safety Promotion/Fall Prevention:   assistive device/personal items within reach   clutter free environment maintained   fall prevention program maintained   lighting adjusted   nonskid shoes/slippers when out of bed   safety round/check completed   room organization consistent  Taken 2/20/2024 0008 by Lucita Marvin RN  Safety Promotion/Fall Prevention:   assistive device/personal items within reach   clutter free environment maintained   fall prevention program maintained   lighting adjusted   nonskid shoes/slippers when out of bed   safety round/check completed   room organization consistent  Taken 2/19/2024 2239 by Lucita Marvin RN  Safety Promotion/Fall Prevention:   assistive device/personal items within reach   clutter free environment maintained   fall prevention program maintained   lighting adjusted   nonskid shoes/slippers when out of bed   room organization consistent   safety round/check completed  Taken 2/19/2024 2027 by Lucita Marvin RN  Safety Promotion/Fall Prevention:   activity supervised   assistive device/personal items within reach   clutter free environment maintained   fall prevention program maintained   lighting adjusted   nonskid shoes/slippers when out of bed   room organization consistent   safety round/check completed   Goal Outcome Evaluation:  Plan of  Care Reviewed With: patient        Progress: no change

## 2024-02-20 NOTE — PLAN OF CARE
Problem: Adult Inpatient Plan of Care  Goal: Absence of Hospital-Acquired Illness or Injury  Intervention: Identify and Manage Fall Risk  Recent Flowsheet Documentation  Taken 2/20/2024 1422 by Carlene Charles RN  Safety Promotion/Fall Prevention: safety round/check completed  Taken 2/20/2024 1200 by Carlene Charles RN  Safety Promotion/Fall Prevention: safety round/check completed  Taken 2/20/2024 1000 by Carlene Charles RN  Safety Promotion/Fall Prevention: safety round/check completed  Taken 2/20/2024 0800 by Carlene Charles RN  Safety Promotion/Fall Prevention: safety round/check completed  Intervention: Prevent Skin Injury  Recent Flowsheet Documentation  Taken 2/20/2024 1422 by Carlene Charles RN  Body Position: supine  Skin Protection:   adhesive use limited   tubing/devices free from skin contact  Taken 2/20/2024 1200 by Carlene Charles RN  Body Position: supine  Taken 2/20/2024 1000 by Carlene Charles RN  Body Position:   left   side-lying   patient/family refused  Taken 2/20/2024 0800 by Carlene Charles RN  Body Position:   left   side-lying  Skin Protection:   adhesive use limited   tubing/devices free from skin contact  Intervention: Prevent and Manage VTE (Venous Thromboembolism) Risk  Recent Flowsheet Documentation  Taken 2/20/2024 1422 by Carlene Charles RN  Activity Management: activity minimized  VTE Prevention/Management:   bilateral   sequential compression devices off   patient refused intervention  Taken 2/20/2024 1200 by Carlene Charles RN  Activity Management: activity minimized  Taken 2/20/2024 1000 by Carlene Charles RN  Activity Management: activity minimized  Taken 2/20/2024 0800 by Carlene Charles RN  Activity Management: activity minimized  VTE Prevention/Management:   bilateral   sequential compression devices off   patient refused intervention  Goal: Optimal Comfort and Wellbeing  Intervention: Provide Person-Centered Care  Recent Flowsheet Documentation  Taken 2/20/2024 1422 by  Carlene Charles RN  Trust Relationship/Rapport: care explained  Taken 2/20/2024 0800 by Carlene Charles RN  Trust Relationship/Rapport: care explained     Problem: Skin Injury Risk Increased  Goal: Skin Health and Integrity  Intervention: Optimize Skin Protection  Recent Flowsheet Documentation  Taken 2/20/2024 1422 by Carlene Charles RN  Pressure Reduction Techniques:   frequent weight shift encouraged   weight shift assistance provided  Head of Bed (HOB) Positioning: HOB elevated  Pressure Reduction Devices: alternating pressure pump (ADD)  Skin Protection:   adhesive use limited   tubing/devices free from skin contact  Taken 2/20/2024 1200 by Carlene Charles RN  Head of Bed (HOB) Positioning: HOB elevated  Taken 2/20/2024 1000 by Carlene Charles RN  Head of Bed (HOB) Positioning: HOB elevated  Taken 2/20/2024 0800 by Carlene Charles RN  Pressure Reduction Techniques:   frequent weight shift encouraged   weight shift assistance provided  Head of Bed (HOB) Positioning: HOB elevated  Pressure Reduction Devices: alternating pressure pump (ADD)  Skin Protection:   adhesive use limited   tubing/devices free from skin contact     Problem: Infection Progression (Sepsis/Septic Shock)  Goal: Absence of Infection Signs and Symptoms  Intervention: Promote Recovery  Recent Flowsheet Documentation  Taken 2/20/2024 1422 by Carlene Charles RN  Activity Management: activity minimized  Taken 2/20/2024 1200 by Carlene Charles RN  Activity Management: activity minimized  Taken 2/20/2024 1000 by Carlene Charles RN  Activity Management: activity minimized  Taken 2/20/2024 0800 by Carlene Charles RN  Activity Management: activity minimized     Problem: Fall Injury Risk  Goal: Absence of Fall and Fall-Related Injury  Intervention: Promote Injury-Free Environment  Recent Flowsheet Documentation  Taken 2/20/2024 1422 by Carlene Charles RN  Safety Promotion/Fall Prevention: safety round/check completed  Taken 2/20/2024 1200 by Araceli  SUKHJINDER Concepcion  Safety Promotion/Fall Prevention: safety round/check completed  Taken 2/20/2024 1000 by Carlene Charles RN  Safety Promotion/Fall Prevention: safety round/check completed  Taken 2/20/2024 0800 by Carlene Charles RN  Safety Promotion/Fall Prevention: safety round/check completed   Goal Outcome Evaluation:   VSS on room air; SR-ST on monitor; pt down for knee aspiration this shift; pt started on COWS protocol; PRN pain meds as needed for R knee pain; cont IV abx; makes needs known; bed locked and in low position; bed alarm set; call light in reach; cont current plan of care.

## 2024-02-20 NOTE — PAYOR COMM NOTE
"Sammie Abraham (33 y.o. Female)        PLEASE SEE ATTACHED FOR INPT AUTH.    DX:  A41.9       R65.20     K72.00     M00.9    PLEASE CALL  OR  [phone] WITH INPT AUTH.    THANK YOU    GERALD NORMAN LPN CCP   Date of Birth   1990    Social Security Number       Address   [address] [place], [place] [address]    Home Phone   [phone]    MRN   [id number]       Episcopal   Jehovah's witness    Marital Status   Single                            Admission Date   2/18/24    Admission Type   Emergency    Admitting Provider   Lucas Blas MD    Attending Provider   Tim Magdaleno MD    Department, Room/Bed   [address], [address]/1       Discharge Date       Discharge Disposition       Discharge Destination                                 Attending Provider: Tim Magdaleno MD    Allergies: No Known Allergies    Isolation: Contact   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)   Code Status: CPR    Ht: 175.3 cm (69\")   Wt: 118 kg (261 lb)    Admission Cmt: None   Principal Problem: Sepsis [A41.9]                   Active Insurance as of 2/18/2024       Primary Coverage       Payor Plan Insurance Group Employer/Plan Group    [facility] [id number]       Payor Plan Address Payor Plan Phone Number Payor Plan Fax Number Effective Dates    [address] [address]   11/1/2022 - None Entered    [facility] [id number]         Subscriber Name Subscriber Birth Date Member ID       SAMMIE ABRAHAM 1990 [id number]                     Emergency Contacts        (Rel.) Home Phone Work Phone Mobile Phone    Melvi Abraham (Mother) [phone] -- --              [place]: [facility] [id number]  Tax ID [id number]     History & Physical        Lucas Blas MD at 02/18/24 1132              Patient Name:  Sammie Abraham  YOB: 1990  MRN:  [id number]  Admit Date:  2/18/2024  Patient Care Team:  Melvi Abraham APRN as PCP - General (Nurse " "Practitioner)      Subjective  History Present Illness     Chief Complaint   Patient presents with    Addiction Problem    Generalized Body Aches       Ms. Landeros is a 33 y.o. female with polysubstance use disorder, chronic hepatitis C with cirrhosis, CKD, history of TV endocarditis/MRSA bacteremia presenting with right knee pain.  She has been unable to get out of bed for the last 4 days due to increasing pain in her knee. In addition she has had flulike symptoms for the past 1 to 2 weeks with fevers, general malaise, and in addition she notes that her boyfriend has told her she has appeared more \"yellow\" lately. She is very lethargic, however she does awaken and answer all my questions appropriately.    Patient states she last used heroin yesterday, normally uses \"about 1 gram per day.\"  I asked her about why she has left AMA in the past during multiple hospitalizations, and she states usually because her pain is not being controlled and she is having significant cravings while hospitalized.    Review of Systems   Constitutional:  Positive for chills, fatigue and fever. Negative for activity change.   HENT:  Negative for congestion, drooling, facial swelling, rhinorrhea and sore throat.    Respiratory:  Negative for apnea, chest tightness and shortness of breath.    Gastrointestinal:  Negative for abdominal pain, blood in stool and diarrhea.   Genitourinary:  Negative for difficulty urinating, dysuria and urgency.   Musculoskeletal:  Positive for arthralgias, gait problem and joint swelling.   Skin:  Positive for color change.   Neurological:  Positive for weakness. Negative for dizziness, syncope and numbness.   Hematological:  Negative for adenopathy.   Psychiatric/Behavioral:  Negative for agitation, dysphoric mood and self-injury.         Personal History     Past Medical History:   Diagnosis Date    Drug abuse     Hepatitis C     Hyperlipidemia     Nausea vomiting and diarrhea 06/03/2023     Past Surgical " "History:   Procedure Laterality Date    ADENOIDECTOMY      BACK SURGERY      INCISION AND DRAINAGE LEG Left 7/20/2016    Procedure: Incision and Drainage left ankle;  Surgeon: Zechariah Kunz MD;  Location: [facility] OR;  Service:     TONSILLECTOMY       Family History   Problem Relation Age of Onset    Other Mother         ADOPTED     Social History     Tobacco Use    Smoking status: Former     Packs/day: 1     Types: Cigarettes   Vaping Use    Vaping Use: Some days   Substance Use Topics    Alcohol use: No    Drug use: Yes     Frequency: 21.0 times per week     Types: Heroin     Comment: \"USE $20-&50 UP TO 3 TIMES PER DAY\"     No current facility-administered medications on file prior to encounter.     Current Outpatient Medications on File Prior to Encounter   Medication Sig Dispense Refill    busPIRone (BUSPAR) 5 MG tablet Take 1 tablet by mouth 3 (Three) Times a Day. 90 tablet 0    pantoprazole (PROTONIX) 40 MG EC tablet Take 1 tablet by mouth Every Morning. 30 tablet 0    sodium bicarbonate 650 MG tablet Take 2 tablets by mouth 3 (Three) Times a Day. 120 tablet 0    [DISCONTINUED] doxycycline (VIBRAMYCIN) 100 MG capsule Take 1 capsule by mouth 2 (Two) Times a Day. 84 capsule 0     No Known Allergies    Objective   Objective     Vital Signs  Temp:  [98 °F (36.7 °C)-98.4 °F (36.9 °C)] 98.1 °F (36.7 °C)  Heart Rate:  [111-120] 111  Resp:  [20] 20  BP: (103-142)/(51-74) 103/51  SpO2:  [94 %-100 %] 94 %  on   ;   Device (Oxygen Therapy): room air  Body mass index is 38.54 kg/m².    Physical Exam  Constitutional:       Appearance: She is ill-appearing and toxic-appearing.   Eyes:      Extraocular Movements: Extraocular movements intact.      Pupils: Pupils are equal, round, and reactive to light.   Cardiovascular:      Rate and Rhythm: Normal rate.      Pulses: Normal pulses.      Heart sounds: No murmur heard.  Pulmonary:      Effort: No respiratory distress.      Breath sounds: No stridor.   Abdominal:      " General: Abdomen is flat. There is distension.      Tenderness: There is no abdominal tenderness.   Musculoskeletal:      Right lower leg: Edema present.      Left lower leg: Edema present.   Skin:     Coloration: Skin is jaundiced.      Comments: Scattered excoriations   Neurological:      Mental Status: She is alert.      Comments: Slowed mentation         Results Review:    I have personally reviewed:  [x]  Laboratory  [x]  Old records  [x]  Radiology   []  EKG/Telemetry   []  Microbiology    []  Cardiology/Vascular   []  Pathology     Lab Results (last 24 hours)       Procedure Component Value Units Date/Time    CBC & Differential [[id number]]  (Abnormal) Collected: 02/18/24 0920    Specimen: Blood Updated: 02/18/24 0957    Narrative:      The following orders were created for panel order CBC & Differential.  Procedure                               Abnormality         Status                     ---------                               -----------         ------                     CBC Auto Differential[[id number]]        Abnormal            Final result                 Please view results for these tests on the individual orders.    Comprehensive Metabolic Panel [[id number]]  (Abnormal) Collected: 02/18/24 0920    Specimen: Blood Updated: 02/18/24 0952     Glucose 102 mg/dL      BUN 29 mg/dL      Creatinine 1.90 mg/dL      Sodium 135 mmol/L      Potassium 4.1 mmol/L      Chloride 103 mmol/L      CO2 17.0 mmol/L      Calcium 8.3 mg/dL      Total Protein 6.1 g/dL      Albumin 2.3 g/dL      ALT (SGPT) 42 U/L      AST (SGOT) 63 U/L      Alkaline Phosphatase 333 U/L      Total Bilirubin 11.3 mg/dL      Globulin 3.8 gm/dL      A/G Ratio 0.6 g/dL      BUN/Creatinine Ratio 15.3     Anion Gap 15.0 mmol/L      eGFR 34.9 mL/min/1.73     Narrative:      GFR Normal >60  Chronic Kidney Disease <60  Kidney Failure <15      Procalcitonin [[id number]]  (Abnormal) Collected: 02/18/24 0920    Specimen: Blood Updated: 02/18/24 0958      "Procalcitonin 19.60 ng/mL     Narrative:      As a Marker for Sepsis (Non-Neonates):    1. <0.5 ng/mL represents a low risk of severe sepsis and/or septic shock.  2. >2 ng/mL represents a high risk of severe sepsis and/or septic shock.    As a Marker for Lower Respiratory Tract Infections that require antibiotic therapy:    PCT on Admission    Antibiotic Therapy       6-12 Hrs later    >0.5                Strongly Recommended  >0.25 - <0.5        Recommended   0.1 - 0.25          Discouraged              Remeasure/reassess PCT  <0.1                Strongly Discouraged     Remeasure/reassess PCT    As 28 day mortality risk marker: \"Change in Procalcitonin Result\" (>80% or <=80%) if Day 0 (or Day 1) and Day 4 values are available. Refer to http://[email or web address]-pct-calculator.com    Change in PCT <=80%  A decrease of PCT levels below or equal to 80% defines a positive change in PCT test result representing a higher risk for 28-day all-cause mortality of patients diagnosed with severe sepsis for septic shock.    Change in PCT >80%  A decrease of PCT levels of more than 80% defines a negative change in PCT result representing a lower risk for 28-day all-cause mortality of patients diagnosed with severe sepsis or septic shock.       hCG, Serum, Qualitative [[id number]]  (Normal) Collected: 02/18/24 0920    Specimen: Blood Updated: 02/18/24 1016     HCG Qualitative Negative    Ethanol [[id number]] Collected: 02/18/24 0920    Specimen: Blood Updated: 02/18/24 0952     Ethanol <10 mg/dL      Ethanol % <0.010 %     CBC Auto Differential [[id number]]  (Abnormal) Collected: 02/18/24 0920    Specimen: Blood Updated: 02/18/24 0957     WBC 9.93 10*3/mm3      RBC 3.93 10*6/mm3      Hemoglobin 7.5 g/dL      Hematocrit 26.7 %      MCV 67.9 fL      MCH 19.1 pg      MCHC 28.1 g/dL      RDW 16.7 %      RDW-SD 40.4 fl      Platelets 65 10*3/mm3     CK [[id number]]  (Normal) Collected: 02/18/24 0920    Specimen: Blood Updated: 02/18/24 0952     " Creatine Kinase 42 U/L     Manual Differential [[id number]]  (Abnormal) Collected: 02/18/24 0920    Specimen: Blood Updated: 02/18/24 1004     Neutrophil % 89.0 %      Lymphocyte % 4.0 %      Monocyte % 5.0 %      Eosinophil % 1.0 %      Basophil % 1.0 %      Neutrophils Absolute 8.84 10*3/mm3      Lymphocytes Absolute 0.40 10*3/mm3      Monocytes Absolute 0.50 10*3/mm3      Eosinophils Absolute 0.10 10*3/mm3      Basophils Absolute 0.10 10*3/mm3      Anisocytosis Mod/2+     Lorri Cells Mod/2+     Elliptocytes Mod/2+     Hypochromia Mod/2+     Macrocytes Mod/2+     Microcytes Mod/2+     Poikilocytes Mod/2+     Polychromasia Mod/2+     WBC Morphology Normal     Platelet Morphology Normal    Sedimentation Rate [[id number]]  (Abnormal) Collected: 02/18/24 0920    Specimen: Blood Updated: 02/18/24 1309     Sed Rate 84 mm/hr     C-reactive Protein [[id number]]  (Abnormal) Collected: 02/18/24 0920    Specimen: Blood Updated: 02/18/24 1154     C-Reactive Protein 23.43 mg/dL     Lactic Acid, Plasma [[id number]]  (Abnormal) Collected: 02/18/24 0921    Specimen: Blood Updated: 02/18/24 0949     Lactate 5.6 mmol/L     Blood Culture - Blood, Hand, Left [[id number]] Collected: 02/18/24 1029    Specimen: Blood from Hand, Left Updated: 02/18/24 1034    Protime-INR [[id number]]  (Abnormal) Collected: 02/18/24 1340    Specimen: Blood from Arm, Left Updated: 02/18/24 1412     Protime 18.5 Seconds      INR 1.51    STAT Lactic Acid, Reflex [[id number]]  (Abnormal) Collected: 02/18/24 1358    Specimen: Blood Updated: 02/18/24 1420     Lactate 4.0 mmol/L     Urinalysis With Microscopic If Indicated (No Culture) - Urine, Clean Catch [[id number]]  (Abnormal) Collected: 02/18/24 1440    Specimen: Urine, Clean Catch Updated: 02/18/24 1517     Color, UA Dark Yellow     Appearance, UA Turbid     pH, UA 5.5     Specific Gravity, UA 1.022     Glucose, UA Negative     Ketones, UA Negative     Bilirubin, UA Large (3+)     Blood, UA Large (3+)     Protein,   mg/dL (2+)     Leuk Esterase, UA Moderate (2+)     Nitrite, UA Positive     Urobilinogen, UA 1.0 E.U./dL    Urine Drug Screen - Urine, Clean Catch [[id number]]  (Abnormal) Collected: 02/18/24 1440    Specimen: Urine, Clean Catch Updated: 02/18/24 1546     Amphet/Methamphet, Screen Positive     Barbiturates Screen, Urine Negative     Benzodiazepine Screen, Urine Negative     Cocaine Screen, Urine Negative     Opiate Screen Positive     THC, Screen, Urine Positive     Methadone Screen, Urine Negative     Oxycodone Screen, Urine Negative     Fentanyl, Urine Positive    Narrative:      Negative Thresholds Per Drugs Screened:    Amphetamines                 500 ng/ml  Barbiturates                 200 ng/ml  Benzodiazepines              100 ng/ml  Cocaine                      300 ng/ml  Methadone                    300 ng/ml  Opiates                      300 ng/ml  Oxycodone                    100 ng/ml  THC                           50 ng/ml  Fentanyl                       5 ng/ml      The Normal Value for all drugs tested is negative. This report includes final unconfirmed screening results to be used for medical treatment purposes only. Unconfirmed results must not be used for non-medical purposes such as employment or legal testing. Clinical consideration should be applied to any drug of abuse test, particularly when unconfirmed results are used.            Urinalysis, Microscopic Only - Urine, Clean Catch [[id number]]  (Abnormal) Collected: 02/18/24 1440    Specimen: Urine, Clean Catch Updated: 02/18/24 1544     RBC, UA 3-5 /HPF      WBC, UA 11-20 /HPF      Bacteria, UA 3+ /HPF      Squamous Epithelial Cells, UA 3-6 /HPF      Hyaline Casts, UA None Seen /LPF      WBC Clumps, UA Small/1+ /HPF      Methodology Manual Light Microscopy    STAT Lactic Acid, Reflex [[id number]]  (Abnormal) Collected: 02/18/24 1710    Specimen: Blood Updated: 02/18/24 1752     Lactate 3.7 mmol/L     Bilirubin, Total & Direct [[id number]]   (Abnormal) Collected: 02/18/24 1710    Specimen: Blood Updated: 02/18/24 1747     Total Bilirubin 10.9 mg/dL      Bilirubin, Direct 9.3 mg/dL      Bilirubin, Indirect 1.6 mg/dL     Hepatitis Panel, Acute [[id number]] Collected: 02/18/24 1710    Specimen: Blood Updated: 02/18/24 1719    Haptoglobin [[id number]]  (Normal) Collected: 02/18/24 1710    Specimen: Blood Updated: 02/18/24 1747     Haptoglobin 35 mg/dL     Lactate Dehydrogenase [[id number]]  (Normal) Collected: 02/18/24 1710    Specimen: Blood Updated: 02/18/24 1747      U/L     Reticulocytes [[id number]]  (Normal) Collected: 02/18/24 1710    Specimen: Blood Updated: 02/18/24 1725     Reticulocyte % 1.74 %      Reticulocyte Absolute 0.0618 10*6/mm3             Imaging Results (Last 24 Hours)       Procedure Component Value Units Date/Time    US Liver [[id number]] Resulted: 02/18/24 1632     Updated: 02/18/24 1636    CT Abdomen Pelvis Without Contrast [[id number]] Collected: 02/18/24 1111     Updated: 02/18/24 1126    Narrative:      CT ABDOMEN PELVIS WO CONTRAST-     INDICATION: Jaundice, fever     COMPARISON: CT abdomen pelvis June 12, 2023     TECHNIQUE:  Routine CT abdomen and pelvis without IV contrast. Coronal and sagittal  reformats. Radiation dose reduction techniques were utilized, including  automated exposure control and exposure modulation based on body size.     FINDINGS:      Lung bases: Trace suspected partially loculated right pleural fluid. Few  small nodular opacities at the lung bases with cavitation in the nodular  right lower lobe opacities. Air trapping in the right middle lobe.     ABDOMEN: Cirrhotic morphology. Probable gallbladder sludge. No biliary  ductal dilatation. Splenomegaly with the spleen measuring 25 cm in  craniocaudal dimension. Mild pancreatic lipomatosis. No pancreatic  ductal dilatation or mass identified. No adrenal nodules. Left kidney is  displaced by splenomegaly. No urolithiasis or hydronephrosis.     Pelvis:  Underdistended bladder. Anteverted uterus. No adnexal mass. Free  intraperitoneal fluid seen in the cul-de-sac, extending to the adnexa  and into the left paracolic gutter and continuing into the upper abdomen  around the liver and spleen.     Bowel: No obstruction. Colon appears under distended. Normal appendix.     Abdominal wall: Body wall edema/anasarca. Pelvic wall scarring.     Retroperitoneum: Mild groin adenopathy, may be reactive. No  retroperitoneal lymphadenopathy.     Osseous structures: Spondylosis/degenerative disc disease, moderate at  L4/L5. Lower lumbar facet degenerative arthropathy.       Impression:         1. Cirrhosis.  2. Massive splenomegaly.  3. Mild volume ascites.  4. Trace right pleural fluid.  5. Nodular opacities at the bases, with cavitation in the right lower  lobe. Suspect septic emboli.     This report was finalized on 2/18/2024 11:23 AM by Dr. Charlie Harris M.D on Workstation: [id number]       XR Chest 1 View [[id number]] Collected: 02/18/24 0922     Updated: 02/18/24 0933    Narrative:      2 VIEW RIGHT KNEE     HISTORY: Generalized pain and fever. Right knee pain. Drug abuse.     FINDINGS: There is mild to moderate degenerative change with joint space  narrowing and spurring involving the lateral compartment and to a lesser  extent the patella. There appears to be a large joint effusion. Given  the history of fever, further evaluation with joint aspiration may be  appropriate.     ONE-VIEW PORTABLE CHEST     HISTORY: Fever. Generalized body aches.     FINDINGS: The lungs are moderately expanded which accentuates an  appearance of mild cardiomegaly and mild vascular congestion that  appears slightly more prominent since the study of 12/2/2023.     This report was finalized on 2/18/2024 9:30 AM by Dr. Bo Pratt M.D  on Workstation: [id number]       XR Knee 1 or 2 View Right [[id number]] Collected: 02/18/24 0922     Updated: 02/18/24 0933    Narrative:      2 VIEW RIGHT  KNEE     HISTORY: Generalized pain and fever. Right knee pain. Drug abuse.     FINDINGS: There is mild to moderate degenerative change with joint space  narrowing and spurring involving the lateral compartment and to a lesser  extent the patella. There appears to be a large joint effusion. Given  the history of fever, further evaluation with joint aspiration may be  appropriate.     ONE-VIEW PORTABLE CHEST     HISTORY: Fever. Generalized body aches.     FINDINGS: The lungs are moderately expanded which accentuates an  appearance of mild cardiomegaly and mild vascular congestion that  appears slightly more prominent since the study of 12/2/2023.     This report was finalized on 2/18/2024 9:30 AM by Dr. Bo Pratt M.D  on Workstation: [id number]               Results for orders placed during the hospital encounter of 12/02/23    Adult Transthoracic Echo Complete W/ Cont if Necessary Per Protocol    Interpretation Summary    Left ventricular systolic function is normal. Calculated left ventricular EF = 65.2%    Left ventricular diastolic function was normal.    Estimated right ventricular systolic pressure from tricuspid regurgitation is mildly elevated (35-45 mmHg). Calculated right ventricular systolic pressure from tricuspid regurgitation is 44 mmHg.    Mild pulmonary hypertension is present.    The aortic valve is structurally normal with no regurgitation or stenosis present. The aortic valve appears trileaflet. There may be a vegetation in the LVOT seen best on image 68 and 69    The tricuspid valve is structurally normal with no significant stenosis present. Moderate to severe tricuspid valve regurgitation is present. Estimated right ventricular systolic pressure from tricuspid regurgitation is mildly elevated (35-45 mmHg). Calculated right ventricular systolic pressure from tricuspid regurgitation is 44.4 mmHg. Mild pulmonary hypertension is present. Vegetation noted on the tricuspid valve measuring 0.5 x  2.2cm    Left atrial volume is mildly increased. Saline test results are negative.      ECG 12 Lead Other; fever, pain   Final Result   HEART RATE= 113  bpm   RR Interval= 531  ms   DE Interval= 144  ms   P Horizontal Axis= 28  deg   P Front Axis= 36  deg   QRSD Interval= 107  ms   QT Interval= 372  ms   QTcB= 510  ms   QRS Axis= 69  deg   T Wave Axis= 33  deg   - ABNORMAL ECG -   Sinus tachycardia   Probable left ventricular hypertrophy   Prolonged QT interval   No change from previous tracing   Electronically Signed By: Gene Guerra ([facility]) 18-Feb-2024 15:46:15   Date and Time of Study: 2024-02-18 09:28:51           Assessment/Plan     Active Hospital Problems    Diagnosis  POA    **Sepsis [A41.9]  Yes    Hyperbilirubinemia [E80.6]  Yes    Stage 4 chronic kidney disease [N18.4]  Yes    Lactic acidosis [E87.20]  Yes    Opioid use disorder [F11.90]  Yes    Chronic hepatitis C with cirrhosis [B18.2, K74.60]  Yes    Right knee pain [M25.561]  Yes    Effusion of right knee [M25.461]  Yes    Painless jaundice [R17]  Yes    Hepatitis B infection [B19.10]  Yes    Polysubstance abuse [F19.10]  Yes      Resolved Hospital Problems   No resolved problems to display.       Ms. Landeros is a 33 y.o. female with polysubstance use disorder, chronic hepatitis C with cirrhosis, CKD, history of TV endocarditis/MRSA bacteremia presenting with right knee pain.         Overall presentation highly concerning for septic arthritis.  She has history of TV endocarditis and MRSA bacteremia, and imaging findings show likely septic emboli in her lungs, unclear if these are new or continuing to evolve from prior . Hemodynamically she is stable, however would not be surprised if her cultures are positive given her incompletely treated endocarditis as she left AMA from hospitalization in December.    Suspected R knee septic arthritis/R knee pain/effusion  Hx TV endocarditis/MRSA bacteremia  Lactic acidosis  -Xray w/ R knee  effusion  -Arthrocentesis attempted in ED, patient mainly uncooperative so reordered with IR  -CT w/ concern for septic embolic  -cx pending  -ID consulted  -cont cefepime, vancomycin (monitor trough)    Opioid use disorder  -most recently used heroin yesterday  -cover with Dilaudid q2h PRN for now    CKD4  -Crt 1.9 OA (b/l ~1.5-1.9)    Hepatitis C cirrhosis   Hepatitis B  Painless jaundice/hyperbilirubinemia  -bili 11.3  -denies EtOH use  -CT w/ cirrhosis, splenomegaly, mild ascites  -GI consulted        I discussed the patient's findings and my recommendations with patient and ED provider.    VTE Prophylaxis - SCDs.  Code Status - Full code.       Lucas Blas MD  [facility]  02/18/24  18:02 EST      Electronically signed by Lucas Blas MD at 02/18/24 1802          Emergency Department Notes        Agustina Cormier, RN at 02/18/24 1142          Nursing report ED to floor  Sammie Landeros  33 y.o.  female    HPI :   Chief Complaint   Patient presents with    Addiction Problem    Generalized Body Aches       Admitting doctor:   Lucas Blas MD    Admitting diagnosis:   The primary encounter diagnosis was Sepsis with acute liver failure without hepatic coma or septic shock, due to unspecified organism. Diagnoses of Hyperbilirubinemia and Pyogenic arthritis of right knee joint, due to unspecified organism were also pertinent to this visit.    Code status:   Current Code Status       Date Active Code Status Order ID Comments User Context       2/18/2024 1130 CPR (Attempt to Resuscitate) [id number]  Lucas Blas MD ED        Question Answer    Code Status (Patient has no pulse and is not breathing) CPR (Attempt to Resuscitate)    Medical Interventions (Patient has pulse or is breathing) Full    Level Of Support Discussed With Patient                    Allergies:   Patient has no known allergies.    Isolation:   Contact    Intake and Output    Intake/Output Summary  "(Last 24 hours) at 2/18/2024 1142  Last data filed at 2/18/2024 1055  Gross per 24 hour   Intake 1100 ml   Output --   Net 1100 ml       Weight:       02/18/24  0901   Weight: 118 kg (261 lb)       Most recent vitals:   Vitals:    02/18/24 0849 02/18/24 0901 02/18/24 0931 02/18/24 1101   BP: 142/74  123/68 116/57   BP Location: Right arm      Patient Position: Lying      Pulse: 120  112 112   Resp: 20  20 20   Temp: 98 °F (36.7 °C)   98.4 °F (36.9 °C)   TempSrc: Oral      SpO2: 98%  94% 100%   Weight:  118 kg (261 lb)     Height:  175.3 cm (69\")         Active LDAs/IV Access:   Lines, Drains & Airways       Active LDAs       Name Placement date Placement time Site Days    Peripheral IV 02/18/24 0922 Posterior;Right Hand 02/18/24 0922  Hand  less than 1    Peripheral IV 02/18/24 0955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 interpreted the x-ray of the right knee and my findings are: No fracture or dislocation. [REBECCA]   0936 Given patient's history of bacteremia and endocarditis and now her new complaint of right knee pain, I am certainly worried about the possibility of a septic arthritis.  Will make arrangements to perform joint aspiration and see if I can obtain any synovial fluid for analysis. [REBECCA]   1014 Hemoglobin(!): 7.5 [REBECCA]   1014 Hematocrit(!): 26.7 [REBECCA]   1014  "Procalcitonin(!): 19.60 [REBECCA]   1014 I attempted to perform right knee arthrocentesis at the bedside.  I anesthetized and prepped the knee appropriately.  However, with introduction of the aspiration needle, patient had significant discomfort and persistently yelled \"stop\".  I tried to explain to her why it was necessary to attempt this procedure.  I ordered some more IV narcotic pain medication for her and I infiltrated some more lidocaine into the knee.  I was trying to see if we can get her comfortable enough to cooperate with a second attempt but she continually refused the procedure that point.  Therefore, unfortunately, I am unable to perform joint aspiration right now given her refusal. [REBECCA]   1027 Lactate(!!): 5.6 [REBECCA]   1027 Total Bilirubin(!): 11.3 [REBECCA]   1029 Paging orthopedics surgery for consult regarding patient's knee pain and my suspicion for septic arthritis. [REBECCA]   1117 I discussed with Dr. Winkler from orthopedic surgery about this patient.  He agrees to consult. [REBECCA]   1128 I discussed with Dr. Finley from [facility] about this patient.  He agrees to accept her to the hospitalist service for further medical management. [REBECCA]   1145 I independently interpreted the CT abdomen pelvis and my findings are: No free air, no bowel obstruction.  Small ascites. [REBECCA]      ED Course User Index  [REBECCA] Aries Pat MD             AS OF 11:54 EST VITALS:    BP - 116/57  HR - 112  TEMP - 98.4 °F (36.9 °C)  O2 SATS - 100%    COMPLEXITY OF CARE  The patient requires admission.      DIAGNOSIS  Final diagnoses:   Sepsis with acute liver failure without hepatic coma or septic shock, due to unspecified organism   Hyperbilirubinemia   Pyogenic arthritis of right knee joint, due to unspecified organism   Acute on chronic anemia   Chronic kidney disease, unspecified CKD stage         DISPOSITION  ED Disposition       ED Disposition   Decision to Admit    Condition   --    Comment   Level of Care: Telemetry [5]   Diagnosis: " Sepsis [[id number]]   Admitting Physician: QUETA BARBER [[id number]]   Attending Physician: QUETA BARBER [[id number]]   Certification: I Certify That Inpatient Hospital Services Are Medically Necessary For Greater Than 2 Midnights                  Please note that portions of this document were completed with a voice recognition program.    Note Disclaimer: At [facility], we believe that sharing information builds trust and better relationships. You are receiving this note because you recently visited [facility]. It is possible you will see health information before a provider has talked with you about it. This kind of information can be easy to misunderstand. To help you fully understand what it means for your health, we urge you to discuss this note with your provider.         Aries Pat MD  02/18/24 1154      Electronically signed by Aries Pat MD at 02/18/24 1154       Delma Garcia, RN at 02/18/24 0848          Pt to ed from home via EMS    Pt reports feeling bad for 4 days. Pt c/o bodywide pain, fever, and R knee painn, NKI. Pt used heroin last night.     Electronically signed by Delma Garcia, RN at 02/18/24 0856       Oxygen Therapy (since admission)       Date/Time SpO2 Device (Oxygen Therapy) Flow (L/min) Oxygen Concentration (%) ETCO2 (mmHg)    02/20/24 0827 97 -- -- -- --    02/20/24 0007 94 room air -- -- --    02/19/24 2022 95 room air -- -- --    02/19/24 1320 96 -- -- -- --    02/19/24 0920 -- room air -- -- --    02/19/24 0720 94 -- -- -- --    02/19/24 0020 94 room air -- -- --    02/18/24 2207 96 room air -- -- --    02/18/24 2000 -- room air -- -- --    02/18/24 1253 94 room air -- -- --    02/18/24 1101 100 -- -- -- --    02/18/24 0931 94 -- -- -- --    02/18/24 0849 98 room air -- -- --          Intake & Output (last 3 days)         02/17 0701 02/18 0700 02/18 0701 02/19 0700 02/19 0701 02/20 0700 02/20 0701 02/21 0700    P.O.   240     IV Piggyback  1100       Total Intake(mL/kg)  1100 (9.3) 240 (2)     Urine (mL/kg/hr)  725 550 (0.2)     Total Output  725 550     Net  +375 -310             Urine Unmeasured Occurrence  2 x             Lines, Drains & Airways       Active LDAs       Name Placement date Placement time Site Days    Peripheral IV 02/18/24 0955 Anterior;Right;Upper Arm 02/18/24  0955  Arm  1    Peripheral IV 02/19/24 1632 Anterior;Left Forearm 02/19/24  1632  Forearm  less than 1              Inactive LDAs       Name Placement date Placement time Removal date Removal time Site Days    [REMOVED] Peripheral IV 02/18/24 0922 Posterior;Right Hand 02/18/24 0922 02/18/24  2330  Hand  less than 1    [REMOVED] External Urinary Catheter 02/18/24  --  unknown  02/18/24 2330  --  less than 1                  Medication Administration Report for Sammie Landeros as of 02/20/24 0945     Legend:    Given Hold Not Given Due Canceled Entry Other Actions    Time Time (Time) Time Time-Action         Discontinued     Completed     Future     MAR Hold     Linked             Medications 02/18/24 02/19/24 02/20/24      acetaminophen (TYLENOL) tablet 650 mg  Dose: 650 mg  Freq: Every 4 Hours PRN Route: PO  PRN Reason: Mild Pain  Start: 02/18/24 1129   Admin Instructions:   Do not exceed 4 grams of acetaminophen in a 24 hr period.    If given for pain, use the following pain scale:   Mild Pain = Pain Score of 1-3, CPOT 1-2  Moderate Pain = Pain Score of 4-6, CPOT 3-4  Severe Pain = Pain Score of 7-10, CPOT 5-8  Based on patient request - if ordered for moderate or severe pain, provider allows for administration of a medication prescribed for a lower pain scale.    Do not exceed 4 grams of acetaminophen in a 24 hr period. Max dose of 2gm for AST/ALT greater than 120 units/L.    If given for pain, use the following pain scale:   Mild Pain = Pain Score of 1-3, CPOT 1-2  Moderate Pain = Pain Score of 4-6, CPOT 3-4  Severe Pain = Pain Score of 7-10, CPOT 5-8           sennosides-docusate (PERICOLACE) 8.6-50 MG per tablet 2 tablet  Dose: 2 tablet  Freq: 2 Times Daily PRN Route: PO  PRN Reason: Constipation  Start: 02/18/24 1130   Admin Instructions:   Start bowel management regimen if patient has not had a bowel movement after 12 hours.         And  polyethylene glycol (MIRALAX) packet 17 g  Dose: 17 g  Freq: Daily PRN Route: PO  PRN Reason: Constipation  PRN Comment: Use if senna-docusate is ineffective  Start: 02/18/24 1130   Admin Instructions:   Use if no bowel movement after 12 hours. Mix in 6-8 ounces of water.  Use 4-8 ounces of water, tea, or juice for each 17 gram dose.         And  bisacodyl (DULCOLAX) EC tablet 5 mg  Dose: 5 mg  Freq: Daily PRN Route: PO  PRN Reason: Constipation  PRN Comment: Use if polyethylene glycol is ineffective  Start: 02/18/24 1130   Admin Instructions:   Use if no bowel movement after 12 hours.  Swallow whole. Do not crush, split, or chew tablet.         And  bisacodyl (DULCOLAX) suppository 10 mg  Dose: 10 mg  Freq: Daily PRN Route: RE  PRN Reason: Constipation  PRN Comment: Use if bisacodyl oral is ineffective  Start: 02/18/24 1130   Admin Instructions:   Use if no bowel movement after 12 hours.  Hold for diarrhea          ceFAZolin in dextrose (ANCEF) IVPB solution 2,000 mg  Dose: 2,000 mg  Freq: Every 8 Hours Route: IV  Indications of Use: BACTEREMIA,SKIN AND SOFT TISSUE INFECTION  Start: 02/20/24 0800   End: 03/05/24 0759   Admin Instructions:   Caution: Look alike/sound alike drug alert      0904-New Bag     1600            HYDROmorphone (DILAUDID) injection 0.5 mg  Dose: 0.5 mg  Freq: Every 2 Hours PRN Route: IV  PRN Reason: Severe Pain  Start: 02/18/24 1131   End: 02/23/24 1130   Admin Instructions:   Based on patient request - if ordered for moderate or severe pain, provider allows for administration of a medication prescribed for a lower pain scale.  If given for pain, use the following pain scale:  Mild Pain = Pain Score of 1-3, CPOT  1-2  Moderate Pain = Pain Score of 4-6, CPOT 3-4  Severe Pain = Pain Score of 7-10, CPOT 5-8    1341-Given     1605-Given     1802-Given       2323-Given          0151-Given     0442-Given     0722-Given       0920-Given     1154-Given     1446-Given       1707-Given     2027-Given     2239-Given        0049-Given     0249-Given     0450-Given       0649-Given     0905-Given            influenza vac split quad (FLUZONE,FLUARIX,AFLURIA,FLULAVAL) injection 0.5 mL  Dose: 0.5 mL  Freq: During Hospitalization Route: IM  PRN Reason: Immunization  Start: 02/18/24 1308   Admin Instructions:   **Do Not Administer if Temperature Greater Than 102F & Notify Pharmacy**  Pneumococcal & Influenza Vaccines May Be Given At The Same Time in SEPARATE Injections.  Do not administer if temperature greater than 102F & notify pharmacy. Pneumococcal and influenza vaccines may be given at the same time in SEPARATE injections.          lactated ringers infusion  Rate: 100 mL/hr Dose: 100 mL/hr  Freq: Continuous Route: IV  Start: 02/18/24 1900    1806-New Bag          0446-New Bag              melatonin tablet 3 mg  Dose: 3 mg  Freq: Nightly PRN Route: PO  PRN Reason: Sleep  Start: 02/18/24 1129          ondansetron ODT (ZOFRAN-ODT) disintegrating tablet 4 mg  Dose: 4 mg  Freq: Every 6 Hours PRN Route: PO  PRN Reasons: Nausea,Vomiting  Start: 02/18/24 1129   Admin Instructions:   If BOTH ondansetron (ZOFRAN) and promethazine (PHENERGAN) are ordered use ondansetron first and THEN promethazine IF ondansetron is ineffective.  Place on tongue and allow to dissolve.     0512-Given     0927-Not Given:  See Alt     1154-Not Given:  See Alt           Or  ondansetron (ZOFRAN) injection 4 mg  Dose: 4 mg  Freq: Every 6 Hours PRN Route: IV  PRN Reasons: Nausea,Vomiting  Start: 02/18/24 1129     0512-Not Given:  See Alt     (0927)-Not Given [C]     1154-Given            pantoprazole (PROTONIX) EC tablet 40 mg  Dose: 40 mg  Freq: Every Early Morning Route:  PO  Start: 02/20/24 0600   Admin Instructions:   Do not crush or chew the capsules or tablets. The drug may not work as designed if the capsule or tablet is crushed or chewed. Swallow whole.  Swallow whole; do not crush, split, or chew.      (0522)-Not Given             Pharmacy to dose vancomycin  Freq: Continuous PRN Route: XX  PRN Reason: Consult  Indications of Use: SEPSIS  Start: 02/18/24 1136   End: 02/25/24 1135          sodium bicarbonate tablet 325 mg  Dose: 325 mg  Freq: 3 Times Daily Route: PO  Start: 02/19/24 1600     (1546)-Not Given     (2027)-Not Given         0905-Given     1600     2100           sodium chloride 0.9 % flush 10 mL  Dose: 10 mL  Freq: As Needed Route: IV  PRN Reason: Line Care  Start: 02/18/24 0854          vancomycin (VANCOCIN) 1000 mg/200 mL dextrose 5% IVPB  Dose: 1,000 mg  Freq: Every 12 Hours Route: IV  Indications of Use: SEPSIS  Start: 02/19/24 2300   End: 02/25/24 2259     2239-New Bag          1100     2300           Completed Medications  Medications 02/18/24 02/19/24 02/20/24       cefepime 2000 mg IVPB in 100 mL NS (VTB)  Dose: 2,000 mg  Freq: Once Route: IV  Indications of Use: SEPSIS  Start: 02/18/24 0940   End: 02/18/24 1054    1030-New Bag     1054-Stopped              HYDROmorphone (DILAUDID) injection 1 mg  Dose: 1 mg  Freq: Once Route: IV  Start: 02/18/24 1022   End: 02/18/24 1008   Admin Instructions:   Based on patient request - if ordered for moderate or severe pain, provider allows for administration of a medication prescribed for a lower pain scale.      Caution: Look alike/sound alike drug alert    If given for pain, use the following pain scale:  Mild Pain = Pain Score of 1-3, CPOT 1-2  Moderate Pain = Pain Score of 4-6, CPOT 3-4  Severe Pain = Pain Score of 7-10, CPOT 5-8    1008-Given               lidocaine 1% - EPINEPHrine [id number] (XYLOCAINE W/EPI) 1 %-[id number] injection 10 mL  Dose: 10 mL  Freq: Once Route: IJ  Start: 02/18/24 0952   End: 02/18/24 0953  "  Admin Instructions:   Vial to bedside - Document actual dose at time of admin by provider.    0953-Given               morphine injection 4 mg  Dose: 4 mg  Freq: Once Route: IV  Start: 02/18/24 0931   End: 02/18/24 0925   Admin Instructions:   Based on patient request - if ordered for moderate or severe pain, provider allows for administration of a medication prescribed for a lower pain scale.  If given for pain, use the following pain scale:  Mild Pain = Pain Score of 1-3, CPOT 1-2  Moderate Pain = Pain Score of 4-6, CPOT 3-4  Severe Pain = Pain Score of 7-10, CPOT 5-8    0925-Given               ondansetron (ZOFRAN) injection 4 mg  Dose: 4 mg  Freq: Once Route: IV  Start: 02/18/24 0931   End: 02/18/24 0925   Admin Instructions:   \"If multiple N/V medications ordered, use in the following order: Ondansetron, Prochlorperazine, Promethazine. Use PO unless patient refuses or patient unable to swallow.\"    0925-Given               sodium chloride 0.9 % bolus 1,000 mL  Dose: 1,000 mL  Freq: Once Route: IV  Start: 02/18/24 1208   End: 02/18/24 1343    1313-New Bag               sodium chloride 0.9 % bolus 1,000 mL  Dose: 1,000 mL  Freq: Once Route: IV  Start: 02/18/24 0911   End: 02/18/24 1055    0926-New Bag     1055-Stopped              vancomycin 2250 mg/500 mL 0.9% NS IVPB ([facility])  Dose: 20 mg/kg  Weight Dosing Info: 118 kg  Freq: Once Route: IV  Indications of Use: SEPSIS  Start: 02/18/24 0931   End: 02/18/24 1315    1100-New Bag              Discontinued Medications  Medications 02/18/24 02/19/24 02/20/24       cefepime 2000 mg IVPB in 100 mL NS (VTB)  Dose: 2,000 mg  Freq: Every 8 Hours Route: IV  Indications of Use: SEPSIS  Start: 02/19/24 1720   End: 02/20/24 0612     1707-New Bag          0049-New Bag             cefepime 2000 mg IVPB in 100 mL NS (VTB)  Dose: 2,000 mg  Freq: Every 12 Hours Route: IV  Indications of Use: SEPSIS  Start: 02/18/24 2200   End: 02/19/24 1318    2304-New Bag          0920-New Bag     " "         cefepime 2000 mg IVPB in 100 mL NS (VTB)  Dose: 2,000 mg  Freq: Every 8 Hours Route: IV  Indications of Use: SEPSIS  Start: 24 1152   End: 24 1158    1152-Canceled Entry               vancomycin 750 mg in sodium chloride 0.9 % 250 mL IVPB-VTB  Dose: 750 mg  Freq: Every 12 Hours Route: IV  Indications of Use: SEPSIS  Start: 24 2300   End: 24 1232    2305-New Bag          1154-New Bag                         Operative/Procedure Notes (all)    No notes of this type exist for this encounter.          Physician Progress Notes (all)        Jazmine Higginbotham MD at 24 0610            Infectious Diseases Progress Note    Jazmine Higginbotham MD     [facility]  [place]: 2 days  Patient Identification:  Name: Sammie Landeros  Age: 33 y.o.  Sex: female  :  1990  MRN: [id number]         Primary Care Physician: Melvi Landeros APRN        Subjective: Complaining of pain and does not want to try aspiration of the right knee joint.  Interval History: See consultation note.    Objective:    Scheduled Meds:cefepime, 2,000 mg, Intravenous, Q8H  pantoprazole, 40 mg, Oral, Q AM  sodium bicarbonate, 325 mg, Oral, TID  vancomycin, 1,000 mg, Intravenous, Q12H      Continuous Infusions:lactated ringers, 100 mL/hr, Last Rate: 100 mL/hr (24 0446)  Pharmacy to dose vancomycin,         Vital signs in last 24 hours:  Temp:  [97.5 °F (36.4 °C)-99.3 °F (37.4 °C)] 98.4 °F (36.9 °C)  Heart Rate:  [] 96  Resp:  [18] 18  BP: (104-156)/(62-86) 104/62    Intake/Output:    Intake/Output Summary (Last 24 hours) at 2024 0610  Last data filed at 2024 0243  Gross per 24 hour   Intake 240 ml   Output 550 ml   Net -310 ml       Exam:  /62 (BP Location: Left arm, Patient Position: Lying)   Pulse 96   Temp 98.4 °F (36.9 °C) (Oral)   Resp 18   Ht 175.3 cm (69\")   Wt 118 kg (261 lb)   SpO2 94%   BMI 38.54 kg/m²   Patient is examined using the personal protective equipment as per " guidelines from infection control for this particular patient as enacted.  Hand washing was performed before and after patient interaction.  General Appearance:  Withdrawn and does not want to engage in conversation but with persistence able to answer some questions.  Feels somewhat better but still in significant pain.                          Head:    Normocephalic, without obvious abnormality, atraumatic                           Eyes:  Eyes closed                         Throat:   Lips, tongue, gums normal; oral mucosa pink and moist                           Neck:   Supple, symmetrical, trachea midline, no JVD                         Lungs:    Bibasilar crackles                 Chest Wall:    No tenderness or deformity                          Heart:  S1-S2 regular                  Abdomen:   Obese soft nontender                 Extremities: As described in the skin examination                        Pulses:   Pulses palpable in all extremities                            Skin: Skin graft area on the right lateral leg erythematous the scabs, erythema of the overall right lower extremity is decreased.  Dry wound on the lateral gluteal aspect of the right lower extremity without any drainage.  Swelling and tenderness of the right knee still present.                    Neurologic: Withdrawn and lethargic but grossly nonfocal Limited right lower extremity movements because of pain       Data Review:    I reviewed the patient's new clinical results.  Results from last 7 days   Lab Units 02/19/24  0236 02/18/24  0920   WBC 10*3/mm3 8.73 9.93   HEMOGLOBIN g/dL 7.0* 7.5*   PLATELETS 10*3/mm3 77* 65*     Results from last 7 days   Lab Units 02/19/24  0236 02/18/24  0920   SODIUM mmol/L 137 135*   POTASSIUM mmol/L 4.0 4.1   CHLORIDE mmol/L 106 103   CO2 mmol/L 20.2* 17.0*   BUN mg/dL 27* 29*   CREATININE mg/dL 1.51* 1.90*   CALCIUM mg/dL 7.9* 8.3*   GLUCOSE mg/dL 98 102*     Microbiology Results (last 10 days)        Procedure Component Value - Date/Time    Blood Culture - Blood, Hand, Left [[id number]]  (Abnormal) Collected: 02/18/24 1029    Lab Status: Preliminary result Specimen: Blood from Hand, Left Updated: 02/19/24 0723     Blood Culture Streptococcus agalactiae (Group B)     Isolated from Aerobic Bottle     Gram Stain Aerobic Bottle Gram positive cocci in chains    Blood Culture ID, PCR - Blood, Hand, Left [[id number]]  (Abnormal) Collected: 02/18/24 1029    Lab Status: Final result Specimen: Blood from Hand, Left Updated: 02/18/24 2235     BCID, PCR Streptococcus agalactiae (Group B). Identification by BCID2 PCR.     BOTTLE TYPE Aerobic Bottle              Assessment:    Sepsis    Polysubstance abuse    Hyperbilirubinemia    Stage 4 chronic kidney disease    Lactic acidosis    Opioid use disorder    Chronic hepatitis C with cirrhosis    Right knee pain    Effusion of right knee    Painless jaundice    Hepatitis B infection  1-systemic sepsis with probable right lower extremity cellulitis involving the lower leg and thigh area with likely systemic bacteremic sepsis with pathogen to consider would be MRSA MSSA and strep species  2-probable right knee septic arthritis  3-active IV drug use with phlebitis and evidence of septic emboli with 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     Recommendations/Discussions:  See my discussion and recommendations on 2/18/2024  Simplify antibiotics and change cefepime to cefazolin while continuing IV vancomycin  Follow-up on joint fluid analysis along with close follow-up with orthopedic surgery service.  Follow-up on repeat blood culture results  Overall prognosis is guarded as discussed on 2/18/2024.  Goals of care  counseling and access evaluation is needed as she needs to be approached as substance abuse disorder and see if community and institutional resources can be mobilized to get her out of this spiraling down cervical that she is in.  Without addressing her substance abuse and craving and poor decision-making it is very difficult to achieve proper care plan and execute the care plan.  And hence prognosis is guarded.  Jazmine Higginbotham MD  2024  06:10 EST    Parts of this note may be an electronic transcription/translation of spoken language to printed text using the Dragon dictation system.      Electronically signed by Jazmine Higginbotham MD at 24 0920       Tim Magdaleno MD at 24 1203            Dedicated to Hospital Care    [phone]   LOS: 1 day     Name: Sammie Landeros  Age/Sex: 33 y.o. female  :  1990        PCP: Melvi Landeros APRN  Chief Complaint   Patient presents with    Addiction Problem    Generalized Body Aches      Subjective   She is somnolent but wakes up and tells me she is in tremendous pain.  She falls asleep midsentence.  She is arousable but falls back asleep.    cefepime, 2,000 mg, Intravenous, Q12H  vancomycin, 750 mg, Intravenous, Q12H      lactated ringers, 100 mL/hr, Last Rate: 100 mL/hr (24 0446)  Pharmacy to dose vancomycin,         Objective   Vital Signs  Temp:  [97.5 °F (36.4 °C)-98.1 °F (36.7 °C)] 97.5 °F (36.4 °C)  Heart Rate:  [100-111] 100  Resp:  [18-20] 18  BP: (103-141)/(51-78) 141/78  Body mass index is 38.54 kg/m².    Intake/Output Summary (Last 24 hours) at 2024 1203  Last data filed at 2024 0235  Gross per 24 hour   Intake --   Output 725 ml   Net -725 ml       Physical Exam  Vitals and nursing note reviewed.   Constitutional:       General: She is not in acute distress.     Appearance: She is obese. She is ill-appearing.   Cardiovascular:      Rate and Rhythm: Regular rhythm. Tachycardia present.   Pulmonary:      Effort: No respiratory  distress.      Breath sounds: Normal breath sounds.   Abdominal:      General: Bowel sounds are normal. There is no distension.      Palpations: Abdomen is soft.   Neurological:      General: No focal deficit present.      Mental Status: She is alert and oriented to person, place, and time.           Results Review:       I reviewed the patient's new clinical results.  Results from last 7 days   Lab Units 02/19/24  0236 02/18/24  0920   WBC 10*3/mm3 8.73 9.93   HEMOGLOBIN g/dL 7.0* 7.5*   PLATELETS 10*3/mm3 77* 65*     Results from last 7 days   Lab Units 02/19/24  0236 02/18/24  0920   SODIUM mmol/L 137 135*   POTASSIUM mmol/L 4.0 4.1   CHLORIDE mmol/L 106 103   CO2 mmol/L 20.2* 17.0*   BUN mg/dL 27* 29*   CREATININE mg/dL 1.51* 1.90*   CALCIUM mg/dL 7.9* 8.3*   Estimated Creatinine Clearance: 71.3 mL/min (A) (by C-G formula based on SCr of 1.51 mg/dL (H)).  Results from last 7 days   Lab Units 02/19/24  0236 02/18/24  1710 02/18/24  0920   ALK PHOS U/L 231*  --  333*   BILIRUBIN mg/dL 9.3* 10.9* 11.3*   BILIRUBIN DIRECT mg/dL  --  9.3*  --    ALT (SGPT) U/L 35*  --  42*   AST (SGOT) U/L 54*  --  63*         Assessment & Plan   Active Hospital Problems    Diagnosis  POA    **Sepsis [A41.9]  Yes    Hyperbilirubinemia [E80.6]  Yes    Stage 4 chronic kidney disease [N18.4]  Yes    Lactic acidosis [E87.20]  Yes    Opioid use disorder [F11.90]  Yes    Chronic hepatitis C with cirrhosis [B18.2, K74.60]  Yes    Right knee pain [M25.561]  Yes    Effusion of right knee [M25.461]  Yes    Painless jaundice [R17]  Yes    Hepatitis B infection [B19.10]  Yes    Polysubstance abuse [F19.10]  Yes      Resolved Hospital Problems   No resolved problems to display.       PLAN  Ms. Landeros is a 33 y.o. female with polysubstance use disorder, chronic hepatitis C with cirrhosis, CKD, history of TV endocarditis/MRSA bacteremia presenting with right knee pain and is admitted to the hospital with concern for septic arthritis     Suspected R  knee septic arthritis/R knee pain/effusion  Hx TV endocarditis/MRSA bacteremia  Lactic acidosis  -Xray w/ R knee effusion  -Arthrocentesis attempted in ED, patient mainly uncooperative so reordered with IR  -CT w/ concern for septic emboli  -cx pending  -ID recs reviewed  -cont cefepime, vancomycin (monitor trough)     2.   Opioid use disorder  -most recently used heroin yesterday  -cover with Dilaudid q2h PRN for now     3.  CKD4  -Crt 1.9 OA (b/l ~1.5-1.9)     4.  Hepatitis C cirrhosis   Hepatitis B  Painless jaundice/hyperbilirubinemia  -bili 11.3  -denies EtOH use  -CT w/ cirrhosis, splenomegaly, mild ascites  -GI recommendations reviewed    --> This is truly a sad situation at her age with her comorbidities and ongoing continued drug use I do not see a bright future for her.    Disposition  Expected Discharge Date: 2/20/2024; Expected Discharge Time:        Tim Magdaleno MD  [facility]  02/19/24  12:03 EST            Electronically signed by Tim Magdaleno MD at 02/19/24 1212       Cullen Lemon MD at 02/19/24 0728          [facility]  Inpatient Progress Note    Reason for Followup:  Chronic liver disease    Subjective     Interval History:   No new issues    Current Facility-Administered Medications:     acetaminophen (TYLENOL) tablet 650 mg, 650 mg, Oral, Q4H PRN, Lucas Blas MD    sennosides-docusate (PERICOLACE) 8.6-50 MG per tablet 2 tablet, 2 tablet, Oral, BID PRN **AND** polyethylene glycol (MIRALAX) packet 17 g, 17 g, Oral, Daily PRN **AND** bisacodyl (DULCOLAX) EC tablet 5 mg, 5 mg, Oral, Daily PRN **AND** bisacodyl (DULCOLAX) suppository 10 mg, 10 mg, Rectal, Daily PRN, Lucas Blas MD    cefepime 2000 mg IVPB in 100 mL NS (VTB), 2,000 mg, Intravenous, Q12H, Lucas Blas MD, 2,000 mg at 02/18/24 6784    HYDROmorphone (DILAUDID) injection 0.5 mg, 0.5 mg, Intravenous, Q2H PRN, Lucas Blas MD, 0.5  mg at 02/19/24 0722    influenza vac split quad (FLUZONE,FLUARIX,AFLURIA,FLULAVAL) injection 0.5 mL, 0.5 mL, Intramuscular, During Hospitalization, Lucas Blas MD    lactated ringers infusion, 100 mL/hr, Intravenous, Continuous, Lucas Blas MD, Last Rate: 100 mL/hr at 02/19/24 0446, 100 mL/hr at 02/19/24 0446    melatonin tablet 3 mg, 3 mg, Oral, Nightly PRN, Lucas Blas MD    ondansetron ODT (ZOFRAN-ODT) disintegrating tablet 4 mg, 4 mg, Oral, Q6H PRN, 4 mg at 02/19/24 0512 **OR** ondansetron (ZOFRAN) injection 4 mg, 4 mg, Intravenous, Q6H PRN, Lucas Blas MD    Pharmacy to dose vancomycin, , Does not apply, Continuous PRN, Lucas Blas MD    [COMPLETED] Insert Peripheral IV, , , Once **AND** sodium chloride 0.9 % flush 10 mL, 10 mL, Intravenous, PRN, Aries Pat MD    vancomycin 750 mg in sodium chloride 0.9 % 250 mL IVPB-VTB, 750 mg, Intravenous, Q12H, Lucas Blas MD, Last Rate: 333.3 mL/hr at 02/18/24 2305, 750 mg at 02/18/24 2305    Objective     Vital Signs  Temp:  [97.7 °F (36.5 °C)-98.4 °F (36.9 °C)] 97.7 °F (36.5 °C)  Heart Rate:  [102-120] 102  Resp:  [18-20] 18  BP: (103-142)/(51-74) 123/66  Body mass index is 38.54 kg/m².    Intake/Output Summary (Last 24 hours) at 2/19/2024 0727  Last data filed at 2/19/2024 0235  Gross per 24 hour   Intake 1100 ml   Output 725 ml   Net 375 ml     No intake/output data recorded.     Physical Exam:   General: patient awake, alert and cooperative   Abdomen: soft, nontender, nondistended; normal bowel sounds     Results Review:     I reviewed the patient's new clinical results.  I reviewed the patient's new imaging results and agree with the interpretation.    Results from last 7 days   Lab Units 02/19/24  0236 02/18/24  0920   WBC 10*3/mm3 8.73 9.93   HEMOGLOBIN g/dL 7.0* 7.5*   HEMATOCRIT % 23.4* 26.7*   PLATELETS 10*3/mm3 77* 65*     Results from last 7 days   Lab Units 02/19/24  0236 02/18/24  1710  02/18/24  0920   SODIUM mmol/L 137  --  135*   POTASSIUM mmol/L 4.0  --  4.1   CHLORIDE mmol/L 106  --  103   CO2 mmol/L 20.2*  --  17.0*   BUN mg/dL 27*  --  29*   CREATININE mg/dL 1.51*  --  1.90*   CALCIUM mg/dL 7.9*  --  8.3*   BILIRUBIN mg/dL 9.3* 10.9* 11.3*   ALK PHOS U/L 231*  --  333*   ALT (SGPT) U/L 35*  --  42*   AST (SGOT) U/L 54*  --  63*   GLUCOSE mg/dL 98  --  102*     Results from last 7 days   Lab Units 02/18/24  1340   INR  1.51*     Lab Results   Lab Value Date/Time    LIPASE 14 12/02/2023 1110    LIPASE 11 (L) 06/03/2023 0520    LIPASE 31 12/21/2022 1556       Radiology:  [unfilled]      Assessment & Plan   Assessment:   Right knee septic arthritis with lactic acidosis effusion and imaging concerning for septic emboli  IV drug abuse, active  Hepatitis C  Hepatitis B antigen + December 2023, no treatment  Nonadherence  Cirrhosis  Splenomegaly  Small volume ascites  Elevated LFTs actually not as high as they have been previously with the exception of the total bilirubin which is markedly elevated at 11 compared to a normal or slightly elevated baseline.  This could be the result of obstructive process, sepsis, hemolysis  History of endocarditis    All problems new to me today    Plan:   Abx per ID  Await HBV DNA/HCV RNA  Trend lFTs.  TB down a little today, predominately direct bilirubin on fractionation.  No e/o obstruction or dilation of CBD on imaging, suspect secondary to intrinsic liver disease and exacerbated by sepsis  Will need outpt f/u with  hepatology to potentially address her HCV and Hep B        I discussed the patient's findings and my recommendations with patient.          Cullen Lemon M.D.  [facility]  [address] [place] [address]  Office: [phone]               Electronically signed by Cullen Lemon MD at 02/19/24 0813          Consult Notes (all)        Bobo Alvarez PA-C at 02/19/24 0713        Consult  Orders    1. Ortho (on-call MD unless specified) [[id number]] ordered by Aries Pat MD at 02/18/24 1658              Attestation signed by Schuyler Winkler MD at 02/19/24 1106    I have reviewed this documentation and agree.                       Orthopaedic Consultation      Patient: Sammie Landeros    Date of Admission: 2/18/2024  8:51 AM    YOB: 1990    Medical Record Number: [id number]    Attending Physician:  Tim Magdaleno MD    Consulting Physician:  Bobo Alvarez PA-C        Chief Complaints: Right knee pain    History of Present Illness:     Patient is a 33-year-old female.  She has past medical history of polysubstance abuse, chronic hepatitis C with cirrhosis, chronic kidney disease, history of TV endocarditis/MRSA bacteremia.  She presented to [facility] with right knee pain.  She states that she has been not been able to get out of bed because of the increasing pain within the knee.  She has had flulike symptoms over the past course of 1 to 2 weeks with fevers and general malaise.  She was initially seen and evaluated in the emergency room.  They did attempt to perform aspiration of the knee.  However patient was unable to tolerate this in the emergency room.  She was subsequently admitted to the hospital.  Gastroenterology as well as infectious disease has been consulted as well.  Orthopedics has been consulted for evaluation.    Allergies: No Known Allergies    Medications:   Home Medications:  Medications Prior to Admission   Medication Sig Dispense Refill Last Dose    busPIRone (BUSPAR) 5 MG tablet Take 1 tablet by mouth 3 (Three) Times a Day. 90 tablet 0 Past Week    pantoprazole (PROTONIX) 40 MG EC tablet Take 1 tablet by mouth Every Morning. 30 tablet 0 Past Week    sodium bicarbonate 650 MG tablet Take 2 tablets by mouth 3 (Three) Times a Day. 120 tablet 0 Past Week       Current Medications:  Scheduled Meds:cefepime, 2,000 mg, Intravenous, Q12H  vancomycin, 750  "mg, Intravenous, Q12H      Continuous Infusions:lactated ringers, 100 mL/hr, Last Rate: 100 mL/hr (02/19/24 0446)  Pharmacy to dose vancomycin,       PRN Meds:.  acetaminophen    senna-docusate sodium **AND** polyethylene glycol **AND** bisacodyl **AND** bisacodyl    HYDROmorphone    influenza vaccine    melatonin    ondansetron ODT **OR** ondansetron    Pharmacy to dose vancomycin    [COMPLETED] Insert Peripheral IV **AND** sodium chloride    Past Medical History:   Diagnosis Date    Drug abuse     Hepatitis C     Hyperlipidemia     Nausea vomiting and diarrhea 06/03/2023     Past Surgical History:   Procedure Laterality Date    ADENOIDECTOMY      BACK SURGERY      INCISION AND DRAINAGE LEG Left 7/20/2016    Procedure: Incision and Drainage left ankle;  Surgeon: Zechariah Kunz MD;  Location: [facility];  Service:     TONSILLECTOMY       Social History     Occupational History    Not on file   Tobacco Use    Smoking status: Former     Packs/day: 1     Types: Cigarettes    Smokeless tobacco: Not on file   Vaping Use    Vaping Use: Some days   Substance and Sexual Activity    Alcohol use: No    Drug use: Yes     Frequency: 21.0 times per week     Types: Heroin     Comment: \"USE $20-&50 UP TO 3 TIMES PER DAY\"    Sexual activity: Defer      Social History     Social History Narrative    Not on file     Family History   Problem Relation Age of Onset    Other Mother         ADOPTED         Review of Systems:   Positive for right knee pain    Vital signs in last 24 hours:  Temp:  [97.7 °F (36.5 °C)-98.4 °F (36.9 °C)] 97.7 °F (36.5 °C)  Heart Rate:  [102-120] 102  Resp:  [18-20] 18  BP: (103-142)/(51-74) 123/66  Vitals:    02/18/24 1101 02/18/24 1253 02/18/24 2207 02/19/24 0020   BP: 116/57 103/51 123/62 123/66   BP Location:  Left arm Left arm Left arm   Patient Position:  Lying Lying Lying   Pulse: 112 111 105 102   Resp: 20 20 18 18   Temp: 98.4 °F (36.9 °C) 98.1 °F (36.7 °C) 97.7 °F (36.5 °C) 97.7 °F (36.5 °C) "   TempSrc:  Oral Oral Oral   SpO2: 100% 94% 96% 94%   Weight:       Height:              Physical Exam: 33 y.o. female         General Appearance:  Alert, cooperative, in no acute distress    HEENT:    Atraumatic, Pupils are equal   Neck:   Cervical spine midline, no appreciable JVD   Lungs:     Breathing non-labored and chest rise symmetric    Heart:   Abdomen:     Rectal:       Extremities:   Pulses  Neurovascular:   Skin:   Musculoskeletal:      Pulse regular    Soft, Non-tender or distended    Deferred        No clubbing, cyanosis, or edema    Intact    Cranial nerves 2 - 12 grossly intact, sensation intact    No skin lesions  Physical examination of the patient's right lower extremity was performed.  Patient is resting in her hospital bed today.  She is lying on her left side.  There is a wound over her right thigh/lateral aspect of the right leg and gluteal area.  Physical examination is somewhat limited given the patient's extent of pain.  She is very apprehensive to any physical exam.  She will not let me move the leg.  She is very exquisitely tender to any type of palpation around the knee.     Diagnostic Tests:    Results from last 7 days   Lab Units 02/19/24  0236   WBC 10*3/mm3 8.73   HEMOGLOBIN g/dL 7.0*   HEMATOCRIT % 23.4*   PLATELETS 10*3/mm3 77*     Results from last 7 days   Lab Units 02/19/24  0236   SODIUM mmol/L 137   POTASSIUM mmol/L 4.0   CHLORIDE mmol/L 106   CO2 mmol/L 20.2*   BUN mg/dL 27*   CREATININE mg/dL 1.51*   GLUCOSE mg/dL 98   CALCIUM mg/dL 7.9*     Results from last 7 days   Lab Units 02/18/24  1340   INR  1.51*       Study Result    Narrative & Impression   2 VIEW RIGHT KNEE     HISTORY: Generalized pain and fever. Right knee pain. Drug abuse.     FINDINGS: There is mild to moderate degenerative change with joint space  narrowing and spurring involving the lateral compartment and to a lesser  extent the patella. There appears to be a large joint effusion. Given  the history of  fever, further evaluation with joint aspiration may be  appropriate.     ONE-VIEW PORTABLE CHEST     HISTORY: Fever. Generalized body aches.     FINDINGS: The lungs are moderately expanded which accentuates an  appearance of mild cardiomegaly and mild vascular congestion that  appears slightly more prominent since the study of 12/2/2023.     This report was finalized on 2/18/2024 9:30 AM by Dr. Bo Pratt M.D  on Workstation: [id number]       Assessment:    Sepsis    Polysubstance abuse    Hyperbilirubinemia    Stage 4 chronic kidney disease    Lactic acidosis    Opioid use disorder    Chronic hepatitis C with cirrhosis    Right knee pain    Effusion of right knee    Painless jaundice    Hepatitis B infection        Plan:      I did have an extensive discussion with the patient regards her situation in the hospital today.  I have reviewed the above.  Patient continuing following with infectious disease.  She likely has probable right lower extremity cellulitis involving the lower leg and thigh area.  She has previous history of MRSA bacteremia.  She is being followed by infectious disease.  She is previously left with history of heroin abuse with noncompliance and leaving AGAINST MEDICAL ADVICE.  History of autoimmune hepatitis and chronic hepatitis C with evolving liver cirrhosis.  We have been consulted to evaluate the patient for probable right knee septic arthritis.  She does have a significant effusion on the x-ray.  They did attempt aspiration in the emergency room.  However patient was not able to tolerate it in the emergency room.  Therefore interventional radiology has been consulted for aspiration of the right knee.  We will await the results of the aspiration.  Will continue to follow along.  Thank you very much for the consultation allowing us to be part of the patient's care.  Will follow-up on the patient once the aspiration is performed.    All findings have been discussed my supervising physician  Dr. Schuyler Winkler.  He is in agreement with the plan.  All questions answered.    Date: 2024  Bobo Alvarez PA-C        Electronically signed by Schuyler Winkler MD at 24       Jazmine Higginbotham MD at 24 1913        Consult Orders    1. Inpatient Infectious Diseases Consult [[id number]] ordered by Ranjith Terrell DO at 24 1147                 CONSULT NOTE    Infectious Diseases - Jazmine Gillis MD  [facility]       Patient Identification:  Name: Sammie Landeros  Age: 33 y.o.  Sex: female  :  1990  MRN: [id number]             Date of Consultation: 2024      Primary Care Physician: Melvi Landeros APRN                               Requesting Physician: Dr. Xander miranda  Reason for Consultation: History of noncompliance MRSA bacteremia endocarditis and suspected right knee septic arthritis.    History of presenting illness: Patient is a 33-year-old female with history of substance abuse, hepatitis C, recurrent hospitalization with signing out AMA for MRSA bacteremic sepsis with latest hospitalization for 9 days from 2023 through 2023.  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  It is unclear whether she took her doxycycline.  In this background patient presented to our facility early this morning last night with complaints of right knee pain and right leg pain ongoing for the last 4 days started when she attempted to get up 4 days ago in the morning and could not do it.  She also has 1 week history of bodyaches fever and chills.  She admitted to using heroin on a daily basis up to a gram per day with last use of heroin was on 2024.  She admitted that her living place medical advice was due to craving for heroin.  Blood cultures were drawn and patient was started on IV cefepime and vancomycin and infectious disease service was  consulted.  Initially the consult was called to Dr. Xander Flannery who transferred her care to me because of the last interaction I had was in December 2023.  At present patient's major complaint is pain in her knee as well as inflammation involving the right leg and thigh.  Patient has obvious wounds on the right thigh and gluteal area which she is unable to characterize how and when they happen.  She also had significant swelling and redness of the left leg below the knee joint.  Attempt was given in the emergency room to drain her left knee which was not performed because of the significant intolerance and pain and the procedure was aborted.  Impression:  This presentation and above context is concerning for:  1-systemic sepsis with probable right lower extremity cellulitis involving the lower leg and thigh area with likely systemic bacteremic sepsis with pathogen to consider would be MRSA MSSA and strep species  2-probable right knee septic arthritis  3-active IV drug use with phlebitis and evidence of septic emboli with 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    Recommendations/Discussions:  This is a difficult situation.  It appears that orthopedic surgery has already evaluated the patient for her right knee and IR consult has been written for aspiration of the right knee joint.  Empirically vancomycin and cefepime is reasonable which can be further de-escalated based on the culture results and or evolving clinical course.  Patient need to be monitored closely for evolving right lower extremity cellulitis antibiotic assessment for need for intervention especially the lateral aspect of the right leg and gluteal area.  Goals of care counseling and  access evaluation is needed as she needs to be approached as substance abuse disorder and see if community and institutional resources can be mobilized to get her out of this spiraling down cervical that she is in.  Without addressing her substance abuse and craving and poor decision-making it is very difficult to achieve proper care plan and execute the care plan.  And hence prognosis is guarded.  Thank you very much for letting me be the part of your patient care.          Past Medical History:  Past Medical History:   Diagnosis Date    Drug abuse     Hepatitis C     Hyperlipidemia     Nausea vomiting and diarrhea 06/03/2023     Past Surgical History:  Past Surgical History:   Procedure Laterality Date    ADENOIDECTOMY      BACK SURGERY      INCISION AND DRAINAGE LEG Left 7/20/2016    Procedure: Incision and Drainage left ankle;  Surgeon: Zechariah Kunz MD;  Location: [facility];  Service:     TONSILLECTOMY        Home Meds:  Medications Prior to Admission   Medication Sig Dispense Refill Last Dose    busPIRone (BUSPAR) 5 MG tablet Take 1 tablet by mouth 3 (Three) Times a Day. 90 tablet 0 Past Week    pantoprazole (PROTONIX) 40 MG EC tablet Take 1 tablet by mouth Every Morning. 30 tablet 0 Past Week    sodium bicarbonate 650 MG tablet Take 2 tablets by mouth 3 (Three) Times a Day. 120 tablet 0 Past Week     Current Meds:     Current Facility-Administered Medications:     acetaminophen (TYLENOL) tablet 650 mg, 650 mg, Oral, Q4H PRN, Lucas Blas MD    sennosides-docusate (PERICOLACE) 8.6-50 MG per tablet 2 tablet, 2 tablet, Oral, BID PRN **AND** polyethylene glycol (MIRALAX) packet 17 g, 17 g, Oral, Daily PRN **AND** bisacodyl (DULCOLAX) EC tablet 5 mg, 5 mg, Oral, Daily PRN **AND** bisacodyl (DULCOLAX) suppository 10 mg, 10 mg, Rectal, Daily PRN, Lucas Blas MD    cefepime 2000 mg IVPB in 100 mL NS (VTB), 2,000 mg, Intravenous, Q12H, Lucas Blas MD    HYDROmorphone (DILAUDID)  injection 0.5 mg, 0.5 mg, Intravenous, Q2H PRN, Lucas Blas MD, 0.5 mg at 02/18/24 1802    influenza vac split quad (FLUZONE,FLUARIX,AFLURIA,FLULAVAL) injection 0.5 mL, 0.5 mL, Intramuscular, During Hospitalization, Lucas Blas MD    lactated ringers infusion, 100 mL/hr, Intravenous, Continuous, Lucas Blas MD, Last Rate: 100 mL/hr at 02/18/24 1806, 100 mL/hr at 02/18/24 1806    melatonin tablet 3 mg, 3 mg, Oral, Nightly PRN, Lucas Blas MD    ondansetron ODT (ZOFRAN-ODT) disintegrating tablet 4 mg, 4 mg, Oral, Q6H PRN **OR** ondansetron (ZOFRAN) injection 4 mg, 4 mg, Intravenous, Q6H PRN, Lucas Blas MD    Pharmacy to dose vancomycin, , Does not apply, Continuous PRN, Lucas Blas MD    [COMPLETED] Insert Peripheral IV, , , Once **AND** sodium chloride 0.9 % flush 10 mL, 10 mL, Intravenous, PRN, Aries Pat MD    vancomycin 750 mg in sodium chloride 0.9 % 250 mL IVPB-VTB, 750 mg, Intravenous, Q12H, Lucas Blas MD  Allergies:  No Known Allergies  Social History:   Social History     Tobacco Use    Smoking status: Former     Packs/day: 1     Types: Cigarettes    Smokeless tobacco: Not on file   Substance Use Topics    Alcohol use: No      Family History:  Family History   Problem Relation Age of Onset    Other Mother         ADOPTED          Review of Systems  See history of present illness and past medical history.    Does not want to talk and engage with me at this time.  Very withdrawn.  But at the time of admission  Review of system was performed as follows by the admitting team.  Remainder of ROS is negative.  Constitutional:  Positive for chills, fatigue and fever. Negative for activity change.   HENT:  Negative for congestion, drooling, facial swelling, rhinorrhea and sore throat.    Respiratory:  Negative for apnea, chest tightness and shortness of breath.    Gastrointestinal:  Negative for abdominal pain, blood in stool and  "diarrhea.   Genitourinary:  Negative for difficulty urinating, dysuria and urgency.   Musculoskeletal:  Positive for arthralgias, gait problem and joint swelling.   Skin:  Positive for color change.   Neurological:  Positive for weakness. Negative for dizziness, syncope and numbness.   Hematological:  Negative for adenopathy.   Psychiatric/Behavioral:  Negative for agitation, dysphoric mood and self-injury.      Vitals:   /51 (BP Location: Left arm, Patient Position: Lying)   Pulse 111   Temp 98.1 °F (36.7 °C) (Oral)   Resp 20   Ht 175.3 cm (69\")   Wt 118 kg (261 lb)   SpO2 94%   BMI 38.54 kg/m²   I/O:   Intake/Output Summary (Last 24 hours) at 2/18/2024 1913  Last data filed at 2/18/2024 1055  Gross per 24 hour   Intake 1100 ml   Output --   Net 1100 ml     Exam:  Patient is examined using the personal protective equipment as per guidelines from infection control for this particular patient as enacted.  Hand washing was performed before and after patient interaction.  General Appearance:  Withdrawn and engaging female who is morbidly obese.   Head:    Normocephalic, without obvious abnormality, atraumatic   Eyes:    PERRL, conjunctivae/corneas clear, EOM's intact, both eyes   Ears:    Normal external ear canals, both ears   Nose:   Nares normal, septum midline, mucosa normal, no drainage    or sinus tenderness   Throat:   Lips, tongue, gums normal; oral mucosa pink and moist   Neck: Supple and no adenopathy   Back:     Symmetric, no curvature, ROM normal, no CVA tenderness   Lungs:   Decreased breath sounds at the bases   Chest Wall:    No tenderness or deformity    Heart:  S1-S2 tachycardia   Abdomen:   Soft mild generalized tenderness, distended   Extremities: Cellulitis of the right lateral leg with skin lesions swelling and warmth and tenderness and decreased motion of the right knee and scabbed wound with surrounding cellulitis of the right gluteal area noted.   Pulses:   Pulses palpable in all " extremities; symmetric all extremities   Skin: Needle track marks as well as open obvious wound on the right gluteal area and erythema of the right leg noted.   Neurologic: Grossly nonfocal cannot move her right knee and leg because of pain.       Data Review:    I reviewed the patient's new clinical results.  Results from last 7 days   Lab Units 02/18/24  0920   WBC 10*3/mm3 9.93   HEMOGLOBIN g/dL 7.5*   PLATELETS 10*3/mm3 65*     Results from last 7 days   Lab Units 02/18/24  0920   SODIUM mmol/L 135*   POTASSIUM mmol/L 4.1   CHLORIDE mmol/L 103   CO2 mmol/L 17.0*   BUN mg/dL 29*   CREATININE mg/dL 1.90*   CALCIUM mg/dL 8.3*   GLUCOSE mg/dL 102*     Microbiology Results (last 10 days)       ** No results found for the last 240 hours. **              Assessment:  Active Hospital Problems    Diagnosis  POA    **Sepsis [A41.9]  Yes    Hyperbilirubinemia [E80.6]  Yes    Stage 4 chronic kidney disease [N18.4]  Yes    Lactic acidosis [E87.20]  Yes    Opioid use disorder [F11.90]  Yes    Chronic hepatitis C with cirrhosis [B18.2, K74.60]  Yes    Right knee pain [M25.561]  Yes    Effusion of right knee [M25.461]  Yes    Painless jaundice [R17]  Yes    Hepatitis B infection [B19.10]  Yes    Polysubstance abuse [F19.10]  Yes      Resolved Hospital Problems   No resolved problems to display.         Plan:  See above  Jazmine Higginbotham MD   2/18/2024  19:13 EST    Parts of this note may be an electronic transcription/translation of spoken language to printed text using the Dragon dictation system.      Electronically signed by Jazmine Higginbotham MD at 02/19/24 1401       Yuri Faye MD at 02/18/24 1430          Gastroenterology   Initial Inpatient Consult Note    Referring Provider: Lucas Blas     Reason for Consultation: chronic liver disease, elevated bilirubin    Subjective     History of present illness:    33 y.o. female who we are asked to see for elevated LFTs.  The patient is admitted due to right knee pain from IV  drug use of heroin into her joint.  It is swollen and attempted to have had fluid drained today but it was too painful for the patient.  The patient's bilirubin was noted to be 11.3 with an ALT of 42 and an AST of 63 alkaline phosphatase of 333 the alk phos and transaminases are actually improved over some previous levels she has had before but the total bilirubin is extremely elevated compared to normal baseline or mildly elevated baseline in the past.  The patient has been diagnosed with both hepatitis B and C her INR is 1.51 her lactate is 4.0 her hemoglobin is 7.5 white count is 9.93 her procalcitonin is 19.6    The patient was just in the hospital in December 2023.  That admission was for a known tricuspid valve endocarditis hepatitis C cirrhosis and other medical issues she had been at [facility] but left that hospital [place] where she was found to have MRSA bacteremia and endocarditis.  She was then admitted here for recurrent sepsis related to her endocarditis given IV antibiotics seen by cardiothoracic surgery medical management with antibiotic was recommended she had complications of acute renal insufficiency on top of her liver disease.  She left AGAINST MEDICAL ADVICE from that admission as well    She had a positive hepatitis B antigen and hepatitis C antibody at that workup.  She has not been treated for either of these.  This admission she is admitted for right knee pain which has limited her ability to get out of bed for the last 4 days due to the pain.  She has noted flulike symptoms as well and has become jaundiced.  Still actively using as recently as the day prior to admission    CT Abdomen Pelvis Without Contrast (02/18/2024 10:53) reveals cirrhosis massive splenomegaly mild volume ascites trace right pleural effusion nodular opacities at the base with cavitation in the right lower lobe suspected of septic emboli    Cefepime and vancomycin have been started    She has had some nausea vomiting  and diarrhea    Hepatitis B Surface Antigen (12/08/2023 16:06)   Hepatitis B Surface Antigen (12/08/2023 16:06)   Hepatitis C Antibody (12/03/2023 17:47)   Past Medical History:  Past Medical History:   Diagnosis Date    Drug abuse     Hepatitis C     Hyperlipidemia     Nausea vomiting and diarrhea 06/03/2023     Past Surgical History:  Past Surgical History:   Procedure Laterality Date    ADENOIDECTOMY      BACK SURGERY      INCISION AND DRAINAGE LEG Left 7/20/2016    Procedure: Incision and Drainage left ankle;  Surgeon: Zechariah Kunz MD;  Location: [facility];  Service:     TONSILLECTOMY        Social History:   Social History     Tobacco Use    Smoking status: Former     Packs/day: 1     Types: Cigarettes    Smokeless tobacco: Not on file   Substance Use Topics    Alcohol use: No      Family History:  Family History   Problem Relation Age of Onset    Other Mother         ADOPTED       Home Meds:  Medications Prior to Admission   Medication Sig Dispense Refill Last Dose    busPIRone (BUSPAR) 5 MG tablet Take 1 tablet by mouth 3 (Three) Times a Day. 90 tablet 0 Past Week    pantoprazole (PROTONIX) 40 MG EC tablet Take 1 tablet by mouth Every Morning. 30 tablet 0 Past Week    sodium bicarbonate 650 MG tablet Take 2 tablets by mouth 3 (Three) Times a Day. 120 tablet 0 Past Week     Current Meds:   cefepime, 2,000 mg, Intravenous, Q12H  vancomycin, 750 mg, Intravenous, Q12H      Allergies:  No Known Allergies  Review of Systems  Pertinent items are noted in HPI, all other systems reviewed and negative    Objective     Vital Signs  Temp:  [98 °F (36.7 °C)-98.4 °F (36.9 °C)] 98.1 °F (36.7 °C)  Heart Rate:  [111-120] 111  Resp:  [20] 20  BP: (103-142)/(51-74) 103/51    Physical Exam:  CONSTITUTIONAL:  today's vital signs reviewed, chronically ill-appearing in mild distress  EARS NOSE THROAT: trachea midline and no deformity of the nares  EYES: Scleral icterus  GASTROINTESTINAL: abdomen is soft, tender to  palpation, nondistended with normal active bowel sounds, no masses are appreciated, jaundiced  PSYCHIATRIC: appropriate mood and affect  RESPIRATORY: normal inspiratory effort with no increased work of breathing  NEUROLOGIC: patient is awake and alert  DERMATOLOGIC: Jaundiced with multiple scabbed over lesions on the abdomen and lower extremities  LYMPHATIC: no periumbilical lymphadenopathy   Right knee swollen    Results Review:              I reviewed the patient's new clinical results.    Results from last 7 days   Lab Units 02/18/24  0920   WBC 10*3/mm3 9.93   HEMOGLOBIN g/dL 7.5*   HEMATOCRIT % 26.7*   PLATELETS 10*3/mm3 65*     Results from last 7 days   Lab Units 02/18/24  0920   SODIUM mmol/L 135*   POTASSIUM mmol/L 4.1   CHLORIDE mmol/L 103   CO2 mmol/L 17.0*   BUN mg/dL 29*   CREATININE mg/dL 1.90*   CALCIUM mg/dL 8.3*   BILIRUBIN mg/dL 11.3*   ALK PHOS U/L 333*   ALT (SGPT) U/L 42*   AST (SGOT) U/L 63*   GLUCOSE mg/dL 102*     Results from last 7 days   Lab Units 02/18/24  1340   INR  1.51*     Lab Results   Lab Value Date/Time    LIPASE 14 12/02/2023 1110    LIPASE 11 (L) 06/03/2023 0520    LIPASE 31 12/21/2022 1556       Radiology:  CT Abdomen Pelvis Without Contrast   Final Result       1. Cirrhosis.   2. Massive splenomegaly.   3. Mild volume ascites.   4. Trace right pleural fluid.   5. Nodular opacities at the bases, with cavitation in the right lower   lobe. Suspect septic emboli.       This report was finalized on 2/18/2024 11:23 AM by Dr. Charlie Harris M.D on Workstation: [id number]          XR Knee 1 or 2 View Right   Final Result      XR Chest 1 View   Final Result      FL Guided Aspiration Joint    (Results Pending)       Assessment & Plan   Active Hospital Problems    Diagnosis     **Sepsis     Hyperbilirubinemia     Stage 4 chronic kidney disease     Lactic acidosis     Opioid use disorder     Chronic hepatitis C with cirrhosis     Right knee pain     Effusion of right knee      Painless jaundice     Polysubstance abuse        Assessment:  Right knee septic arthritis with lactic acidosis effusion and imaging concerning for septic emboli  IV drug abuse, active  Hepatitis C  Hepatitis B antigen + December 2023, no treatment  Nonadherence  Cirrhosis  Splenomegaly  Small volume ascites  Elevated LFTs actually not as high as they have been previously with the exception of the total bilirubin which is markedly elevated at 11 compared to a normal or slightly elevated baseline.  This could be the result of obstructive process, sepsis, hemolysis  History of endocarditis    Plan:  Patient is being covered with broad-spectrum antibiotics she has been started on cefepime and vancomycin with plans for monitoring.  ID has been consulted.  Plans for repeat arthrocentesis in interventional radiology given her discomfort with the attempt in the emergency room  Plans to cover her for her opioid use, watch for withdrawal symptoms and aggressively manage  Will fractionate her bilirubin and send labs for hemolysis to look for underlying cause of her elevated bilirubin acutely which would be more likely related to her underlying sepsis or hemolysis then advancement of her liver disease  We will get right upper quadrant ultrasound to assess ducts for obstructive process and amount of ascites  We will check hepatitis B antigen and antibody to see if this has changed over the last few months  Check hepatitis C RNA  Check hep B DNA  Treatment options regarding hepatitis C and B will need to be discussed with the patient in the context of both her current infectious issues going on and adherence issues in the past  Counseling regarding her drug use  Workup for source of her septic emboli per ID and primary service      I discussed the patients findings and my recommendations with patient and nursing staff.           Yuri Faye M.D.  [facility] [address]  [address] [place] [address]  Office: [phone]       Electronically signed by Yuri Faye MD at 02/18/24 3015

## 2024-02-20 NOTE — PROGRESS NOTES
Name: Sammie Landeros ADMIT: 2024   : 1990  PCP: Leti Melvi ENGLANDTYLOR    MRN: [id number] LOS: 2 days   AGE/SEX: 33 y.o. female  ROOM: [facility]     Subjective   Subjective   She complains of persistent pain not relieved by current analgesics.  She refused to go to IR for aspiration due to her uncontrolled pain.  Nurse suspects she is opiate withdrawal which is likely despite PRN dilaudid given significant drug use prior to admission.        Objective   Objective   Vital Signs  Temp:  [98.2 °F (36.8 °C)-99.3 °F (37.4 °C)] 98.2 °F (36.8 °C)  Heart Rate:  [] 102  Resp:  [18] 18  BP: (104-156)/(62-86) 147/70  SpO2:  [94 %-97 %] 97 %  on   ;   Device (Oxygen Therapy): room air  Body mass index is 38.54 kg/m².  Physical Exam  Vitals reviewed.   Constitutional:       Appearance: She is well-developed. She is ill-appearing.      Comments: Anxious agitated moaning in generalized pain    HENT:      Head: Normocephalic and atraumatic.      Mouth/Throat:      Mouth: Mucous membranes are moist.   Cardiovascular:      Rate and Rhythm: Normal rate and regular rhythm. Tachycardia present.   Pulmonary:      Effort: Pulmonary effort is normal. No respiratory distress.      Breath sounds: Normal breath sounds.   Abdominal:      General: Bowel sounds are normal. There is no distension.      Palpations: Abdomen is soft.      Tenderness: There is no abdominal tenderness.   Skin:     General: Skin is warm and dry.      Coloration: Skin is jaundiced.   Neurological:      General: No focal deficit present.      Mental Status: She is alert and oriented to person, place, and time.   Psychiatric:         Attention and Perception: She is inattentive.         Mood and Affect: Mood is anxious. Affect is tearful.       Results Review     I reviewed the patient's new clinical results.  Results from last 7 days   Lab Units 24  0758 24  0236 24  0920   WBC 10*3/mm3 15.09* 8.73 9.93   HEMOGLOBIN g/dL 7.3* 7.0* 7.5*  "  PLATELETS 10*3/mm3 81* 77* 65*     Results from last 7 days   Lab Units 02/20/24  0758 02/19/24  0236 02/18/24  0920   SODIUM mmol/L 133* 137 135*   POTASSIUM mmol/L 4.0 4.0 4.1   CHLORIDE mmol/L 104 106 103   CO2 mmol/L 20.0* 20.2* 17.0*   BUN mg/dL 21* 27* 29*   CREATININE mg/dL 1.16* 1.51* 1.90*   GLUCOSE mg/dL 103* 98 102*   EGFR mL/min/1.73 63.2 46.0* 34.9*     Results from last 7 days   Lab Units 02/20/24  0758 02/19/24  0236 02/18/24  1710 02/18/24  0920   ALBUMIN g/dL 1.9* 2.0*  --  2.3*   BILIRUBIN mg/dL 7.2* 9.3* 10.9* 11.3*   ALK PHOS U/L 303* 231*  --  333*   AST (SGOT) U/L 56* 54*  --  63*   ALT (SGPT) U/L 27 35*  --  42*     Results from last 7 days   Lab Units 02/20/24  0758 02/19/24  0236 02/18/24  0920   CALCIUM mg/dL 7.6* 7.9* 8.3*   ALBUMIN g/dL 1.9* 2.0* 2.3*     Results from last 7 days   Lab Units 02/19/24  0236 02/18/24 2028 02/18/24  1710 02/18/24  1358 02/18/24  0921 02/18/24  0920   PROCALCITONIN ng/mL  --   --   --   --   --  19.60*   LACTATE mmol/L 1.4 2.6* 3.7* 4.0*   < >  --     < > = values in this interval not displayed.     No results found for: \"HGBA1C\", \"POCGLU\"    US Liver    Result Date: 2/18/2024  1.  Liver cirrhotic. No definite focal hepatic lesion is seen. 2.  The gallbladder is decompressed and demonstrates significant wall thickening with small amount of pericholecystic fluid. Given patient history, findings are favored to be related to adjacent chronic liver disease and/or third spacing with chronic cholecystitis less likely. Correlation with patient history is recommended with follow-up HIDA scan if clinically indicated.  This report was finalized on 2/18/2024 10:01 PM by Dr. Grant Almanza M.D on Workstation: [id number]      CT Abdomen Pelvis Without Contrast    Result Date: 2/18/2024   1. Cirrhosis. 2. Massive splenomegaly. 3. Mild volume ascites. 4. Trace right pleural fluid. 5. Nodular opacities at the bases, with cavitation in the right lower lobe. Suspect septic " emboli.  This report was finalized on 2/18/2024 11:23 AM by Dr. Charlie Harris M.D on Workstation: [id number]       I have personally reviewed all medications:  Scheduled Medications  ceFAZolin, 2,000 mg, Intravenous, Q8H  pantoprazole, 40 mg, Oral, Q AM  sodium bicarbonate, 325 mg, Oral, TID  vancomycin, 1,000 mg, Intravenous, Q12H    Infusions  lactated ringers, 100 mL/hr, Last Rate: 100 mL/hr (02/19/24 0446)  Pharmacy to dose vancomycin,     Diet  Diet: Cardiac Diets; Low Sodium (2g); Texture: Regular Texture (IDDSI 7); Fluid Consistency: Thin (IDDSI 0)    I have personally reviewed:  [x]  Laboratory   [x]  Microbiology   [x]  Radiology   [x]  EKG/Telemetry  [x]  Cardiology/Vascular   []  Pathology    []  Records       Assessment/Plan     Active Hospital Problems    Diagnosis  POA    **Sepsis [A41.9]  Yes    Hyperbilirubinemia [E80.6]  Yes    Stage 4 chronic kidney disease [N18.4]  Yes    Lactic acidosis [E87.20]  Yes    Opioid use disorder [F11.90]  Yes    Chronic hepatitis C with cirrhosis [B18.2, K74.60]  Yes    Right knee pain [M25.561]  Yes    Effusion of right knee [M25.461]  Yes    Painless jaundice [R17]  Yes    Hepatitis B infection [B19.10]  Yes    Polysubstance abuse [F19.10]  Yes      Resolved Hospital Problems   No resolved problems to display.       Ms. Landeros is a 33 y.o. female with polysubstance use disorder, chronic hepatitis C with cirrhosis, CKD, history of TV endocarditis/MRSA bacteremia presenting with right knee pain and is admitted to the hospital with concern for septic arthritis      Suspected R knee septic arthritis/R knee pain/effusion  Hx TV endocarditis/MRSA bacteremia  Lactic acidosis  -Xray w/ R knee effusion  -Arthrocentesis attempted in ED, patient mainly uncooperative so reordered with IR but patient refusing. Will attempt to better control pain so she agrees to go to procedures.   -CT w/ concern for septic emboli  -cx pending  -ID recs reviewed  -cont cefepime, vancomycin  (monitor trough)     2.   Opioid use disorder  -most recently used heroin 2/19.   - she has been on 0.5 mg Dilaudid q2h PRN but this is likely not sufficient to prevent withdrawal given significant addition and tolerance. Initiate COWS protocol and increased prn dose to 1mg.      3.  CKD4  -Crt 1.9 OA (b/l ~1.5-1.9)     4.  Hepatitis C cirrhosis   Hepatitis B  Painless jaundice/hyperbilirubinemia  -bili 11.3  -denies EtOH use  -CT w/ cirrhosis, splenomegaly, mild ascites  -GI following           Disposition  Expected Discharge Date: 2/25/2024; Expected Discharge Time:            TYLOR Rogers  [facility]  02/20/24  09:06 EST

## 2024-02-20 NOTE — PROGRESS NOTES
[facility]  Inpatient Progress Note    Reason for Followup:  Chronic liver disease    Subjective     Interval History:   No new issues    Current Facility-Administered Medications:     acetaminophen (TYLENOL) tablet 650 mg, 650 mg, Oral, Q4H PRN, Lucas Blas MD    sennosides-docusate (PERICOLACE) 8.6-50 MG per tablet 2 tablet, 2 tablet, Oral, BID PRN **AND** polyethylene glycol (MIRALAX) packet 17 g, 17 g, Oral, Daily PRN **AND** bisacodyl (DULCOLAX) EC tablet 5 mg, 5 mg, Oral, Daily PRN **AND** bisacodyl (DULCOLAX) suppository 10 mg, 10 mg, Rectal, Daily PRN, Lucas Blas MD    ceFAZolin in dextrose (ANCEF) IVPB solution 2,000 mg, 2,000 mg, Intravenous, Q8H, NeftalyJazmine MD, 2,000 mg at 02/20/24 0904    HYDROmorphone (DILAUDID) injection 0.5 mg, 0.5 mg, Intravenous, Q2H PRN, Lucas Blas MD, 0.5 mg at 02/20/24 0905    influenza vac split quad (FLUZONE,FLUARIX,AFLURIA,FLULAVAL) injection 0.5 mL, 0.5 mL, Intramuscular, During Hospitalization, Lucas Blas MD    lactated ringers infusion, 100 mL/hr, Intravenous, Continuous, Lucas Blas MD, Last Rate: 100 mL/hr at 02/19/24 0446, 100 mL/hr at 02/19/24 0446    melatonin tablet 3 mg, 3 mg, Oral, Nightly PRN, Lucas Blas MD    ondansetron ODT (ZOFRAN-ODT) disintegrating tablet 4 mg, 4 mg, Oral, Q6H PRN, 4 mg at 02/19/24 0512 **OR** ondansetron (ZOFRAN) injection 4 mg, 4 mg, Intravenous, Q6H PRN, Lucas Blas MD, 4 mg at 02/19/24 1154    pantoprazole (PROTONIX) EC tablet 40 mg, 40 mg, Oral, Q AM, Tim Magdaleno MD    Pharmacy to dose vancomycin, , Does not apply, Continuous PRN, Lucas Blas MD    sodium bicarbonate tablet 325 mg, 325 mg, Oral, TID, Tim Magdaleno MD, 325 mg at 02/20/24 0905    [COMPLETED] Insert Peripheral IV, , , Once **AND** sodium chloride 0.9 % flush 10 mL, 10 mL, Intravenous, PRN, Aries Pat MD    vancomycin (VANCOCIN) 1000  mg/200 mL dextrose 5% IVPB, 1,000 mg, Intravenous, Q12H, Jazmine Higginbotham MD, 1,000 mg at 02/19/24 0607    Objective     Vital Signs  Temp:  [98.2 °F (36.8 °C)-99.3 °F (37.4 °C)] 98.2 °F (36.8 °C)  Heart Rate:  [] 102  Resp:  [18] 18  BP: (104-156)/(62-86) 147/70  Body mass index is 38.54 kg/m².    Intake/Output Summary (Last 24 hours) at 2/20/2024 0946  Last data filed at 2/20/2024 0243  Gross per 24 hour   Intake 240 ml   Output 550 ml   Net -310 ml     No intake/output data recorded.     Physical Exam:   General: patient drowsy, no distress   Abdomen: soft, nontender, nondistended; normal bowel sounds     Results Review:     I reviewed the patient's new clinical results.  I reviewed the patient's new imaging results and agree with the interpretation.    Results from last 7 days   Lab Units 02/20/24  0758 02/19/24  0236 02/18/24  0920   WBC 10*3/mm3 15.09* 8.73 9.93   HEMOGLOBIN g/dL 7.3* 7.0* 7.5*   HEMATOCRIT % 24.3* 23.4* 26.7*   PLATELETS 10*3/mm3 81* 77* 65*     Results from last 7 days   Lab Units 02/20/24  0758 02/19/24  0236 02/18/24  1710 02/18/24  0920   SODIUM mmol/L 133* 137  --  135*   POTASSIUM mmol/L 4.0 4.0  --  4.1   CHLORIDE mmol/L 104 106  --  103   CO2 mmol/L 20.0* 20.2*  --  17.0*   BUN mg/dL 21* 27*  --  29*   CREATININE mg/dL 1.16* 1.51*  --  1.90*   CALCIUM mg/dL 7.6* 7.9*  --  8.3*   BILIRUBIN mg/dL 7.2* 9.3* 10.9* 11.3*   ALK PHOS U/L 303* 231*  --  333*   ALT (SGPT) U/L 27 35*  --  42*   AST (SGOT) U/L 56* 54*  --  63*   GLUCOSE mg/dL 103* 98  --  102*     Results from last 7 days   Lab Units 02/18/24  1340   INR  1.51*     Lab Results   Lab Value Date/Time    LIPASE 14 12/02/2023 1110    LIPASE 11 (L) 06/03/2023 0520    LIPASE 31 12/21/2022 1554       Radiology:  [unfilled]      Assessment & Plan   Assessment:   Right knee septic arthritis with lactic acidosis effusion and imaging concerning for septic emboli  IV drug abuse, active  Hepatitis C  Hepatitis B antigen + December  2023, no treatment  Nonadherence  Cirrhosis  Splenomegaly  Small volume ascites  Elevated LFTs actually not as high as they have been previously with the exception of the total bilirubin which is markedly elevated at 11 compared to a normal or slightly elevated baseline.  This could be the result of obstructive process, sepsis, hemolysis  History of endocarditis      Plan:   Abx per ID, WBC up today  Await HBV DNA/HCV RNA  Continue to trend LFTs, the are improving  No e/o obstruction or dilation of CBD on imaging, suspect secondary to intrinsic liver disease and exacerbated by sepsis  Will need outpt f/u with  hepatology to potentially address her HCV and Hep B          Cullen Lemon M.D.  [facility]  [address] [place] [address]  Office: [phone]

## 2024-02-20 NOTE — NURSING NOTE
"Patient back on unit so [facility] attempted evaluation again. The patient is drowsy and refuses evaluation. Patient asked if she would be willing to speak with [facility] at any point and the patient responded \"I don't know.\" Patient asked if she was interested in LUCIA treatment and the patient responded \"I don't know.\" [facility] will attempt evaluation again tomorrow.   "

## 2024-02-21 LAB
ALBUMIN SERPL-MCNC: 2.1 G/DL (ref 3.5–5.2)
ALBUMIN/GLOB SERPL: 0.5 G/DL
ALP SERPL-CCNC: 313 U/L (ref 39–117)
ALT SERPL W P-5'-P-CCNC: 21 U/L (ref 1–33)
ANION GAP SERPL CALCULATED.3IONS-SCNC: 6 MMOL/L (ref 5–15)
AST SERPL-CCNC: 49 U/L (ref 1–32)
BASOPHILS # BLD AUTO: 0.03 10*3/MM3 (ref 0–0.2)
BASOPHILS NFR BLD AUTO: 0.3 % (ref 0–1.5)
BILIRUB SERPL-MCNC: 5.5 MG/DL (ref 0–1.2)
BUN SERPL-MCNC: 18 MG/DL (ref 6–20)
BUN/CREAT SERPL: 17.5 (ref 7–25)
CALCIUM SPEC-SCNC: 7.7 MG/DL (ref 8.6–10.5)
CHLORIDE SERPL-SCNC: 108 MMOL/L (ref 98–107)
CO2 SERPL-SCNC: 22 MMOL/L (ref 22–29)
CREAT SERPL-MCNC: 1.03 MG/DL (ref 0.57–1)
DEPRECATED RDW RBC AUTO: 40 FL (ref 37–54)
EGFRCR SERPLBLD CKD-EPI 2021: 72.9 ML/MIN/1.73
EOSINOPHIL # BLD AUTO: 0.04 10*3/MM3 (ref 0–0.4)
EOSINOPHIL NFR BLD AUTO: 0.3 % (ref 0.3–6.2)
ERYTHROCYTE [DISTWIDTH] IN BLOOD BY AUTOMATED COUNT: 19.7 % (ref 12.3–15.4)
GLOBULIN UR ELPH-MCNC: 4.4 GM/DL
GLUCOSE SERPL-MCNC: 90 MG/DL (ref 65–99)
HCT VFR BLD AUTO: 24.1 % (ref 34–46.6)
HGB BLD-MCNC: 7.4 G/DL (ref 12–15.9)
LYMPHOCYTES # BLD AUTO: 1.85 10*3/MM3 (ref 0.7–3.1)
LYMPHOCYTES NFR BLD AUTO: 16.1 % (ref 19.6–45.3)
MCH RBC QN AUTO: 20.1 PG (ref 26.6–33)
MCHC RBC AUTO-ENTMCNC: 30.7 G/DL (ref 31.5–35.7)
MCV RBC AUTO: 65.3 FL (ref 79–97)
MONOCYTES # BLD AUTO: 0.92 10*3/MM3 (ref 0.1–0.9)
MONOCYTES NFR BLD AUTO: 8 % (ref 5–12)
NEUTROPHILS NFR BLD AUTO: 72.9 % (ref 42.7–76)
NEUTROPHILS NFR BLD AUTO: 8.37 10*3/MM3 (ref 1.7–7)
PLATELET # BLD AUTO: 100 10*3/MM3 (ref 140–450)
PMV BLD AUTO: ABNORMAL FL
POTASSIUM SERPL-SCNC: 4.1 MMOL/L (ref 3.5–5.2)
PROT SERPL-MCNC: 6.5 G/DL (ref 6–8.5)
RBC # BLD AUTO: 3.69 10*6/MM3 (ref 3.77–5.28)
SODIUM SERPL-SCNC: 136 MMOL/L (ref 136–145)
WBC NRBC COR # BLD AUTO: 11.48 10*3/MM3 (ref 3.4–10.8)

## 2024-02-21 PROCEDURE — 25810000003 LACTATED RINGERS PER 1000 ML: Performed by: STUDENT IN AN ORGANIZED HEALTH CARE EDUCATION/TRAINING PROGRAM

## 2024-02-21 PROCEDURE — 99232 SBSQ HOSP IP/OBS MODERATE 35: CPT | Performed by: NURSE PRACTITIONER

## 2024-02-21 PROCEDURE — 85025 COMPLETE CBC W/AUTO DIFF WBC: CPT | Performed by: STUDENT IN AN ORGANIZED HEALTH CARE EDUCATION/TRAINING PROGRAM

## 2024-02-21 PROCEDURE — 25010000002 CEFAZOLIN IN DEXTROSE 2-4 GM/100ML-% SOLUTION: Performed by: INTERNAL MEDICINE

## 2024-02-21 PROCEDURE — 25010000002 HYDROMORPHONE PER 4 MG: Performed by: NURSE PRACTITIONER

## 2024-02-21 PROCEDURE — 36415 COLL VENOUS BLD VENIPUNCTURE: CPT | Performed by: STUDENT IN AN ORGANIZED HEALTH CARE EDUCATION/TRAINING PROGRAM

## 2024-02-21 PROCEDURE — 25010000002 HYDROMORPHONE 1 MG/ML SOLUTION: Performed by: NURSE PRACTITIONER

## 2024-02-21 PROCEDURE — 25010000002 VANCOMYCIN PER 500 MG: Performed by: INTERNAL MEDICINE

## 2024-02-21 PROCEDURE — 80053 COMPREHEN METABOLIC PANEL: CPT | Performed by: STUDENT IN AN ORGANIZED HEALTH CARE EDUCATION/TRAINING PROGRAM

## 2024-02-21 RX ORDER — HYDROMORPHONE HYDROCHLORIDE 1 MG/ML
0.5 INJECTION, SOLUTION INTRAMUSCULAR; INTRAVENOUS; SUBCUTANEOUS
Status: DISCONTINUED | OUTPATIENT
Start: 2024-02-21 | End: 2024-02-22

## 2024-02-21 RX ADMIN — GABAPENTIN 600 MG: 300 CAPSULE ORAL at 03:36

## 2024-02-21 RX ADMIN — HYDROMORPHONE HYDROCHLORIDE 1 MG: 1 INJECTION, SOLUTION INTRAMUSCULAR; INTRAVENOUS; SUBCUTANEOUS at 11:50

## 2024-02-21 RX ADMIN — HYDROMORPHONE HYDROCHLORIDE 1 MG: 1 INJECTION, SOLUTION INTRAMUSCULAR; INTRAVENOUS; SUBCUTANEOUS at 03:36

## 2024-02-21 RX ADMIN — VANCOMYCIN HYDROCHLORIDE 1000 MG: 1 INJECTION, SOLUTION INTRAVENOUS at 22:39

## 2024-02-21 RX ADMIN — CEFAZOLIN SODIUM 2000 MG: 2 INJECTION, SOLUTION INTRAVENOUS at 00:59

## 2024-02-21 RX ADMIN — HYDROMORPHONE HYDROCHLORIDE 0.5 MG: 1 INJECTION, SOLUTION INTRAMUSCULAR; INTRAVENOUS; SUBCUTANEOUS at 20:18

## 2024-02-21 RX ADMIN — SODIUM BICARBONATE 325 MG: 650 TABLET ORAL at 20:07

## 2024-02-21 RX ADMIN — PANTOPRAZOLE SODIUM 40 MG: 40 TABLET, DELAYED RELEASE ORAL at 09:02

## 2024-02-21 RX ADMIN — HYDROMORPHONE HYDROCHLORIDE 1 MG: 1 INJECTION, SOLUTION INTRAMUSCULAR; INTRAVENOUS; SUBCUTANEOUS at 09:02

## 2024-02-21 RX ADMIN — SODIUM CHLORIDE, POTASSIUM CHLORIDE, SODIUM LACTATE AND CALCIUM CHLORIDE 100 ML/HR: 600; 310; 30; 20 INJECTION, SOLUTION INTRAVENOUS at 11:50

## 2024-02-21 RX ADMIN — CYCLOBENZAPRINE 10 MG: 10 TABLET, FILM COATED ORAL at 03:36

## 2024-02-21 RX ADMIN — CEFAZOLIN SODIUM 2000 MG: 2 INJECTION, SOLUTION INTRAVENOUS at 09:06

## 2024-02-21 RX ADMIN — SODIUM BICARBONATE 325 MG: 650 TABLET ORAL at 09:02

## 2024-02-21 RX ADMIN — VANCOMYCIN HYDROCHLORIDE 1000 MG: 1 INJECTION, SOLUTION INTRAVENOUS at 11:45

## 2024-02-21 RX ADMIN — SODIUM CHLORIDE, POTASSIUM CHLORIDE, SODIUM LACTATE AND CALCIUM CHLORIDE 100 ML/HR: 600; 310; 30; 20 INJECTION, SOLUTION INTRAVENOUS at 00:59

## 2024-02-21 RX ADMIN — CLONIDINE HYDROCHLORIDE 0.1 MG: 0.1 TABLET ORAL at 20:07

## 2024-02-21 RX ADMIN — HYDROXYZINE HYDROCHLORIDE 50 MG: 50 TABLET ORAL at 09:02

## 2024-02-21 RX ADMIN — SODIUM BICARBONATE 325 MG: 650 TABLET ORAL at 14:55

## 2024-02-21 RX ADMIN — HYDROMORPHONE HYDROCHLORIDE 1 MG: 1 INJECTION, SOLUTION INTRAMUSCULAR; INTRAVENOUS; SUBCUTANEOUS at 00:59

## 2024-02-21 RX ADMIN — HYDROXYZINE HYDROCHLORIDE 50 MG: 50 TABLET ORAL at 18:08

## 2024-02-21 RX ADMIN — HYDROMORPHONE HYDROCHLORIDE 0.5 MG: 1 INJECTION, SOLUTION INTRAMUSCULAR; INTRAVENOUS; SUBCUTANEOUS at 15:46

## 2024-02-21 RX ADMIN — HYDROMORPHONE HYDROCHLORIDE 0.5 MG: 1 INJECTION, SOLUTION INTRAMUSCULAR; INTRAVENOUS; SUBCUTANEOUS at 18:08

## 2024-02-21 RX ADMIN — CEFAZOLIN SODIUM 2000 MG: 2 INJECTION, SOLUTION INTRAVENOUS at 14:55

## 2024-02-21 NOTE — PLAN OF CARE
Goal Outcome Evaluation:              Outcome Evaluation: Moans and groans at times with c/o right knee pain. Refused for staff to fully assess skin and touch legs. Right knee remains edematous.  Continue IV antibiotics and pain meds.  Monitor COWS.  Clonidine prn.

## 2024-02-21 NOTE — PROGRESS NOTES
Gastroenterology   Inpatient Progress Note    Reason for Follow Up:  Chronic liver disease    Subjective  Interval History:   Patient denies abdominal pain.    Current Facility-Administered Medications:     acetaminophen (TYLENOL) tablet 650 mg, 650 mg, Oral, Q4H PRN, Lucas Blas MD    sennosides-docusate (PERICOLACE) 8.6-50 MG per tablet 2 tablet, 2 tablet, Oral, BID PRN **AND** polyethylene glycol (MIRALAX) packet 17 g, 17 g, Oral, Daily PRN **AND** bisacodyl (DULCOLAX) EC tablet 5 mg, 5 mg, Oral, Daily PRN **AND** bisacodyl (DULCOLAX) suppository 10 mg, 10 mg, Rectal, Daily PRN, Lucas Blas MD    ceFAZolin in dextrose (ANCEF) IVPB solution 2,000 mg, 2,000 mg, Intravenous, Q8H, Jazmine Higginbotham MD, 2,000 mg at 24 1455    [] cloNIDine (CATAPRES) tablet 0.1 mg, 0.1 mg, Oral, 4x Daily PRN, 0.1 mg at 24 2020 **FOLLOWED BY** cloNIDine (CATAPRES) tablet 0.1 mg, 0.1 mg, Oral, 4x Daily PRN **FOLLOWED BY** [START ON 2024] cloNIDine (CATAPRES) tablet 0.1 mg, 0.1 mg, Oral, TID PRN **FOLLOWED BY** [START ON 2024] cloNIDine (CATAPRES) tablet 0.1 mg, 0.1 mg, Oral, BID PRN **FOLLOWED BY** [START ON 2024] cloNIDine (CATAPRES) tablet 0.1 mg, 0.1 mg, Oral, Once PRN, Hurst, Adore W, APRN    cyclobenzaprine (FLEXERIL) tablet 10 mg, 10 mg, Oral, TID PRN, Hurst, Adore W, APRN, 10 mg at 24 0336    dicyclomine (BENTYL) capsule 10 mg, 10 mg, Oral, TID PRN, Hurst, Adore W, APRN    gabapentin (NEURONTIN) capsule 600 mg, 600 mg, Oral, Q6H PRN, Adore Mix, APRN, 600 mg at 24 0336    HYDROmorphone (DILAUDID) injection 0.5 mg, 0.5 mg, Intravenous, Q2H PRN, Adore Mix, APRN, 0.5 mg at 24 1546    hydrOXYzine (ATARAX) tablet 50 mg, 50 mg, Oral, TID PRN, Adore Mix, APRN, 50 mg at 24 0902    influenza vac split quad (FLUZONE,FLUARIX,AFLURIA,FLULAVAL) injection 0.5 mL, 0.5 mL, Intramuscular, During Hospitalization, Lucas Blas,  MD    lactated ringers infusion, 100 mL/hr, Intravenous, Continuous, Lucas Blas MD, Last Rate: 100 mL/hr at 02/21/24 1150, 100 mL/hr at 02/21/24 1150    lidocaine (XYLOCAINE) 1 % injection 10 mL, 10 mL, Intradermal, Once in imaging, Tim Magdaleno MD    melatonin tablet 3 mg, 3 mg, Oral, Nightly PRN, Lucas Blas MD    ondansetron ODT (ZOFRAN-ODT) disintegrating tablet 4 mg, 4 mg, Oral, Q6H PRN, 4 mg at 02/19/24 0512 **OR** ondansetron (ZOFRAN) injection 4 mg, 4 mg, Intravenous, Q6H PRN, Lucas Blas MD, 4 mg at 02/19/24 1154    pantoprazole (PROTONIX) EC tablet 40 mg, 40 mg, Oral, Q AM, Tim Magdaleno MD, 40 mg at 02/21/24 0902    Pharmacy to dose vancomycin, , Does not apply, Continuous PRN, Lucas Blas MD    sodium bicarbonate tablet 325 mg, 325 mg, Oral, TID, Tim Magdaleno MD, 325 mg at 02/21/24 1455    [COMPLETED] Insert Peripheral IV, , , Once **AND** sodium chloride 0.9 % flush 10 mL, 10 mL, Intravenous, PRN, Aries Pat MD    vancomycin (VANCOCIN) 1000 mg/200 mL dextrose 5% IVPB, 1,000 mg, Intravenous, Q12H, Jazmine Higginbotham MD, 1,000 mg at 02/21/24 1145  Review of Systems:                GI negative    Objective     Vital Signs  Temp:  [98.2 °F (36.8 °C)-99.7 °F (37.6 °C)] 99.7 °F (37.6 °C)  Heart Rate:  [104-105] 105  Resp:  [18] 18  BP: (139-161)/(75-93) 144/79  Body mass index is 38.54 kg/m².                  Physical Exam:              General: patient sleeping but arousable to sound              Eyes: +scleral icterus              Skin: +jaundiced              Psychiatric: Appropriate affect and behavior                Results Review:                I reviewed the patient's new clinical results.    Results from last 7 days   Lab Units 02/21/24  0654 02/20/24  0758 02/19/24  0236   WBC 10*3/mm3 11.48* 15.09* 8.73   HEMOGLOBIN g/dL 7.4* 7.3* 7.0*   HEMATOCRIT % 24.1* 24.3* 23.4*   PLATELETS 10*3/mm3 100* 81* 77*     Results from last 7 days    Lab Units 02/21/24  0654 02/20/24  0758 02/19/24  0236   SODIUM mmol/L 136 133* 137   POTASSIUM mmol/L 4.1 4.0 4.0   CHLORIDE mmol/L 108* 104 106   CO2 mmol/L 22.0 20.0* 20.2*   BUN mg/dL 18 21* 27*   CREATININE mg/dL 1.03* 1.16* 1.51*   CALCIUM mg/dL 7.7* 7.6* 7.9*   BILIRUBIN mg/dL 5.5* 7.2* 9.3*   ALK PHOS U/L 313* 303* 231*   ALT (SGPT) U/L 21 27 35*   AST (SGOT) U/L 49* 56* 54*   GLUCOSE mg/dL 90 103* 98       Radiology:  FL Guided Aspiration Joint   Final Result      US Liver   Final Result   1.  Liver cirrhotic. No definite focal hepatic lesion is seen.   2.  The gallbladder is decompressed and demonstrates significant wall   thickening with small amount of pericholecystic fluid. Given patient   history, findings are favored to be related to adjacent chronic liver   disease and/or third spacing with chronic cholecystitis less likely.   Correlation with patient history is recommended with follow-up HIDA scan   if clinically indicated.       This report was finalized on 2/18/2024 10:01 PM by Dr. Grant Almanza M.D on Workstation: [id number]          CT Abdomen Pelvis Without Contrast   Final Result       1. Cirrhosis.   2. Massive splenomegaly.   3. Mild volume ascites.   4. Trace right pleural fluid.   5. Nodular opacities at the bases, with cavitation in the right lower   lobe. Suspect septic emboli.       This report was finalized on 2/18/2024 11:23 AM by Dr. Charlie Harris M.D on Workstation: [id number]          XR Knee 1 or 2 View Right   Final Result      XR Chest 1 View   Final Result            Assessment & Plan     Active Hospital Problems    Diagnosis     **Sepsis     Hyperbilirubinemia     Stage 4 chronic kidney disease     Lactic acidosis     Opioid use disorder     Chronic hepatitis C with cirrhosis     Right knee pain     Effusion of right knee     Painless jaundice     Hepatitis B infection     Polysubstance abuse        Assessment:  Right knee septic arthritis with lactic acidosis  effusion and imaging concerning for septic emboli  IV drug abuse, active  Hepatitis C RNA resulted and no active infection detected   Hepatitis B antigen + December 2023, HBV DNA positive  Nonadherence  Cirrhosis  Splenomegaly  Small volume ascites  Elevated LFTs actually not as high as they have been previously, trending down, suspect this could be the result of obstructive process, sepsis, hemolysis  History of endocarditis      Plan:  ID following, WBC trending down, ID managing ABX  HBV DNA positive,  HCV RNA resulted and active infection not detected   Continue to trend LFTs, they are improving  No evidence of common bile duct dilation or obstruction on imaging, suspect secondary to intrinsic liver disease and exacerbated by sepsis  Upon discharge patient will need outpatient follow-up with  hepatology to potentially address her HCV and Hep B    GI will sign off at this time but can be available at any time if any change in clinical course or we can be of any further assistance.  Thank you so much.    I discussed the patients findings and my recommendations with patient and nursing staff.           Michelle SNIDER  [facility] [place]  [address] [place] [address]

## 2024-02-21 NOTE — PROGRESS NOTES
"  Infectious Diseases Progress Note    Jazmine Higginbotham MD     [facility]  Los: 3 days  Patient Identification:  Name: Sammie Landeros  Age: 33 y.o.  Sex: female  :  1990  MRN: [id number]         Primary Care Physician: Melvi Landeros APRN        Subjective: Feels about the same and complaining of discomfort in her knee and eventually agreed to have knee aspiration done which was performed yesterday evening.  Interval History: See consultation note.  Right knee aspiration performed on 2024  revealing purulent fluid consistent with septic arthritis.  Gram stain is positive for gram-positive cocci.  Objective:    Scheduled Meds:ceFAZolin, 2,000 mg, Intravenous, Q8H  lidocaine, 10 mL, Intradermal, Once in imaging  pantoprazole, 40 mg, Oral, Q AM  sodium bicarbonate, 325 mg, Oral, TID  vancomycin, 1,000 mg, Intravenous, Q12H      Continuous Infusions:lactated ringers, 100 mL/hr, Last Rate: 100 mL/hr (24 0059)  Pharmacy to dose vancomycin,         Vital signs in last 24 hours:  Temp:  [97.5 °F (36.4 °C)-99.1 °F (37.3 °C)] 99.1 °F (37.3 °C)  Heart Rate:  [100-105] 104  Resp:  [18] 18  BP: (139-161)/(70-93) 161/93    Intake/Output:    Intake/Output Summary (Last 24 hours) at 2024 0813  Last data filed at 2024 0358  Gross per 24 hour   Intake 960 ml   Output 3900 ml   Net -2940 ml       Exam:  /93 (BP Location: Left arm, Patient Position: Lying)   Pulse 104   Temp 99.1 °F (37.3 °C) (Oral)   Resp 18   Ht 175.3 cm (69\")   Wt 118 kg (261 lb)   SpO2 95%   BMI 38.54 kg/m²   Patient is examined using the personal protective equipment as per guidelines from infection control for this particular patient as enacted.  Hand washing was performed before and after patient interaction.  General Appearance:  Withdrawn and does not want to engage in conversation but with persistence able to answer some questions.  Feels somewhat better but still in significant pain.                         "  Head:    Normocephalic, without obvious abnormality, atraumatic                           Eyes:  Eyes closed                         Throat:   Lips, tongue, gums normal; oral mucosa pink and moist                           Neck:   Supple, symmetrical, trachea midline, no JVD                         Lungs:    Bibasilar crackles                 Chest Wall:    No tenderness or deformity                          Heart:  S1-S2 regular                  Abdomen:   Obese soft nontender                 Extremities: As described in the skin examination                        Pulses:   Pulses palpable in all extremities                            Skin: Skin graft area on the right lateral leg erythematous the scabs, erythema of the overall right lower extremity is decreased.  Dry wound on the lateral gluteal aspect of the right lower extremity without any drainage.  Swelling and tenderness of the right knee still present.                    Neurologic: Withdrawn and lethargic but grossly nonfocal Limited right lower extremity movements because of pain       Data Review:    I reviewed the patient's new clinical results.  Results from last 7 days   Lab Units 02/21/24  0654 02/20/24  0758 02/19/24  0236 02/18/24  0920   WBC 10*3/mm3 11.48* 15.09* 8.73 9.93   HEMOGLOBIN g/dL 7.4* 7.3* 7.0* 7.5*   PLATELETS 10*3/mm3 100* 81* 77* 65*     Results from last 7 days   Lab Units 02/21/24  0654 02/20/24  0758 02/19/24  0236 02/18/24  0920   SODIUM mmol/L 136 133* 137 135*   POTASSIUM mmol/L 4.1 4.0 4.0 4.1   CHLORIDE mmol/L 108* 104 106 103   CO2 mmol/L 22.0 20.0* 20.2* 17.0*   BUN mg/dL 18 21* 27* 29*   CREATININE mg/dL 1.03* 1.16* 1.51* 1.90*   CALCIUM mg/dL 7.7* 7.6* 7.9* 8.3*   GLUCOSE mg/dL 90 103* 98 102*     Microbiology Results (last 10 days)       Procedure Component Value - Date/Time    Body Fluid Culture - Aspirate, Knee, Right [[id number]] Collected: 02/20/24 1352    Lab Status: Preliminary result Specimen: Aspirate from Knee,  Right Updated: 02/21/24 0732     Body Fluid Culture Culture in progress     Gram Stain Many (4+) WBCs seen      Few (2+) Gram positive cocci in chains    Blood Culture - Blood, Hand, Left [[id number]]  (Abnormal)  (Susceptibility) Collected: 02/18/24 1029    Lab Status: Final result Specimen: Blood from Hand, Left Updated: 02/20/24 0648     Blood Culture Streptococcus agalactiae (Group B)     Isolated from Aerobic Bottle     Gram Stain Aerobic Bottle Gram positive cocci in chains    Susceptibility        Streptococcus agalactiae (Group B)      SARAH      Ceftriaxone Susceptible      Penicillin G Susceptible      Vancomycin Susceptible                           Blood Culture ID, PCR - Blood, Hand, Left [[id number]]  (Abnormal) Collected: 02/18/24 1029    Lab Status: Final result Specimen: Blood from Hand, Left Updated: 02/18/24 2235     BCID, PCR Streptococcus agalactiae (Group B). Identification by BCID2 PCR.     BOTTLE TYPE Aerobic Bottle              Assessment:    Sepsis    Polysubstance abuse    Hyperbilirubinemia    Stage 4 chronic kidney disease    Lactic acidosis    Opioid use disorder    Chronic hepatitis C with cirrhosis    Right knee pain    Effusion of right knee    Painless jaundice    Hepatitis B infection  1-systemic sepsis with probable right lower extremity cellulitis involving the lower leg and thigh area with likely systemic bacteremic sepsis with pathogen to consider would be MRSA MSSA and strep species  2-right knee septic arthritis.  3-active IV drug use with phlebitis and evidence of septic emboli with 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     Recommendations/Discussions:  See my discussion and recommendations on  2/18/2024  Simplify antibiotics and change cefepime to cefazolin while continuing IV vancomycin  Follow-up on joint fluid culture results along with close follow-up with orthopedic surgery service-likely will need I&D and washout of the knee joint.  Follow-up on repeat blood culture results  Overall prognosis is guarded as discussed on 2/18/2024.  Goals of care counseling and access evaluation is needed as she needs to be approached as substance abuse disorder and see if community and institutional resources can be mobilized to get her out of this spiraling down cervical that she is in.  Without addressing her substance abuse and craving and poor decision-making it is very difficult to achieve proper care plan and execute the care plan.  And hence prognosis is guarded.  Jazmine Higginbotham MD  2/21/2024  08:13 EST    Parts of this note may be an electronic transcription/translation of spoken language to printed text using the Dragon dictation system.

## 2024-02-21 NOTE — CONSULTS
Pt has declined four attempts at completing assessment three days in a row. Pt was reluctant to take LUCIA resources; however provided pt with resources nonetheless. Access will remove consult. Please reach out if pt has questions or changes her mind.

## 2024-02-21 NOTE — PROGRESS NOTES
Name: Sammie Landeros ADMIT: 2024   : 1990  PCP: [id number]    MRN: [id number] LOS: 3 days   AGE/SEX: 33 y.o. female  ROOM: [facility]     Subjective   Subjective   She is sleeping upon entering room but still complains of persistent severe pain but she is drowsy. No CP SOB. No chills, N/V/ D, rash. She refused resources for sobriety from access.        Objective   Objective   Vital Signs  Temp:  [97.5 °F (36.4 °C)-99.1 °F (37.3 °C)] 99.1 °F (37.3 °C)  Heart Rate:  [100-105] 104  Resp:  [18] 18  BP: (139-161)/(75-93) 161/93  SpO2:  [95 %-97 %] 95 %  on   ;   Device (Oxygen Therapy): room air  Body mass index is 38.54 kg/m².  Physical Exam  Vitals reviewed.   Constitutional:       Appearance: She is well-developed. She is ill-appearing.      Comments: Acute on chronic ill appearing and appears older than stated age. Sleeping, drowsy but awakens easily. Limited answers to questions.    HENT:      Head: Normocephalic and atraumatic.      Mouth/Throat:      Mouth: Mucous membranes are moist.   Cardiovascular:      Rate and Rhythm: Regular rhythm. Tachycardia present.   Pulmonary:      Effort: Pulmonary effort is normal. No respiratory distress.      Breath sounds: Normal breath sounds.   Abdominal:      General: Bowel sounds are normal. There is no distension.      Palpations: Abdomen is soft.      Tenderness: There is no abdominal tenderness.   Skin:     General: Skin is warm and dry.      Coloration: Skin is jaundiced.      Comments: RLE  with erythema and edema    Neurological:      General: No focal deficit present.      Mental Status: She is alert and oriented to person, place, and time.   Psychiatric:         Attention and Perception: She is inattentive.         Mood and Affect: Mood is anxious. Affect is tearful.       Results Review     I reviewed the patient's new clinical results.  Results from last 7 days   Lab Units 24  0654 24  0758 24  0236 24  0920   WBC  "10*3/mm3 11.48* 15.09* 8.73 9.93   HEMOGLOBIN g/dL 7.4* 7.3* 7.0* 7.5*   PLATELETS 10*3/mm3 100* 81* 77* 65*     Results from last 7 days   Lab Units 02/21/24 0654 02/20/24 0758 02/19/24  0236 02/18/24  0920   SODIUM mmol/L 136 133* 137 135*   POTASSIUM mmol/L 4.1 4.0 4.0 4.1   CHLORIDE mmol/L 108* 104 106 103   CO2 mmol/L 22.0 20.0* 20.2* 17.0*   BUN mg/dL 18 21* 27* 29*   CREATININE mg/dL 1.03* 1.16* 1.51* 1.90*   GLUCOSE mg/dL 90 103* 98 102*   EGFR mL/min/1.73 72.9 63.2 46.0* 34.9*     Results from last 7 days   Lab Units 02/21/24 0654 02/20/24 0758 02/19/24 0236 02/18/24 1710 02/18/24  0920   ALBUMIN g/dL 2.1* 1.9* 2.0*  --  2.3*   BILIRUBIN mg/dL 5.5* 7.2* 9.3* 10.9* 11.3*   ALK PHOS U/L 313* 303* 231*  --  333*   AST (SGOT) U/L 49* 56* 54*  --  63*   ALT (SGPT) U/L 21 27 35*  --  42*     Results from last 7 days   Lab Units 02/21/24 0654 02/20/24 0758 02/19/24  0236 02/18/24  0920   CALCIUM mg/dL 7.7* 7.6* 7.9* 8.3*   ALBUMIN g/dL 2.1* 1.9* 2.0* 2.3*     Results from last 7 days   Lab Units 02/19/24 0236 02/18/24 2028 02/18/24 1710 02/18/24  1358 02/18/24  0921 02/18/24  0920   PROCALCITONIN ng/mL  --   --   --   --   --  19.60*   LACTATE mmol/L 1.4 2.6* 3.7* 4.0*   < >  --     < > = values in this interval not displayed.     No results found for: \"HGBA1C\", \"POCGLU\"    No radiology results for the last day    I have personally reviewed all medications:  Scheduled Medications  ceFAZolin, 2,000 mg, Intravenous, Q8H  lidocaine, 10 mL, Intradermal, Once in imaging  pantoprazole, 40 mg, Oral, Q AM  sodium bicarbonate, 325 mg, Oral, TID  vancomycin, 1,000 mg, Intravenous, Q12H    Infusions  lactated ringers, 100 mL/hr, Last Rate: 100 mL/hr (02/21/24 0059)  Pharmacy to dose vancomycin,     Diet  Diet: Cardiac Diets; Low Sodium (2g); Texture: Regular Texture (IDDSI 7); Fluid Consistency: Thin (IDDSI 0)    I have personally reviewed:  [x]  Laboratory   [x]  Microbiology   [x]  Radiology   []  " EKG/Telemetry  [x]  Cardiology/Vascular   []  Pathology    []  Records       Assessment/Plan     Active Hospital Problems    Diagnosis  POA    **Sepsis [A41.9]  Yes    Hyperbilirubinemia [E80.6]  Yes    Stage 4 chronic kidney disease [N18.4]  Yes    Lactic acidosis [E87.20]  Yes    Opioid use disorder [F11.90]  Yes    Chronic hepatitis C with cirrhosis [B18.2, K74.60]  Yes    Right knee pain [M25.561]  Yes    Effusion of right knee [M25.461]  Yes    Painless jaundice [R17]  Yes    Hepatitis B infection [B19.10]  Yes    Polysubstance abuse [F19.10]  Yes      Resolved Hospital Problems   No resolved problems to display.       Ms. Landeros is a 33 y.o. female with polysubstance use disorder, chronic hepatitis C with cirrhosis, CKD, history of TV endocarditis/MRSA bacteremia presenting with right knee pain and is admitted to the hospital with concern for septic arthritis      Suspected R knee septic arthritis/R knee pain/effusion  Hx TV endocarditis/MRSA bacteremia  Lactic acidosis  -ID following  --Xray w/ R knee effusion. Arthrocentesis attempted in ED but she was uncooperative. Completed in IR on 2/20 with 50cc purulent fluid removed.   -CT w/ concern for septic emboli  -blood cx pending  - WBC increased today to 15. Currently on cefazolin IV, management per ID     2Opioid use disorder  -most recently used heroin 2/19.   - she has been on 0.5 mg Dilaudid q2h PRN but this is likely not sufficient to prevent withdrawal given significant addition and tolerance. Initiate COWS protocol and increased prn dose to 1mg.      CKD4  -Crt 1.9  now 1.0      Hepatitis C cirrhosis   Hepatitis B  Painless jaundice/hyperbilirubinemia  -bili 11.3 now 5.5   -denies EtOH use  -CT w/ cirrhosis, splenomegaly, mild ascites  -GI following           Disposition  Expected Discharge Date: 2/25/2024; Expected Discharge Time:            TYLOR Rogers  [facility]  02/21/24  09:30 EST

## 2024-02-22 ENCOUNTER — ANESTHESIA (OUTPATIENT)
Dept: PERIOP | Facility: HOSPITAL | Age: 34
End: 2024-02-22
Payer: COMMERCIAL

## 2024-02-22 ENCOUNTER — ANESTHESIA EVENT (OUTPATIENT)
Dept: PERIOP | Facility: HOSPITAL | Age: 34
End: 2024-02-22
Payer: COMMERCIAL

## 2024-02-22 LAB
ABO GROUP BLD: NORMAL
ALBUMIN SERPL-MCNC: 2.1 G/DL (ref 3.5–5.2)
ALBUMIN/GLOB SERPL: 0.5 G/DL
ALP SERPL-CCNC: 342 U/L (ref 39–117)
ALT SERPL W P-5'-P-CCNC: 15 U/L (ref 1–33)
ANION GAP SERPL CALCULATED.3IONS-SCNC: 8 MMOL/L (ref 5–15)
AST SERPL-CCNC: 47 U/L (ref 1–32)
B-HCG UR QL: NEGATIVE
BACTERIA FLD CULT: ABNORMAL
BASOPHILS # BLD AUTO: 0.05 10*3/MM3 (ref 0–0.2)
BASOPHILS NFR BLD AUTO: 0.5 % (ref 0–1.5)
BILIRUB SERPL-MCNC: 4.1 MG/DL (ref 0–1.2)
BLD GP AB SCN SERPL QL: NEGATIVE
BUN SERPL-MCNC: 13 MG/DL (ref 6–20)
BUN/CREAT SERPL: 13.5 (ref 7–25)
CALCIUM SPEC-SCNC: 7.6 MG/DL (ref 8.6–10.5)
CHLORIDE SERPL-SCNC: 107 MMOL/L (ref 98–107)
CO2 SERPL-SCNC: 21 MMOL/L (ref 22–29)
CREAT SERPL-MCNC: 0.96 MG/DL (ref 0.57–1)
DEPRECATED RDW RBC AUTO: 41.6 FL (ref 37–54)
EGFRCR SERPLBLD CKD-EPI 2021: 79.3 ML/MIN/1.73
EOSINOPHIL # BLD AUTO: 0.04 10*3/MM3 (ref 0–0.4)
EOSINOPHIL NFR BLD AUTO: 0.4 % (ref 0.3–6.2)
ERYTHROCYTE [DISTWIDTH] IN BLOOD BY AUTOMATED COUNT: 19.8 % (ref 12.3–15.4)
EXPIRATION DATE: NORMAL
GLOBULIN UR ELPH-MCNC: 4.6 GM/DL
GLUCOSE SERPL-MCNC: 115 MG/DL (ref 65–99)
GRAM STN SPEC: ABNORMAL
GRAM STN SPEC: ABNORMAL
HCT VFR BLD AUTO: 24.2 % (ref 34–46.6)
HGB BLD-MCNC: 7.3 G/DL (ref 12–15.9)
INTERNAL NEGATIVE CONTROL: NEGATIVE
INTERNAL POSITIVE CONTROL: POSITIVE
LYMPHOCYTES # BLD AUTO: 1.9 10*3/MM3 (ref 0.7–3.1)
LYMPHOCYTES NFR BLD AUTO: 18.4 % (ref 19.6–45.3)
Lab: NORMAL
MCH RBC QN AUTO: 20.2 PG (ref 26.6–33)
MCHC RBC AUTO-ENTMCNC: 30.2 G/DL (ref 31.5–35.7)
MCV RBC AUTO: 66.9 FL (ref 79–97)
MONOCYTES # BLD AUTO: 0.83 10*3/MM3 (ref 0.1–0.9)
MONOCYTES NFR BLD AUTO: 8.1 % (ref 5–12)
NEUTROPHILS NFR BLD AUTO: 69.3 % (ref 42.7–76)
NEUTROPHILS NFR BLD AUTO: 7.14 10*3/MM3 (ref 1.7–7)
PLATELET # BLD AUTO: 98 10*3/MM3 (ref 140–450)
POTASSIUM SERPL-SCNC: 3.8 MMOL/L (ref 3.5–5.2)
PROT SERPL-MCNC: 6.7 G/DL (ref 6–8.5)
RBC # BLD AUTO: 3.62 10*6/MM3 (ref 3.77–5.28)
RH BLD: POSITIVE
SODIUM SERPL-SCNC: 136 MMOL/L (ref 136–145)
T&S EXPIRATION DATE: NORMAL
WBC NRBC COR # BLD AUTO: 10.3 10*3/MM3 (ref 3.4–10.8)

## 2024-02-22 PROCEDURE — 25010000002 HYDROMORPHONE PER 4 MG: Performed by: ORTHOPAEDIC SURGERY

## 2024-02-22 PROCEDURE — 86900 BLOOD TYPING SEROLOGIC ABO: CPT | Performed by: ANESTHESIOLOGY

## 2024-02-22 PROCEDURE — 25010000002 HYDROMORPHONE PER 4 MG: Performed by: NURSE PRACTITIONER

## 2024-02-22 PROCEDURE — 25010000002 VANCOMYCIN PER 500 MG: Performed by: ORTHOPAEDIC SURGERY

## 2024-02-22 PROCEDURE — 25010000002 CEFAZOLIN IN DEXTROSE 2-4 GM/100ML-% SOLUTION: Performed by: ORTHOPAEDIC SURGERY

## 2024-02-22 PROCEDURE — 25010000002 HYDROMORPHONE 1 MG/ML SOLUTION: Performed by: ANESTHESIOLOGY

## 2024-02-22 PROCEDURE — 87075 CULTR BACTERIA EXCEPT BLOOD: CPT | Performed by: ORTHOPAEDIC SURGERY

## 2024-02-22 PROCEDURE — 25810000003 LACTATED RINGERS PER 1000 ML: Performed by: ANESTHESIOLOGY

## 2024-02-22 PROCEDURE — 25810000003 LACTATED RINGERS PER 1000 ML: Performed by: ORTHOPAEDIC SURGERY

## 2024-02-22 PROCEDURE — 0S9C4ZZ DRAINAGE OF RIGHT KNEE JOINT, PERCUTANEOUS ENDOSCOPIC APPROACH: ICD-10-PCS | Performed by: ORTHOPAEDIC SURGERY

## 2024-02-22 PROCEDURE — 81025 URINE PREGNANCY TEST: CPT | Performed by: ANESTHESIOLOGY

## 2024-02-22 PROCEDURE — 25010000002 MIDAZOLAM PER 1 MG: Performed by: ANESTHESIOLOGY

## 2024-02-22 PROCEDURE — 80053 COMPREHEN METABOLIC PANEL: CPT | Performed by: STUDENT IN AN ORGANIZED HEALTH CARE EDUCATION/TRAINING PROGRAM

## 2024-02-22 PROCEDURE — 87147 CULTURE TYPE IMMUNOLOGIC: CPT | Performed by: ORTHOPAEDIC SURGERY

## 2024-02-22 PROCEDURE — 25010000002 PROPOFOL 10 MG/ML EMULSION: Performed by: ANESTHESIOLOGY

## 2024-02-22 PROCEDURE — 25010000002 CEFAZOLIN IN DEXTROSE 2-4 GM/100ML-% SOLUTION: Performed by: INTERNAL MEDICINE

## 2024-02-22 PROCEDURE — 86850 RBC ANTIBODY SCREEN: CPT | Performed by: ANESTHESIOLOGY

## 2024-02-22 PROCEDURE — 87205 SMEAR GRAM STAIN: CPT | Performed by: ORTHOPAEDIC SURGERY

## 2024-02-22 PROCEDURE — 25010000002 ONDANSETRON PER 1 MG: Performed by: ANESTHESIOLOGY

## 2024-02-22 PROCEDURE — 85025 COMPLETE CBC W/AUTO DIFF WBC: CPT | Performed by: STUDENT IN AN ORGANIZED HEALTH CARE EDUCATION/TRAINING PROGRAM

## 2024-02-22 PROCEDURE — 25010000002 FENTANYL CITRATE (PF) 100 MCG/2ML SOLUTION: Performed by: ANESTHESIOLOGY

## 2024-02-22 PROCEDURE — 86901 BLOOD TYPING SEROLOGIC RH(D): CPT | Performed by: ANESTHESIOLOGY

## 2024-02-22 PROCEDURE — 25010000002 FENTANYL CITRATE (PF) 50 MCG/ML SOLUTION: Performed by: ANESTHESIOLOGY

## 2024-02-22 PROCEDURE — 25010000002 VANCOMYCIN PER 500 MG: Performed by: INTERNAL MEDICINE

## 2024-02-22 PROCEDURE — 87070 CULTURE OTHR SPECIMN AEROBIC: CPT | Performed by: ORTHOPAEDIC SURGERY

## 2024-02-22 PROCEDURE — 25810000003 LACTATED RINGERS PER 1000 ML: Performed by: STUDENT IN AN ORGANIZED HEALTH CARE EDUCATION/TRAINING PROGRAM

## 2024-02-22 RX ORDER — SODIUM CHLORIDE 0.9 % (FLUSH) 0.9 %
3-10 SYRINGE (ML) INJECTION AS NEEDED
Status: DISCONTINUED | OUTPATIENT
Start: 2024-02-22 | End: 2024-02-22 | Stop reason: HOSPADM

## 2024-02-22 RX ORDER — LABETALOL HYDROCHLORIDE 5 MG/ML
5 INJECTION, SOLUTION INTRAVENOUS
Status: DISCONTINUED | OUTPATIENT
Start: 2024-02-22 | End: 2024-02-22 | Stop reason: HOSPADM

## 2024-02-22 RX ORDER — FLUMAZENIL 0.1 MG/ML
0.2 INJECTION INTRAVENOUS AS NEEDED
Status: DISCONTINUED | OUTPATIENT
Start: 2024-02-22 | End: 2024-02-22 | Stop reason: HOSPADM

## 2024-02-22 RX ORDER — KETAMINE HCL IN NACL, ISO-OSM 100MG/10ML
SYRINGE (ML) INJECTION AS NEEDED
Status: DISCONTINUED | OUTPATIENT
Start: 2024-02-22 | End: 2024-02-22 | Stop reason: SURG

## 2024-02-22 RX ORDER — FAMOTIDINE 10 MG/ML
20 INJECTION, SOLUTION INTRAVENOUS ONCE
Status: DISCONTINUED | OUTPATIENT
Start: 2024-02-22 | End: 2024-02-22 | Stop reason: HOSPADM

## 2024-02-22 RX ORDER — ONDANSETRON 2 MG/ML
4 INJECTION INTRAMUSCULAR; INTRAVENOUS ONCE AS NEEDED
Status: DISCONTINUED | OUTPATIENT
Start: 2024-02-22 | End: 2024-02-22 | Stop reason: HOSPADM

## 2024-02-22 RX ORDER — LIDOCAINE HYDROCHLORIDE 20 MG/ML
INJECTION, SOLUTION INFILTRATION; PERINEURAL AS NEEDED
Status: DISCONTINUED | OUTPATIENT
Start: 2024-02-22 | End: 2024-02-22 | Stop reason: SURG

## 2024-02-22 RX ORDER — HYDROMORPHONE HYDROCHLORIDE 1 MG/ML
0.5 INJECTION, SOLUTION INTRAMUSCULAR; INTRAVENOUS; SUBCUTANEOUS
Status: DISCONTINUED | OUTPATIENT
Start: 2024-02-22 | End: 2024-02-24 | Stop reason: HOSPADM

## 2024-02-22 RX ORDER — OXYCODONE AND ACETAMINOPHEN 10; 325 MG/1; MG/1
1 TABLET ORAL EVERY 4 HOURS PRN
Status: DISCONTINUED | OUTPATIENT
Start: 2024-02-22 | End: 2024-02-22 | Stop reason: HOSPADM

## 2024-02-22 RX ORDER — PROPOFOL 10 MG/ML
VIAL (ML) INTRAVENOUS AS NEEDED
Status: DISCONTINUED | OUTPATIENT
Start: 2024-02-22 | End: 2024-02-22 | Stop reason: SURG

## 2024-02-22 RX ORDER — FENTANYL CITRATE 50 UG/ML
50 INJECTION, SOLUTION INTRAMUSCULAR; INTRAVENOUS ONCE
Status: COMPLETED | OUTPATIENT
Start: 2024-02-22 | End: 2024-02-22

## 2024-02-22 RX ORDER — SODIUM CHLORIDE 0.9 % (FLUSH) 0.9 %
3 SYRINGE (ML) INJECTION EVERY 12 HOURS SCHEDULED
Status: DISCONTINUED | OUTPATIENT
Start: 2024-02-22 | End: 2024-02-22 | Stop reason: HOSPADM

## 2024-02-22 RX ORDER — MIDAZOLAM HYDROCHLORIDE 1 MG/ML
1 INJECTION INTRAMUSCULAR; INTRAVENOUS
Status: DISCONTINUED | OUTPATIENT
Start: 2024-02-22 | End: 2024-02-22 | Stop reason: HOSPADM

## 2024-02-22 RX ORDER — DIPHENHYDRAMINE HYDROCHLORIDE 50 MG/ML
12.5 INJECTION INTRAMUSCULAR; INTRAVENOUS
Status: DISCONTINUED | OUTPATIENT
Start: 2024-02-22 | End: 2024-02-22 | Stop reason: HOSPADM

## 2024-02-22 RX ORDER — MIDAZOLAM HYDROCHLORIDE 1 MG/ML
1 INJECTION INTRAMUSCULAR; INTRAVENOUS ONCE
Status: COMPLETED | OUTPATIENT
Start: 2024-02-22 | End: 2024-02-22

## 2024-02-22 RX ORDER — FENTANYL CITRATE 50 UG/ML
50 INJECTION, SOLUTION INTRAMUSCULAR; INTRAVENOUS ONCE AS NEEDED
Status: DISCONTINUED | OUTPATIENT
Start: 2024-02-22 | End: 2024-02-22 | Stop reason: HOSPADM

## 2024-02-22 RX ORDER — PROMETHAZINE HYDROCHLORIDE 25 MG/1
25 TABLET ORAL ONCE AS NEEDED
Status: DISCONTINUED | OUTPATIENT
Start: 2024-02-22 | End: 2024-02-22 | Stop reason: HOSPADM

## 2024-02-22 RX ORDER — DROPERIDOL 2.5 MG/ML
0.62 INJECTION, SOLUTION INTRAMUSCULAR; INTRAVENOUS
Status: DISCONTINUED | OUTPATIENT
Start: 2024-02-22 | End: 2024-02-22 | Stop reason: HOSPADM

## 2024-02-22 RX ORDER — LIDOCAINE HYDROCHLORIDE 10 MG/ML
0.5 INJECTION, SOLUTION INFILTRATION; PERINEURAL ONCE AS NEEDED
Status: DISCONTINUED | OUTPATIENT
Start: 2024-02-22 | End: 2024-02-22 | Stop reason: HOSPADM

## 2024-02-22 RX ORDER — EPHEDRINE SULFATE 50 MG/ML
5 INJECTION, SOLUTION INTRAVENOUS ONCE AS NEEDED
Status: DISCONTINUED | OUTPATIENT
Start: 2024-02-22 | End: 2024-02-22 | Stop reason: HOSPADM

## 2024-02-22 RX ORDER — FENTANYL CITRATE 50 UG/ML
100 INJECTION, SOLUTION INTRAMUSCULAR; INTRAVENOUS
Status: DISCONTINUED | OUTPATIENT
Start: 2024-02-22 | End: 2024-02-22 | Stop reason: HOSPADM

## 2024-02-22 RX ORDER — SODIUM CHLORIDE, SODIUM LACTATE, POTASSIUM CHLORIDE, CALCIUM CHLORIDE 600; 310; 30; 20 MG/100ML; MG/100ML; MG/100ML; MG/100ML
9 INJECTION, SOLUTION INTRAVENOUS CONTINUOUS
Status: DISCONTINUED | OUTPATIENT
Start: 2024-02-22 | End: 2024-02-24 | Stop reason: HOSPADM

## 2024-02-22 RX ORDER — PROMETHAZINE HYDROCHLORIDE 25 MG/1
25 SUPPOSITORY RECTAL ONCE AS NEEDED
Status: DISCONTINUED | OUTPATIENT
Start: 2024-02-22 | End: 2024-02-22 | Stop reason: HOSPADM

## 2024-02-22 RX ORDER — MAGNESIUM HYDROXIDE 1200 MG/15ML
LIQUID ORAL AS NEEDED
Status: DISCONTINUED | OUTPATIENT
Start: 2024-02-22 | End: 2024-02-22 | Stop reason: HOSPADM

## 2024-02-22 RX ORDER — HYDRALAZINE HYDROCHLORIDE 20 MG/ML
5 INJECTION INTRAMUSCULAR; INTRAVENOUS
Status: DISCONTINUED | OUTPATIENT
Start: 2024-02-22 | End: 2024-02-22 | Stop reason: HOSPADM

## 2024-02-22 RX ORDER — FENTANYL CITRATE 50 UG/ML
INJECTION, SOLUTION INTRAMUSCULAR; INTRAVENOUS AS NEEDED
Status: DISCONTINUED | OUTPATIENT
Start: 2024-02-22 | End: 2024-02-22 | Stop reason: SURG

## 2024-02-22 RX ORDER — ONDANSETRON 2 MG/ML
INJECTION INTRAMUSCULAR; INTRAVENOUS AS NEEDED
Status: DISCONTINUED | OUTPATIENT
Start: 2024-02-22 | End: 2024-02-22 | Stop reason: SURG

## 2024-02-22 RX ORDER — NALOXONE HCL 0.4 MG/ML
0.2 VIAL (ML) INJECTION AS NEEDED
Status: DISCONTINUED | OUTPATIENT
Start: 2024-02-22 | End: 2024-02-22 | Stop reason: HOSPADM

## 2024-02-22 RX ORDER — IPRATROPIUM BROMIDE AND ALBUTEROL SULFATE 2.5; .5 MG/3ML; MG/3ML
3 SOLUTION RESPIRATORY (INHALATION) ONCE AS NEEDED
Status: DISCONTINUED | OUTPATIENT
Start: 2024-02-22 | End: 2024-02-22 | Stop reason: HOSPADM

## 2024-02-22 RX ADMIN — FENTANYL CITRATE 100 MCG: 50 INJECTION, SOLUTION INTRAMUSCULAR; INTRAVENOUS at 15:40

## 2024-02-22 RX ADMIN — FENTANYL CITRATE 50 MCG: 50 INJECTION, SOLUTION INTRAMUSCULAR; INTRAVENOUS at 15:38

## 2024-02-22 RX ADMIN — Medication 20 MG: at 15:32

## 2024-02-22 RX ADMIN — MIDAZOLAM 1 MG: 1 INJECTION INTRAMUSCULAR; INTRAVENOUS at 14:52

## 2024-02-22 RX ADMIN — HYDROMORPHONE HYDROCHLORIDE 0.5 MG: 1 INJECTION, SOLUTION INTRAMUSCULAR; INTRAVENOUS; SUBCUTANEOUS at 09:36

## 2024-02-22 RX ADMIN — OXYCODONE AND ACETAMINOPHEN 1 TABLET: 10; 325 TABLET ORAL at 17:07

## 2024-02-22 RX ADMIN — CLONIDINE HYDROCHLORIDE 0.1 MG: 0.1 TABLET ORAL at 21:26

## 2024-02-22 RX ADMIN — HYDROMORPHONE HYDROCHLORIDE 0.5 MG: 1 INJECTION, SOLUTION INTRAMUSCULAR; INTRAVENOUS; SUBCUTANEOUS at 07:42

## 2024-02-22 RX ADMIN — HYDROMORPHONE HYDROCHLORIDE 1 MG: 1 INJECTION, SOLUTION INTRAMUSCULAR; INTRAVENOUS; SUBCUTANEOUS at 15:36

## 2024-02-22 RX ADMIN — HYDROMORPHONE HYDROCHLORIDE 0.5 MG: 1 INJECTION, SOLUTION INTRAMUSCULAR; INTRAVENOUS; SUBCUTANEOUS at 13:31

## 2024-02-22 RX ADMIN — SODIUM CHLORIDE, POTASSIUM CHLORIDE, SODIUM LACTATE AND CALCIUM CHLORIDE 100 ML/HR: 600; 310; 30; 20 INJECTION, SOLUTION INTRAVENOUS at 00:12

## 2024-02-22 RX ADMIN — FENTANYL CITRATE 50 MCG: 50 INJECTION, SOLUTION INTRAMUSCULAR; INTRAVENOUS at 14:52

## 2024-02-22 RX ADMIN — SODIUM BICARBONATE 325 MG: 650 TABLET ORAL at 21:26

## 2024-02-22 RX ADMIN — HYDROMORPHONE HYDROCHLORIDE 0.5 MG: 1 INJECTION, SOLUTION INTRAMUSCULAR; INTRAVENOUS; SUBCUTANEOUS at 00:44

## 2024-02-22 RX ADMIN — FENTANYL CITRATE 100 MCG: 50 INJECTION, SOLUTION INTRAMUSCULAR; INTRAVENOUS at 16:48

## 2024-02-22 RX ADMIN — HYDROMORPHONE HYDROCHLORIDE 0.5 MG: 1 INJECTION, SOLUTION INTRAMUSCULAR; INTRAVENOUS; SUBCUTANEOUS at 05:10

## 2024-02-22 RX ADMIN — LIDOCAINE HYDROCHLORIDE 100 MG: 20 INJECTION, SOLUTION INFILTRATION; PERINEURAL at 15:32

## 2024-02-22 RX ADMIN — SODIUM CHLORIDE, POTASSIUM CHLORIDE, SODIUM LACTATE AND CALCIUM CHLORIDE 100 ML/HR: 600; 310; 30; 20 INJECTION, SOLUTION INTRAVENOUS at 18:26

## 2024-02-22 RX ADMIN — SODIUM CHLORIDE, POTASSIUM CHLORIDE, SODIUM LACTATE AND CALCIUM CHLORIDE 9 ML/HR: 600; 310; 30; 20 INJECTION, SOLUTION INTRAVENOUS at 16:52

## 2024-02-22 RX ADMIN — HYDROMORPHONE HYDROCHLORIDE 0.5 MG: 1 INJECTION, SOLUTION INTRAMUSCULAR; INTRAVENOUS; SUBCUTANEOUS at 11:32

## 2024-02-22 RX ADMIN — FENTANYL CITRATE 50 MCG: 50 INJECTION, SOLUTION INTRAMUSCULAR; INTRAVENOUS at 15:29

## 2024-02-22 RX ADMIN — FENTANYL CITRATE 100 MCG: 50 INJECTION, SOLUTION INTRAMUSCULAR; INTRAVENOUS at 15:46

## 2024-02-22 RX ADMIN — CEFAZOLIN SODIUM 2000 MG: 2 INJECTION, SOLUTION INTRAVENOUS at 00:08

## 2024-02-22 RX ADMIN — FENTANYL CITRATE 100 MCG: 50 INJECTION, SOLUTION INTRAMUSCULAR; INTRAVENOUS at 16:32

## 2024-02-22 RX ADMIN — HYDROMORPHONE HYDROCHLORIDE 0.5 MG: 1 INJECTION, SOLUTION INTRAMUSCULAR; INTRAVENOUS; SUBCUTANEOUS at 02:42

## 2024-02-22 RX ADMIN — PROPOFOL 200 MG: 10 INJECTION, EMULSION INTRAVENOUS at 15:32

## 2024-02-22 RX ADMIN — HYDROMORPHONE HYDROCHLORIDE 0.5 MG: 1 INJECTION, SOLUTION INTRAMUSCULAR; INTRAVENOUS; SUBCUTANEOUS at 21:37

## 2024-02-22 RX ADMIN — ONDANSETRON 4 MG: 2 INJECTION INTRAMUSCULAR; INTRAVENOUS at 15:59

## 2024-02-22 RX ADMIN — HYDROMORPHONE HYDROCHLORIDE 1 MG: 1 INJECTION, SOLUTION INTRAMUSCULAR; INTRAVENOUS; SUBCUTANEOUS at 16:41

## 2024-02-22 RX ADMIN — HYDROMORPHONE HYDROCHLORIDE 1 MG: 1 INJECTION, SOLUTION INTRAMUSCULAR; INTRAVENOUS; SUBCUTANEOUS at 15:46

## 2024-02-22 RX ADMIN — CEFAZOLIN SODIUM 2000 MG: 2 INJECTION, SOLUTION INTRAVENOUS at 23:50

## 2024-02-22 RX ADMIN — VANCOMYCIN HYDROCHLORIDE 1000 MG: 1 INJECTION, SOLUTION INTRAVENOUS at 11:48

## 2024-02-22 RX ADMIN — HYDROMORPHONE HYDROCHLORIDE 1 MG: 1 INJECTION, SOLUTION INTRAMUSCULAR; INTRAVENOUS; SUBCUTANEOUS at 16:52

## 2024-02-22 RX ADMIN — VANCOMYCIN HYDROCHLORIDE 1000 MG: 1 INJECTION, SOLUTION INTRAVENOUS at 21:38

## 2024-02-22 RX ADMIN — FENTANYL CITRATE 100 MCG: 50 INJECTION, SOLUTION INTRAMUSCULAR; INTRAVENOUS at 15:58

## 2024-02-22 RX ADMIN — CEFAZOLIN SODIUM 2000 MG: 2 INJECTION, SOLUTION INTRAVENOUS at 07:42

## 2024-02-22 RX ADMIN — Medication 30 MG: at 15:29

## 2024-02-22 RX ADMIN — PANTOPRAZOLE SODIUM 40 MG: 40 TABLET, DELAYED RELEASE ORAL at 05:10

## 2024-02-22 NOTE — PAYOR COMM NOTE
"Sammie Abraham (33 y.o. Female)        PLEASE SEE ATTACHED FOR CONTINUED STAY REVIEW.     REF#[id number]    PLEASE CALL  OR FAX  [phone]    THANK YOU    GERALD NORMAN LPN CCP   Date of Birth   1990    Social Security Number       Address   [address] [place] [address]    Home Phone   [phone]    MRN   [id number]       Tenriism   [employer]ism    Marital Status   Single                            Admission Date   2/18/24    Admission Type   Emergency    Admitting Provider   Lucas Blas MD    Attending Provider   Amita Abraham MD    Department, Room/Bed   [address], [address]1       Discharge Date       Discharge Disposition       Discharge Destination                                 Attending Provider: Amita Abraham MD    Allergies: No Known Allergies    Isolation: Contact   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)   Code Status: CPR    Ht: 175.3 cm (69\")   Wt: 118 kg (261 lb)    Admission Cmt: None   Principal Problem: Sepsis [A41.9]                   Active Insurance as of 2/18/2024       Primary Coverage       Payor Plan Insurance Group Employer/Plan Group    [facility] JETER [id number]       Payor Plan Address Payor Plan Phone Number Payor Plan Fax Number Effective Dates    [address] [address]   11/1/2022 - None Entered    [facility] [id number]         Subscriber Name Subscriber Birth Date Member ID       SAMMIE ABRAHAM 1990 [id number]                     Emergency Contacts        (Rel.) Home Phone Work Phone Mobile Phone    Melvi Abraham (Mother) [phone] -- --              Oxygen Therapy (last day)       Date/Time SpO2 Device (Oxygen Therapy) Flow (L/min) Oxygen Concentration (%) ETCO2 (mmHg)    02/22/24 1345 96 -- -- -- --    02/22/24 0800 94 -- -- -- --    02/22/24 0742 -- room air -- -- --    02/22/24 0455 93 room air -- -- --    02/21/24 2332 94 room air -- -- --    02/21/24 2020 -- room air -- -- " --    24 1942 94 room air -- -- --    24 1420 -- room air -- -- --    24 1310 97 -- -- -- --    24 0906 -- room air -- -- --    24 0720 95 -- -- -- --          Intake & Output (last day)          0701   0700  07 0700    P.O. 1100     I.V. (mL/kg) 1150 (9.7)     Total Intake(mL/kg) 2250 (19.1)     Urine (mL/kg/hr) 5410 (1.9) 1200 (1.4)    Total Output 5410 1200    Net -3160 -1200          Urine Unmeasured Occurrence 1 x           Lines, Drains & Airways       Active LDAs       Name Placement date Placement time Site Days    Peripheral IV 24 0955 Anterior;Right;Upper Arm 24  0955  Arm  4    Peripheral IV 24 1632 Anterior;Left Forearm 24  1632  Forearm  2                  Operative/Procedure Notes (last 24 hours)  Notes from 24 1424 through 24 1424   No notes of this type exist for this encounter.          Physician Progress Notes (last 24 hours)        Jazmine Higginbotham MD at 24 1005            Infectious Diseases Progress Note    Jazmine Higginbotham MD     [facility]  [place]: 4 days  Patient Identification:  Name: Sammie Landeros  Age: 33 y.o.  Sex: female  :  1990  MRN: [id number]         Primary Care Physician: Melvi Landeros APRN        Subjective: Feels somewhat better with decreased pain and discomfort in the right knee.  Agreeable to go for washout of the right knee.  Interval History: See consultation note.  Right knee aspiration performed on 2024  revealing purulent fluid consistent with septic arthritis.  Gram stain is positive for gram-positive cocci now showing culture positive for group Bstrep.  Objective:    Scheduled Meds:ceFAZolin, 2,000 mg, Intravenous, Q8H  lidocaine, 10 mL, Intradermal, Once in imaging  pantoprazole, 40 mg, Oral, Q AM  sodium bicarbonate, 325 mg, Oral, TID  vancomycin, 1,000 mg, Intravenous, Q12H      Continuous Infusions:lactated ringers, 100 mL/hr, Last Rate: 100 mL/hr  "(02/22/24 0012)  Pharmacy to dose vancomycin,         Vital signs in last 24 hours:  Temp:  [98.6 °F (37 °C)-99.7 °F (37.6 °C)] 98.6 °F (37 °C)  Heart Rate:  [] 97  Resp:  [18] 18  BP: (140-150)/(79-92) 150/85    Intake/Output:    Intake/Output Summary (Last 24 hours) at 2/22/2024 1005  Last data filed at 2/22/2024 0625  Gross per 24 hour   Intake 2250 ml   Output 5410 ml   Net -3160 ml       Exam:  /85 (BP Location: Right arm, Patient Position: Lying)   Pulse 97   Temp 98.6 °F (37 °C) (Oral)   Resp 18   Ht 175.3 cm (69\")   Wt 118 kg (261 lb)   SpO2 94%   BMI 38.54 kg/m²   Patient is examined using the personal protective equipment as per guidelines from infection control for this particular patient as enacted.  Hand washing was performed before and after patient interaction.  General Appearance: Appears slightly better and interactive.  Still appears ill.  Agreeable to go for surgery.                          Head:    Normocephalic, without obvious abnormality, atraumatic                           Eyes:  Eyes closed                         Throat:   Lips, tongue, gums normal; oral mucosa pink and moist                           Neck:   Supple, symmetrical, trachea midline, no JVD                         Lungs:    Bibasilar crackles                 Chest Wall:    No tenderness or deformity                          Heart:  S1-S2 regular                  Abdomen:   Obese soft nontender                 Extremities: As described in the skin examination, decreased warmth and tenderness of the right knee.                        Pulses:   Pulses palpable in all extremities                            Skin: Skin graft area on the right lateral leg erythematous the scabs, erythema of the overall right lower extremity is decreased.  Dry wound on the lateral gluteal aspect of the right lower extremity without any drainage.  Swelling and tenderness of the right knee still present.                    " Neurologic: Withdrawn and lethargic but grossly nonfocal Limited right lower extremity movements because of pain       Data Review:    I reviewed the patient's new clinical results.  Results from last 7 days   Lab Units 02/22/24  0603 02/21/24  0654 02/20/24 0758 02/19/24 0236 02/18/24  0920   WBC 10*3/mm3 10.30 11.48* 15.09* 8.73 9.93   HEMOGLOBIN g/dL 7.3* 7.4* 7.3* 7.0* 7.5*   PLATELETS 10*3/mm3 98* 100* 81* 77* 65*     Results from last 7 days   Lab Units 02/22/24  0603 02/21/24  0654 02/20/24 0758 02/19/24 0236 02/18/24  0920   SODIUM mmol/L 136 136 133* 137 135*   POTASSIUM mmol/L 3.8 4.1 4.0 4.0 4.1   CHLORIDE mmol/L 107 108* 104 106 103   CO2 mmol/L 21.0* 22.0 20.0* 20.2* 17.0*   BUN mg/dL 13 18 21* 27* 29*   CREATININE mg/dL 0.96 1.03* 1.16* 1.51* 1.90*   CALCIUM mg/dL 7.6* 7.7* 7.6* 7.9* 8.3*   GLUCOSE mg/dL 115* 90 103* 98 102*     Microbiology Results (last 10 days)       Procedure Component Value - Date/Time    Body Fluid Culture - Aspirate, Knee, Right [[id number]]  (Abnormal)  (Susceptibility) Collected: 02/20/24 1357    Lab Status: Final result Specimen: Aspirate from Knee, Right Updated: 02/22/24 0627     Body Fluid Culture Moderate growth (3+) Streptococcus agalactiae (Group B)     Gram Stain Many (4+) WBCs seen      Few (2+) Gram positive cocci in chains    Susceptibility        Streptococcus agalactiae (Group B)      SARAH      Ceftriaxone Susceptible      Clindamycin Susceptible      Inducible Clindamycin Resistance Negative      Penicillin G Susceptible      Vancomycin Susceptible                           Blood Culture - Blood, Arm, Right [[id number]]  (Normal) Collected: 02/20/24 0807    Lab Status: Preliminary result Specimen: Blood from Arm, Right Updated: 02/22/24 0830     Blood Culture No growth at 2 days    Blood Culture - Blood, Arm, Left [[id number]]  (Normal) Collected: 02/20/24 0756    Lab Status: Preliminary result Specimen: Blood from Arm, Left Updated: 02/22/24 0830     Blood  Culture No growth at 2 days    Blood Culture - Blood, Hand, Left [[id number]]  (Abnormal)  (Susceptibility) Collected: 02/18/24 1029    Lab Status: Final result Specimen: Blood from Hand, Left Updated: 02/20/24 0648     Blood Culture Streptococcus agalactiae (Group B)     Isolated from Aerobic Bottle     Gram Stain Aerobic Bottle Gram positive cocci in chains    Susceptibility        Streptococcus agalactiae (Group B)      SARAH      Ceftriaxone Susceptible      Penicillin G Susceptible      Vancomycin Susceptible                           Blood Culture ID, PCR - Blood, Hand, Left [[id number]]  (Abnormal) Collected: 02/18/24 1029    Lab Status: Final result Specimen: Blood from Hand, Left Updated: 02/18/24 2235     BCID, PCR Streptococcus agalactiae (Group B). Identification by BCID2 PCR.     BOTTLE TYPE Aerobic Bottle              Assessment:    Sepsis    Polysubstance abuse    Hyperbilirubinemia    Stage 4 chronic kidney disease    Lactic acidosis    Opioid use disorder    Chronic hepatitis C with cirrhosis    Right knee pain    Effusion of right knee    Painless jaundice    Hepatitis B infection  1-systemic sepsis with probable right lower extremity cellulitis involving the lower leg and thigh area with likely systemic bacteremic sepsis with pathogen so far identified as group B strep with prior history of MRSA endocarditis.  2-right knee septic arthritis with aspiration culture positive for group B strep.  3-active IV drug use with phlebitis and evidence of septic emboli with 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     Recommendations/Discussions:  See my discussion and recommendations on 2/18/2024  Given the fact that there is no evidence of MRSA  during this hospitalization and all the cultures this time was positive for group B strep would recommend DC vancomycin and continue with cefazolin if echocardiogram shows no evidence of endocarditis.  Once surgery on the knee joint is done an echocardiogram has shown no evidence of endocarditis then arrangement can be made for her to come to the ACU to receive 2 g of IV Rocephin every 24 hours for 4 to 6 weeks depending upon whether she has element of endocarditis or not.  Septic emboli in the right lung is concerning for endocarditis.  Overall prognosis is guarded as discussed on 2024.  Goals of care counseling and access evaluation is needed as she needs to be approached as substance abuse disorder and see if community and institutional resources can be mobilized to get her out of this spiraling down cervical that she is in.  Without addressing her substance abuse and craving and poor decision-making it is very difficult to achieve proper care plan and execute the care plan.  And hence prognosis is guarded.  Jazmine Higginbotham MD  2024  10:05 EST    Parts of this note may be an electronic transcription/translation of spoken language to printed text using the Dragon dictation system.      Electronically signed by Jazmine Higginbotham MD at 24 1011       Adore Mix APRN at 24 0927              Name: Sammie Landeros ADMIT: 2024   : 1990  PCP: Melvi Landeros APRN    MRN: [id number] LOS: 4 days   AGE/SEX: 33 y.o. female  ROOM: [facility]     Subjective   Subjective   She is sleeping upon entering room but awakens easily. Complains of pain in knee but its slightly better.  Plan for right knee washout today.  No CP SOB. No chills, N/V/ D, rash.        Objective   Objective   Vital Signs  Temp:  [98.6 °F (37 °C)-99.7 °F (37.6 °C)] 98.6 °F (37 °C)  Heart Rate:  [] 97  Resp:  [18] 18  BP: (140-150)/(79-92) 150/85  SpO2:  [93 %-97 %] 94 %  on   ;   Device (Oxygen Therapy): room air  Body mass  index is 38.54 kg/m².  Physical Exam  Vitals reviewed.   Constitutional:       Appearance: She is well-developed. She is ill-appearing.      Comments: Acute on chronic ill appearing and appears older than stated age. Sleeping, drowsy but awakens easily. Limited answers to questions.    HENT:      Head: Normocephalic and atraumatic.      Mouth/Throat:      Mouth: Mucous membranes are moist.   Cardiovascular:      Rate and Rhythm: Regular rhythm. Tachycardia present.   Pulmonary:      Effort: Pulmonary effort is normal. No respiratory distress.      Breath sounds: Normal breath sounds.   Abdominal:      General: Bowel sounds are normal. There is no distension.      Palpations: Abdomen is soft.      Tenderness: There is no abdominal tenderness.   Musculoskeletal:         General: Tenderness present.      Right lower leg: Edema present.      Left lower leg: Edema present.   Skin:     General: Skin is warm and dry.      Coloration: Skin is jaundiced.      Comments: RLE  with erythema and edema    Neurological:      General: No focal deficit present.      Mental Status: She is alert and oriented to person, place, and time.   Psychiatric:         Attention and Perception: She is inattentive.         Mood and Affect: Mood is anxious. Affect is tearful.       Results Review     I reviewed the patient's new clinical results.  Results from last 7 days   Lab Units 02/22/24  0603 02/21/24  0654 02/20/24  0758 02/19/24  0236   WBC 10*3/mm3 10.30 11.48* 15.09* 8.73   HEMOGLOBIN g/dL 7.3* 7.4* 7.3* 7.0*   PLATELETS 10*3/mm3 98* 100* 81* 77*     Results from last 7 days   Lab Units 02/22/24  0603 02/21/24  0654 02/20/24  0758 02/19/24  0236   SODIUM mmol/L 136 136 133* 137   POTASSIUM mmol/L 3.8 4.1 4.0 4.0   CHLORIDE mmol/L 107 108* 104 106   CO2 mmol/L 21.0* 22.0 20.0* 20.2*   BUN mg/dL 13 18 21* 27*   CREATININE mg/dL 0.96 1.03* 1.16* 1.51*   GLUCOSE mg/dL 115* 90 103* 98   EGFR mL/min/1.73 79.3 72.9 63.2 46.0*     Results from  "last 7 days   Lab Units 02/22/24  0603 02/21/24  0654 02/20/24  0758 02/19/24  0236   ALBUMIN g/dL 2.1* 2.1* 1.9* 2.0*   BILIRUBIN mg/dL 4.1* 5.5* 7.2* 9.3*   ALK PHOS U/L 342* 313* 303* 231*   AST (SGOT) U/L 47* 49* 56* 54*   ALT (SGPT) U/L 15 21 27 35*     Results from last 7 days   Lab Units 02/22/24  0603 02/21/24  0654 02/20/24  0758 02/19/24  0236   CALCIUM mg/dL 7.6* 7.7* 7.6* 7.9*   ALBUMIN g/dL 2.1* 2.1* 1.9* 2.0*     Results from last 7 days   Lab Units 02/19/24  0236 02/18/24  2028 02/18/24  1710 02/18/24  1358 02/18/24  0921 02/18/24  0920   PROCALCITONIN ng/mL  --   --   --   --   --  19.60*   LACTATE mmol/L 1.4 2.6* 3.7* 4.0*   < >  --     < > = values in this interval not displayed.     No results found for: \"HGBA1C\", \"POCGLU\"    No radiology results for the last day    I have personally reviewed all medications:  Scheduled Medications  ceFAZolin, 2,000 mg, Intravenous, Q8H  lidocaine, 10 mL, Intradermal, Once in imaging  pantoprazole, 40 mg, Oral, Q AM  sodium bicarbonate, 325 mg, Oral, TID  vancomycin, 1,000 mg, Intravenous, Q12H    Infusions  lactated ringers, 100 mL/hr, Last Rate: 100 mL/hr (02/22/24 0012)  Pharmacy to dose vancomycin,     Diet  NPO Diet NPO Type: Strict NPO    I have personally reviewed:  [x]  Laboratory   [x]  Microbiology   [x]  Radiology   []  EKG/Telemetry  [x]  Cardiology/Vascular   []  Pathology    []  Records      Assessment/Plan     Active Hospital Problems    Diagnosis  POA    **Sepsis [A41.9]  Yes    Hyperbilirubinemia [E80.6]  Yes    Stage 4 chronic kidney disease [N18.4]  Yes    Lactic acidosis [E87.20]  Yes    Opioid use disorder [F11.90]  Yes    Chronic hepatitis C with cirrhosis [B18.2, K74.60]  Yes    Right knee pain [M25.561]  Yes    Effusion of right knee [M25.461]  Yes    Painless jaundice [R17]  Yes    Hepatitis B infection [B19.10]  Yes    Polysubstance abuse [F19.10]  Yes      Resolved Hospital Problems   No resolved problems to display.       Ms. Landeros is a " 33 y.o. female with polysubstance use disorder, chronic hepatitis C with cirrhosis, CKD, history of TV endocarditis/MRSA bacteremia presenting with right knee pain and is admitted to the hospital with concern for septic arthritis      Suspected R knee septic arthritis/R knee pain/effusion  Hx TV endocarditis/MRSA bacteremia  Lactic acidosis  -ID and ortho following  --Xray w/ R knee effusion. Arthrocentesis attempted in ED but she was uncooperative. Completed in IR on 2/20 with 50cc purulent fluid removed. Fluid consistent with pseudogout in addition to secondary infection with strep agalactiae.  - Abx per ID, see note for recommendations. Check echocardiogram for endocarditis.   - plan for washout today per ortho sx.      Opioid use disorder  -most recently used heroin 2/19.   - IV dilaudid PRN for pain and COWS protocol.       CKD4  -Crt 1.9  now 1.0      Hepatitis C cirrhosis   Hepatitis B  Painless jaundice/hyperbilirubinemia  -bili 11.3 now 5.5   -denies EtOH use  -CT w/ cirrhosis, splenomegaly, mild ascites  -GI signed off. Follow up with  hepatology to address HepB and HCV          Disposition  Expected Discharge Date: 2/25/2024; Expected Discharge Time:            [facility]  02/22/24  09:27 EST      Electronically signed by Adore Mix APRN at 02/22/24 1321       Schuyler Winkler MD at 02/22/24 0741            Schuyler Winkler MD     Orthopedic Progress Note    Subjective :   Patient still complains of right knee pain though does state that she is feeling better.  She was compliant with an IR aspiration yesterday.  States that her knee is feeling much better since the aspiration    Objective :    Vital signs in last 24 hours:  Temp:  [99 °F (37.2 °C)-99.7 °F (37.6 °C)] 99.5 °F (37.5 °C)  Heart Rate:  [] 96  Resp:  [18] 18  BP: (140-146)/(79-92) 146/92  Vitals:    02/21/24 1310 02/21/24 1942 02/21/24 2332 02/22/24 0455   BP: 144/79 146/82 140/80  146/92   BP Location: Left arm Right arm Right arm Right arm   Patient Position: Lying Lying Lying Lying   Pulse: 105 104 100 96   Resp: 18 18 18 18   Temp: 99.7 °F (37.6 °C) 99.5 °F (37.5 °C) 99 °F (37.2 °C) 99.5 °F (37.5 °C)   TempSrc: Oral Oral Oral Oral   SpO2: 97% 94% 94% 93%   Weight:       Height:           PHYSICAL EXAM:  Patient is calm, she does follow commands  Examination of the patient's right knee demonstrates some warmth.  No significant effusion.  Range of motion 10 degrees-about 80 degrees.  Cellulitis evidence in the bilateral lower extremities distally.  1+ pitting edema.    LABS:  Results from last 7 days   Lab Units 02/22/24  0603   WBC 10*3/mm3 10.30   HEMOGLOBIN g/dL 7.3*   HEMATOCRIT % 24.2*   PLATELETS 10*3/mm3 98*     Results from last 7 days   Lab Units 02/22/24  0603   SODIUM mmol/L 136   POTASSIUM mmol/L 3.8   CHLORIDE mmol/L 107   CO2 mmol/L 21.0*   BUN mg/dL 13   CREATININE mg/dL 0.96   GLUCOSE mg/dL 115*   CALCIUM mg/dL 7.6*     Results from last 7 days   Lab Units 02/18/24  1340   INR  1.51*       Intake/Output                         02/20/24 0701 - 02/21/24 0700 02/21/24 0701 - 02/22/24 0700     2620-3923 6757-9581 Total 5283-2494 1059-5099 Total                 Intake    P.O.  840  120 960  1100  -- 1100    I.V.  --  -- --  --  1150 1150    Total Intake 840  1150 2250       Output    Urine  1900  2000 3900  1460  3950 5410    Total Output 1900 2000 3900 1460 3950 5410             ASSESSMENT:  Right knee septic arthritis    Plan:  Patient's aspiration is consistent with pseudogout but with a secondary superinfection as she is growing strep agalactiae as well.  Cell count 60 1540    This elevated cell count is not unexpected with an inflammatory and/or crystalline arthropathy however she does have positive cultures.  I advised her that this would warrant irrigation and debridement.    She voices understanding and states that she is willing to do this.  Will try to plan  on this today pending or availability.  Will make patient n.p.o.    Schuyler Winkler MD    Date: 2/22/2024  Time: 07:41 EST    Schuyler Winkler MD  Orthopaedic Surgeon  [facility]              Electronically signed by Schuyler Winkler MD at 02/22/24 0773       Consult Notes (last 24 hours)  Notes from 02/21/24 1424 through 02/22/24 1424   No notes of this type exist for this encounter.

## 2024-02-22 NOTE — ANESTHESIA POSTPROCEDURE EVALUATION
Patient: Sammie Landeros    Procedure Summary       Date: 02/22/24 Room / Location: [facility] OR  /  [facility]    Anesthesia Start: 1525 Anesthesia Stop: 1620    Procedures:       KNEE ARTHROSCOPY (Right: Knee)      WITH WASH OUT (Right) Diagnosis:       Effusion of right knee      (Effusion of right knee [M25.461])    Surgeons: Rogelio Anderson MD Provider: Miguelina Chow MD    Anesthesia Type: general ASA Status: 4            Anesthesia Type: general    Vitals  Vitals Value Taken Time   /101 02/22/24 1700   Temp 37 °C (98.6 °F) 02/22/24 1700   Pulse 92 02/22/24 1713   Resp 24 02/22/24 1700   SpO2 95 % 02/22/24 1713   Vitals shown include unfiled device data.        Post Anesthesia Care and Evaluation    Patient location during evaluation: bedside  Patient participation: complete - patient participated  Level of consciousness: awake  Pain management: adequate    Airway patency: patent  Anesthetic complications: No anesthetic complications  PONV Status: controlled  Cardiovascular status: acceptable  Respiratory status: acceptable  Hydration status: acceptable    Comments: -------------------------              02/22/24                    1700        -------------------------   BP:       (!) 161/101     Pulse:        91          Resp:         24          Temp:   37 °C (98.6 °F)   SpO2:         95%        -------------------------

## 2024-02-22 NOTE — ANESTHESIA PROCEDURE NOTES
Airway  Urgency: elective    Date/Time: 2/22/2024 3:35 PM  Airway not difficult    General Information and Staff    Patient location during procedure: OR  Anesthesiologist: Miguelina Chow MD    Indications and Patient Condition  Indications for airway management: airway protection    Preoxygenated: yes  Mask difficulty assessment: 1 - vent by mask    Final Airway Details  Final airway type: supraglottic airway      Successful airway: unique  Size 4  Cuff Pressure (cm H2O): 18  Airway Seal Pressure (cm H2O): 18     Cormack-Lehane Classification: grade I - full view of glottis  Number of attempts at approach: 1  Assessment: lips, teeth, and gum same as pre-op and atraumatic intubation

## 2024-02-22 NOTE — ANESTHESIA PREPROCEDURE EVALUATION
Anesthesia Evaluation     Patient summary reviewed and Nursing notes reviewed   NPO Solid Status: > 8 hours  NPO Liquid Status: > 2 hours           Airway   Dental      Pulmonary    (+) a smoker Former,  Cardiovascular     ECG reviewed    (+) valvular problems/murmurs (TR mod/severe, possible LVOT vegetation) TI, hyperlipidemia      Neuro/Psych  GI/Hepatic/Renal/Endo    (+) GERD, hepatitis B and C, liver disease, renal disease- CRI and ARF    Musculoskeletal     (+) chronic pain  Abdominal    Substance History   (+) drug use (IV heroin, last use 3 days ago)     OB/GYN          Other   autoimmune disease (hepatitis hx) , blood dyscrasia anemia thrombocytopenia,     ROS/Med Hx Other:  Sepsis    Polysubstance abuse    Hyperbilirubinemia    Stage 4 chronic kidney disease    Lactic acidosis    Opioid use disorder    Chronic hepatitis C with cirrhosis    Right knee pain    Effusion of right knee    Painless jaundice    Hepatitis B infection              TTE 12/23    Left ventricular systolic function is normal. Calculated left ventricular EF = 65.2%  ·  Left ventricular diastolic function was normal.  ·  Estimated right ventricular systolic pressure from tricuspid regurgitation is mildly elevated (35-45 mmHg). Calculated right ventricular systolic pressure from tricuspid regurgitation is 44 mmHg.  ·  Mild pulmonary hypertension is present.  ·  The aortic valve is structurally normal with no regurgitation or stenosis present. The aortic valve appears trileaflet. There may be a vegetation in the LVOT seen best on image 68 and 69  ·  The tricuspid valve is structurally normal with no significant stenosis present. Moderate to severe tricuspid valve regurgitation is present. Estimated right ventricular systolic pressure from tricuspid regurgitation is mildly elevated (35-45 mmHg). Calculated right ventricular systolic pressure from tricuspid regurgitation is 44.4 mmHg. Mild pulmonary hypertension is present. Vegetation noted  "on the tricuspid valve measuring 0.5 x 2.2cm  ·  Left atrial volume is mildly increased. Saline test results are negative.                      Anesthesia Plan    ASA 4     general     (I have reviewed the patient's history and chart with the patient, including all pertinent laboratory results and imaging. I have explained the risks of anesthesia including but not limited to dental damage, corneal abrasion, nerve injury, MI, stroke, aspiration, and death. Patient has agreed to proceed.      /90 (BP Location: Right arm, Patient Position: Lying)   Pulse 87   Temp 36.6 °C (97.9 °F) (Oral)   Resp 18   Ht 175.3 cm (69\")   Wt 118 kg (261 lb)   SpO2 96%   BMI 38.54 kg/m²   )          CODE STATUS:    Level Of Support Discussed With: Patient  Code Status (Patient has no pulse and is not breathing): CPR (Attempt to Resuscitate)  Medical Interventions (Patient has pulse or is breathing): Full      "

## 2024-02-22 NOTE — PROGRESS NOTES
"  Infectious Diseases Progress Note    Jazmine Higginbotham MD     [facility]  [place]: 4 days  Patient Identification:  Name: Sammie Landeros  Age: 33 y.o.  Sex: female  :  1990  MRN: [id number]         Primary Care Physician: Melvi Landeros APRN        Subjective: Feels somewhat better with decreased pain and discomfort in the right knee.  Agreeable to go for washout of the right knee.  Interval History: See consultation note.  Right knee aspiration performed on 2024  revealing purulent fluid consistent with septic arthritis.  Gram stain is positive for gram-positive cocci now showing culture positive for group Bstrep.  Objective:    Scheduled Meds:ceFAZolin, 2,000 mg, Intravenous, Q8H  lidocaine, 10 mL, Intradermal, Once in imaging  pantoprazole, 40 mg, Oral, Q AM  sodium bicarbonate, 325 mg, Oral, TID  vancomycin, 1,000 mg, Intravenous, Q12H      Continuous Infusions:lactated ringers, 100 mL/hr, Last Rate: 100 mL/hr (24 0012)  Pharmacy to dose vancomycin,         Vital signs in last 24 hours:  Temp:  [98.6 °F (37 °C)-99.7 °F (37.6 °C)] 98.6 °F (37 °C)  Heart Rate:  [] 97  Resp:  [18] 18  BP: (140-150)/(79-92) 150/85    Intake/Output:    Intake/Output Summary (Last 24 hours) at 2024 1005  Last data filed at 2024 0625  Gross per 24 hour   Intake 2250 ml   Output 5410 ml   Net -3160 ml       Exam:  /85 (BP Location: Right arm, Patient Position: Lying)   Pulse 97   Temp 98.6 °F (37 °C) (Oral)   Resp 18   Ht 175.3 cm (69\")   Wt 118 kg (261 lb)   SpO2 94%   BMI 38.54 kg/m²   Patient is examined using the personal protective equipment as per guidelines from infection control for this particular patient as enacted.  Hand washing was performed before and after patient interaction.  General Appearance: Appears slightly better and interactive.  Still appears ill.  Agreeable to go for surgery.                          Head:    Normocephalic, without obvious abnormality, " atraumatic                           Eyes:  Eyes closed                         Throat:   Lips, tongue, gums normal; oral mucosa pink and moist                           Neck:   Supple, symmetrical, trachea midline, no JVD                         Lungs:    Bibasilar crackles                 Chest Wall:    No tenderness or deformity                          Heart:  S1-S2 regular                  Abdomen:   Obese soft nontender                 Extremities: As described in the skin examination, decreased warmth and tenderness of the right knee.                        Pulses:   Pulses palpable in all extremities                            Skin: Skin graft area on the right lateral leg erythematous the scabs, erythema of the overall right lower extremity is decreased.  Dry wound on the lateral gluteal aspect of the right lower extremity without any drainage.  Swelling and tenderness of the right knee still present.                    Neurologic: Withdrawn and lethargic but grossly nonfocal Limited right lower extremity movements because of pain       Data Review:    I reviewed the patient's new clinical results.  Results from last 7 days   Lab Units 02/22/24  0603 02/21/24  0654 02/20/24  0758 02/19/24  0236 02/18/24  0920   WBC 10*3/mm3 10.30 11.48* 15.09* 8.73 9.93   HEMOGLOBIN g/dL 7.3* 7.4* 7.3* 7.0* 7.5*   PLATELETS 10*3/mm3 98* 100* 81* 77* 65*     Results from last 7 days   Lab Units 02/22/24  0603 02/21/24  0654 02/20/24  0758 02/19/24  0236 02/18/24  0920   SODIUM mmol/L 136 136 133* 137 135*   POTASSIUM mmol/L 3.8 4.1 4.0 4.0 4.1   CHLORIDE mmol/L 107 108* 104 106 103   CO2 mmol/L 21.0* 22.0 20.0* 20.2* 17.0*   BUN mg/dL 13 18 21* 27* 29*   CREATININE mg/dL 0.96 1.03* 1.16* 1.51* 1.90*   CALCIUM mg/dL 7.6* 7.7* 7.6* 7.9* 8.3*   GLUCOSE mg/dL 115* 90 103* 98 102*     Microbiology Results (last 10 days)       Procedure Component Value - Date/Time    Body Fluid Culture - Aspirate, Knee, Right [[id number]]   (Abnormal)  (Susceptibility) Collected: 02/20/24 1357    Lab Status: Final result Specimen: Aspirate from Knee, Right Updated: 02/22/24 0627     Body Fluid Culture Moderate growth (3+) Streptococcus agalactiae (Group B)     Gram Stain Many (4+) WBCs seen      Few (2+) Gram positive cocci in chains    Susceptibility        Streptococcus agalactiae (Group B)      SARAH      Ceftriaxone Susceptible      Clindamycin Susceptible      Inducible Clindamycin Resistance Negative      Penicillin G Susceptible      Vancomycin Susceptible                           Blood Culture - Blood, Arm, Right [[id number]]  (Normal) Collected: 02/20/24 0807    Lab Status: Preliminary result Specimen: Blood from Arm, Right Updated: 02/22/24 0830     Blood Culture No growth at 2 days    Blood Culture - Blood, Arm, Left [[id number]]  (Normal) Collected: 02/20/24 0758    Lab Status: Preliminary result Specimen: Blood from Arm, Left Updated: 02/22/24 0830     Blood Culture No growth at 2 days    Blood Culture - Blood, Hand, Left [[id number]]  (Abnormal)  (Susceptibility) Collected: 02/18/24 1029    Lab Status: Final result Specimen: Blood from Hand, Left Updated: 02/20/24 0648     Blood Culture Streptococcus agalactiae (Group B)     Isolated from Aerobic Bottle     Gram Stain Aerobic Bottle Gram positive cocci in chains    Susceptibility        Streptococcus agalactiae (Group B)      SARAH      Ceftriaxone Susceptible      Penicillin G Susceptible      Vancomycin Susceptible                           Blood Culture ID, PCR - Blood, Hand, Left [[id number]]  (Abnormal) Collected: 02/18/24 1029    Lab Status: Final result Specimen: Blood from Hand, Left Updated: 02/18/24 2235     BCID, PCR Streptococcus agalactiae (Group B). Identification by BCID2 PCR.     BOTTLE TYPE Aerobic Bottle              Assessment:    Sepsis    Polysubstance abuse    Hyperbilirubinemia    Stage 4 chronic kidney disease    Lactic acidosis    Opioid use disorder    Chronic hepatitis  C with cirrhosis    Right knee pain    Effusion of right knee    Painless jaundice    Hepatitis B infection  1-systemic sepsis with probable right lower extremity cellulitis involving the lower leg and thigh area with likely systemic bacteremic sepsis with pathogen so far identified as group B strep with prior history of MRSA endocarditis.  2-right knee septic arthritis with aspiration culture positive for group B strep.  3-active IV drug use with phlebitis and evidence of septic emboli with 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     Recommendations/Discussions:  See my discussion and recommendations on 2/18/2024  Given the fact that there is no evidence of MRSA during this hospitalization and all the cultures this time was positive for group B strep would recommend DC vancomycin and continue with cefazolin if echocardiogram shows no evidence of endocarditis.  Once surgery on the knee joint is done an echocardiogram has shown no evidence of endocarditis then arrangement can be made for her to come to the ACU to receive 2 g of IV Rocephin every 24 hours for 4 to 6 weeks depending upon whether she has element of endocarditis or not.  Septic emboli in the right lung is concerning for endocarditis.  Overall prognosis is guarded as discussed on 2/18/2024.  Goals of care counseling and access evaluation is needed as she needs to be approached as substance abuse disorder and see if community and institutional resources can be mobilized to get her out of this spiraling down cervical that she is in.  Without addressing her substance abuse and craving and poor decision-making it is very difficult to achieve proper care plan and execute the care plan.  And  hence prognosis is guarded.  Jazmine Higginbotham MD  2/22/2024  10:05 EST    Parts of this note may be an electronic transcription/translation of spoken language to printed text using the Dragon dictation system.

## 2024-02-22 NOTE — PROGRESS NOTES
Schuyler Winkler MD     Orthopedic Progress Note    Subjective :   Patient still complains of right knee pain though does state that she is feeling better.  She was compliant with an IR aspiration yesterday.  States that her knee is feeling much better since the aspiration    Objective :    Vital signs in last 24 hours:  Temp:  [99 °F (37.2 °C)-99.7 °F (37.6 °C)] 99.5 °F (37.5 °C)  Heart Rate:  [] 96  Resp:  [18] 18  BP: (140-146)/(79-92) 146/92  Vitals:    02/21/24 1310 02/21/24 1942 02/21/24 2332 02/22/24 0455   BP: 144/79 146/82 140/80 146/92   BP Location: Left arm Right arm Right arm Right arm   Patient Position: Lying Lying Lying Lying   Pulse: 105 104 100 96   Resp: 18 18 18 18   Temp: 99.7 °F (37.6 °C) 99.5 °F (37.5 °C) 99 °F (37.2 °C) 99.5 °F (37.5 °C)   TempSrc: Oral Oral Oral Oral   SpO2: 97% 94% 94% 93%   Weight:       Height:           PHYSICAL EXAM:  Patient is calm, she does follow commands  Examination of the patient's right knee demonstrates some warmth.  No significant effusion.  Range of motion 10 degrees-about 80 degrees.  Cellulitis evidence in the bilateral lower extremities distally.  1+ pitting edema.    LABS:  Results from last 7 days   Lab Units 02/22/24  0603   WBC 10*3/mm3 10.30   HEMOGLOBIN g/dL 7.3*   HEMATOCRIT % 24.2*   PLATELETS 10*3/mm3 98*     Results from last 7 days   Lab Units 02/22/24  0603   SODIUM mmol/L 136   POTASSIUM mmol/L 3.8   CHLORIDE mmol/L 107   CO2 mmol/L 21.0*   BUN mg/dL 13   CREATININE mg/dL 0.96   GLUCOSE mg/dL 115*   CALCIUM mg/dL 7.6*     Results from last 7 days   Lab Units 02/18/24  1340   INR  1.51*       Intake/Output                         02/20/24 0701 - 02/21/24 0700 02/21/24 0701 - 02/22/24 0700     4432-3028 2180-5770 Total 1696-7958 2496-9837 Total                 Intake    P.O.  840  120 960  1100  -- 1100    I.V.  --  -- --  --  1150 1150    Total Intake 840  1150 2250       Output    Urine  1900 2000 3900  1460  6341 4311     Total Output 1900 2000 3900 1460 3950 5443             ASSESSMENT:  Right knee septic arthritis    Plan:  Patient's aspiration is consistent with pseudogout but with a secondary superinfection as she is growing strep agalactiae as well.  Cell count 60 1540    This elevated cell count is not unexpected with an inflammatory and/or crystalline arthropathy however she does have positive cultures.  I advised her that this would warrant irrigation and debridement.    She voices understanding and states that she is willing to do this.  Will try to plan on this today pending or availability.  Will make patient n.p.o.    Schuyler Winkler MD    Date: 2/22/2024  Time: 07:41 EST    Schuyler Winkler MD  Orthopaedic Surgeon  [facility]

## 2024-02-22 NOTE — PROGRESS NOTES
Name: Sammie Landeros ADMIT: 2024   : 1990  PCP: [email or web address]    MRN: [id number] LOS: 4 days   AGE/SEX: 33 y.o. female  ROOM: [facility]     Subjective   Subjective   She is sleeping upon entering room but awakens easily. Complains of pain in knee but its slightly better.  Plan for right knee washout today.  No CP SOB. No chills, N/V/ D, rash.         Objective   Objective   Vital Signs  Temp:  [98.6 °F (37 °C)-99.7 °F (37.6 °C)] 98.6 °F (37 °C)  Heart Rate:  [] 97  Resp:  [18] 18  BP: (140-150)/(79-92) 150/85  SpO2:  [93 %-97 %] 94 %  on   ;   Device (Oxygen Therapy): room air  Body mass index is 38.54 kg/m².  Physical Exam  Vitals reviewed.   Constitutional:       Appearance: She is well-developed. She is ill-appearing.      Comments: Acute on chronic ill appearing and appears older than stated age. Sleeping, drowsy but awakens easily. Limited answers to questions.    HENT:      Head: Normocephalic and atraumatic.      Mouth/Throat:      Mouth: Mucous membranes are moist.   Cardiovascular:      Rate and Rhythm: Regular rhythm. Tachycardia present.   Pulmonary:      Effort: Pulmonary effort is normal. No respiratory distress.      Breath sounds: Normal breath sounds.   Abdominal:      General: Bowel sounds are normal. There is no distension.      Palpations: Abdomen is soft.      Tenderness: There is no abdominal tenderness.   Musculoskeletal:         General: Tenderness present.      Right lower leg: Edema present.      Left lower leg: Edema present.   Skin:     General: Skin is warm and dry.      Coloration: Skin is jaundiced.      Comments: RLE  with erythema and edema    Neurological:      General: No focal deficit present.      Mental Status: She is alert and oriented to person, place, and time.   Psychiatric:         Attention and Perception: She is inattentive.         Mood and Affect: Mood is anxious. Affect is tearful.       Results Review     I reviewed the patient's new clinical  "results.  Results from last 7 days   Lab Units 02/22/24  0603 02/21/24  0654 02/20/24  0758 02/19/24  0236   WBC 10*3/mm3 10.30 11.48* 15.09* 8.73   HEMOGLOBIN g/dL 7.3* 7.4* 7.3* 7.0*   PLATELETS 10*3/mm3 98* 100* 81* 77*     Results from last 7 days   Lab Units 02/22/24  0603 02/21/24  0654 02/20/24 0758 02/19/24  0236   SODIUM mmol/L 136 136 133* 137   POTASSIUM mmol/L 3.8 4.1 4.0 4.0   CHLORIDE mmol/L 107 108* 104 106   CO2 mmol/L 21.0* 22.0 20.0* 20.2*   BUN mg/dL 13 18 21* 27*   CREATININE mg/dL 0.96 1.03* 1.16* 1.51*   GLUCOSE mg/dL 115* 90 103* 98   EGFR mL/min/1.73 79.3 72.9 63.2 46.0*     Results from last 7 days   Lab Units 02/22/24  0603 02/21/24  0654 02/20/24 0758 02/19/24  0236   ALBUMIN g/dL 2.1* 2.1* 1.9* 2.0*   BILIRUBIN mg/dL 4.1* 5.5* 7.2* 9.3*   ALK PHOS U/L 342* 313* 303* 231*   AST (SGOT) U/L 47* 49* 56* 54*   ALT (SGPT) U/L 15 21 27 35*     Results from last 7 days   Lab Units 02/22/24  0603 02/21/24  0654 02/20/24 0758 02/19/24  0236   CALCIUM mg/dL 7.6* 7.7* 7.6* 7.9*   ALBUMIN g/dL 2.1* 2.1* 1.9* 2.0*     Results from last 7 days   Lab Units 02/19/24  0236 02/18/24 2028 02/18/24  1710 02/18/24  1358 02/18/24  0921 02/18/24  0920   PROCALCITONIN ng/mL  --   --   --   --   --  19.60*   LACTATE mmol/L 1.4 2.6* 3.7* 4.0*   < >  --     < > = values in this interval not displayed.     No results found for: \"HGBA1C\", \"POCGLU\"    No radiology results for the last day    I have personally reviewed all medications:  Scheduled Medications  ceFAZolin, 2,000 mg, Intravenous, Q8H  lidocaine, 10 mL, Intradermal, Once in imaging  pantoprazole, 40 mg, Oral, Q AM  sodium bicarbonate, 325 mg, Oral, TID  vancomycin, 1,000 mg, Intravenous, Q12H    Infusions  lactated ringers, 100 mL/hr, Last Rate: 100 mL/hr (02/22/24 0012)  Pharmacy to dose vancomycin,     Diet  NPO Diet NPO Type: Strict NPO    I have personally reviewed:  [x]  Laboratory   [x]  Microbiology   [x]  Radiology   []  EKG/Telemetry  [x]  " Cardiology/Vascular   []  Pathology    []  Records       Assessment/Plan     Active Hospital Problems    Diagnosis  POA    **Sepsis [A41.9]  Yes    Hyperbilirubinemia [E80.6]  Yes    Stage 4 chronic kidney disease [N18.4]  Yes    Lactic acidosis [E87.20]  Yes    Opioid use disorder [F11.90]  Yes    Chronic hepatitis C with cirrhosis [B18.2, K74.60]  Yes    Right knee pain [M25.561]  Yes    Effusion of right knee [M25.461]  Yes    Painless jaundice [R17]  Yes    Hepatitis B infection [B19.10]  Yes    Polysubstance abuse [F19.10]  Yes      Resolved Hospital Problems   No resolved problems to display.       Ms. Landeros is a 33 y.o. female with polysubstance use disorder, chronic hepatitis C with cirrhosis, CKD, history of TV endocarditis/MRSA bacteremia presenting with right knee pain and is admitted to the hospital with concern for septic arthritis      Suspected R knee septic arthritis/R knee pain/effusion  Hx TV endocarditis/MRSA bacteremia  Lactic acidosis  -ID and ortho following  --Xray w/ R knee effusion. Arthrocentesis attempted in ED but she was uncooperative. Completed in IR on 2/20 with 50cc purulent fluid removed. Fluid consistent with pseudogout in addition to secondary infection with strep agalactiae.  - Abx per ID, see note for recommendations. Check echocardiogram for endocarditis.   - plan for washout today per ortho sx.      Opioid use disorder  -most recently used heroin 2/19.   - IV dilaudid PRN for pain and COWS protocol.       CKD4  -Crt 1.9  now 1.0      Hepatitis C cirrhosis   Hepatitis B  Painless jaundice/hyperbilirubinemia  -bili 11.3 now 5.5   -denies EtOH use  -CT w/ cirrhosis, splenomegaly, mild ascites  -GI signed off. Follow up with  hepatology to address HepB and HCV          Disposition  Expected Discharge Date: 2/25/2024; Expected Discharge Time:            TYLOR Rogers  [facility]  02/22/24  09:27 EST

## 2024-02-23 ENCOUNTER — APPOINTMENT (OUTPATIENT)
Dept: CARDIOLOGY | Facility: HOSPITAL | Age: 34
DRG: 872 | End: 2024-02-23
Payer: COMMERCIAL

## 2024-02-23 LAB
ALBUMIN SERPL-MCNC: 2 G/DL (ref 3.5–5.2)
ALBUMIN/GLOB SERPL: 0.4 G/DL
ALP SERPL-CCNC: 363 U/L (ref 39–117)
ALT SERPL W P-5'-P-CCNC: 9 U/L (ref 1–33)
ANION GAP SERPL CALCULATED.3IONS-SCNC: 8.6 MMOL/L (ref 5–15)
AORTIC DIMENSIONLESS INDEX: 0.9 (DI)
AST SERPL-CCNC: 42 U/L (ref 1–32)
BASOPHILS # BLD AUTO: 0.03 10*3/MM3 (ref 0–0.2)
BASOPHILS NFR BLD AUTO: 0.3 % (ref 0–1.5)
BH CV ECHO MEAS - AO MAX PG: 11.3 MMHG
BH CV ECHO MEAS - AO MEAN PG: 5.2 MMHG
BH CV ECHO MEAS - AO ROOT DIAM: 2.6 CM
BH CV ECHO MEAS - AO V2 MAX: 168.3 CM/SEC
BH CV ECHO MEAS - AO V2 VTI: 26.9 CM
BH CV ECHO MEAS - EDV(MOD-SP2): 114 ML
BH CV ECHO MEAS - EDV(MOD-SP4): 120 ML
BH CV ECHO MEAS - EF(MOD-BP): 59.5 %
BH CV ECHO MEAS - EF(MOD-SP2): 53.5 %
BH CV ECHO MEAS - EF(MOD-SP4): 66.7 %
BH CV ECHO MEAS - ESV(MOD-SP2): 53 ML
BH CV ECHO MEAS - ESV(MOD-SP4): 40 ML
BH CV ECHO MEAS - LAT PEAK E' VEL: 22.2 CM/SEC
BH CV ECHO MEAS - LV MAX PG: 8.3 MMHG
BH CV ECHO MEAS - LV MEAN PG: 3.4 MMHG
BH CV ECHO MEAS - LV V1 MAX: 144.3 CM/SEC
BH CV ECHO MEAS - LV V1 VTI: 24.8 CM
BH CV ECHO MEAS - MED PEAK E' VEL: 9.7 CM/SEC
BH CV ECHO MEAS - MR MAX PG: 30.3 MMHG
BH CV ECHO MEAS - MR MAX VEL: 275.2 CM/SEC
BH CV ECHO MEAS - MV A DUR: 0.12 SEC
BH CV ECHO MEAS - MV A MAX VEL: 116.9 CM/SEC
BH CV ECHO MEAS - MV DEC SLOPE: 634.8 CM/SEC2
BH CV ECHO MEAS - MV DEC TIME: 0.21 SEC
BH CV ECHO MEAS - MV E MAX VEL: 64.5 CM/SEC
BH CV ECHO MEAS - MV E/A: 0.55
BH CV ECHO MEAS - MV MAX PG: 7.3 MMHG
BH CV ECHO MEAS - MV MEAN PG: 3.9 MMHG
BH CV ECHO MEAS - MV P1/2T: 58.6 MSEC
BH CV ECHO MEAS - MV V2 VTI: 28.5 CM
BH CV ECHO MEAS - MVA(P1/2T): 3.8 CM2
BH CV ECHO MEAS - PA ACC TIME: 0.09 SEC
BH CV ECHO MEAS - PA V2 MAX: 135.5 CM/SEC
BH CV ECHO MEAS - PULM A REVS DUR: 0.14 SEC
BH CV ECHO MEAS - PULM A REVS VEL: 40.1 CM/SEC
BH CV ECHO MEAS - PULM DIAS VEL: 45.6 CM/SEC
BH CV ECHO MEAS - PULM S/D: 1.12
BH CV ECHO MEAS - PULM SYS VEL: 51.1 CM/SEC
BH CV ECHO MEAS - RV MAX PG: 3.3 MMHG
BH CV ECHO MEAS - RV V1 MAX: 90.7 CM/SEC
BH CV ECHO MEAS - RV V1 VTI: 20.9 CM
BH CV ECHO MEAS - SV(MOD-SP2): 61 ML
BH CV ECHO MEAS - SV(MOD-SP4): 80 ML
BH CV ECHO MEAS - TR MAX PG: 29.4 MMHG
BH CV ECHO MEAS - TR MAX VEL: 270.9 CM/SEC
BH CV ECHO MEASUREMENTS AVERAGE E/E' RATIO: 4.04
BH CV XLRA - RV BASE: 3 CM
BH CV XLRA - RV LENGTH: 8.2 CM
BH CV XLRA - RV MID: 3.4 CM
BH CV XLRA - TDI S': 16 CM/SEC
BILIRUB SERPL-MCNC: 3.2 MG/DL (ref 0–1.2)
BUN SERPL-MCNC: 10 MG/DL (ref 6–20)
BUN/CREAT SERPL: 12.2 (ref 7–25)
CALCIUM SPEC-SCNC: 7.5 MG/DL (ref 8.6–10.5)
CHLORIDE SERPL-SCNC: 105 MMOL/L (ref 98–107)
CO2 SERPL-SCNC: 20.4 MMOL/L (ref 22–29)
CREAT SERPL-MCNC: 0.82 MG/DL (ref 0.57–1)
DEPRECATED RDW RBC AUTO: 43.5 FL (ref 37–54)
EGFRCR SERPLBLD CKD-EPI 2021: 95.8 ML/MIN/1.73
EOSINOPHIL # BLD AUTO: 0.08 10*3/MM3 (ref 0–0.4)
EOSINOPHIL NFR BLD AUTO: 0.8 % (ref 0.3–6.2)
ERYTHROCYTE [DISTWIDTH] IN BLOOD BY AUTOMATED COUNT: 20.6 % (ref 12.3–15.4)
FERRITIN SERPL-MCNC: 133 NG/ML (ref 13–150)
GLOBULIN UR ELPH-MCNC: 5.1 GM/DL
GLUCOSE SERPL-MCNC: 96 MG/DL (ref 65–99)
HCT VFR BLD AUTO: 25.3 % (ref 34–46.6)
HGB BLD-MCNC: 7.8 G/DL (ref 12–15.9)
IRON 24H UR-MRATE: 31 MCG/DL (ref 37–145)
IRON SATN MFR SERPL: 12 % (ref 20–50)
LEFT ATRIUM VOLUME INDEX: 17.8 ML/M2
LYMPHOCYTES # BLD AUTO: 2.01 10*3/MM3 (ref 0.7–3.1)
LYMPHOCYTES NFR BLD AUTO: 20 % (ref 19.6–45.3)
MCH RBC QN AUTO: 20.6 PG (ref 26.6–33)
MCHC RBC AUTO-ENTMCNC: 30.8 G/DL (ref 31.5–35.7)
MCV RBC AUTO: 66.9 FL (ref 79–97)
MONOCYTES # BLD AUTO: 0.82 10*3/MM3 (ref 0.1–0.9)
MONOCYTES NFR BLD AUTO: 8.2 % (ref 5–12)
NEUTROPHILS NFR BLD AUTO: 6.93 10*3/MM3 (ref 1.7–7)
NEUTROPHILS NFR BLD AUTO: 68.9 % (ref 42.7–76)
PLATELET # BLD AUTO: 127 10*3/MM3 (ref 140–450)
POTASSIUM SERPL-SCNC: 4.3 MMOL/L (ref 3.5–5.2)
PROT SERPL-MCNC: 7.1 G/DL (ref 6–8.5)
RBC # BLD AUTO: 3.78 10*6/MM3 (ref 3.77–5.28)
SODIUM SERPL-SCNC: 134 MMOL/L (ref 136–145)
TIBC SERPL-MCNC: 255 MCG/DL (ref 298–536)
TRANSFERRIN SERPL-MCNC: 171 MG/DL (ref 200–360)
VANCOMYCIN TROUGH SERPL-MCNC: 11.9 MCG/ML (ref 5–20)
WBC NRBC COR # BLD AUTO: 10.05 10*3/MM3 (ref 3.4–10.8)

## 2024-02-23 PROCEDURE — 36415 COLL VENOUS BLD VENIPUNCTURE: CPT | Performed by: ORTHOPAEDIC SURGERY

## 2024-02-23 PROCEDURE — 93306 TTE W/DOPPLER COMPLETE: CPT | Performed by: INTERNAL MEDICINE

## 2024-02-23 PROCEDURE — 25010000002 CEFAZOLIN IN DEXTROSE 2-4 GM/100ML-% SOLUTION: Performed by: ORTHOPAEDIC SURGERY

## 2024-02-23 PROCEDURE — 93306 TTE W/DOPPLER COMPLETE: CPT

## 2024-02-23 PROCEDURE — 80202 ASSAY OF VANCOMYCIN: CPT | Performed by: STUDENT IN AN ORGANIZED HEALTH CARE EDUCATION/TRAINING PROGRAM

## 2024-02-23 PROCEDURE — 80053 COMPREHEN METABOLIC PANEL: CPT | Performed by: ORTHOPAEDIC SURGERY

## 2024-02-23 PROCEDURE — 25010000002 HYDROMORPHONE PER 4 MG: Performed by: ORTHOPAEDIC SURGERY

## 2024-02-23 PROCEDURE — 25010000002 VANCOMYCIN 10 G RECONSTITUTED SOLUTION: Performed by: STUDENT IN AN ORGANIZED HEALTH CARE EDUCATION/TRAINING PROGRAM

## 2024-02-23 PROCEDURE — 25810000003 SODIUM CHLORIDE 0.9 % SOLUTION: Performed by: STUDENT IN AN ORGANIZED HEALTH CARE EDUCATION/TRAINING PROGRAM

## 2024-02-23 PROCEDURE — 84466 ASSAY OF TRANSFERRIN: CPT | Performed by: NURSE PRACTITIONER

## 2024-02-23 PROCEDURE — 25810000003 LACTATED RINGERS PER 1000 ML: Performed by: ORTHOPAEDIC SURGERY

## 2024-02-23 PROCEDURE — 83540 ASSAY OF IRON: CPT | Performed by: NURSE PRACTITIONER

## 2024-02-23 PROCEDURE — 25510000001 PERFLUTREN (DEFINITY) 8.476 MG IN SODIUM CHLORIDE (PF) 0.9 % 10 ML INJECTION: Performed by: ORTHOPAEDIC SURGERY

## 2024-02-23 PROCEDURE — 85025 COMPLETE CBC W/AUTO DIFF WBC: CPT | Performed by: ORTHOPAEDIC SURGERY

## 2024-02-23 PROCEDURE — 82728 ASSAY OF FERRITIN: CPT | Performed by: NURSE PRACTITIONER

## 2024-02-23 RX ADMIN — HYDROMORPHONE HYDROCHLORIDE 0.5 MG: 1 INJECTION, SOLUTION INTRAMUSCULAR; INTRAVENOUS; SUBCUTANEOUS at 06:20

## 2024-02-23 RX ADMIN — GABAPENTIN 600 MG: 300 CAPSULE ORAL at 13:45

## 2024-02-23 RX ADMIN — HYDROMORPHONE HYDROCHLORIDE 0.5 MG: 1 INJECTION, SOLUTION INTRAMUSCULAR; INTRAVENOUS; SUBCUTANEOUS at 03:22

## 2024-02-23 RX ADMIN — HYDROMORPHONE HYDROCHLORIDE 0.5 MG: 1 INJECTION, SOLUTION INTRAMUSCULAR; INTRAVENOUS; SUBCUTANEOUS at 12:37

## 2024-02-23 RX ADMIN — SODIUM CHLORIDE, POTASSIUM CHLORIDE, SODIUM LACTATE AND CALCIUM CHLORIDE 100 ML/HR: 600; 310; 30; 20 INJECTION, SOLUTION INTRAVENOUS at 20:02

## 2024-02-23 RX ADMIN — HYDROMORPHONE HYDROCHLORIDE 0.5 MG: 1 INJECTION, SOLUTION INTRAMUSCULAR; INTRAVENOUS; SUBCUTANEOUS at 09:51

## 2024-02-23 RX ADMIN — CEFAZOLIN SODIUM 2000 MG: 2 INJECTION, SOLUTION INTRAVENOUS at 16:15

## 2024-02-23 RX ADMIN — CYCLOBENZAPRINE 10 MG: 10 TABLET, FILM COATED ORAL at 13:45

## 2024-02-23 RX ADMIN — HYDROMORPHONE HYDROCHLORIDE 0.5 MG: 1 INJECTION, SOLUTION INTRAMUSCULAR; INTRAVENOUS; SUBCUTANEOUS at 00:32

## 2024-02-23 RX ADMIN — HYDROMORPHONE HYDROCHLORIDE 0.5 MG: 1 INJECTION, SOLUTION INTRAMUSCULAR; INTRAVENOUS; SUBCUTANEOUS at 18:45

## 2024-02-23 RX ADMIN — HYDROMORPHONE HYDROCHLORIDE 0.5 MG: 1 INJECTION, SOLUTION INTRAMUSCULAR; INTRAVENOUS; SUBCUTANEOUS at 16:06

## 2024-02-23 RX ADMIN — PERFLUTREN 3 ML: 6.52 INJECTION, SUSPENSION INTRAVENOUS at 14:33

## 2024-02-23 RX ADMIN — VANCOMYCIN HYDROCHLORIDE 1250 MG: 10 INJECTION, POWDER, LYOPHILIZED, FOR SOLUTION INTRAVENOUS at 13:50

## 2024-02-23 RX ADMIN — CEFAZOLIN SODIUM 2000 MG: 2 INJECTION, SOLUTION INTRAVENOUS at 11:30

## 2024-02-23 RX ADMIN — HYDROMORPHONE HYDROCHLORIDE 0.5 MG: 1 INJECTION, SOLUTION INTRAMUSCULAR; INTRAVENOUS; SUBCUTANEOUS at 21:46

## 2024-02-23 NOTE — PLAN OF CARE
Goal Outcome Evaluation:  Plan of Care Reviewed With: patient        Progress: no change  Outcome Evaluation: Low grade temps.  Turns herself in bed at times, but refuses for the NA to clean her up or change any sheets.  Bed alarm on for safety.  C/O pain to right knee.  Hemovac to right knee with ACE wrap in place.

## 2024-02-23 NOTE — PROGRESS NOTES
"[facility]: Vancomycin Monitoring Note    Sammie Landeros is a 33 y.o. female who is on day 6/7 of pharmacy to dose vancomycin for Sepsis.    Previous Vancomycin Dose: 1000 mg IV every 12 hours    Vitals/Labs  Ht: 175.3 cm (69\"); Wt: 118 kg (261 lb)   Estimated Creatinine Clearance: 131.4 mL/min (by C-G formula based on SCr of 0.82 mg/dL).   Results from last 7 days   Lab Units 02/23/24  1100 02/23/24  0614 02/22/24  0603 02/21/24  0654 02/20/24  0758 02/19/24  1024   CREATININE mg/dL  --  0.82 0.96 1.03*   < >  --    VANCOMYCIN TR mcg/mL 11.90  --   --   --   --  14.20    < > = values in this interval not displayed.     Assessment/Plan  Vancomycin Dose: 1250 mg IV every 12 hours; provides a predicted  mg/L.hr   No levels planned at this time  We will continue to monitor patient changes and renal function     Thank you for involving pharmacy in this patient's care. Please contact pharmacy with any questions or concerns.       Ranjan Decker III, PharmD  Clinical Pharmacist    "

## 2024-02-23 NOTE — OP NOTE
KNEE ARTHROSCOPY, INCISION AND DRAINAGE LOWER EXTREMITY  Procedure Note    Sammie Landeros  2/22/2024    Pre-op Diagnosis: Right knee septic arthritis.   Post-op Diagnosis: Same  Procedure: Right knee arthroscopic lavage and drainage, 91489.  Surgeon:  Rogelio Anderson MD  Anesthesia: General, Anesthesiologist: Miguelina Chow MD  Staff: Circulator: Lisseth Mendieta RN  Scrub Person: Rossana Valdivia  Vendor Representative: Willard De Santiago  Assistant: Bobo Alvarez PA-C CFA  Estimated Blood Loss: minimal  Specimens:   Order Name Source Comment Collection Info Order Time   ANAEROBIC CULTURE Knee, Right  Collected By: Rogelio Anderson MD 2/22/2024  3:46 PM     Release to patient   Routine Release        WOUND CULTURE Knee, Right  Collected By: Rogelio Anderson MD 2/22/2024  3:46 PM     Release to patient   Routine Release        TYPE AND SCREEN   Collected By: Emily Baker RN 2/22/2024  3:18 PM     Release to patient   Routine Release          Drains: none  Complications: None    Components Utilized:    Nothing was implanted during the procedure    Indication for Procedure:  This patient is a 33 y.o. female who has history of IV drug abuse.  She was noted to have a septic right knee confirmed with interventional radiology aspiration.  It was felt she would benefit from arthroscopic I&D.  Surgical options and non-surgical options were discussed in detail and to the patient's satisfaction.  Surgical intervention was recommended based on the patient's injury and functional status.      The risks and benefits of surgery were discussed with patient and informed consent was obtained.  Risks include but are not limited to, infection, bleeding, nerve injury, blood clots, risks associated with anesthesia, need for further surgery, persistent pain, and possibly death.    DESCRIPTION OF PROCEDURE:     The patient was seen in Preoperative Holding Area where her right knee surgical site was marked. Preoperative  scheduled antibiotics were received. H&P and consent updated.  She was taken to the Operating Room and provided general anesthesia on the Operating Room table.  The right lower extremity was prepped and draped in a typical sterile fashion.  A timeout was performed confirming the correct surgical site and procedure.  At this point a standard anterolateral portal was created to the knee joint with an 11 blade.  Blunt trocar was carefully inserted into the knee joint.  A significant amount of turbid fluid extravasated through the cannula.  Culture swab was utilized.  Camera was inserted into the knee.  The knee was copiously irrigated.  Anterior medial portal was created under visual guidance using an 18-gauge needle.  Arthroscopic shaver was utilized to resect any adhesions within the joint.  Arthritic changes were noted within the knee joint.  3 L of normal saline containing Betadine was pulsed through the wound utilizing the camera.  3 more liters of normal saline without Betadine was flushed through the knee.  At this point, a 10 [place] Hemovac drain was placed to the anterior medial portal.  Instruments were removed.  Portal wounds were closed with 3-0 nylon suture.  Sterile dressings were applied.  Patient was taken to recovery in stable condition.    Postoperative Plan:  The patient will return to her hospital room.  She will continue antibiotic treatment per infectious diseases.  She will be weightbearing as tolerated on the right lower extremity.  She may have her drain removed when appropriate.    Bobo Alvarez PA-C assisted for the entire surgical procedure.  He was necessary for manipulation of the leg during the procedure.    No complications were encountered during the surgical procedure.    Rogelio Anderson MD

## 2024-02-23 NOTE — PROGRESS NOTES
Orthopedic Progress Note    Subjective :   Patient resting in bed.  Notes right knee pain but improved.    Objective :    Vital signs in last 24 hours:  Temp:  [97.4 °F (36.3 °C)-100 °F (37.8 °C)] 97.4 °F (36.3 °C)  Heart Rate:  [87-99] 94  Resp:  [16-26] 18  BP: (145-186)/() 155/85  Vitals:    02/22/24 1924 02/23/24 0045 02/23/24 0356 02/23/24 0756   BP: (!) 186/118  Comment: nurse notified 146/94 157/98 155/85   BP Location: Right arm Right arm Right arm Right arm   Patient Position: Lying Lying Lying Lying   Pulse: 94 95 94 94   Resp: 16 16 16 18   Temp: 99 °F (37.2 °C) 98.6 °F (37 °C) 100 °F (37.8 °C) 97.4 °F (36.3 °C)   TempSrc: Oral Oral Oral Oral   SpO2: 98% 94% 97%    Weight:       Height:           PHYSICAL EXAM:  Patient is calm, in no acute distress, awake and oriented x 3.  Dressing to the right knee intact.  Gross motor and sensory exam intact.  Drain in place.      LABS:  Results from last 7 days   Lab Units 02/23/24  0614   WBC 10*3/mm3 10.05   HEMOGLOBIN g/dL 7.8*   HEMATOCRIT % 25.3*   PLATELETS 10*3/mm3 127*     Results from last 7 days   Lab Units 02/23/24  0614   SODIUM mmol/L 134*   POTASSIUM mmol/L 4.3   CHLORIDE mmol/L 105   CO2 mmol/L 20.4*   BUN mg/dL 10   CREATININE mg/dL 0.82   GLUCOSE mg/dL 96   CALCIUM mg/dL 7.5*     Results from last 7 days   Lab Units 02/18/24  1340   INR  1.51*       ASSESSMENT:  Status post right knee arthroscopic I&D, postoperative day 1.    Plan:  Continue Physical Therapy, weightbearing as tolerated right lower extremity.  Continue SCDs, Continue DVT prophylaxis as indicated by primary team.  We will have drain removed today.  Continue ice and elevate.  Range of motion as tolerated.    Continue IV antibiotics per infectious disease recommendations.    Please call with any questions.  Patient needs sutures removed from right knee in 2 weeks.    Rogelio Anderson MD    Date: 2/23/2024  Time: 08:53 EST

## 2024-02-23 NOTE — NURSING NOTE
Talked with MD.  Discussed patient wanting to have visitors outside of mom.  Due to patient refusing care (psych consult), vulnerability, and previous histories it was decided that patient would be most safe to restrict visitors.  Explained this to patient.  Patient requested to have someone bring in her belongings and have them dropped off at the desk.  I told her this would be okay with charge RN present.

## 2024-02-23 NOTE — CASE MANAGEMENT/SOCIAL WORK
Continued Stay Note  [facility]     Patient Name: Sammie Landeros  MRN: [id number]  Today's Date: 2/23/2024    Admit Date: 2/18/2024    Plan: TBD   Discharge Plan       Row Name 02/23/24 1244       Plan    Plan TBD    Patient/Family in Agreement with Plan yes    Plan Comments Chart reviewed by CCP, discused with primary RN, pt had surgery yesterday, still unsure of plan for abx/endocarditis etc, no d/c this weekend planned. May need ACU infusions. CCP will follow - Amita JOHNSON                   Discharge Codes    No documentation.                 Expected Discharge Date and Time       Expected Discharge Date Expected Discharge Time    Feb 26, 2024               Amita Kumar RN

## 2024-02-23 NOTE — PROGRESS NOTES
"  Infectious Diseases Progress Note    Jazmine Higginbotham MD     [facility]  [place]: 5 days  Patient Identification:  Name: Sammie Landeros  Age: 33 y.o.  Sex: female  :  1990  MRN: [id number]         Primary Care Physician: Melvi Landeros APRN        Subjective: Feeling better with decreased discomfort in her leg and denies any fever and chills.  Interval History: See consultation note.  Right knee aspiration performed on 2024  revealing purulent fluid consistent with septic arthritis.  Gram stain is positive for gram-positive cocci now showing culture positive for group Bstrep.  Objective:    Scheduled Meds:ceFAZolin, 2,000 mg, Intravenous, Q8H  lidocaine, 10 mL, Intradermal, Once in imaging  pantoprazole, 40 mg, Oral, Q AM  sodium bicarbonate, 325 mg, Oral, TID  vancomycin, 1,250 mg, Intravenous, Q12H      Continuous Infusions:lactated ringers, 100 mL/hr, Last Rate: 100 mL/hr (24 182)  lactated ringers, 9 mL/hr, Last Rate: 9 mL/hr (24 165)  Pharmacy to dose vancomycin,         Vital signs in last 24 hours:  Temp:  [97.4 °F (36.3 °C)-100 °F (37.8 °C)] 97.9 °F (36.6 °C)  Heart Rate:  [93-95] 93  Resp:  [16-18] 18  BP: (145-186)/() 145/87    Intake/Output:    Intake/Output Summary (Last 24 hours) at 2024 1841  Last data filed at 2024 1623  Gross per 24 hour   Intake 1910 ml   Output 5140 ml   Net -3230 ml       Exam:  /87   Pulse 93   Temp 97.9 °F (36.6 °C) (Oral)   Resp 18   Ht 175 cm (68.9\")   Wt 118 kg (260 lb 2.3 oz)   SpO2 98%   BMI 38.53 kg/m²   Patient is examined using the personal protective equipment as per guidelines from infection control for this particular patient as enacted.  Hand washing was performed before and after patient interaction.  General Appearance: Appears slightly better and interactive.  Still appears ill.  Agreeable to go for surgery.                          Head:    Normocephalic, without obvious abnormality, atraumatic       "                     Eyes:  Eyes closed                         Throat:   Lips, tongue, gums normal; oral mucosa pink and moist                           Neck:   Supple, symmetrical, trachea midline, no JVD                         Lungs:    Bibasilar crackles                 Chest Wall:    No tenderness or deformity                          Heart:  S1-S2 regular                  Abdomen:   Obese soft nontender                 Extremities: As described in the skin examination, decreased warmth and tenderness of the right knee.                        Pulses:   Pulses palpable in all extremities                            Skin: Skin graft area on the right lateral leg erythematous the scabs, erythema of the overall right lower extremity is decreased.  Dry wound on the lateral gluteal aspect of the right lower extremity without any drainage.  Swelling and tenderness of the right knee still present.                    Neurologic: Withdrawn and lethargic but grossly nonfocal Limited right lower extremity movements because of pain       Data Review:    I reviewed the patient's new clinical results.  Results from last 7 days   Lab Units 02/23/24  0614 02/22/24  0603 02/21/24  0654 02/20/24  0758 02/19/24  0236 02/18/24  0920   WBC 10*3/mm3 10.05 10.30 11.48* 15.09* 8.73 9.93   HEMOGLOBIN g/dL 7.8* 7.3* 7.4* 7.3* 7.0* 7.5*   PLATELETS 10*3/mm3 127* 98* 100* 81* 77* 65*     Results from last 7 days   Lab Units 02/23/24  0614 02/22/24  0603 02/21/24  0654 02/20/24  0758 02/19/24  0236 02/18/24  0920   SODIUM mmol/L 134* 136 136 133* 137 135*   POTASSIUM mmol/L 4.3 3.8 4.1 4.0 4.0 4.1   CHLORIDE mmol/L 105 107 108* 104 106 103   CO2 mmol/L 20.4* 21.0* 22.0 20.0* 20.2* 17.0*   BUN mg/dL 10 13 18 21* 27* 29*   CREATININE mg/dL 0.82 0.96 1.03* 1.16* 1.51* 1.90*   CALCIUM mg/dL 7.5* 7.6* 7.7* 7.6* 7.9* 8.3*   GLUCOSE mg/dL 96 115* 90 103* 98 102*     Microbiology Results (last 10 days)       Procedure Component Value - Date/Time     Wound Culture - Surgical Site, Knee, Right [[id number]] Collected: 02/22/24 1546    Lab Status: Preliminary result Specimen: Surgical Site from Knee, Right Updated: 02/23/24 0937     Gram Stain Rare (1+) WBCs seen      No organisms seen    Narrative:      Organism under investigation.      Body Fluid Culture - Aspirate, Knee, Right [[id number]]  (Abnormal)  (Susceptibility) Collected: 02/20/24 1357    Lab Status: Final result Specimen: Aspirate from Knee, Right Updated: 02/22/24 0627     Body Fluid Culture Moderate growth (3+) Streptococcus agalactiae (Group B)     Gram Stain Many (4+) WBCs seen      Few (2+) Gram positive cocci in chains    Susceptibility        Streptococcus agalactiae (Group B)      SARAH      Ceftriaxone Susceptible      Clindamycin Susceptible      Inducible Clindamycin Resistance Negative      Penicillin G Susceptible      Vancomycin Susceptible                           Blood Culture - Blood, Arm, Right [[id number]]  (Normal) Collected: 02/20/24 0807    Lab Status: Preliminary result Specimen: Blood from Arm, Right Updated: 02/23/24 0830     Blood Culture No growth at 3 days    Blood Culture - Blood, Arm, Left [[id number]]  (Normal) Collected: 02/20/24 0758    Lab Status: Preliminary result Specimen: Blood from Arm, Left Updated: 02/23/24 0830     Blood Culture No growth at 3 days    Blood Culture - Blood, Hand, Left [[id number]]  (Abnormal)  (Susceptibility) Collected: 02/18/24 1029    Lab Status: Final result Specimen: Blood from Hand, Left Updated: 02/20/24 0648     Blood Culture Streptococcus agalactiae (Group B)     Isolated from Aerobic Bottle     Gram Stain Aerobic Bottle Gram positive cocci in chains    Susceptibility        Streptococcus agalactiae (Group B)      SARAH      Ceftriaxone Susceptible      Penicillin G Susceptible      Vancomycin Susceptible                           Blood Culture ID, PCR - Blood, Hand, Left [[id number]]  (Abnormal) Collected: 02/18/24 1029    Lab Status: Final  result Specimen: Blood from Hand, Left Updated: 02/18/24 2235     BCID, PCR Streptococcus agalactiae (Group B). Identification by BCID2 PCR.     BOTTLE TYPE Aerobic Bottle              Assessment:    Sepsis    Polysubstance abuse    Hyperbilirubinemia    Stage 4 chronic kidney disease    Lactic acidosis    Opioid use disorder    Chronic hepatitis C with cirrhosis    Right knee pain    Effusion of right knee    Painless jaundice    Hepatitis B infection  1-systemic sepsis with probable right lower extremity cellulitis involving the lower leg and thigh area with likely systemic bacteremic sepsis with pathogen so far identified as group B strep with prior history of MRSA endocarditis.  2-right knee septic arthritis with aspiration culture positive for group B strep.  3-active IV drug use with phlebitis and evidence of septic emboli with 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     Recommendations/Discussions:  See my discussion and recommendation on 2/22/2024.    Her echocardiogram does not show any evidence of active vegetation or endocarditis.  Will simplify antibiotic therapy to IV cefazolin and would recommend 4 weeks of IV cefazolin or IV Rocephin from 2/22/2024.  DC vancomycin  I will treatment will be her coming to ACU and getting IV Rocephin once a day to complete the treatment but if it is hardship or for some reason cannot be carried out then 4 weeks of oral Keflex is another option in this difficult situation of active IV drug use and our inability to provide IV access and noncompliance issues that she has had before.  Patient still would need follow-up with orthopedic surgery service.  Referral to substance abuse treatment programs and rehab  is not unreasonable.  Jazmine Higginbotham MD  2/23/2024  18:41 EST    Parts of this note may be an electronic transcription/translation of spoken language to printed text using the Dragon dictation system.

## 2024-02-23 NOTE — PROGRESS NOTES
Name: Sammie Landeros ADMIT: 2024   : 1990  PCP: Melvi LanderosTYLOR    MRN: [id number] LOS: 5 days   AGE/SEX: 33 y.o. female  ROOM: [facility]     Subjective   Subjective    S/p right knee washout .  Nausea but no emesis. No CP SOB. No chills, N/V/ D, rash.         Objective   Objective   Vital Signs  Temp:  [97.4 °F (36.3 °C)-100 °F (37.8 °C)] 97.4 °F (36.3 °C)  Heart Rate:  [87-99] 94  Resp:  [16-26] 18  BP: (145-186)/() 155/85  SpO2:  [94 %-100 %] 97 %  on  Flow (L/min):  [2-6] 2;   Device (Oxygen Therapy): room air  Body mass index is 38.54 kg/m².  Physical Exam  Vitals reviewed.   Constitutional:       Appearance: She is well-developed. She is ill-appearing.      Comments: Acute on chronic ill appearing and appears older than stated age. Sleeping, drowsy but awakens easily. She complains about being examined   HENT:      Head: Normocephalic and atraumatic.      Mouth/Throat:      Mouth: Mucous membranes are moist.   Cardiovascular:      Rate and Rhythm: Regular rhythm. Tachycardia present.   Pulmonary:      Effort: Pulmonary effort is normal. No respiratory distress.      Breath sounds: Normal breath sounds.   Abdominal:      General: Bowel sounds are normal. There is no distension.      Palpations: Abdomen is soft.      Tenderness: There is no abdominal tenderness.   Musculoskeletal:         General: Tenderness present.      Right lower leg: Edema present.      Left lower leg: Edema present.      Comments: RLE knee wrapped with drain    Skin:     General: Skin is warm and dry.      Coloration: Skin is jaundiced.      Comments: RLE  with erythema and edema    Neurological:      General: No focal deficit present.      Mental Status: She is alert and oriented to person, place, and time.   Psychiatric:         Mood and Affect: Affect is blunt.         Behavior: Behavior is uncooperative.       Results Review     I reviewed the patient's new clinical results.  Results from last 7 days   Lab  "Units 02/23/24  0614 02/22/24  0603 02/21/24  0654 02/20/24  0758   WBC 10*3/mm3 10.05 10.30 11.48* 15.09*   HEMOGLOBIN g/dL 7.8* 7.3* 7.4* 7.3*   PLATELETS 10*3/mm3 127* 98* 100* 81*     Results from last 7 days   Lab Units 02/23/24  0614 02/22/24  0603 02/21/24  0654 02/20/24  0758   SODIUM mmol/L 134* 136 136 133*   POTASSIUM mmol/L 4.3 3.8 4.1 4.0   CHLORIDE mmol/L 105 107 108* 104   CO2 mmol/L 20.4* 21.0* 22.0 20.0*   BUN mg/dL 10 13 18 21*   CREATININE mg/dL 0.82 0.96 1.03* 1.16*   GLUCOSE mg/dL 96 115* 90 103*   EGFR mL/min/1.73 95.8 79.3 72.9 63.2     Results from last 7 days   Lab Units 02/23/24  0614 02/22/24  0603 02/21/24  0654 02/20/24  0758   ALBUMIN g/dL 2.0* 2.1* 2.1* 1.9*   BILIRUBIN mg/dL 3.2* 4.1* 5.5* 7.2*   ALK PHOS U/L 363* 342* 313* 303*   AST (SGOT) U/L 42* 47* 49* 56*   ALT (SGPT) U/L 9 15 21 27     Results from last 7 days   Lab Units 02/23/24 0614 02/22/24  0603 02/21/24  0654 02/20/24  0758   CALCIUM mg/dL 7.5* 7.6* 7.7* 7.6*   ALBUMIN g/dL 2.0* 2.1* 2.1* 1.9*     Results from last 7 days   Lab Units 02/19/24  0236 02/18/24 2028 02/18/24  1710 02/18/24  1358 02/18/24  0921 02/18/24  0920   PROCALCITONIN ng/mL  --   --   --   --   --  19.60*   LACTATE mmol/L 1.4 2.6* 3.7* 4.0*   < >  --     < > = values in this interval not displayed.     No results found for: \"HGBA1C\", \"POCGLU\"    No radiology results for the last day    I have personally reviewed all medications:  Scheduled Medications  ceFAZolin, 2,000 mg, Intravenous, Q8H  lidocaine, 10 mL, Intradermal, Once in imaging  pantoprazole, 40 mg, Oral, Q AM  sodium bicarbonate, 325 mg, Oral, TID  vancomycin, 1,000 mg, Intravenous, Q12H    Infusions  lactated ringers, 100 mL/hr, Last Rate: 100 mL/hr (02/22/24 0186)  lactated ringers, 9 mL/hr, Last Rate: 9 mL/hr (02/22/24 1652)  Pharmacy to dose vancomycin,     Diet  Diet: Regular/House Diet; Texture: Regular Texture (IDDSI 7); Fluid Consistency: Thin (IDDSI 0)    I have personally " reviewed:  [x]  Laboratory   [x]  Microbiology   [x]  Radiology   [x]  EKG/Telemetry  [x]  Cardiology/Vascular   []  Pathology    []  Records       Assessment/Plan     Active Hospital Problems    Diagnosis  POA    **Sepsis [A41.9]  Yes    Hyperbilirubinemia [E80.6]  Yes    Stage 4 chronic kidney disease [N18.4]  Yes    Lactic acidosis [E87.20]  Yes    Opioid use disorder [F11.90]  Yes    Chronic hepatitis C with cirrhosis [B18.2, K74.60]  Yes    Right knee pain [M25.561]  Yes    Effusion of right knee [M25.461]  Yes    Painless jaundice [R17]  Yes    Hepatitis B infection [B19.10]  Yes    Polysubstance abuse [F19.10]  Yes      Resolved Hospital Problems   No resolved problems to display.       Ms. Landeros is a 33 y.o. female with polysubstance use disorder, chronic hepatitis C with cirrhosis, CKD, history of TV endocarditis/MRSA bacteremia presenting with right knee pain and is admitted to the hospital with concern for septic arthritis      Suspected R knee septic arthritis/R knee pain/effusion  Hx TV endocarditis/MRSA bacteremia  Lactic acidosis  -ID and ortho following  --Xray w/ R knee effusion. Arthrocentesis completed in IR on 2/20 with 50cc purulent fluid removed. Fluid consistent with pseudogout in addition to secondary infection with strep agalactiae.  - Abx per ID, see note for recommendations. Await echocardiogram to rule out endocarditis.   -  s/p right knee lavage and drainage on 2/22. Drain removed today. Sutures to be removed in 2 weeks. WBAT with PT     Opioid use disorder  -most recently used heroin 2/19.   - IV dilaudid PRN for pain and COWS protocol.       CKD  -Crt  0.9 compared to prior values of 2.6-5 when also acutely ill       Hepatitis C cirrhosis   Hepatitis B  Painless jaundice/hyperbilirubinemia  -bili 11.3 now 5.5   -denies EtOH use  -CT w/ cirrhosis, splenomegaly, mild ascites  -GI signed off. Follow up with  hepatology to address HepB and HCV          Disposition  May need SNF, Await PT  nathaly Expected Discharge Date: 2/25/2024; Expected Discharge Time:            TYLOR Rogers  [facility]  02/23/24  09:37 EST

## 2024-02-23 NOTE — SIGNIFICANT NOTE
"   02/23/24 1437   OTHER   Discipline occupational therapist   Rehab Time/Intention   Session Not Performed patient/family declined evaluation   Therapy Assessment/Plan (PT)   Criteria for Skilled Interventions Met (PT) patient/family refuse skilled intervention at this time;other (see comments)  (Pt. refuses therapy today stating \"I just want my aide to come and clean me up\", will re-attempt next scheduled visit.)   Recommendation   OT - Next Appointment 02/26/24       "

## 2024-02-24 ENCOUNTER — READMISSION MANAGEMENT (OUTPATIENT)
Dept: CALL CENTER | Facility: HOSPITAL | Age: 34
End: 2024-02-24
Payer: COMMERCIAL

## 2024-02-24 VITALS
BODY MASS INDEX: 38.53 KG/M2 | WEIGHT: 260.14 LBS | HEART RATE: 105 BPM | SYSTOLIC BLOOD PRESSURE: 138 MMHG | OXYGEN SATURATION: 100 % | TEMPERATURE: 98.1 F | RESPIRATION RATE: 18 BRPM | HEIGHT: 69 IN | DIASTOLIC BLOOD PRESSURE: 89 MMHG

## 2024-02-24 LAB
BACTERIA SPEC AEROBE CULT: ABNORMAL
BASOPHILS # BLD MANUAL: 0.09 10*3/MM3 (ref 0–0.2)
BASOPHILS NFR BLD MANUAL: 1 % (ref 0–1.5)
DEPRECATED RDW RBC AUTO: 43.2 FL (ref 37–54)
ERYTHROCYTE [DISTWIDTH] IN BLOOD BY AUTOMATED COUNT: 20.9 % (ref 12.3–15.4)
FOLATE SERPL-MCNC: 17.1 NG/ML (ref 4.78–24.2)
GRAM STN SPEC: ABNORMAL
GRAM STN SPEC: ABNORMAL
HCT VFR BLD AUTO: 27.9 % (ref 34–46.6)
HGB BLD-MCNC: 8.1 G/DL (ref 12–15.9)
LYMPHOCYTES # BLD MANUAL: 1.57 10*3/MM3 (ref 0.7–3.1)
LYMPHOCYTES NFR BLD MANUAL: 4 % (ref 5–12)
MCH RBC QN AUTO: 19.8 PG (ref 26.6–33)
MCHC RBC AUTO-ENTMCNC: 29 G/DL (ref 31.5–35.7)
MCV RBC AUTO: 68.2 FL (ref 79–97)
MONOCYTES # BLD: 0.35 10*3/MM3 (ref 0.1–0.9)
NEUTROPHILS # BLD AUTO: 6.64 10*3/MM3 (ref 1.7–7)
NEUTROPHILS NFR BLD MANUAL: 76.8 % (ref 42.7–76)
PLAT MORPH BLD: NORMAL
PLATELET # BLD AUTO: 148 10*3/MM3 (ref 140–450)
POIKILOCYTOSIS BLD QL SMEAR: ABNORMAL
RBC # BLD AUTO: 4.09 10*6/MM3 (ref 3.77–5.28)
VARIANT LYMPHS NFR BLD MANUAL: 18.2 % (ref 19.6–45.3)
VIT B12 BLD-MCNC: >2000 PG/ML (ref 211–946)
WBC MORPH BLD: NORMAL
WBC NRBC COR # BLD AUTO: 8.65 10*3/MM3 (ref 3.4–10.8)

## 2024-02-24 PROCEDURE — 36415 COLL VENOUS BLD VENIPUNCTURE: CPT | Performed by: ORTHOPAEDIC SURGERY

## 2024-02-24 PROCEDURE — 25810000003 SODIUM CHLORIDE 0.9 % SOLUTION: Performed by: STUDENT IN AN ORGANIZED HEALTH CARE EDUCATION/TRAINING PROGRAM

## 2024-02-24 PROCEDURE — 25010000002 VANCOMYCIN 10 G RECONSTITUTED SOLUTION: Performed by: STUDENT IN AN ORGANIZED HEALTH CARE EDUCATION/TRAINING PROGRAM

## 2024-02-24 PROCEDURE — 82746 ASSAY OF FOLIC ACID SERUM: CPT | Performed by: NURSE PRACTITIONER

## 2024-02-24 PROCEDURE — 85025 COMPLETE CBC W/AUTO DIFF WBC: CPT | Performed by: ORTHOPAEDIC SURGERY

## 2024-02-24 PROCEDURE — 85007 BL SMEAR W/DIFF WBC COUNT: CPT | Performed by: ORTHOPAEDIC SURGERY

## 2024-02-24 PROCEDURE — 25010000002 CEFAZOLIN IN DEXTROSE 2-4 GM/100ML-% SOLUTION: Performed by: ORTHOPAEDIC SURGERY

## 2024-02-24 PROCEDURE — 25010000002 HYDROMORPHONE PER 4 MG: Performed by: ORTHOPAEDIC SURGERY

## 2024-02-24 PROCEDURE — 82607 VITAMIN B-12: CPT | Performed by: NURSE PRACTITIONER

## 2024-02-24 PROCEDURE — 25010000002 CEFAZOLIN IN DEXTROSE 2-4 GM/100ML-% SOLUTION: Performed by: INTERNAL MEDICINE

## 2024-02-24 RX ORDER — CEPHALEXIN 500 MG/1
1000 CAPSULE ORAL 3 TIMES DAILY
Qty: 180 CAPSULE | Refills: 0 | Status: SHIPPED | OUTPATIENT
Start: 2024-02-24 | End: 2024-03-25

## 2024-02-24 RX ADMIN — HYDROMORPHONE HYDROCHLORIDE 0.5 MG: 1 INJECTION, SOLUTION INTRAMUSCULAR; INTRAVENOUS; SUBCUTANEOUS at 09:46

## 2024-02-24 RX ADMIN — CEFAZOLIN SODIUM 2000 MG: 2 INJECTION, SOLUTION INTRAVENOUS at 00:47

## 2024-02-24 RX ADMIN — HYDROMORPHONE HYDROCHLORIDE 0.5 MG: 1 INJECTION, SOLUTION INTRAMUSCULAR; INTRAVENOUS; SUBCUTANEOUS at 03:38

## 2024-02-24 RX ADMIN — CEFAZOLIN SODIUM 2000 MG: 2 INJECTION, SOLUTION INTRAVENOUS at 08:54

## 2024-02-24 RX ADMIN — HYDROMORPHONE HYDROCHLORIDE 0.5 MG: 1 INJECTION, SOLUTION INTRAMUSCULAR; INTRAVENOUS; SUBCUTANEOUS at 00:47

## 2024-02-24 RX ADMIN — HYDROMORPHONE HYDROCHLORIDE 0.5 MG: 1 INJECTION, SOLUTION INTRAMUSCULAR; INTRAVENOUS; SUBCUTANEOUS at 06:38

## 2024-02-24 RX ADMIN — VANCOMYCIN HYDROCHLORIDE 1250 MG: 10 INJECTION, POWDER, LYOPHILIZED, FOR SOLUTION INTRAVENOUS at 00:56

## 2024-02-24 RX ADMIN — PANTOPRAZOLE SODIUM 40 MG: 40 TABLET, DELAYED RELEASE ORAL at 06:38

## 2024-02-24 NOTE — PLAN OF CARE
Goal Outcome Evaluation:  Plan of Care Reviewed With: patient        Progress: no change     Dressing to right knee dry& intact. Edema present to right foot and leg. Pain controlled with dilaudid. Refuses care at times, reinforced importance of skin care and changing position. Did allow brief and pure wick change. Assisted to reposition with pillow support to right knee and ice pack applied for pain control. VSS. Will continue to monitor.

## 2024-02-24 NOTE — CASE MANAGEMENT/SOCIAL WORK
Case Management Discharge Note      Final Note: Pt left AMA.         Selected Continued Care - Discharged on 2/24/2024 Admission date: 2/18/2024 - Discharge disposition: Home or Self Care      Destination    No services have been selected for the patient.                Durable Medical Equipment    No services have been selected for the patient.                Dialysis/Infusion    No services have been selected for the patient.                Home Medical Care    No services have been selected for the patient.                Therapy    No services have been selected for the patient.                Community Resources    No services have been selected for the patient.                Community & DME    No services have been selected for the patient.                         Final Discharge Disposition Code: 07 - left AMA

## 2024-02-24 NOTE — OUTREACH NOTE
Prep Survey      Flowsheet Row Responses   [facility] facility patient discharged from? [facility]   Is LACE score < 7 ? No   Eligibility Readm Mgmt   Discharge diagnosis Sepsis r/t right knee septic arthritis/Opiod disorder   Does the patient have one of the following disease processes/diagnoses(primary or secondary)? Sepsis   Does the patient have Home health ordered? No   Is there a DME ordered? No   Prep survey completed? Yes            OSCAR ENGLAND - Registered Nurse

## 2024-02-24 NOTE — DISCHARGE SUMMARY
Patient Name: Sammie Landeros  : 1990  MRN: [id number]    Date of Admission: 2024  Date of Discharge:  2024  Primary Care Physician: Melvi Landeros APRN      Chief Complaint:   Addiction Problem and Generalized Body Aches      Discharge Diagnoses     Active Hospital Problems    Diagnosis  POA    **Sepsis [A41.9]  Yes    Hyperbilirubinemia [E80.6]  Yes    Stage 4 chronic kidney disease [N18.4]  Yes    Lactic acidosis [E87.20]  Yes    Opioid use disorder [F11.90]  Yes    Chronic hepatitis C with cirrhosis [B18.2, K74.60]  Yes    Right knee pain [M25.561]  Yes    Effusion of right knee [M25.461]  Yes    Painless jaundice [R17]  Yes    Hepatitis B infection [B19.10]  Yes    Polysubstance abuse [F19.10]  Yes      Resolved Hospital Problems   No resolved problems to display.        Hospital Course     Ms. Landeros is a 33 y.o. female with a history of substance use disorder, CKD, chronic hepatitis C, and previous tricuspid valve endocarditis with MRSA bacteremia who presented to [facility] initially complaining of right knee pain.  Please see the admitting history and physical for further details.  She was admitted to the hospital for further evaluation and treatment.      Presentation on admission was concerning for septic arthritis.  Imaging did reveal possible septic emboli in the lungs, so there was concern for repeat episode of endocarditis.  Orthopedic surgery was consulted.  Arthrocentesis had been attempted in the ED, but it was unsuccessful.  Interventional radiology was consulted, and a successful arthrocentesis was performed.  Cultures from joint fluid ultimately grew group B strep.  Blood cultures were also positive for this.  Orthopedic surgery took the patient to the operating room on  for an I&D and washout.  The patient tolerated the procedure well.  Infectious disease was consulted and followed throughout hospitalization.  Echocardiogram was obtained and there was no  evidence of valvular involvement.  The patient was continued on IV antibiotics.  Unfortunately, the patient ultimately elected to leave AGAINST MEDICAL ADVICE.  Was notified by nursing that the patient wanted to leave.  By the time I reached her room, she had already left.  Was unable to  her on the risks of leaving AGAINST MEDICAL ADVICE and also reasons to return to the hospital.  She certainly showed decision making capacity when I had evaluated her earlier in the day, so signing out AMA was her choice.  I did discuss with infectious disease.  Because of the significance of her infection, I have sent Keflex 1 g every 8 hours for 4 weeks to the outside pharmacy.  This certainly will not be the same as the IV antibiotics, but hopefully will help. The patient should follow-up with orthopedic surgery within 2 weeks of leaving the hospital for incision check and suture removal.    Patient has known hepatitis C cirrhosis and hepatitis B.  Bilirubin was elevated greater than 11 on admission.  CT revealed cirrhosis, splenomegaly, and mild ascites.  GI was consulted for further recommendations.  Ultimately liver enzymes were monitored and improved.  The patient should follow-up with [facility] as an outpatient.    Day of Discharge     Subjective:  No acute events overnight.  When seen this morning, patient states that she is feeling pretty well.  Denies chest pain or shortness of breath.  No abdominal pain.    Physical Exam:  Temp:  [98.1 °F (36.7 °C)-99.3 °F (37.4 °C)] 98.1 °F (36.7 °C)  Heart Rate:  [] 105  Resp:  [18] 18  BP: (138-151)/(83-89) 138/89  Body mass index is 38.53 kg/m².  Physical Exam  General: Alert, no acute distress.  Chronically ill-appearing.  ENT: No conjunctival injection or scleral icterus. Moist mucous membranes. No JVD.   Neuro: Eyes open and moving in all directions, strength normal in all extremities, no focal deficits.   Lungs: Clear to auscultation bilaterally. No wheeze  or crackles. No distress.   Heart: RRR, no murmurs.   Abdomen: Obese.  Soft, non-tender, non-distended. Normal bowel sounds.  Ext: Right knee with incision dressing clean/dry/intact, edema of right lower extremity.  Skin: No rashes or lesions. IV site without swelling or erythema.     Consultants     Consult Orders (all) (From admission, onward)       Start     Ordered    02/19/24 1550  Inpatient Access Center Consult  Once        Provider:  (Not yet assigned)    02/19/24 1549    02/18/24 1658  Ortho (on-call MD unless specified)  Once        Specialty:  Orthopedic Surgery  Provider:  Schuyler Winkler MD    02/18/24 1658    02/18/24 1308  Inpatient Case Management  Consult  Once        Provider:  (Not yet assigned)    02/18/24 1308    02/18/24 1148  Inpatient Infectious Diseases Consult  Once        Specialty:  Infectious Diseases  Provider:  Jazmine Higginbotham MD    02/18/24 1147    02/18/24 1135  Inpatient Infectious Diseases Consult  Once,   Status:  Canceled        Specialty:  Infectious Diseases  Provider:  Berenice Maddox MD    02/18/24 1134    02/18/24 1134  Inpatient Gastroenterology Consult  Once,   Status:  Canceled        Specialty:  Gastroenterology  Provider:  Cullen Lemon MD    02/18/24 1134    02/18/24 1112  [facility] (on-call MD unless specified) Details  Once,   Status:  Canceled        Specialty:  Hospitalist  Provider:  (Not yet assigned)    02/18/24 1111    02/18/24 1020  Ortho (on-call MD unless specified)  Once,   Status:  Canceled        Specialty:  Orthopedic Surgery  Provider:  (Not yet assigned)    02/18/24 1019                  Procedures     KNEE ARTHROSCOPY, WITH WASH OUT    Imaging Results (All)       Procedure Component Value Units Date/Time    FL Guided Aspiration Joint [[id number]] Collected: 02/20/24 1503     Updated: 02/20/24 1507    Narrative:      RIGHT KNEE ASPIRATION UNDER FLUOROSCOPIC GUIDANCE     HISTORY: Right knee pain and swelling. Joint effusion. Fever.      Following sterile prep and local anesthetic, an 18-gauge needle was  advanced into the right knee joint under fluoroscopic guidance by Dr. Pratt. 50 cc of grossly purulent fluid was removed and a specimen  sent for cultures and Gram stain and other studies as requested.     The patient tolerated the procedure well and there were no immediate  complications. 1 image was obtained and the fluoroscopy time measures 3  seconds. The dose Kerma measures 1 mGy.     This report was finalized on 2/20/2024 3:04 PM by Dr. Bo Pratt M.D  on Workstation: [id number]        Liver [[id number]] Collected: 02/18/24 1818     Updated: 02/18/24 2204    Narrative:      Right upper quadrant abdominal sonogram     TECHNIQUE: Grayscale and color Doppler images of the right upper  quadrant of the abdomen were obtained.     HISTORY: Cirrhosis     COMPARISON: None     FINDINGS:     The gallbladder is decompressed. There is wall thickening with  pericholecystic fluid.     The common bile duct measures 5 mm in diameter. There is no intrahepatic  biliary ductal dilation.     The liver is cirrhotic. No definite focal hepatic lesion is seen..     The right kidney measures 13 cm in length.  The right kidney  demonstrates normal echogenicity and cortical thickness. There is no  right-sided hydronephrosis. There are no right-sided cysts, masses or  renal calculi.     The visualized portion of the pancreas is unremarkable.     Visualized portions of the aorta and IVC appear normal.       Impression:      1.  Liver cirrhotic. No definite focal hepatic lesion is seen.  2.  The gallbladder is decompressed and demonstrates significant wall  thickening with small amount of pericholecystic fluid. Given patient  history, findings are favored to be related to adjacent chronic liver  disease and/or third spacing with chronic cholecystitis less likely.  Correlation with patient history is recommended with follow-up HIDA scan  if clinically indicated.      This report was finalized on 2/18/2024 10:01 PM by Dr. Grant Almanza M.D on Workstation: [id number]       CT Abdomen Pelvis Without Contrast [[id number]] Collected: 02/18/24 1111     Updated: 02/18/24 1126    Narrative:      CT ABDOMEN PELVIS WO CONTRAST-     INDICATION: Jaundice, fever     COMPARISON: CT abdomen pelvis June 12, 2023     TECHNIQUE:  Routine CT abdomen and pelvis without IV contrast. Coronal and sagittal  reformats. Radiation dose reduction techniques were utilized, including  automated exposure control and exposure modulation based on body size.     FINDINGS:      Lung bases: Trace suspected partially loculated right pleural fluid. Few  small nodular opacities at the lung bases with cavitation in the nodular  right lower lobe opacities. Air trapping in the right middle lobe.     ABDOMEN: Cirrhotic morphology. Probable gallbladder sludge. No biliary  ductal dilatation. Splenomegaly with the spleen measuring 25 cm in  craniocaudal dimension. Mild pancreatic lipomatosis. No pancreatic  ductal dilatation or mass identified. No adrenal nodules. Left kidney is  displaced by splenomegaly. No urolithiasis or hydronephrosis.     Pelvis: Underdistended bladder. Anteverted uterus. No adnexal mass. Free  intraperitoneal fluid seen in the cul-de-sac, extending to the adnexa  and into the left paracolic gutter and continuing into the upper abdomen  around the liver and spleen.     Bowel: No obstruction. Colon appears under distended. Normal appendix.     Abdominal wall: Body wall edema/anasarca. Pelvic wall scarring.     Retroperitoneum: Mild groin adenopathy, may be reactive. No  retroperitoneal lymphadenopathy.     Osseous structures: Spondylosis/degenerative disc disease, moderate at  L4/L5. Lower lumbar facet degenerative arthropathy.       Impression:         1. Cirrhosis.  2. Massive splenomegaly.  3. Mild volume ascites.  4. Trace right pleural fluid.  5. Nodular opacities at the bases, with cavitation in  the right lower  lobe. Suspect septic emboli.     This report was finalized on 2/18/2024 11:23 AM by Dr. Charlie Harris M.D on Workstation: [id number]       XR Chest 1 View [[id number]] Collected: 02/18/24 0922     Updated: 02/18/24 0933    Narrative:      2 VIEW RIGHT KNEE     HISTORY: Generalized pain and fever. Right knee pain. Drug abuse.     FINDINGS: There is mild to moderate degenerative change with joint space  narrowing and spurring involving the lateral compartment and to a lesser  extent the patella. There appears to be a large joint effusion. Given  the history of fever, further evaluation with joint aspiration may be  appropriate.     ONE-VIEW PORTABLE CHEST     HISTORY: Fever. Generalized body aches.     FINDINGS: The lungs are moderately expanded which accentuates an  appearance of mild cardiomegaly and mild vascular congestion that  appears slightly more prominent since the study of 12/2/2023.     This report was finalized on 2/18/2024 9:30 AM by Dr. Bo Pratt M.D  on Workstation: [id number]       XR Knee 1 or 2 View Right [[id number]] Collected: 02/18/24 0922     Updated: 02/18/24 0933    Narrative:      2 VIEW RIGHT KNEE     HISTORY: Generalized pain and fever. Right knee pain. Drug abuse.     FINDINGS: There is mild to moderate degenerative change with joint space  narrowing and spurring involving the lateral compartment and to a lesser  extent the patella. There appears to be a large joint effusion. Given  the history of fever, further evaluation with joint aspiration may be  appropriate.     ONE-VIEW PORTABLE CHEST     HISTORY: Fever. Generalized body aches.     FINDINGS: The lungs are moderately expanded which accentuates an  appearance of mild cardiomegaly and mild vascular congestion that  appears slightly more prominent since the study of 12/2/2023.     This report was finalized on 2/18/2024 9:30 AM by Dr. Bo Pratt M.D  on Workstation: [id number]             Results for orders  "placed during the hospital encounter of 12/02/23    Duplex Venous Lower Extremity - Right CAR    Interpretation Summary    Normal right lower extremity venous duplex scan.    Results for orders placed during the hospital encounter of 02/18/24    Adult Transthoracic Echo Complete w/ Color, Spectral and Contrast if Necessary Per Protocol    Interpretation Summary    Left ventricular systolic function is normal. Left ventricular ejection fraction appears to be 56 - 60%.    Left ventricular diastolic function was normal.    Technically difficult study as patient could not reposition.  Limited visualization of cardiac structures.  Thickened mitral valve with trace to mild regurgitation.  Right-sided valves were not well-visualized.    Pertinent Labs     Results from last 7 days   Lab Units 02/24/24  0859 02/23/24  0614 02/22/24  0603 02/21/24  0654   WBC 10*3/mm3 8.65 10.05 10.30 11.48*   HEMOGLOBIN g/dL 8.1* 7.8* 7.3* 7.4*   PLATELETS 10*3/mm3 148 127* 98* 100*     Results from last 7 days   Lab Units 02/23/24  0614 02/22/24  0603 02/21/24  0654 02/20/24  0758   SODIUM mmol/L 134* 136 136 133*   POTASSIUM mmol/L 4.3 3.8 4.1 4.0   CHLORIDE mmol/L 105 107 108* 104   CO2 mmol/L 20.4* 21.0* 22.0 20.0*   BUN mg/dL 10 13 18 21*   CREATININE mg/dL 0.82 0.96 1.03* 1.16*   GLUCOSE mg/dL 96 115* 90 103*   EGFR mL/min/1.73 95.8 79.3 72.9 63.2     Results from last 7 days   Lab Units 02/23/24  0614 02/22/24  0603 02/21/24  0654 02/20/24  0758   ALBUMIN g/dL 2.0* 2.1* 2.1* 1.9*   BILIRUBIN mg/dL 3.2* 4.1* 5.5* 7.2*   ALK PHOS U/L 363* 342* 313* 303*   AST (SGOT) U/L 42* 47* 49* 56*   ALT (SGPT) U/L 9 15 21 27     Results from last 7 days   Lab Units 02/23/24  0614 02/22/24  0603 02/21/24  0654 02/20/24  0758   CALCIUM mg/dL 7.5* 7.6* 7.7* 7.6*   ALBUMIN g/dL 2.0* 2.1* 2.1* 1.9*       Results from last 7 days   Lab Units 02/18/24  0920   CK TOTAL U/L 42           Invalid input(s): \"LDLCALC\"  Results from last 7 days   Lab Units " 02/22/24  1546 02/20/24  1357 02/20/24  0807 02/20/24  0758 02/18/24  1029   BLOODCX   --   --  No growth at 4 days No growth at 4 days Streptococcus agalactiae (Group B)*   BODYFLDCX   --  Moderate growth (3+) Streptococcus agalactiae (Group B)*  --   --   --    WOUNDCX  Scant growth (1+) Streptococcus agalactiae (Group B)*  --   --   --   --    BCIDPCR   --   --   --   --  Streptococcus agalactiae (Group B). Identification by BCID2 PCR.*         Test Results Pending at Discharge     Pending Labs       Order Current Status    Anaerobic Culture - Surgical Site, Knee, Right In process    Blood Culture - Blood, Arm, Left Preliminary result    Blood Culture - Blood, Arm, Right Preliminary result            Discharge Details        Discharge Medications        ASK your doctor about these medications        Instructions Start Date   busPIRone 5 MG tablet  Commonly known as: BUSPAR   5 mg, Oral, 3 Times Daily      pantoprazole 40 MG EC tablet  Commonly known as: PROTONIX   40 mg, Oral, Every Early Morning      sodium bicarbonate 650 MG tablet   1,300 mg, Oral, 3 Times Daily               No Known Allergies    Discharge Disposition:  Home or Self Care  Left AGAINST MEDICAL ADVICE    Discharge Diet:  No active diet order      CODE STATUS:    Code Status and Medical Interventions:   Ordered at: 02/18/24 1130     Level Of Support Discussed With:    Patient     Code Status (Patient has no pulse and is not breathing):    CPR (Attempt to Resuscitate)     Medical Interventions (Patient has pulse or is breathing):    Full       No future appointments.   Follow-up Information       Melvi Landeros, APRN .    Specialty: Nurse Practitioner  Contact information:  [address] [place] [address] [address] [place] [address] [phone]                             Time Spent on Discharge:  Greater than 30 minutes      Amita Abraham MD  [facility]  02/24/24  15:31 EST

## 2024-02-24 NOTE — NURSING NOTE
Patient was advised that she was still not able to have her boyfriend come up and visit.  She then refused to have her labs drawn.  I informed her that I was unable to give her pain medication and other medications without labs.  We are unable to do complete treatment without labs.  She then called me back in her room and told me that her ride was on there way to pick her up and she was leaving no matter what.  I tried to explain to her the importance of staying and how worried I was about her knee becoming more infected.  She said she didn't care, she was leaving.  I notified  and her physician.

## 2024-02-24 NOTE — SIGNIFICANT NOTE
02/24/24 1225   OTHER   Discipline physical therapist   Rehab Time/Intention   Session Not Performed patient/family declined evaluation;other (see comments)  (Pt denied therapy with no specific reason. PT attempted to explain the importance of therapy but pt continued to deny treatment. PT will attempt follow up tomorrow.)   Therapy Assessment/Plan (PT)   Criteria for Skilled Interventions Met (PT) patient/family refuse skilled intervention at this time;other (see comments)   Recommendation   PT - Next Appointment 02/25/24

## 2024-02-25 LAB
BACTERIA SPEC AEROBE CULT: NORMAL
BACTERIA SPEC AEROBE CULT: NORMAL
BACTERIA SPEC ANAEROBE CULT: NORMAL

## 2024-02-26 NOTE — PAYOR COMM NOTE
"Sammie Abraham (33 y.o. Female)        PLEASE SEE ATTACHED DC SUMMARY    REF#[id number]     THANK YOU    GERALD NORMAN LPN [facility]   Date of Birth   1990    Social Security Number       Address   [address] [address]    Home Phone   [phone]    MRN   [id number]       Restorationism   Alevism    Marital Status   Single                            Admission Date   24    Admission Type   Emergency    Admitting Provider   Lucas Blas MD    Attending Provider       Department, Room/Bed   [address], [address]/1       Discharge Date   2024    Discharge Disposition   Home or Self Care    Discharge Destination                                 Attending Provider: (none)   Allergies: No Known Allergies    Isolation: None   Infection: MRSA (23)   Code Status: Prior    Ht: 175 cm (68.9\")   Wt: 118 kg (260 lb 2.3 oz)    Admission Cmt: None   Principal Problem: Sepsis [A41.9]                   Active Insurance as of 2024       Primary Coverage       Payor Plan Insurance Group Employer/Plan Group    [facility] BY [facility] [id number]       Payor Plan Address Payor Plan Phone Number Payor Plan Fax Number Effective Dates    [address] [address]   2022 - None Entered    [facility] [phone]         Subscriber Name Subscriber Birth Date Member ID       SAMMIE ABRAHAM 1990 [id number]                     Emergency Contacts        (Rel.) Home Phone Work Phone Mobile Phone    LetiMelvi (Mother) [phone] -- --                 Discharge Summary        Amita Abraham MD at 24 1301              Patient Name: Sammie Abraham  : 1990  MRN: [id number]    Date of Admission: 2024  Date of Discharge:  2024  Primary Care Physician: Melvi Abraham APRN      Chief Complaint:   Addiction Problem and Generalized Body Aches      Discharge Diagnoses     Active Hospital Problems    Diagnosis  POA    **Sepsis " [A41.9]  Yes    Hyperbilirubinemia [E80.6]  Yes    Stage 4 chronic kidney disease [N18.4]  Yes    Lactic acidosis [E87.20]  Yes    Opioid use disorder [F11.90]  Yes    Chronic hepatitis C with cirrhosis [B18.2, K74.60]  Yes    Right knee pain [M25.561]  Yes    Effusion of right knee [M25.461]  Yes    Painless jaundice [R17]  Yes    Hepatitis B infection [B19.10]  Yes    Polysubstance abuse [F19.10]  Yes      Resolved Hospital Problems   No resolved problems to display.        Hospital Course     Ms. Landeros is a 33 y.o. female with a history of substance use disorder, CKD, chronic hepatitis C, and previous tricuspid valve endocarditis with MRSA bacteremia who presented to [facility] initially complaining of right knee pain.  Please see the admitting history and physical for further details.  She was admitted to the hospital for further evaluation and treatment.      Presentation on admission was concerning for septic arthritis.  Imaging did reveal possible septic emboli in the lungs, so there was concern for repeat episode of endocarditis.  Orthopedic surgery was consulted.  Arthrocentesis had been attempted in the ED, but it was unsuccessful.  Interventional radiology was consulted, and a successful arthrocentesis was performed.  Cultures from joint fluid ultimately grew group B strep.  Blood cultures were also positive for this.  Orthopedic surgery took the patient to the operating room on 2/22 for an I&D and washout.  The patient tolerated the procedure well.  Infectious disease was consulted and followed throughout hospitalization.  Echocardiogram was obtained and there was no evidence of valvular involvement.  The patient was continued on IV antibiotics.  Unfortunately, the patient ultimately elected to leave AGAINST MEDICAL ADVICE.  Was notified by nursing that the patient wanted to leave.  By the time I reached her room, she had already left.  Was unable to  her on the risks of leaving  AGAINST MEDICAL ADVICE and also reasons to return to the hospital.  She certainly showed decision making capacity when I had evaluated her earlier in the day, so signing out AMA was her choice.  I did discuss with infectious disease.  Because of the significance of her infection, I have sent Keflex 1 g every 8 hours for 4 weeks to the outside pharmacy.  This certainly will not be the same as the IV antibiotics, but hopefully will help. The patient should follow-up with orthopedic surgery within 2 weeks of leaving the hospital for incision check and suture removal.    Patient has known hepatitis C cirrhosis and hepatitis B.  Bilirubin was elevated greater than 11 on admission.  CT revealed cirrhosis, splenomegaly, and mild ascites.  GI was consulted for further recommendations.  Ultimately liver enzymes were monitored and improved.  The patient should follow-up with [facility] as an outpatient.    Day of Discharge     Subjective:  No acute events overnight.  When seen this morning, patient states that she is feeling pretty well.  Denies chest pain or shortness of breath.  No abdominal pain.    Physical Exam:  Temp:  [98.1 °F (36.7 °C)-99.3 °F (37.4 °C)] 98.1 °F (36.7 °C)  Heart Rate:  [] 105  Resp:  [18] 18  BP: (138-151)/(83-89) 138/89  Body mass index is 38.53 kg/m².  Physical Exam  General: Alert, no acute distress.  Chronically ill-appearing.  ENT: No conjunctival injection or scleral icterus. Moist mucous membranes. No JVD.   Neuro: Eyes open and moving in all directions, strength normal in all extremities, no focal deficits.   Lungs: Clear to auscultation bilaterally. No wheeze or crackles. No distress.   Heart: RRR, no murmurs.   Abdomen: Obese.  Soft, non-tender, non-distended. Normal bowel sounds.  Ext: Right knee with incision dressing clean/dry/intact, edema of right lower extremity.  Skin: No rashes or lesions. IV site without swelling or erythema.     Consultants     Consult Orders (all)  (From admission, onward)       Start     Ordered    02/19/24 1550  Inpatient Access Center Consult  Once        Provider:  (Not yet assigned)    02/19/24 1549    02/18/24 1658  Ortho (on-call MD unless specified)  Once        Specialty:  Orthopedic Surgery  Provider:  Schuyler Winkler MD    02/18/24 1658    02/18/24 1308  Inpatient Case Management  Consult  Once        Provider:  (Not yet assigned)    02/18/24 1308    02/18/24 1148  Inpatient Infectious Diseases Consult  Once        Specialty:  Infectious Diseases  Provider:  Jazmine Higginbotham MD    02/18/24 1147    02/18/24 1135  Inpatient Infectious Diseases Consult  Once,   Status:  Canceled        Specialty:  Infectious Diseases  Provider:  Berenice Maddox MD    02/18/24 1134    02/18/24 1134  Inpatient Gastroenterology Consult  Once,   Status:  Canceled        Specialty:  Gastroenterology  Provider:  Cullen Lemon MD    02/18/24 1134    02/18/24 1112  [facility] (on-call MD unless specified) Details  Once,   Status:  Canceled        Specialty:  Hospitalist  Provider:  (Not yet assigned)    02/18/24 1111    02/18/24 1020  Ortho (on-call MD unless specified)  Once,   Status:  Canceled        Specialty:  Orthopedic Surgery  Provider:  (Not yet assigned)    02/18/24 1019                  Procedures     KNEE ARTHROSCOPY, WITH WASH OUT    Imaging Results (All)       Procedure Component Value Units Date/Time    FL Guided Aspiration Joint [[id number]] Collected: 02/20/24 1503     Updated: 02/20/24 1507    Narrative:      RIGHT KNEE ASPIRATION UNDER FLUOROSCOPIC GUIDANCE     HISTORY: Right knee pain and swelling. Joint effusion. Fever.     Following sterile prep and local anesthetic, an 18-gauge needle was  advanced into the right knee joint under fluoroscopic guidance by Dr. Pratt. 50 cc of grossly purulent fluid was removed and a specimen  sent for cultures and Gram stain and other studies as requested.     The patient tolerated the procedure well and  there were no immediate  complications. 1 image was obtained and the fluoroscopy time measures 3  seconds. The dose Kerma measures 1 mGy.     This report was finalized on 2/20/2024 3:04 PM by Dr. Bo Pratt M.D  on Workstation: [id number]       US Liver [[id number]] Collected: 02/18/24 1818     Updated: 02/18/24 2204    Narrative:      Right upper quadrant abdominal sonogram     TECHNIQUE: Grayscale and color Doppler images of the right upper  quadrant of the abdomen were obtained.     HISTORY: Cirrhosis     COMPARISON: None     FINDINGS:     The gallbladder is decompressed. There is wall thickening with  pericholecystic fluid.     The common bile duct measures 5 mm in diameter. There is no intrahepatic  biliary ductal dilation.     The liver is cirrhotic. No definite focal hepatic lesion is seen..     The right kidney measures 13 cm in length.  The right kidney  demonstrates normal echogenicity and cortical thickness. There is no  right-sided hydronephrosis. There are no right-sided cysts, masses or  renal calculi.     The visualized portion of the pancreas is unremarkable.     Visualized portions of the aorta and IVC appear normal.       Impression:      1.  Liver cirrhotic. No definite focal hepatic lesion is seen.  2.  The gallbladder is decompressed and demonstrates significant wall  thickening with small amount of pericholecystic fluid. Given patient  history, findings are favored to be related to adjacent chronic liver  disease and/or third spacing with chronic cholecystitis less likely.  Correlation with patient history is recommended with follow-up HIDA scan  if clinically indicated.     This report was finalized on 2/18/2024 10:01 PM by Dr. Grant Almanza M.D on Workstation: [id number]       CT Abdomen Pelvis Without Contrast [[id number]] Collected: 02/18/24 1111     Updated: 02/18/24 1126    Narrative:      CT ABDOMEN PELVIS WO CONTRAST-     INDICATION: Jaundice, fever     COMPARISON: CT abdomen pelvis  June 12, 2023     TECHNIQUE:  Routine CT abdomen and pelvis without IV contrast. Coronal and sagittal  reformats. Radiation dose reduction techniques were utilized, including  automated exposure control and exposure modulation based on body size.     FINDINGS:      Lung bases: Trace suspected partially loculated right pleural fluid. Few  small nodular opacities at the lung bases with cavitation in the nodular  right lower lobe opacities. Air trapping in the right middle lobe.     ABDOMEN: Cirrhotic morphology. Probable gallbladder sludge. No biliary  ductal dilatation. Splenomegaly with the spleen measuring 25 cm in  craniocaudal dimension. Mild pancreatic lipomatosis. No pancreatic  ductal dilatation or mass identified. No adrenal nodules. Left kidney is  displaced by splenomegaly. No urolithiasis or hydronephrosis.     Pelvis: Underdistended bladder. Anteverted uterus. No adnexal mass. Free  intraperitoneal fluid seen in the cul-de-sac, extending to the adnexa  and into the left paracolic gutter and continuing into the upper abdomen  around the liver and spleen.     Bowel: No obstruction. Colon appears under distended. Normal appendix.     Abdominal wall: Body wall edema/anasarca. Pelvic wall scarring.     Retroperitoneum: Mild groin adenopathy, may be reactive. No  retroperitoneal lymphadenopathy.     Osseous structures: Spondylosis/degenerative disc disease, moderate at  L4/L5. Lower lumbar facet degenerative arthropathy.       Impression:         1. Cirrhosis.  2. Massive splenomegaly.  3. Mild volume ascites.  4. Trace right pleural fluid.  5. Nodular opacities at the bases, with cavitation in the right lower  lobe. Suspect septic emboli.     This report was finalized on 2/18/2024 11:23 AM by Dr. Charlie Harris M.D on Workstation: [id number]       XR Chest 1 View [[id number]] Collected: 02/18/24 0922     Updated: 02/18/24 0933    Narrative:      2 VIEW RIGHT KNEE     HISTORY: Generalized pain and fever.  Right knee pain. Drug abuse.     FINDINGS: There is mild to moderate degenerative change with joint space  narrowing and spurring involving the lateral compartment and to a lesser  extent the patella. There appears to be a large joint effusion. Given  the history of fever, further evaluation with joint aspiration may be  appropriate.     ONE-VIEW PORTABLE CHEST     HISTORY: Fever. Generalized body aches.     FINDINGS: The lungs are moderately expanded which accentuates an  appearance of mild cardiomegaly and mild vascular congestion that  appears slightly more prominent since the study of 12/2/2023.     This report was finalized on 2/18/2024 9:30 AM by Dr. Bo Pratt M.D  on Workstation: [id number]       XR Knee 1 or 2 View Right [[id number]] Collected: 02/18/24 0922     Updated: 02/18/24 0933    Narrative:      2 VIEW RIGHT KNEE     HISTORY: Generalized pain and fever. Right knee pain. Drug abuse.     FINDINGS: There is mild to moderate degenerative change with joint space  narrowing and spurring involving the lateral compartment and to a lesser  extent the patella. There appears to be a large joint effusion. Given  the history of fever, further evaluation with joint aspiration may be  appropriate.     ONE-VIEW PORTABLE CHEST     HISTORY: Fever. Generalized body aches.     FINDINGS: The lungs are moderately expanded which accentuates an  appearance of mild cardiomegaly and mild vascular congestion that  appears slightly more prominent since the study of 12/2/2023.     This report was finalized on 2/18/2024 9:30 AM by Dr. Bo Pratt M.D  on Workstation: [id number]             Results for orders placed during the hospital encounter of 12/02/23    Duplex Venous Lower Extremity - Right CAR    Interpretation Summary    Normal right lower extremity venous duplex scan.    Results for orders placed during the hospital encounter of 02/18/24    Adult Transthoracic Echo Complete w/ Color, Spectral and Contrast if Necessary  "Per Protocol    Interpretation Summary    Left ventricular systolic function is normal. Left ventricular ejection fraction appears to be 56 - 60%.    Left ventricular diastolic function was normal.    Technically difficult study as patient could not reposition.  Limited visualization of cardiac structures.  Thickened mitral valve with trace to mild regurgitation.  Right-sided valves were not well-visualized.    Pertinent Labs     Results from last 7 days   Lab Units 02/24/24  0859 02/23/24  0614 02/22/24  0603 02/21/24  0654   WBC 10*3/mm3 8.65 10.05 10.30 11.48*   HEMOGLOBIN g/dL 8.1* 7.8* 7.3* 7.4*   PLATELETS 10*3/mm3 148 127* 98* 100*     Results from last 7 days   Lab Units 02/23/24  0614 02/22/24  0603 02/21/24  0654 02/20/24  0758   SODIUM mmol/L 134* 136 136 133*   POTASSIUM mmol/L 4.3 3.8 4.1 4.0   CHLORIDE mmol/L 105 107 108* 104   CO2 mmol/L 20.4* 21.0* 22.0 20.0*   BUN mg/dL 10 13 18 21*   CREATININE mg/dL 0.82 0.96 1.03* 1.16*   GLUCOSE mg/dL 96 115* 90 103*   EGFR mL/min/1.73 95.8 79.3 72.9 63.2     Results from last 7 days   Lab Units 02/23/24  0614 02/22/24  0603 02/21/24  0654 02/20/24  0758   ALBUMIN g/dL 2.0* 2.1* 2.1* 1.9*   BILIRUBIN mg/dL 3.2* 4.1* 5.5* 7.2*   ALK PHOS U/L 363* 342* 313* 303*   AST (SGOT) U/L 42* 47* 49* 56*   ALT (SGPT) U/L 9 15 21 27     Results from last 7 days   Lab Units 02/23/24  0614 02/22/24  0603 02/21/24  0654 02/20/24  0758   CALCIUM mg/dL 7.5* 7.6* 7.7* 7.6*   ALBUMIN g/dL 2.0* 2.1* 2.1* 1.9*       Results from last 7 days   Lab Units 02/18/24  0920   CK TOTAL U/L 42           Invalid input(s): \"LDLCALC\"  Results from last 7 days   Lab Units 02/22/24  1546 02/20/24  1357 02/20/24  0807 02/20/24  0758 02/18/24  1029   BLOODCX   --   --  No growth at 4 days No growth at 4 days Streptococcus agalactiae (Group B)*   BODYFLDCX   --  Moderate growth (3+) Streptococcus agalactiae (Group B)*  --   --   --    WOUNDCX  Scant growth (1+) Streptococcus agalactiae (Group B)*  -- "   --   --   --    BCIDPCR   --   --   --   --  Streptococcus agalactiae (Group B). Identification by BCID2 PCR.*         Test Results Pending at Discharge     Pending Labs       Order Current Status    Anaerobic Culture - Surgical Site, Knee, Right In process    Blood Culture - Blood, Arm, Left Preliminary result    Blood Culture - Blood, Arm, Right Preliminary result            Discharge Details        Discharge Medications        ASK your doctor about these medications        Instructions Start Date   busPIRone 5 MG tablet  Commonly known as: BUSPAR   5 mg, Oral, 3 Times Daily      pantoprazole 40 MG EC tablet  Commonly known as: PROTONIX   40 mg, Oral, Every Early Morning      sodium bicarbonate 650 MG tablet   1,300 mg, Oral, 3 Times Daily               No Known Allergies    Discharge Disposition:  Home or Self Care  Left AGAINST MEDICAL ADVICE    Discharge Diet:  No active diet order      CODE STATUS:    Code Status and Medical Interventions:   Ordered at: 02/18/24 1130     Level Of Support Discussed With:    Patient     Code Status (Patient has no pulse and is not breathing):    CPR (Attempt to Resuscitate)     Medical Interventions (Patient has pulse or is breathing):    Full       No future appointments.   Follow-up Information       Melvi Landeros, APRN .    Specialty: Nurse Practitioner  Contact information:  [address] [place] [address] [address] [place] [address] [phone]                             Time Spent on Discharge:  Greater than 30 minutes      Amita Abraham MD  [facility]  02/24/24  15:31 EST                Electronically signed by Amita Abraham MD at 02/24/24 1483       Discharge Order (From admission, onward)      None

## 2024-02-27 LAB — BACTERIA SPEC ANAEROBE CULT: NORMAL

## 2024-03-09 NOTE — PROGRESS NOTES
"Enter Query Response Below      Query Response: Unable to determine             If applicable, please update the problem list.     Patient: Sammie Segal        : 1990  Account: [id number]           Admit Date: 2024        How to Respond to this query:       a. Click New Note     b. Answer query within the yellow box.                c. Update the Problem List, if applicable.      If you have any questions about this query contact me at: [email or web address]     ,    Risk: 33 year old female with history of hepatitis C with cirrhosis admitted with sepsis and septic arthritis.   Clinical indicators:  gastroenterology progress note \"Elevated LFTs actually not as high as they have been previously with the exception of the total bilirubin which is markedly elevated at 11 compared to a normal or slightly elevated baseline.  This could be the result of obstructive process, sepsis, hemolysis\" Bilirubin 3.2- 11.3  Treatment: 1L NS ( 0926, 1313), LR 100mL/hr ( 1806-2002), GI consult, patient left AMA    Hyperbilirubinemia due to sepsis and hepatitis B & C  Hyperbilirubinemia due to hepatitis only  Other- specify_______  Unable to determine    By submitting this query, we are merely seeking further clarification of documentation to accurately reflect all conditions that you are monitoring, evaluating, treating or that extend the hospitalization or utilize additional resources of care. Please utilize your independent clinical judgment when addressing the question(s) above.     This query and your response, once completed, will be entered into the legal medical record.    Sincerely,  Ishan Rodríguez RN  Clinical Documentation Integrity Program     "

## 2024-03-09 NOTE — PROGRESS NOTES
Enter Query Response Below      Query Response: Acute kidney injury due to sepsis             If applicable, please update the problem list.     Patient: Sammie Segal        : 1990  Account: [id number]           Admit Date: 2024        How to Respond to this query:       a. Click New Note     b. Answer query within the yellow box.                c. Update the Problem List, if applicable.      If you have any questions about this query contact me at: [email or web address]     ,    Risk: 33-year-old female with history of CKD4 (b/l ~1.5-1.9) admitted with sepsis and septic arthritis.   Clinical indicators: Creatinine 0.82- 1.9  Treatment: 1L NS ( 0926, 1313), LR 100mL/hr (1806-2002), labs monitored, patient left AMA    Please clarify the following:    Acute kidney injury due to sepsis  Acute kidney injury due to _____  Insignificant lab finding  Other- specify_______  Unable to determine    By submitting this query, we are merely seeking further clarification of documentation to accurately reflect all conditions that you are monitoring, evaluating, treating or that extend the hospitalization or utilize additional resources of care. Please utilize your independent clinical judgment when addressing the question(s) above.     This query and your response, once completed, will be entered into the legal medical record.    Sincerely,  Ishan Rodríguez RN  Clinical Documentation Integrity Program

## 2024-03-09 NOTE — PROGRESS NOTES
"Enter Query Response Below      Query Response: Unable to determine             If applicable, please update the problem list.     Patient: Sammie Segal        : 1990  Account: [id number]           Admit Date: 2024        How to Respond to this query:       a. Click New Note     b. Answer query within the yellow box.                c. Update the Problem List, if applicable.      If you have any questions about this query contact me at: [email or web address]     ,    Risk: 33 year old female history of CKD, IVDU, and MRSA bacteremia admitted with sepsis and septic arthritis.   Clinical indicators: Discharge summary notes \"Lactic acidosis\". Lactic acid 1.4-5.6  Treatment: 1L NS ( 0926, 1313), LR 100mL/hr (1806-2002), labs monitored, patient left AMA    lactic acidosis due to sepsis  lactic acidosis due to ______  Other- specify_______  Unable to determine    By submitting this query, we are merely seeking further clarification of documentation to accurately reflect all conditions that you are monitoring, evaluating, treating or that extend the hospitalization or utilize additional resources of care. Please utilize your independent clinical judgment when addressing the question(s) above.     This query and your response, once completed, will be entered into the legal medical record.    Sincerely,  Ishan Rodríguez RN  Clinical Documentation Integrity Program     "

## (undated) DEVICE — KT DRN EVAC WND PVC PCH WTROC RND 10F400

## (undated) DEVICE — EMERALD ARTHROSCOPY SHEET: Brand: CONVERTORS

## (undated) DEVICE — GLV SURG PREMIERPRO ORTHO LTX PF SZ8 BRN

## (undated) DEVICE — PAD,ABDOMINAL,8"X10",ST,LF: Brand: MEDLINE

## (undated) DEVICE — SUT PDS 2 0 CT1 27IN CLR Z259H

## (undated) DEVICE — DRSNG SURESITE WNDW 4X4.5

## (undated) DEVICE — BNDG ESMARK STRL 6INX12FT LF

## (undated) DEVICE — DRESSING,GAUZE,XEROFORM,CURAD,1"X8",ST: Brand: CURAD

## (undated) DEVICE — TBG PENCL TELESCP MEGADYNE SMOKE EVAC 10FT

## (undated) DEVICE — NEEDLE, QUINCKE, 18GX3.5": Brand: MEDLINE

## (undated) DEVICE — HANDPIECE SET WITH COAXIAL HIGH FLOW TIP AND SUCTION TUBE: Brand: INTERPULSE

## (undated) DEVICE — BNDG,ELSTC,MATRIX,STRL,6"X5YD,LF,HOOK&LP: Brand: MEDLINE

## (undated) DEVICE — MAT FLR ABSORBENT LG 4FT 10 2.5FT

## (undated) DEVICE — DISPOSABLE TOURNIQUET CUFF SINGLE BLADDER, SINGLE PORT AND QUICK CONNECT CONNECTOR: Brand: COLOR CUFF

## (undated) DEVICE — TRAP FLD MINIVAC MEGADYNE 100ML

## (undated) DEVICE — PK ORTHO MAJ 40

## (undated) DEVICE — SUT ETHLN 3/0 PSL BLK MONO SA 30IN 1691H

## (undated) DEVICE — BNDG,ELSTC,MATRIX,STRL,4"X5YD,LF,HOOK&LP: Brand: MEDLINE

## (undated) DEVICE — UNDERCAST PADDING: Brand: DEROYAL

## (undated) DEVICE — SUT ETHLN 3/0 PS1 18IN 1663H

## (undated) DEVICE — APPL CHLORAPREP HI/LITE 26ML ORNG

## (undated) DEVICE — TBG PUMP ARTHSCP MAIN AR6400 16FT

## (undated) DEVICE — SYR LUERLOK 30CC

## (undated) DEVICE — ANES CIRC EXTENDAFLEX-LF: Brand: MEDLINE INDUSTRIES, INC.

## (undated) DEVICE — GLV SURG SIGNATURE ESSENTIAL PF LTX SZ8

## (undated) DEVICE — PK ARTHSCP 40

## (undated) DEVICE — PATIENT RETURN ELECTRODE, SINGLE-USE, CONTACT QUALITY MONITORING, ADULT, WITH 9FT CORD, FOR PATIENTS WEIGING OVER 33LBS. (15KG): Brand: MEGADYNE

## (undated) DEVICE — WEBRIL* CAST PADDING: Brand: DEROYAL

## (undated) DEVICE — CULT AER/ANAEROB FASTIDIOUS BACT